# Patient Record
Sex: MALE | Race: WHITE | NOT HISPANIC OR LATINO
[De-identification: names, ages, dates, MRNs, and addresses within clinical notes are randomized per-mention and may not be internally consistent; named-entity substitution may affect disease eponyms.]

---

## 2017-01-03 ENCOUNTER — APPOINTMENT (OUTPATIENT)
Dept: ELECTROPHYSIOLOGY | Facility: CLINIC | Age: 72
End: 2017-01-03

## 2017-01-23 ENCOUNTER — OTHER (OUTPATIENT)
Age: 72
End: 2017-01-23

## 2017-02-21 ENCOUNTER — APPOINTMENT (OUTPATIENT)
Dept: ELECTROPHYSIOLOGY | Facility: CLINIC | Age: 72
End: 2017-02-21

## 2017-02-24 ENCOUNTER — APPOINTMENT (OUTPATIENT)
Dept: ELECTROPHYSIOLOGY | Facility: CLINIC | Age: 72
End: 2017-02-24

## 2017-02-24 ENCOUNTER — NON-APPOINTMENT (OUTPATIENT)
Age: 72
End: 2017-02-24

## 2017-02-24 VITALS
DIASTOLIC BLOOD PRESSURE: 62 MMHG | OXYGEN SATURATION: 98 % | SYSTOLIC BLOOD PRESSURE: 120 MMHG | HEIGHT: 68 IN | BODY MASS INDEX: 25.31 KG/M2 | WEIGHT: 167 LBS | HEART RATE: 78 BPM

## 2017-02-24 DIAGNOSIS — R61 GENERALIZED HYPERHIDROSIS: ICD-10-CM

## 2017-02-24 LAB
ALBUMIN SERPL ELPH-MCNC: 4.5 G/DL
ALP BLD-CCNC: 62 U/L
ALT SERPL-CCNC: 19 U/L
ANION GAP SERPL CALC-SCNC: 19 MMOL/L
AST SERPL-CCNC: 24 U/L
BASOPHILS # BLD AUTO: 0.02 K/UL
BASOPHILS NFR BLD AUTO: 0.3 %
BILIRUB SERPL-MCNC: 0.5 MG/DL
BUN SERPL-MCNC: 22 MG/DL
CALCIUM SERPL-MCNC: 10.3 MG/DL
CHLORIDE SERPL-SCNC: 96 MMOL/L
CO2 SERPL-SCNC: 26 MMOL/L
CREAT SERPL-MCNC: 1.06 MG/DL
EOSINOPHIL # BLD AUTO: 0.18 K/UL
EOSINOPHIL NFR BLD AUTO: 2.6 %
ERYTHROCYTE [SEDIMENTATION RATE] IN BLOOD BY WESTERGREN METHOD: 29 MM/HR
GLUCOSE SERPL-MCNC: 115 MG/DL
HCT VFR BLD CALC: 37.1 %
HGB BLD-MCNC: 12.9 G/DL
IMM GRANULOCYTES NFR BLD AUTO: 0.1 %
LDH SERPL-CCNC: 187 U/L
LYMPHOCYTES # BLD AUTO: 1.13 K/UL
LYMPHOCYTES NFR BLD AUTO: 16.4 %
MAN DIFF?: NORMAL
MCHC RBC-ENTMCNC: 34.8 GM/DL
MCHC RBC-ENTMCNC: 35.1 PG
MCV RBC AUTO: 100.8 FL
MONOCYTES # BLD AUTO: 0.39 K/UL
MONOCYTES NFR BLD AUTO: 5.6 %
NEUTROPHILS # BLD AUTO: 5.18 K/UL
NEUTROPHILS NFR BLD AUTO: 75 %
PLATELET # BLD AUTO: 248 K/UL
POTASSIUM SERPL-SCNC: 3.9 MMOL/L
PROT SERPL-MCNC: 8 G/DL
RBC # BLD: 3.68 M/UL
RBC # FLD: 14.8 %
SODIUM SERPL-SCNC: 141 MMOL/L
WBC # FLD AUTO: 6.91 K/UL

## 2017-03-06 ENCOUNTER — FORM ENCOUNTER (OUTPATIENT)
Age: 72
End: 2017-03-06

## 2017-03-07 ENCOUNTER — OUTPATIENT (OUTPATIENT)
Dept: OUTPATIENT SERVICES | Facility: HOSPITAL | Age: 72
LOS: 1 days | End: 2017-03-07
Payer: MEDICARE

## 2017-03-07 ENCOUNTER — APPOINTMENT (OUTPATIENT)
Dept: CT IMAGING | Facility: IMAGING CENTER | Age: 72
End: 2017-03-07

## 2017-03-07 DIAGNOSIS — Z98.89 OTHER SPECIFIED POSTPROCEDURAL STATES: Chronic | ICD-10-CM

## 2017-03-07 DIAGNOSIS — R61 GENERALIZED HYPERHIDROSIS: ICD-10-CM

## 2017-03-07 PROCEDURE — 70450 CT HEAD/BRAIN W/O DYE: CPT

## 2017-06-16 ENCOUNTER — OUTPATIENT (OUTPATIENT)
Dept: OUTPATIENT SERVICES | Facility: HOSPITAL | Age: 72
LOS: 1 days | End: 2017-06-16
Payer: MEDICARE

## 2017-06-16 ENCOUNTER — APPOINTMENT (OUTPATIENT)
Dept: CV DIAGNOSITCS | Facility: HOSPITAL | Age: 72
End: 2017-06-16

## 2017-06-16 ENCOUNTER — APPOINTMENT (OUTPATIENT)
Dept: CARDIOLOGY | Facility: CLINIC | Age: 72
End: 2017-06-16

## 2017-06-16 ENCOUNTER — NON-APPOINTMENT (OUTPATIENT)
Age: 72
End: 2017-06-16

## 2017-06-16 VITALS — HEART RATE: 81 BPM | DIASTOLIC BLOOD PRESSURE: 64 MMHG | SYSTOLIC BLOOD PRESSURE: 119 MMHG

## 2017-06-16 DIAGNOSIS — Z95.2 PRESENCE OF PROSTHETIC HEART VALVE: ICD-10-CM

## 2017-06-16 DIAGNOSIS — Z98.89 OTHER SPECIFIED POSTPROCEDURAL STATES: Chronic | ICD-10-CM

## 2017-06-16 PROCEDURE — 93306 TTE W/DOPPLER COMPLETE: CPT | Mod: 26

## 2017-06-16 PROCEDURE — 93306 TTE W/DOPPLER COMPLETE: CPT

## 2017-06-16 RX ORDER — PREDNISONE 20 MG/1
20 TABLET ORAL
Qty: 90 | Refills: 0 | Status: DISCONTINUED | COMMUNITY
Start: 2015-10-08 | End: 2017-06-16

## 2017-06-16 RX ORDER — HYDROCODONE BITARTRATE AND HOMATROPINE METHYLBROMIDE 5; 1.5 MG/5ML; MG/5ML
5-1.5 SOLUTION ORAL
Qty: 150 | Refills: 0 | Status: DISCONTINUED | COMMUNITY
Start: 2017-01-10 | End: 2017-06-16

## 2017-06-20 ENCOUNTER — APPOINTMENT (OUTPATIENT)
Dept: ELECTROPHYSIOLOGY | Facility: CLINIC | Age: 72
End: 2017-06-20

## 2017-06-20 ENCOUNTER — NON-APPOINTMENT (OUTPATIENT)
Age: 72
End: 2017-06-20

## 2017-06-20 VITALS — DIASTOLIC BLOOD PRESSURE: 52 MMHG | HEART RATE: 70 BPM | SYSTOLIC BLOOD PRESSURE: 109 MMHG

## 2017-07-28 ENCOUNTER — APPOINTMENT (OUTPATIENT)
Dept: CARDIOLOGY | Facility: CLINIC | Age: 72
End: 2017-07-28
Payer: MEDICARE

## 2017-07-28 ENCOUNTER — RX RENEWAL (OUTPATIENT)
Age: 72
End: 2017-07-28

## 2017-07-28 VITALS
OXYGEN SATURATION: 97 % | HEART RATE: 91 BPM | SYSTOLIC BLOOD PRESSURE: 124 MMHG | BODY MASS INDEX: 25.31 KG/M2 | HEIGHT: 68 IN | WEIGHT: 167 LBS | DIASTOLIC BLOOD PRESSURE: 68 MMHG | RESPIRATION RATE: 16 BRPM

## 2017-07-28 DIAGNOSIS — I25.2 OLD MYOCARDIAL INFARCTION: ICD-10-CM

## 2017-07-28 DIAGNOSIS — R51 HEADACHE: ICD-10-CM

## 2017-07-28 DIAGNOSIS — Z85.71 PERSONAL HISTORY OF HODGKIN LYMPHOMA: ICD-10-CM

## 2017-07-28 DIAGNOSIS — Z86.79 PERSONAL HISTORY OF OTHER DISEASES OF THE CIRCULATORY SYSTEM: ICD-10-CM

## 2017-07-28 PROCEDURE — 94620 PULMONARY STRESS TESTING SIMPLE: CPT | Mod: 59

## 2017-07-28 PROCEDURE — 93000 ELECTROCARDIOGRAM COMPLETE: CPT | Mod: 59

## 2017-07-28 PROCEDURE — 99205 OFFICE O/P NEW HI 60 MIN: CPT | Mod: 25

## 2017-08-11 LAB — ERYTHROCYTE [SEDIMENTATION RATE] IN BLOOD BY WESTERGREN METHOD: 22 MM/HR

## 2017-08-14 LAB
ANION GAP SERPL CALC-SCNC: 17 MMOL/L
BUN SERPL-MCNC: 31 MG/DL
CALCIUM SERPL-MCNC: 9.7 MG/DL
CHLORIDE SERPL-SCNC: 95 MMOL/L
CO2 SERPL-SCNC: 27 MMOL/L
CREAT SERPL-MCNC: 1.46 MG/DL
GLUCOSE SERPL-MCNC: 211 MG/DL
POTASSIUM SERPL-SCNC: 5 MMOL/L
SODIUM SERPL-SCNC: 139 MMOL/L

## 2017-08-22 ENCOUNTER — APPOINTMENT (OUTPATIENT)
Dept: CARDIOLOGY | Facility: CLINIC | Age: 72
End: 2017-08-22
Payer: MEDICARE

## 2017-08-22 VITALS
HEART RATE: 74 BPM | OXYGEN SATURATION: 95 % | RESPIRATION RATE: 17 BRPM | HEIGHT: 68 IN | SYSTOLIC BLOOD PRESSURE: 129 MMHG | BODY MASS INDEX: 25.46 KG/M2 | WEIGHT: 168 LBS | DIASTOLIC BLOOD PRESSURE: 69 MMHG

## 2017-08-22 PROCEDURE — 99215 OFFICE O/P EST HI 40 MIN: CPT

## 2017-09-26 ENCOUNTER — NON-APPOINTMENT (OUTPATIENT)
Age: 72
End: 2017-09-26

## 2017-09-26 ENCOUNTER — APPOINTMENT (OUTPATIENT)
Dept: ELECTROPHYSIOLOGY | Facility: CLINIC | Age: 72
End: 2017-09-26
Payer: MEDICARE

## 2017-09-26 VITALS — HEART RATE: 73 BPM | DIASTOLIC BLOOD PRESSURE: 52 MMHG | SYSTOLIC BLOOD PRESSURE: 117 MMHG

## 2017-09-26 PROCEDURE — 93284 PRGRMG EVAL IMPLANTABLE DFB: CPT

## 2017-09-26 PROCEDURE — 93000 ELECTROCARDIOGRAM COMPLETE: CPT | Mod: 59

## 2017-09-26 PROCEDURE — 99213 OFFICE O/P EST LOW 20 MIN: CPT | Mod: 25

## 2017-09-29 ENCOUNTER — RX RENEWAL (OUTPATIENT)
Age: 72
End: 2017-09-29

## 2017-09-29 ENCOUNTER — APPOINTMENT (OUTPATIENT)
Dept: CARDIOLOGY | Facility: CLINIC | Age: 72
End: 2017-09-29
Payer: MEDICARE

## 2017-09-29 VITALS
SYSTOLIC BLOOD PRESSURE: 122 MMHG | DIASTOLIC BLOOD PRESSURE: 70 MMHG | HEART RATE: 79 BPM | BODY MASS INDEX: 26.22 KG/M2 | HEIGHT: 68 IN | OXYGEN SATURATION: 95 % | RESPIRATION RATE: 17 BRPM | WEIGHT: 173 LBS

## 2017-09-29 PROCEDURE — 99215 OFFICE O/P EST HI 40 MIN: CPT

## 2017-10-18 ENCOUNTER — APPOINTMENT (OUTPATIENT)
Dept: CARDIOLOGY | Facility: CLINIC | Age: 72
End: 2017-10-18

## 2017-11-28 ENCOUNTER — APPOINTMENT (OUTPATIENT)
Dept: CARDIOLOGY | Facility: CLINIC | Age: 72
End: 2017-11-28
Payer: MEDICARE

## 2017-11-28 VITALS
BODY MASS INDEX: 26.52 KG/M2 | SYSTOLIC BLOOD PRESSURE: 120 MMHG | WEIGHT: 175 LBS | OXYGEN SATURATION: 97 % | DIASTOLIC BLOOD PRESSURE: 50 MMHG | HEIGHT: 68 IN | HEART RATE: 83 BPM

## 2017-11-28 DIAGNOSIS — I25.10 ATHEROSCLEROTIC HEART DISEASE OF NATIVE CORONARY ARTERY W/OUT ANGINA PECTORIS: ICD-10-CM

## 2017-11-28 PROCEDURE — 99214 OFFICE O/P EST MOD 30 MIN: CPT

## 2017-11-29 LAB
ALBUMIN SERPL ELPH-MCNC: 4.2 G/DL
ALP BLD-CCNC: 60 U/L
ALT SERPL-CCNC: 14 U/L
ANION GAP SERPL CALC-SCNC: 18 MMOL/L
AST SERPL-CCNC: 19 U/L
BASOPHILS # BLD AUTO: 0.02 K/UL
BASOPHILS NFR BLD AUTO: 0.2 %
BILIRUB SERPL-MCNC: 0.4 MG/DL
BUN SERPL-MCNC: 29 MG/DL
CALCIUM SERPL-MCNC: 10.1 MG/DL
CO2 SERPL-SCNC: 27 MMOL/L
CREAT SERPL-MCNC: 1.56 MG/DL
EOSINOPHIL # BLD AUTO: 0.16 K/UL
EOSINOPHIL NFR BLD AUTO: 1.8 %
GLUCOSE SERPL-MCNC: 249 MG/DL
HCT VFR BLD CALC: 35.7 %
HGB BLD-MCNC: 11.6 G/DL
IMM GRANULOCYTES NFR BLD AUTO: 0.2 %
LYMPHOCYTES # BLD AUTO: 1.11 K/UL
LYMPHOCYTES NFR BLD AUTO: 12.3 %
MAGNESIUM SERPL-MCNC: 2 MG/DL
MAN DIFF?: NORMAL
MCHC RBC-ENTMCNC: 32.6 PG
MONOCYTES # BLD AUTO: 0.56 K/UL
MONOCYTES NFR BLD AUTO: 6.2 %
NEUTROPHILS # BLD AUTO: 7.19 K/UL
NEUTROPHILS NFR BLD AUTO: 79.3 %
PLATELET # BLD AUTO: 315 K/UL
POTASSIUM SERPL-SCNC: 4.6 MMOL/L
PROT SERPL-MCNC: 7.7 G/DL
RBC # BLD: 3.56 M/UL
RBC # FLD: 14.5 %
WBC # FLD AUTO: 9.06 K/UL

## 2017-12-01 ENCOUNTER — APPOINTMENT (OUTPATIENT)
Dept: CARDIOLOGY | Facility: CLINIC | Age: 72
End: 2017-12-01
Payer: MEDICARE

## 2017-12-04 ENCOUNTER — NON-APPOINTMENT (OUTPATIENT)
Age: 72
End: 2017-12-04

## 2017-12-04 ENCOUNTER — APPOINTMENT (OUTPATIENT)
Dept: CARDIOLOGY | Facility: CLINIC | Age: 72
End: 2017-12-04
Payer: MEDICARE

## 2017-12-04 VITALS — DIASTOLIC BLOOD PRESSURE: 64 MMHG | OXYGEN SATURATION: 98 % | HEART RATE: 91 BPM | SYSTOLIC BLOOD PRESSURE: 115 MMHG

## 2017-12-04 PROCEDURE — 93000 ELECTROCARDIOGRAM COMPLETE: CPT

## 2017-12-04 PROCEDURE — 99214 OFFICE O/P EST MOD 30 MIN: CPT

## 2017-12-07 ENCOUNTER — APPOINTMENT (OUTPATIENT)
Dept: CV DIAGNOSITCS | Facility: HOSPITAL | Age: 72
End: 2017-12-07

## 2017-12-13 ENCOUNTER — OUTPATIENT (OUTPATIENT)
Dept: OUTPATIENT SERVICES | Facility: HOSPITAL | Age: 72
LOS: 1 days | End: 2017-12-13
Payer: MEDICARE

## 2017-12-13 ENCOUNTER — APPOINTMENT (OUTPATIENT)
Dept: CV DIAGNOSITCS | Facility: HOSPITAL | Age: 72
End: 2017-12-13

## 2017-12-13 DIAGNOSIS — Z98.89 OTHER SPECIFIED POSTPROCEDURAL STATES: Chronic | ICD-10-CM

## 2017-12-13 DIAGNOSIS — I50.22 CHRONIC SYSTOLIC (CONGESTIVE) HEART FAILURE: ICD-10-CM

## 2017-12-13 PROCEDURE — 93306 TTE W/DOPPLER COMPLETE: CPT

## 2017-12-13 PROCEDURE — 93306 TTE W/DOPPLER COMPLETE: CPT | Mod: 26

## 2017-12-14 ENCOUNTER — RESULT REVIEW (OUTPATIENT)
Age: 72
End: 2017-12-14

## 2017-12-18 ENCOUNTER — APPOINTMENT (OUTPATIENT)
Dept: CV DIAGNOSITCS | Facility: HOSPITAL | Age: 72
End: 2017-12-18

## 2018-01-22 ENCOUNTER — RX RENEWAL (OUTPATIENT)
Age: 73
End: 2018-01-22

## 2018-03-16 ENCOUNTER — APPOINTMENT (OUTPATIENT)
Dept: CARDIOLOGY | Facility: CLINIC | Age: 73
End: 2018-03-16
Payer: MEDICARE

## 2018-03-16 VITALS
HEART RATE: 78 BPM | BODY MASS INDEX: 26.22 KG/M2 | WEIGHT: 173 LBS | SYSTOLIC BLOOD PRESSURE: 103 MMHG | HEIGHT: 68 IN | OXYGEN SATURATION: 98 % | DIASTOLIC BLOOD PRESSURE: 60 MMHG | RESPIRATION RATE: 16 BRPM

## 2018-03-16 DIAGNOSIS — I42.7 CARDIOMYOPATHY DUE TO DRUG AND EXTERNAL AGENT: ICD-10-CM

## 2018-03-16 DIAGNOSIS — Z86.79 PERSONAL HISTORY OF OTHER DISEASES OF THE CIRCULATORY SYSTEM: ICD-10-CM

## 2018-03-16 DIAGNOSIS — F32.9 MAJOR DEPRESSIVE DISORDER, SINGLE EPISODE, UNSPECIFIED: ICD-10-CM

## 2018-03-16 PROCEDURE — 99215 OFFICE O/P EST HI 40 MIN: CPT

## 2018-03-16 RX ORDER — ATORVASTATIN CALCIUM 20 MG/1
20 TABLET, FILM COATED ORAL
Qty: 90 | Refills: 0 | Status: DISCONTINUED | COMMUNITY
Start: 2017-05-15 | End: 2018-03-16

## 2018-03-28 LAB
ANION GAP SERPL CALC-SCNC: 18 MMOL/L
BUN SERPL-MCNC: 31 MG/DL
CALCIUM SERPL-MCNC: 10.3 MG/DL
CHLORIDE SERPL-SCNC: 93 MMOL/L
CO2 SERPL-SCNC: 26 MMOL/L
CREAT SERPL-MCNC: 1.55 MG/DL
GLUCOSE SERPL-MCNC: 273 MG/DL
NT-PROBNP SERPL-MCNC: 804 PG/ML
POTASSIUM SERPL-SCNC: 4.7 MMOL/L
SODIUM SERPL-SCNC: 137 MMOL/L
TSH SERPL-ACNC: 5.05 UIU/ML

## 2018-04-17 LAB — DIGOXIN SERPL-MCNC: 1.2 NG/ML

## 2018-05-08 ENCOUNTER — APPOINTMENT (OUTPATIENT)
Dept: ELECTROPHYSIOLOGY | Facility: CLINIC | Age: 73
End: 2018-05-08

## 2018-07-03 ENCOUNTER — APPOINTMENT (OUTPATIENT)
Dept: ELECTROPHYSIOLOGY | Facility: CLINIC | Age: 73
End: 2018-07-03
Payer: MEDICARE

## 2018-07-03 ENCOUNTER — NON-APPOINTMENT (OUTPATIENT)
Age: 73
End: 2018-07-03

## 2018-07-03 VITALS — WEIGHT: 170 LBS | HEIGHT: 68 IN | BODY MASS INDEX: 25.76 KG/M2

## 2018-07-03 VITALS — OXYGEN SATURATION: 98 %

## 2018-07-03 VITALS — DIASTOLIC BLOOD PRESSURE: 50 MMHG | HEART RATE: 74 BPM | SYSTOLIC BLOOD PRESSURE: 109 MMHG

## 2018-07-03 PROCEDURE — 99214 OFFICE O/P EST MOD 30 MIN: CPT | Mod: 25

## 2018-07-03 PROCEDURE — 93000 ELECTROCARDIOGRAM COMPLETE: CPT | Mod: 59

## 2018-07-03 PROCEDURE — 93284 PRGRMG EVAL IMPLANTABLE DFB: CPT

## 2018-07-03 RX ORDER — AMOXICILLIN 500 MG/1
500 CAPSULE ORAL
Qty: 16 | Refills: 0 | Status: DISCONTINUED | COMMUNITY
Start: 2018-06-02

## 2018-07-13 ENCOUNTER — APPOINTMENT (OUTPATIENT)
Dept: ENDOCRINOLOGY | Facility: CLINIC | Age: 73
End: 2018-07-13

## 2018-07-19 ENCOUNTER — APPOINTMENT (OUTPATIENT)
Dept: ENDOCRINOLOGY | Facility: CLINIC | Age: 73
End: 2018-07-19

## 2018-09-13 ENCOUNTER — APPOINTMENT (OUTPATIENT)
Dept: ENDOCRINOLOGY | Facility: CLINIC | Age: 73
End: 2018-09-13

## 2018-10-12 ENCOUNTER — APPOINTMENT (OUTPATIENT)
Dept: ELECTROPHYSIOLOGY | Facility: CLINIC | Age: 73
End: 2018-10-12
Payer: MEDICARE

## 2018-10-12 ENCOUNTER — NON-APPOINTMENT (OUTPATIENT)
Age: 73
End: 2018-10-12

## 2018-10-12 VITALS
BODY MASS INDEX: 26.22 KG/M2 | OXYGEN SATURATION: 97 % | SYSTOLIC BLOOD PRESSURE: 115 MMHG | HEIGHT: 68 IN | WEIGHT: 173 LBS | HEART RATE: 71 BPM | DIASTOLIC BLOOD PRESSURE: 68 MMHG

## 2018-10-12 PROCEDURE — 93284 PRGRMG EVAL IMPLANTABLE DFB: CPT

## 2018-10-12 PROCEDURE — 99213 OFFICE O/P EST LOW 20 MIN: CPT

## 2018-10-12 PROCEDURE — 93000 ELECTROCARDIOGRAM COMPLETE: CPT | Mod: 59

## 2019-01-24 ENCOUNTER — APPOINTMENT (OUTPATIENT)
Dept: ELECTROPHYSIOLOGY | Facility: CLINIC | Age: 74
End: 2019-01-24

## 2019-05-14 ENCOUNTER — APPOINTMENT (OUTPATIENT)
Dept: ELECTROPHYSIOLOGY | Facility: CLINIC | Age: 74
End: 2019-05-14
Payer: MEDICARE

## 2019-05-14 VITALS — HEART RATE: 75 BPM | RESPIRATION RATE: 14 BRPM | SYSTOLIC BLOOD PRESSURE: 111 MMHG | DIASTOLIC BLOOD PRESSURE: 55 MMHG

## 2019-05-14 DIAGNOSIS — I31.3 PERICARDIAL EFFUSION (NONINFLAMMATORY): ICD-10-CM

## 2019-05-14 PROCEDURE — 93283 PRGRMG EVAL IMPLANTABLE DFB: CPT

## 2019-05-14 PROCEDURE — 99214 OFFICE O/P EST MOD 30 MIN: CPT

## 2019-05-14 RX ORDER — LOSARTAN POTASSIUM 25 MG/1
25 TABLET, FILM COATED ORAL
Qty: 90 | Refills: 0 | Status: DISCONTINUED | COMMUNITY
Start: 2017-01-06 | End: 2019-05-14

## 2019-05-14 RX ORDER — SPIRONOLACTONE 25 MG/1
25 TABLET ORAL
Qty: 1 | Refills: 3 | Status: DISCONTINUED | COMMUNITY
Start: 2017-07-28 | End: 2019-05-14

## 2019-05-14 RX ORDER — METOPROLOL SUCCINATE 50 MG/1
50 TABLET, EXTENDED RELEASE ORAL DAILY
Qty: 135 | Refills: 2 | Status: DISCONTINUED | COMMUNITY
Start: 2016-11-28 | End: 2019-05-14

## 2019-05-14 NOTE — DISCUSSION/SUMMARY
[FreeTextEntry1] : In summary, Al Hutton is a 72y/o man with Hx of Hodgkins lymphoma, HTN, DM, HLD, severe AS s/p TAVR, chronic systolic CHF with EF 34%, LBBB (EF improved to 40-45% with CRT-D and AVR), and MI s/p BiV ICD and paroxysmal afib (very brief, not on AC) who presents today for routine device check and follow up. Was recently hospitalized in [place] for suspected pericardial effusion and 900cc of blood was aspirated. Now off Plavix and only on ASA. Admits doing well currently with no issues or complaints. Denies chest pain, palpitations, SOB, syncope or near syncope. ICD check today revealed device in good working status. Has history fo brief episodes of afib but no sustained episodes. Given recent pericardial effusion, risk of AC may outweigh benefit at this time. Will remain off AC and set afib burden alert on device. Will continue remote monitoring and in office follow up as scheduled and RTO for f/u in 6 months. \par \par Sincerely,\par \par Sotero Godinez MD

## 2019-05-14 NOTE — REASON FOR VISIT
[Follow-Up - Clinic] : a clinic follow-up of [AICD Check] : implantable cardioverter-defibrillator [Atrial Fibrillation] : atrial fibrillation

## 2019-05-14 NOTE — HISTORY OF PRESENT ILLNESS
[FreeTextEntry1] : Del Kemp MD \par \par I saw Al Hutton on May 14, 2019. As you know, he is a 72y/o man with Hx of Hodgkins lymphoma, HTN, DM, HLD, severe AS s/p TAVR, chronic systolic CHF with EF 34%, LBBB (EF improved to 40-45% with CRT-D and AVR), and MI s/p BiV ICD and paroxysmal afib (very brief, not on AC) who presents today for routine device check and follow up. Was recently hospitalized in [place] for suspected pericardial effusion and 900cc of blood was aspirated. Now off Plavix and only on ASA. Admits doing well currently with no issues or complaints. Denies chest pain, palpitations, SOB, syncope or near syncope.

## 2019-05-14 NOTE — PHYSICAL EXAM
[Normal Appearance] : normal appearance [General Appearance - Well Developed] : well developed [Well Groomed] : well groomed [General Appearance - Well Nourished] : well nourished [No Deformities] : no deformities [General Appearance - In No Acute Distress] : no acute distress [Normal Conjunctiva] : the conjunctiva exhibited no abnormalities [Eyelids - No Xanthelasma] : the eyelids demonstrated no xanthelasmas [Normal Oral Mucosa] : normal oral mucosa [No Oral Pallor] : no oral pallor [No Oral Cyanosis] : no oral cyanosis [Normal Jugular Venous A Waves Present] : normal jugular venous A waves present [Normal Jugular Venous V Waves Present] : normal jugular venous V waves present [No Jugular Venous Katz A Waves] : no jugular venous katz A waves [Respiration, Rhythm And Depth] : normal respiratory rhythm and effort [Exaggerated Use Of Accessory Muscles For Inspiration] : no accessory muscle use [Auscultation Breath Sounds / Voice Sounds] : lungs were clear to auscultation bilaterally [Heart Rate And Rhythm] : heart rate and rhythm were normal [Heart Sounds] : normal S1 and S2 [Abdomen Soft] : soft [Abdomen Tenderness] : non-tender [Abdomen Mass (___ Cm)] : no abdominal mass palpated [Abnormal Walk] : normal gait [Gait - Sufficient For Exercise Testing] : the gait was sufficient for exercise testing [Nail Clubbing] : no clubbing of the fingernails [Cyanosis, Localized] : no localized cyanosis [Petechial Hemorrhages (___cm)] : no petechial hemorrhages [Skin Color & Pigmentation] : normal skin color and pigmentation [] : no rash [No Venous Stasis] : no venous stasis [Skin Lesions] : no skin lesions [No Xanthoma] : no  xanthoma was observed [No Skin Ulcers] : no skin ulcer [Oriented To Time, Place, And Person] : oriented to person, place, and time [Affect] : the affect was normal [No Anxiety] : not feeling anxious [Mood] : the mood was normal [FreeTextEntry1] : murmur

## 2019-05-28 ENCOUNTER — INPATIENT (INPATIENT)
Facility: HOSPITAL | Age: 74
LOS: 3 days | Discharge: HOME | End: 2019-06-01
Attending: INTERNAL MEDICINE | Admitting: INTERNAL MEDICINE
Payer: MEDICARE

## 2019-05-28 VITALS
SYSTOLIC BLOOD PRESSURE: 148 MMHG | TEMPERATURE: 99 F | RESPIRATION RATE: 16 BRPM | HEART RATE: 84 BPM | OXYGEN SATURATION: 99 % | DIASTOLIC BLOOD PRESSURE: 54 MMHG

## 2019-05-28 DIAGNOSIS — I50.22 CHRONIC SYSTOLIC (CONGESTIVE) HEART FAILURE: ICD-10-CM

## 2019-05-28 DIAGNOSIS — I21.9 ACUTE MYOCARDIAL INFARCTION, UNSPECIFIED: ICD-10-CM

## 2019-05-28 DIAGNOSIS — E11.9 TYPE 2 DIABETES MELLITUS WITHOUT COMPLICATIONS: ICD-10-CM

## 2019-05-28 DIAGNOSIS — R06.00 DYSPNEA, UNSPECIFIED: ICD-10-CM

## 2019-05-28 DIAGNOSIS — Z98.89 OTHER SPECIFIED POSTPROCEDURAL STATES: Chronic | ICD-10-CM

## 2019-05-28 DIAGNOSIS — I10 ESSENTIAL (PRIMARY) HYPERTENSION: ICD-10-CM

## 2019-05-28 DIAGNOSIS — Z98.890 OTHER SPECIFIED POSTPROCEDURAL STATES: Chronic | ICD-10-CM

## 2019-05-28 DIAGNOSIS — D64.9 ANEMIA, UNSPECIFIED: ICD-10-CM

## 2019-05-28 DIAGNOSIS — F41.9 ANXIETY DISORDER, UNSPECIFIED: ICD-10-CM

## 2019-05-28 DIAGNOSIS — Z95.810 PRESENCE OF AUTOMATIC (IMPLANTABLE) CARDIAC DEFIBRILLATOR: Chronic | ICD-10-CM

## 2019-05-28 DIAGNOSIS — Z95.2 PRESENCE OF PROSTHETIC HEART VALVE: Chronic | ICD-10-CM

## 2019-05-28 DIAGNOSIS — E03.9 HYPOTHYROIDISM, UNSPECIFIED: ICD-10-CM

## 2019-05-28 DIAGNOSIS — I35.0 NONRHEUMATIC AORTIC (VALVE) STENOSIS: ICD-10-CM

## 2019-05-28 LAB
ALBUMIN SERPL ELPH-MCNC: 4.3 G/DL — SIGNIFICANT CHANGE UP (ref 3.3–5)
ALP SERPL-CCNC: 67 U/L — SIGNIFICANT CHANGE UP (ref 40–120)
ALT FLD-CCNC: 10 U/L — SIGNIFICANT CHANGE UP (ref 4–41)
ANION GAP SERPL CALC-SCNC: 13 MMO/L — SIGNIFICANT CHANGE UP (ref 7–14)
APTT BLD: 36.7 SEC — HIGH (ref 27.5–36.3)
AST SERPL-CCNC: 15 U/L — SIGNIFICANT CHANGE UP (ref 4–40)
BASE EXCESS BLDV CALC-SCNC: 7 MMOL/L — SIGNIFICANT CHANGE UP
BASOPHILS # BLD AUTO: 0.03 K/UL — SIGNIFICANT CHANGE UP (ref 0–0.2)
BASOPHILS NFR BLD AUTO: 0.5 % — SIGNIFICANT CHANGE UP (ref 0–2)
BILIRUB SERPL-MCNC: 0.4 MG/DL — SIGNIFICANT CHANGE UP (ref 0.2–1.2)
BLOOD GAS VENOUS - CREATININE: 1.02 MG/DL — SIGNIFICANT CHANGE UP (ref 0.5–1.3)
BLOOD GAS VENOUS - FIO2: 21 — SIGNIFICANT CHANGE UP
BUN SERPL-MCNC: 10 MG/DL — SIGNIFICANT CHANGE UP (ref 7–23)
CALCIUM SERPL-MCNC: 9.6 MG/DL — SIGNIFICANT CHANGE UP (ref 8.4–10.5)
CHLORIDE BLDV-SCNC: 97 MMOL/L — SIGNIFICANT CHANGE UP (ref 96–108)
CHLORIDE SERPL-SCNC: 95 MMOL/L — LOW (ref 98–107)
CO2 SERPL-SCNC: 30 MMOL/L — SIGNIFICANT CHANGE UP (ref 22–31)
CREAT SERPL-MCNC: 1.11 MG/DL — SIGNIFICANT CHANGE UP (ref 0.5–1.3)
EOSINOPHIL # BLD AUTO: 0.21 K/UL — SIGNIFICANT CHANGE UP (ref 0–0.5)
EOSINOPHIL NFR BLD AUTO: 3.6 % — SIGNIFICANT CHANGE UP (ref 0–6)
GAS PNL BLDV: 137 MMOL/L — SIGNIFICANT CHANGE UP (ref 136–146)
GLUCOSE BLDV-MCNC: 137 MG/DL — HIGH (ref 70–99)
GLUCOSE SERPL-MCNC: 140 MG/DL — HIGH (ref 70–99)
HCO3 BLDV-SCNC: 29 MMOL/L — HIGH (ref 20–27)
HCT VFR BLD CALC: 33.3 % — LOW (ref 39–50)
HCT VFR BLDV CALC: 33.9 % — LOW (ref 39–51)
HGB BLD-MCNC: 10.8 G/DL — LOW (ref 13–17)
HGB BLDV-MCNC: 11 G/DL — LOW (ref 13–17)
IMM GRANULOCYTES NFR BLD AUTO: 0.5 % — SIGNIFICANT CHANGE UP (ref 0–1.5)
INR BLD: 1.17 — SIGNIFICANT CHANGE UP (ref 0.88–1.17)
LACTATE BLDV-MCNC: 1 MMOL/L — SIGNIFICANT CHANGE UP (ref 0.5–2)
LYMPHOCYTES # BLD AUTO: 0.65 K/UL — LOW (ref 1–3.3)
LYMPHOCYTES # BLD AUTO: 11.1 % — LOW (ref 13–44)
MCHC RBC-ENTMCNC: 30.4 PG — SIGNIFICANT CHANGE UP (ref 27–34)
MCHC RBC-ENTMCNC: 32.4 % — SIGNIFICANT CHANGE UP (ref 32–36)
MCV RBC AUTO: 93.8 FL — SIGNIFICANT CHANGE UP (ref 80–100)
MONOCYTES # BLD AUTO: 0.42 K/UL — SIGNIFICANT CHANGE UP (ref 0–0.9)
MONOCYTES NFR BLD AUTO: 7.2 % — SIGNIFICANT CHANGE UP (ref 2–14)
NEUTROPHILS # BLD AUTO: 4.5 K/UL — SIGNIFICANT CHANGE UP (ref 1.8–7.4)
NEUTROPHILS NFR BLD AUTO: 77.1 % — HIGH (ref 43–77)
NRBC # FLD: 0 K/UL — SIGNIFICANT CHANGE UP (ref 0–0)
NT-PROBNP SERPL-SCNC: 4718 PG/ML — SIGNIFICANT CHANGE UP
PCO2 BLDV: 53 MMHG — HIGH (ref 41–51)
PH BLDV: 7.4 PH — SIGNIFICANT CHANGE UP (ref 7.32–7.43)
PLATELET # BLD AUTO: 409 K/UL — HIGH (ref 150–400)
PMV BLD: 8.6 FL — SIGNIFICANT CHANGE UP (ref 7–13)
PO2 BLDV: 22 MMHG — LOW (ref 35–40)
POTASSIUM BLDV-SCNC: 4.1 MMOL/L — SIGNIFICANT CHANGE UP (ref 3.4–4.5)
POTASSIUM SERPL-MCNC: 4.3 MMOL/L — SIGNIFICANT CHANGE UP (ref 3.5–5.3)
POTASSIUM SERPL-SCNC: 4.3 MMOL/L — SIGNIFICANT CHANGE UP (ref 3.5–5.3)
PROT SERPL-MCNC: 7.9 G/DL — SIGNIFICANT CHANGE UP (ref 6–8.3)
PROTHROM AB SERPL-ACNC: 13.1 SEC — SIGNIFICANT CHANGE UP (ref 9.8–13.1)
RBC # BLD: 3.55 M/UL — LOW (ref 4.2–5.8)
RBC # FLD: 15 % — HIGH (ref 10.3–14.5)
SAO2 % BLDV: 28.1 % — LOW (ref 60–85)
SODIUM SERPL-SCNC: 138 MMOL/L — SIGNIFICANT CHANGE UP (ref 135–145)
TROPONIN T, HIGH SENSITIVITY: 25 NG/L — SIGNIFICANT CHANGE UP (ref ?–14)
TROPONIN T, HIGH SENSITIVITY: 29 NG/L — SIGNIFICANT CHANGE UP (ref ?–14)
WBC # BLD: 5.84 K/UL — SIGNIFICANT CHANGE UP (ref 3.8–10.5)
WBC # FLD AUTO: 5.84 K/UL — SIGNIFICANT CHANGE UP (ref 3.8–10.5)

## 2019-05-28 PROCEDURE — 93284 PRGRMG EVAL IMPLANTABLE DFB: CPT | Mod: 26

## 2019-05-28 PROCEDURE — 71046 X-RAY EXAM CHEST 2 VIEWS: CPT | Mod: 26

## 2019-05-28 PROCEDURE — 93306 TTE W/DOPPLER COMPLETE: CPT | Mod: 26

## 2019-05-28 PROCEDURE — 99222 1ST HOSP IP/OBS MODERATE 55: CPT

## 2019-05-28 PROCEDURE — 71250 CT THORAX DX C-: CPT | Mod: 26

## 2019-05-28 RX ORDER — ACETAMINOPHEN 500 MG
975 TABLET ORAL ONCE
Refills: 0 | Status: COMPLETED | OUTPATIENT
Start: 2019-05-28 | End: 2019-05-28

## 2019-05-28 RX ORDER — SODIUM CHLORIDE 9 MG/ML
1000 INJECTION, SOLUTION INTRAVENOUS
Refills: 0 | Status: DISCONTINUED | OUTPATIENT
Start: 2019-05-28 | End: 2019-06-01

## 2019-05-28 RX ORDER — INSULIN LISPRO 100/ML
VIAL (ML) SUBCUTANEOUS
Refills: 0 | Status: DISCONTINUED | OUTPATIENT
Start: 2019-05-28 | End: 2019-06-01

## 2019-05-28 RX ORDER — LEVOTHYROXINE SODIUM 125 MCG
125 TABLET ORAL DAILY
Refills: 0 | Status: DISCONTINUED | OUTPATIENT
Start: 2019-05-28 | End: 2019-06-01

## 2019-05-28 RX ORDER — FUROSEMIDE 40 MG
20 TABLET ORAL
Refills: 0 | Status: DISCONTINUED | OUTPATIENT
Start: 2019-05-28 | End: 2019-05-28

## 2019-05-28 RX ORDER — GLUCAGON INJECTION, SOLUTION 0.5 MG/.1ML
1 INJECTION, SOLUTION SUBCUTANEOUS ONCE
Refills: 0 | Status: DISCONTINUED | OUTPATIENT
Start: 2019-05-28 | End: 2019-06-01

## 2019-05-28 RX ORDER — LOSARTAN POTASSIUM 100 MG/1
25 TABLET, FILM COATED ORAL DAILY
Refills: 0 | Status: DISCONTINUED | OUTPATIENT
Start: 2019-05-28 | End: 2019-06-01

## 2019-05-28 RX ORDER — CLONAZEPAM 1 MG
1 TABLET ORAL DAILY
Refills: 0 | Status: DISCONTINUED | OUTPATIENT
Start: 2019-05-28 | End: 2019-06-01

## 2019-05-28 RX ORDER — ATORVASTATIN CALCIUM 80 MG/1
80 TABLET, FILM COATED ORAL AT BEDTIME
Refills: 0 | Status: DISCONTINUED | OUTPATIENT
Start: 2019-05-28 | End: 2019-06-01

## 2019-05-28 RX ORDER — METOPROLOL TARTRATE 50 MG
25 TABLET ORAL DAILY
Refills: 0 | Status: DISCONTINUED | OUTPATIENT
Start: 2019-05-28 | End: 2019-05-30

## 2019-05-28 RX ORDER — ASPIRIN/CALCIUM CARB/MAGNESIUM 324 MG
81 TABLET ORAL DAILY
Refills: 0 | Status: DISCONTINUED | OUTPATIENT
Start: 2019-05-28 | End: 2019-06-01

## 2019-05-28 RX ORDER — FUROSEMIDE 40 MG
20 TABLET ORAL DAILY
Refills: 0 | Status: DISCONTINUED | OUTPATIENT
Start: 2019-05-28 | End: 2019-05-29

## 2019-05-28 RX ORDER — DEXTROSE 50 % IN WATER 50 %
12.5 SYRINGE (ML) INTRAVENOUS ONCE
Refills: 0 | Status: DISCONTINUED | OUTPATIENT
Start: 2019-05-28 | End: 2019-06-01

## 2019-05-28 RX ORDER — DEXTROSE 50 % IN WATER 50 %
25 SYRINGE (ML) INTRAVENOUS ONCE
Refills: 0 | Status: DISCONTINUED | OUTPATIENT
Start: 2019-05-28 | End: 2019-06-01

## 2019-05-28 RX ORDER — HEPARIN SODIUM 5000 [USP'U]/ML
5000 INJECTION INTRAVENOUS; SUBCUTANEOUS EVERY 8 HOURS
Refills: 0 | Status: DISCONTINUED | OUTPATIENT
Start: 2019-05-28 | End: 2019-06-01

## 2019-05-28 RX ORDER — DEXTROSE 50 % IN WATER 50 %
15 SYRINGE (ML) INTRAVENOUS ONCE
Refills: 0 | Status: DISCONTINUED | OUTPATIENT
Start: 2019-05-28 | End: 2019-06-01

## 2019-05-28 RX ORDER — FLUOXETINE HCL 10 MG
20 CAPSULE ORAL DAILY
Refills: 0 | Status: DISCONTINUED | OUTPATIENT
Start: 2019-05-28 | End: 2019-06-01

## 2019-05-28 RX ORDER — ACETAMINOPHEN 500 MG
650 TABLET ORAL EVERY 6 HOURS
Refills: 0 | Status: DISCONTINUED | OUTPATIENT
Start: 2019-05-28 | End: 2019-06-01

## 2019-05-28 RX ADMIN — ATORVASTATIN CALCIUM 80 MILLIGRAM(S): 80 TABLET, FILM COATED ORAL at 22:27

## 2019-05-28 RX ADMIN — Medication 20 MILLIGRAM(S): at 18:48

## 2019-05-28 RX ADMIN — Medication 650 MILLIGRAM(S): at 19:47

## 2019-05-28 RX ADMIN — Medication 975 MILLIGRAM(S): at 12:30

## 2019-05-28 RX ADMIN — HEPARIN SODIUM 5000 UNIT(S): 5000 INJECTION INTRAVENOUS; SUBCUTANEOUS at 22:27

## 2019-05-28 RX ADMIN — Medication 650 MILLIGRAM(S): at 18:47

## 2019-05-28 NOTE — ED ADULT NURSE NOTE - PSH
H/O bilateral inguinal hernia repair    H/O carotid endarterectomy  bilateral  History of carotid endarterectomy

## 2019-05-28 NOTE — H&P ADULT - NSICDXPASTSURGICALHX_GEN_ALL_CORE_FT
PAST SURGICAL HISTORY:  H/O bilateral inguinal hernia repair     H/O carotid endarterectomy bilateral    History of umbilical hernia repair     S/P ICD (internal cardiac defibrillator) procedure [employer]    S/P TAVR (transcatheter aortic valve replacement) 10/16 in [place]

## 2019-05-28 NOTE — ED PROVIDER NOTE - OBJECTIVE STATEMENT
Corinne Lindsay MD: 74yo M with PMH of cardiac arrest, MI in 1994, ICD, HTN, HLD, hypothyroidism and lung fibrosis s/p radiation for non-hodgkin lymphoma, s/p TAVR, carotid endarterectomy x2, CHF, pericardial effusion and tamponade s/p pericardiocentesis 4/15/19 presents with SOB for 4 days. SOB worse with bending down, not with walking. No chest pain. Pt states that his BP has been high for past 4 days around 150/80. Decreased appetite. No diaphoresis, N/V, syncope, lightheadedness, irregular rhythm, palpitations, leg swelling or focal neuro deficit. No weight loss, night sweats.     Meds: asa 81, metoprolol 25, losartan 25, Lasix 20, actos 60, lipitor 80, prozac 20, klonopin 1, synthroid 0.125

## 2019-05-28 NOTE — ED ADULT NURSE NOTE - OBJECTIVE STATEMENT
patient alert ox3 came in c/o increasing in SOB with cough since 1week. denies having fever. h/o pericardial effusion 3 weeks ago while visiting  [place] and had a pericardiocentesis done. h/o MI and has a pacemaker. skin warm and d ry. CM shows NSR. labs done as ordered, awaiting results and re eval.

## 2019-05-28 NOTE — ED PROVIDER NOTE - CLINICAL SUMMARY MEDICAL DECISION MAKING FREE TEXT BOX
Corinne Lindsay MD: 72yo M with PMH of cardiac arrest, MI in 1994, ICD, HTN, HLD, hypothyroidism and lung fibrosis s/p radiation for non-hodgkin lymphoma, s/p TAVR, carotid endarterectomy x2, CHF, pericardial effusion and tamponade s/p pericardiocentesis 4/15/19 presents with SOB for 4 days Corinne Lindsay MD: 72yo M with PMH of cardiac arrest, MI in 1994, ICD, HTN, HLD, hypothyroidism and lung fibrosis s/p radiation for non-hodgkin lymphoma, s/p TAVR, carotid endarterectomy x2, CHF, pericardial effusion and tamponade s/p pericardiocentesis 4/15/19 presents with SOB for 4 days. Pt hemodynamically stable, hypertensive, afebrile. Decreased lungs sounds in L lung base. Heart sounds normal. No LE edema. Concern for pleural effusion vs pericardial effusion. Plan: labs, trop, proBNP, EKG, CXR, echo

## 2019-05-28 NOTE — ED PROVIDER NOTE - PMH
Acquired hypothyroidism    Diabetes mellitus    HLD (hyperlipidemia)    Hodgkin lymphoma  on remission, chemo and rad [id number]  HTN (hypertension)    Hx of Hodgkins lymphoma    Iatrogenic hypothyroidism    ICD (implantable cardioverter-defibrillator) in place    Myocardial infarction  1994  Myocardial infarction involving other coronary artery  no stents  Myocardial infarction, old    Pacemaker    Type 2 diabetes mellitus without complication

## 2019-05-28 NOTE — H&P ADULT - PROBLEM SELECTOR PLAN 1
Admit to Telemetry, Check CT Chest r/o CHF vs PNA, echo done- no pericardial effusion, IV Lasix, Pulmonary c/s to be called by MD, F/U Cardiology note

## 2019-05-28 NOTE — ED ADULT NURSE NOTE - PMH
Acquired hypothyroidism    Diabetes mellitus    HLD (hyperlipidemia)    Hodgkin lymphoma  on remission, chemo and rad [id number]  HTN (hypertension)    Hx of Hodgkins lymphoma    Iatrogenic hypothyroidism    ICD (implantable cardioverter-defibrillator) in place    Myocardial infarction  1994  Myocardial infarction involving other coronary artery  no stents  Myocardial infarction, old    Pacemaker    Type 2 diabetes mellitus without complication

## 2019-05-28 NOTE — H&P ADULT - NSICDXPASTMEDICALHX_GEN_ALL_CORE_FT
PAST MEDICAL HISTORY:  Acquired hypothyroidism     AS (aortic stenosis) s/p TAVR    HLD (hyperlipidemia)     Hodgkin lymphoma on remission, chemo and rad [id number]    HTN (hypertension)     Lung fibrosis after RT in 80's    Myocardial infarction Cardiac Arrest- 1994- no stents    Pericardial effusion drained in [place] in 4/19    Systolic CHF, chronic s/p ICD    Type 2 diabetes mellitus without complication

## 2019-05-28 NOTE — CONSULT NOTE ADULT - ASSESSMENT
Patient is a 73y old male with past medical history of remote Hodgkins lymphoma, hypertension, hyperlipidemia, severe AS s/p TAVR, chronic systolic CHF with EF 34%, LBBB, MI s/p BiV-ICD (EF improved to 40-45%) who, in  April, was hospitalized in [place] for pericardial effusion requiring draining. Now admitted with similar symptoms. CXR with bilateral pleural effusions, right > left. Patient is a 73y old male with past medical history of remote Hodgkins lymphoma, hypertension, hyperlipidemia, severe AS s/p TAVR, chronic systolic CHF with EF 34%, LBBB, MI s/p BiV-ICD (EF improved to 40-45%) who, in  April, was hospitalized in [place] for pericardial effusion requiring draining. Now admitted with similar symptoms. CXR with bilateral pleural effusions, right > left.  Plan:  Recommend diuresis Patient is a 73y old male with past medical history of remote Hodgkins lymphoma, hypertension, hyperlipidemia, severe AS s/p TAVR, chronic systolic CHF with EF 34%, LBBB, MI s/p BiV-ICD (EF improved to 40-45%) who, in  April, was hospitalized in [place] for pericardial effusion requiring draining. Now admitted with similar symptoms. CXR with bilateral pleural effusions, right > left.  Plan:  Recommend diuresis- agree with Lasix 20mg IV push daily

## 2019-05-28 NOTE — ED PROVIDER NOTE - ATTENDING CONTRIBUTION TO CARE
Attending note:   After face to face evaluation of this patient, I concur with above noted hx, pe, and care plan for this patient.  74 y/o M with previous pericardial effusion requiring drainage (april 2019), chf on lower dose of lasix for a month with increasing sob, decreased breath sounds on left.  Evaluation in progress.   Heart sounds easily audible.

## 2019-05-28 NOTE — H&P ADULT - [facility] HE PE MLT D E PC
Problem: Falls - Risk of  Goal: *Absence of Falls  Document Yu Fall Risk and appropriate interventions in the flowsheet. Outcome: Progressing Towards Goal  Fall Risk Interventions:  Mobility Interventions: Patient to call before getting OOB           Medication Interventions: Assess postural VS orthostatic hypotension     Elimination Interventions:  Toilet paper/wipes in reach no murmur/regular rate and rhythm

## 2019-05-28 NOTE — ED PROVIDER NOTE - NS ED ROS FT
Corinne Lindsay MD:   General: denies fever, chills  HENT: denies nasal congestion, sore throat, rhinorrhea  Eyes: denies vision changes  CV: denies chest pain  Resp: +difficulty breathing, cough  Abdominal: denies nausea, vomiting, diarrhea, abdominal pain, blood in stool, dark stool  : denies pain with urination  MSK: denies recent trauma  Neuro: denies headaches, numbness, tingling, dizziness, lightheadedness.  Skin: denies new rashes  Endocrine: denies recent weight loss

## 2019-05-28 NOTE — H&P ADULT - NEGATIVE ENMT SYMPTOMS
no throat pain/no dysphagia/no vertigo/no tinnitus/no sinus symptoms/no hearing difficulty/no ear pain/no nasal discharge

## 2019-05-28 NOTE — H&P ADULT - HISTORY OF PRESENT ILLNESS
72 yo M s/p recent pericardiocentesis in 4/19 in [place], reportedly had 900cc of non bacterial/ non malignant blood drained from pericardium as per pt. Pt now p/w SOB especially when bending over or when going to bathroom and wiping himself. Also c/o a mild dry cough for the last 1 week. Pt admits he experiences some SOB when walking longer distances for a while now. Also admits to experiencing orthopnea, sleeps with 2-3 pillows for a long time now. Pt reports ~6 weeks ago he had his furosemide decreased to TIW- M/W/F by his Cardiologist in [place] after the pericardial effusion was treated due to hypotension. Pt has since been using his BP machine over the last few weeks and noticed it has been gradually increasing up to 164/80 this AM, usually in past his BP was 100-110/60 reportedly. No dizziness, fevers/ chills. Pt does have a h/o experiencing HA's over his eyes, lasting a couple of hours, once in a while. No CP, wheezing, fevers/ chills, LE edema. Pt also c/o not eating much due to nausea, but over the last week has been drinking mostly chicken broth.

## 2019-05-28 NOTE — ED PROVIDER NOTE - PHYSICAL EXAMINATION
Corinne Lindsay MD:   CONSTITUTIONAL: Nontoxic, well nourished, well developed, elderly male, resting comfortably in no acute distress  HEAD: Normocephalic; atraumatic  EYES: Normal inspection, EOMI  ENMT: External appears normal; normal oropharynx  NECK: Supple; non-tender; no cervical lymphadenopathy  CARD: RRR; no audible murmurs, rubs, or gallops  RESP: No respiratory distress, lungs ctab/l with decreased lungs sounds in L base  ABD: Soft, non-distended; non-tender; no rebound or guarding  EXT: No LE pitting edema or calf tenderness; distal pulses intact with good capillary refill  SKIN: Warm, dry, intact  NEURO: aaox3, moving all extremities spontaneously

## 2019-05-28 NOTE — H&P ADULT - RS GEN PE MLT RESP DETAILS PC
good air movement/airway patent/respirations non-labored/clear to auscultation bilaterally/diminished breath sounds, L/breath sounds equal

## 2019-05-29 DIAGNOSIS — J84.10 PULMONARY FIBROSIS, UNSPECIFIED: ICD-10-CM

## 2019-05-29 LAB
ALBUMIN SERPL ELPH-MCNC: 3.9 G/DL — SIGNIFICANT CHANGE UP (ref 3.3–5)
ALP SERPL-CCNC: 71 U/L — SIGNIFICANT CHANGE UP (ref 40–120)
ALT FLD-CCNC: 14 U/L — SIGNIFICANT CHANGE UP (ref 4–41)
ANION GAP SERPL CALC-SCNC: 15 MMO/L — HIGH (ref 7–14)
AST SERPL-CCNC: 16 U/L — SIGNIFICANT CHANGE UP (ref 4–40)
BASOPHILS # BLD AUTO: 0.03 K/UL — SIGNIFICANT CHANGE UP (ref 0–0.2)
BASOPHILS NFR BLD AUTO: 0.5 % — SIGNIFICANT CHANGE UP (ref 0–2)
BILIRUB SERPL-MCNC: 0.6 MG/DL — SIGNIFICANT CHANGE UP (ref 0.2–1.2)
BUN SERPL-MCNC: 12 MG/DL — SIGNIFICANT CHANGE UP (ref 7–23)
CALCIUM SERPL-MCNC: 10.1 MG/DL — SIGNIFICANT CHANGE UP (ref 8.4–10.5)
CHLORIDE SERPL-SCNC: 95 MMOL/L — LOW (ref 98–107)
CHOLEST SERPL-MCNC: 130 MG/DL — SIGNIFICANT CHANGE UP (ref 120–199)
CO2 SERPL-SCNC: 27 MMOL/L — SIGNIFICANT CHANGE UP (ref 22–31)
CREAT SERPL-MCNC: 1.02 MG/DL — SIGNIFICANT CHANGE UP (ref 0.5–1.3)
EOSINOPHIL # BLD AUTO: 0.17 K/UL — SIGNIFICANT CHANGE UP (ref 0–0.5)
EOSINOPHIL NFR BLD AUTO: 3.1 % — SIGNIFICANT CHANGE UP (ref 0–6)
FERRITIN SERPL-MCNC: 424.2 NG/ML — HIGH (ref 30–400)
FOLATE SERPL-MCNC: > 20 NG/ML — HIGH (ref 4.7–20)
GLUCOSE SERPL-MCNC: 130 MG/DL — HIGH (ref 70–99)
HBA1C BLD-MCNC: 6.4 % — HIGH (ref 4–5.6)
HCT VFR BLD CALC: 35.2 % — LOW (ref 39–50)
HDLC SERPL-MCNC: 27 MG/DL — LOW (ref 35–55)
HGB BLD-MCNC: 11.2 G/DL — LOW (ref 13–17)
IMM GRANULOCYTES NFR BLD AUTO: 0.2 % — SIGNIFICANT CHANGE UP (ref 0–1.5)
IRON SATN MFR SERPL: 270 UG/DL — SIGNIFICANT CHANGE UP (ref 155–535)
IRON SATN MFR SERPL: 47 UG/DL — SIGNIFICANT CHANGE UP (ref 45–165)
LIPID PNL WITH DIRECT LDL SERPL: 91 MG/DL — SIGNIFICANT CHANGE UP
LYMPHOCYTES # BLD AUTO: 0.84 K/UL — LOW (ref 1–3.3)
LYMPHOCYTES # BLD AUTO: 15.1 % — SIGNIFICANT CHANGE UP (ref 13–44)
MAGNESIUM SERPL-MCNC: 2 MG/DL — SIGNIFICANT CHANGE UP (ref 1.6–2.6)
MCHC RBC-ENTMCNC: 29.9 PG — SIGNIFICANT CHANGE UP (ref 27–34)
MCHC RBC-ENTMCNC: 31.8 % — LOW (ref 32–36)
MCV RBC AUTO: 94.1 FL — SIGNIFICANT CHANGE UP (ref 80–100)
MONOCYTES # BLD AUTO: 0.49 K/UL — SIGNIFICANT CHANGE UP (ref 0–0.9)
MONOCYTES NFR BLD AUTO: 8.8 % — SIGNIFICANT CHANGE UP (ref 2–14)
NEUTROPHILS # BLD AUTO: 4.01 K/UL — SIGNIFICANT CHANGE UP (ref 1.8–7.4)
NEUTROPHILS NFR BLD AUTO: 72.3 % — SIGNIFICANT CHANGE UP (ref 43–77)
NRBC # FLD: 0 K/UL — SIGNIFICANT CHANGE UP (ref 0–0)
PHOSPHATE SERPL-MCNC: 3.9 MG/DL — SIGNIFICANT CHANGE UP (ref 2.5–4.5)
PLATELET # BLD AUTO: 429 K/UL — HIGH (ref 150–400)
PMV BLD: 8.9 FL — SIGNIFICANT CHANGE UP (ref 7–13)
POTASSIUM SERPL-MCNC: 4 MMOL/L — SIGNIFICANT CHANGE UP (ref 3.5–5.3)
POTASSIUM SERPL-SCNC: 4 MMOL/L — SIGNIFICANT CHANGE UP (ref 3.5–5.3)
PROT SERPL-MCNC: 7.5 G/DL — SIGNIFICANT CHANGE UP (ref 6–8.3)
RBC # BLD: 3.74 M/UL — LOW (ref 4.2–5.8)
RBC # FLD: 15.1 % — HIGH (ref 10.3–14.5)
RETICS #: 94 K/UL — SIGNIFICANT CHANGE UP (ref 25–125)
RETICS/RBC NFR: 2.5 % — SIGNIFICANT CHANGE UP (ref 0.5–2.5)
SODIUM SERPL-SCNC: 137 MMOL/L — SIGNIFICANT CHANGE UP (ref 135–145)
TRIGL SERPL-MCNC: 131 MG/DL — SIGNIFICANT CHANGE UP (ref 10–149)
UIBC SERPL-MCNC: 223 UG/DL — SIGNIFICANT CHANGE UP (ref 110–370)
VIT B12 SERPL-MCNC: 505 PG/ML — SIGNIFICANT CHANGE UP (ref 200–900)
WBC # BLD: 5.55 K/UL — SIGNIFICANT CHANGE UP (ref 3.8–10.5)
WBC # FLD AUTO: 5.55 K/UL — SIGNIFICANT CHANGE UP (ref 3.8–10.5)

## 2019-05-29 PROCEDURE — 70450 CT HEAD/BRAIN W/O DYE: CPT | Mod: 26

## 2019-05-29 PROCEDURE — 99232 SBSQ HOSP IP/OBS MODERATE 35: CPT

## 2019-05-29 RX ORDER — FUROSEMIDE 40 MG
40 TABLET ORAL DAILY
Refills: 0 | Status: DISCONTINUED | OUTPATIENT
Start: 2019-05-29 | End: 2019-06-01

## 2019-05-29 RX ORDER — FUROSEMIDE 40 MG
20 TABLET ORAL ONCE
Refills: 0 | Status: COMPLETED | OUTPATIENT
Start: 2019-05-29 | End: 2019-05-29

## 2019-05-29 RX ADMIN — Medication 650 MILLIGRAM(S): at 03:30

## 2019-05-29 RX ADMIN — ATORVASTATIN CALCIUM 80 MILLIGRAM(S): 80 TABLET, FILM COATED ORAL at 22:11

## 2019-05-29 RX ADMIN — Medication 20 MILLIGRAM(S): at 14:33

## 2019-05-29 RX ADMIN — Medication 81 MILLIGRAM(S): at 09:42

## 2019-05-29 RX ADMIN — HEPARIN SODIUM 5000 UNIT(S): 5000 INJECTION INTRAVENOUS; SUBCUTANEOUS at 22:11

## 2019-05-29 RX ADMIN — Medication 25 MILLIGRAM(S): at 06:15

## 2019-05-29 RX ADMIN — LOSARTAN POTASSIUM 25 MILLIGRAM(S): 100 TABLET, FILM COATED ORAL at 06:16

## 2019-05-29 RX ADMIN — HEPARIN SODIUM 5000 UNIT(S): 5000 INJECTION INTRAVENOUS; SUBCUTANEOUS at 14:33

## 2019-05-29 RX ADMIN — Medication 650 MILLIGRAM(S): at 09:41

## 2019-05-29 RX ADMIN — Medication 20 MILLIGRAM(S): at 09:42

## 2019-05-29 RX ADMIN — Medication 650 MILLIGRAM(S): at 10:15

## 2019-05-29 RX ADMIN — HEPARIN SODIUM 5000 UNIT(S): 5000 INJECTION INTRAVENOUS; SUBCUTANEOUS at 06:16

## 2019-05-29 RX ADMIN — Medication 20 MILLIGRAM(S): at 06:16

## 2019-05-29 RX ADMIN — Medication 650 MILLIGRAM(S): at 02:37

## 2019-05-29 RX ADMIN — Medication 125 MICROGRAM(S): at 06:16

## 2019-05-29 RX ADMIN — Medication 1 MILLIGRAM(S): at 09:41

## 2019-05-29 NOTE — DIETITIAN INITIAL EVALUATION ADULT. - NS AS NUTRI INTERV ED CONTENT
Nutrition relationship to health/disease/Provided with written education, as per pt., will provide to spouse for review./Other (specify)

## 2019-05-29 NOTE — DIETITIAN INITIAL EVALUATION ADULT. - NS AS NUTRI DX KNOWLEDGE BELIEFS
Not ready for diet/lifestyle change/Food - and nutrition - related knowledge deficit/Self - monitoring deficit

## 2019-05-29 NOTE — PROGRESS NOTE ADULT - SUBJECTIVE AND OBJECTIVE BOX
Subjective: no chest pain; sob improving   	  MEDICATIONS:  MEDICATIONS  (STANDING):  aspirin enteric coated 81 milliGRAM(s) Oral daily  atorvastatin 80 milliGRAM(s) Oral at bedtime  clonazePAM  Tablet 1 milliGRAM(s) Oral daily  dextrose 5%. 1000 milliLiter(s) (50 mL/Hr) IV Continuous <Continuous>  dextrose 50% Injectable 12.5 Gram(s) IV Push once  dextrose 50% Injectable 25 Gram(s) IV Push once  dextrose 50% Injectable 25 Gram(s) IV Push once  FLUoxetine 20 milliGRAM(s) Oral daily  furosemide   Injectable 20 milliGRAM(s) IV Push daily  heparin  Injectable 5000 Unit(s) SubCutaneous every 8 hours  insulin lispro (HumaLOG) corrective regimen sliding scale   SubCutaneous three times a day before meals  levothyroxine 125 MICROGram(s) Oral daily  losartan 25 milliGRAM(s) Oral daily  metoprolol succinate ER 25 milliGRAM(s) Oral daily      LABS:	 	    CARDIAC MARKERS:                                11.2   5.55  )-----------( 429      ( 29 May 2019 07:28 )             35.2     05-29    137  |  95<L>  |  12  ----------------------------<  130<H>  4.0   |  27  |  1.02    Ca    10.1      29 May 2019 07:28  Phos  3.9     05-29  Mg     2.0     05-29    TPro  7.5  /  Alb  3.9  /  TBili  0.6  /  DBili  x   /  AST  16  /  ALT  14  /  AlkPhos  71  05-29    proBNP:   Lipid Profile:   HgA1c: Hemoglobin A1C, Whole Blood: 6.4 % (05-29 @ 07:28)    TSH:       PHYSICAL EXAM:  T(C): 36.7 (05-29-19 @ 06:12), Max: 36.9 (05-28-19 @ 13:26)  HR: 86 (05-29-19 @ 06:12) (80 - 86)  BP: 152/75 (05-29-19 @ 06:12) (145/74 - 167/77)  RR: 18 (05-29-19 @ 06:12) (16 - 20)  SpO2: 98% (05-29-19 @ 06:12) (95% - 100%)  Wt(kg): --  I&O's Summary    28 May 2019 07:01  -  29 May 2019 07:00  --------------------------------------------------------  IN: 500 mL / OUT: 0 mL / NET: 500 mL      Height (cm): 172.72 (05-28 @ 17:46)  Weight (kg): 78.9 (05-28 @ 17:46)  BMI (kg/m2): 26.4 (05-28 @ 17:46)  BSA (m2): 1.93 (05-28 @ 17:46)    Appearance: Normal	  HEENT:   Normal oral mucosa, PERRL, EOMI	  Lymphatic: No lymphadenopathy , no edema  Cardiovascular: Normal S1 S2, RRR, No murmurs , Peripheral pulses palpable 2+ bilaterally  Respiratory: decreased BS at bases bilaterally 	  Gastrointestinal:  Soft, Non-tender, + BS	  Skin: No rashes, No ecchymoses, No cyanosis, warm to touch  Psychiatry:  Mood & affect appropriate    TELEMETRY: 	    ECG:  	  RADIOLOGY:   DIAGNOSTIC TESTING:  [ ] Echocardiogram: < from: Transthoracic Echocardiogram (05.28.19 @ 15:23) >  CONCLUSIONS:  1. Mitral annular calcification and calcified mitral  leaflets with decreased diastolic opening. Mild-moderate  mitral regurgitation. Mean transmitral valve gradient  equals 4 mm Hg, consistent with mild mitral stenosis.  2. Transcatheter aortic valve replacement. Suboptimal  transaortic gradients.  3. Normal left ventricular internal dimensions and wall  thicknesses.  4. Moderate segmental left ventricular systolic  dysfunction. Inferior and inferolateral wall hypokinesis.  Paradoxical septal wall motion.  5. The right ventricle is not well visualized; grossly  normal right ventricular systolic function.  6. Normal tricuspid valve.  Moderate tricuspid  regurgitation.  7. Estimated pulmonary artery systolic pressure equals 40  mm Hg, assuming right atrial pressure equals 10  mm Hg,  consistent with mild pulmonary hypertension.  8. Left pleural effusion.    < end of copied text >    [ ]  Catheterization: < from: Cardiac Cath Lab - Adult (10.18.16 @ 16:42) >  CORONARY VESSELS: The coronary circulation is right dominant.  LM:   --  LM: Angiography showed minor luminal irregularities with no flow  limiting lesions.  LAD:   --  LAD: Angiography showed minor luminal irregularities with no  flow limiting lesions.  CX:   --  Circumflex: Angiography showed minor luminal irregularities with  no flow limiting lesions.  RCA:   --  Proximal RCA: There was a 100 % stenosis.    < end of copied text >    [ ] Stress Test:    OTHER: 	      ASSESSMENT/PLAN: 	73y Male with history of severe AS s/p TAVR, CAD with last cath in 2016 showing  of prox RCA, HTN, history of pericardial effusion s/p pericardiocentesis, cardiomyopathy admitted with dyspnea.   -TTE performed showing no pericardial effusion and EG of 40% with segmental LV dysfunction in the inferior and inferolateral walls  -RWMA are old and consistent with known  of RCA - medical management of cardiomyopathy recommended  -CT scan demonstrates bilateral pleural effusions and patchy opacities consistent with pulmonary edema or possible PNA  -will continue with diuresis for now - increase lasix to 40mg IV daily  -pulmonary eval with Dr. Corcoran to evaluate possible pna and pleural effusions  -continue with asa for history of cad  -further workup pending above    Brian Mo MD

## 2019-05-29 NOTE — PROGRESS NOTE ADULT - SUBJECTIVE AND OBJECTIVE BOX
Patient feeling better, less shortness of breath.  No chest pain, palpitations or lightheadedness. Resting comfortably. Patient states that in [place] his medications were adjusted including spironolactone and Plavix which were discontinued and lasix, metoprolol and aspirin whose dosages were decreased.  His lasix is now back on daily dosing.       Vital Signs Last 24 Hrs  T(C): 36.8 (29 May 2019 12:22), Max: 36.9 (28 May 2019 13:26)  T(F): 98.3 (29 May 2019 12:22), Max: 98.5 (28 May 2019 13:26)  HR: 86 (29 May 2019 12:22) (80 - 86)  BP: 123/65 (29 May 2019 12:22) (123/65 - 167/77)  BP(mean): --  RR: 16 (29 May 2019 12:22) (16 - 18)  SpO2: 96% (29 May 2019 12:22) (95% - 99%)      EKG  Telemetry  MEDICATIONS  (STANDING):  aspirin enteric coated 81 milliGRAM(s) Oral daily  atorvastatin 80 milliGRAM(s) Oral at bedtime  clonazePAM  Tablet 1 milliGRAM(s) Oral daily  dextrose 5%. 1000 milliLiter(s) (50 mL/Hr) IV Continuous <Continuous>  dextrose 50% Injectable 12.5 Gram(s) IV Push once  dextrose 50% Injectable 25 Gram(s) IV Push once  dextrose 50% Injectable 25 Gram(s) IV Push once  FLUoxetine 20 milliGRAM(s) Oral daily  furosemide   Injectable 40 milliGRAM(s) IV Push daily  heparin  Injectable 5000 Unit(s) SubCutaneous every 8 hours  insulin lispro (HumaLOG) corrective regimen sliding scale   SubCutaneous three times a day before meals  levothyroxine 125 MICROGram(s) Oral daily  losartan 25 milliGRAM(s) Oral daily  metoprolol succinate ER 25 milliGRAM(s) Oral daily    MEDICATIONS  (PRN):  acetaminophen   Tablet .. 650 milliGRAM(s) Oral every 6 hours PRN Mild Pain (1 - 3)  dextrose 40% Gel 15 Gram(s) Oral once PRN Blood Glucose LESS THAN 70 milliGRAM(s)/deciliter  glucagon  Injectable 1 milliGRAM(s) IntraMuscular once PRN Glucose LESS THAN 70 milligrams/deciliter          Physical exam:   Gen- well developed.  Well nourished NAD  Resp- decreased breath sounds at bases.  No wheezing  CV- S1 and S2 RRR + systolic murmur  ABD- soft nontender + bowel sounds  EXT- no edema or calf tenderness.  Neuro- grossly nonfocal                            11.2   5.55  )-----------( 429      ( 29 May 2019 07:28 )             35.2     PT/INR - ( 28 May 2019 10:30 )   PT: 13.1 SEC;   INR: 1.17          PTT - ( 28 May 2019 10:30 )  PTT:36.7 SEC  05-29    137  |  95<L>  |  12  ----------------------------<  130<H>  4.0   |  27  |  1.02    Ca    10.1      29 May 2019 07:28  Phos  3.9     05-29  Mg     2.0     05-29    TPro  7.5  /  Alb  3.9  /  TBili  0.6  /  DBili  x   /  AST  16  /  ALT  14  /  AlkPhos  71  05-29      Radiology:    < from: CT Chest No Cont (05.28.19 @ 23:29) >  EXAM:  CT CHEST        PROCEDURE DATE:  May 28 2019         INTERPRETATION:  Clinical indications: Shortness of breath and cough,   pericardial effusion.    Axial CT images of the chest are obtained without intravenous   administration of contrast..    Correlation is made with prior studies including the cardiac CT of   October 21, 2016.    There is a left cardiac device with the leads terminating within the   right atrium, right ventricle and 1 coursing through the coronary sinus.    There areno enlarged bilateral axillary lymph nodes. There are calcified   anterior mediastinal lymph nodes unchanged.    The heart size is enlarged. There is a small pericardial effusion   unchanged in size the prior study. There may be associated pericardial   thickening. The main pulmonary artery is prominent measuring about 3.3 cm   may be due to pulmonary arterial hypertension.    There is atherosclerotic disease of the aorta and the coronary arteries.   There is mitral annular calcification. The patient is status post TAVR.    Evaluation of the upper abdominal organs demonstrate cholelithiasis.   There is a tiny hiatal hernia.    There are bilateral pleural effusions, moderate-sized on the left and   small on the right. There is suggestion of right pleural thickening.    Evaluation of the lungs demonstrate bibasilar areas of compressive   atelectasis, left greater than right. There are multiple right lung   patchy groundglass or mixed solid and groundglass opacities within all 3   lobes.    There is a 5 mm right lower lobe pulmonary nodule unchanged. There are   bilateral subtle reticular opacities in the paramediastinal location   representing scarring given the history and likely sequela of prior   radiation therapy as on the prior study. There are no central   endobronchial lesions.  Evaluation of the bones demonstrate some degenerative changes of the   spine.    IMPRESSION: Bilateral pleural effusions, moderate-sized on the left and   small on the right with associated bibasilar atelectasis, left greater   than right.    Multiple right lung patchy opacities as described above. Differential   diagnosis include pneumonia and/or pulmonary edema.    Small pericardial effusion is unchanged since October 21, 2016.  SAI ALEXANDER M.D. ATTENDING RADIOLOGIST  This document has been electronically signed. May 29 2019  9:21AM

## 2019-05-29 NOTE — CONSULT NOTE ADULT - SUBJECTIVE AND OBJECTIVE BOX
Patient is a 73y old  Male who presents with a chief complaint of shortness of breath and hypertension (29 May 2019 13:04)      HPI:  74 yo M s/p recent pericardiocentesis in 4/19 in [place], reportedly had 900cc of non bacterial/ non malignant blood drained from pericardium as per pt. Pt now p/w SOB especially when bending over or when going to bathroom and wiping himself. Also c/o a mild dry cough for the last 1 week. Pt admits he experiences some SOB when walking longer distances for a while now. Also admits to experiencing orthopnea, sleeps with 2-3 pillows for a long time now. Pt reports ~6 weeks ago he had his furosemide decreased to TIW- M/W/F by his Cardiologist in [place] after the pericardial effusion was treated due to hypotension. Pt has since been using his BP machine over the last few weeks and noticed it has been gradually increasing up to 164/80 this AM, usually in past his BP was 100-110/60 reportedly. No dizziness, fevers/ chills. Pt does have a h/o experiencing HA's over his eyes, lasting a couple of hours, once in a while. No CP, wheezing, fevers/ chills, LE edema. Pt also c/o not eating much due to nausea, but over the last week has been drinking mostly chicken broth. 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;s  Systolic CHF, chronic: s/p ICD  AS (aortic stenosis): s/p TAVR  Myocardial infarction: Cardiac Arrest- 1994- no stents  Type 2 diabetes mellitus without complication  Hodgkin lymphoma: on remission, chemo and rad 9557-0419  Acquired hypothyroidism  HLD (hyperlipidemia)  HTN (hypertension)  S/P ICD (internal cardiac defibrillator) procedure: [employer]  S/P TAVR (transcatheter aortic valve replacement): 10/16 in [place]  History of umbilical hernia repair  H/O bilateral inguinal hernia repair  H/O carotid endarterectomy: bilateral      FAMILY HISTORY:  No pertinent family history in first degree relatives      Social History: [ x ] TOBACCO                  [ x ] ETOH                                 [  x] IVDA/DRUGS    REVIEW OF SYSTEMS      General:	x    Skin/Breast:x  	  Ophthalmologic:x  	  ENMT:	x    Respiratory and Thorax: [place]: increasing blood pressure  	  Cardiovascular:	x    Gastrointestinal:	x    Genitourinary:	x    Musculoskeletal:	x    Neurological:	x    Psychiatric:	x    Hematology/Lymphatics:	x    Endocrine:	x    Allergic/Immunologic:	x    MEDICATIONS  (STANDING):  aspirin enteric coated 81 milliGRAM(s) Oral daily  atorvastatin 80 milliGRAM(s) Oral at bedtime  clonazePAM  Tablet 1 milliGRAM(s) Oral daily  dextrose 5%. 1000 milliLiter(s) (50 mL/Hr) IV Continuous <Continuous>  dextrose 50% Injectable 12.5 Gram(s) IV Push once  dextrose 50% Injectable 25 Gram(s) IV Push once  dextrose 50% Injectable 25 Gram(s) IV Push once  FLUoxetine 20 milliGRAM(s) Oral daily  furosemide   Injectable 40 milliGRAM(s) IV Push daily  heparin  Injectable 5000 Unit(s) SubCutaneous every 8 hours  insulin lispro (HumaLOG) corrective regimen sliding scale   SubCutaneous three times a day before meals  levothyroxine 125 MICROGram(s) Oral daily  losartan 25 milliGRAM(s) Oral daily  metoprolol succinate ER 25 milliGRAM(s) Oral daily    MEDICATIONS  (PRN):  acetaminophen   Tablet .. 650 milliGRAM(s) Oral every 6 hours PRN Mild Pain (1 - 3)  dextrose 40% Gel 15 Gram(s) Oral once PRN Blood Glucose LESS THAN 70 milliGRAM(s)/deciliter  glucagon  Injectable 1 milliGRAM(s) IntraMuscular once PRN Glucose LESS THAN 70 milligrams/deciliter       Vital Signs Last 24 Hrs  T(C): 36.8 (29 May 2019 12:22), Max: 36.9 (28 May 2019 18:13)  T(F): 98.3 (29 May 2019 12:22), Max: 98.4 (28 May 2019 18:13)  HR: 86 (29 May 2019 12:22) (80 - 86)  BP: 123/65 (29 May 2019 12:22) (123/65 - 167/77)  BP(mean): --  RR: 16 (29 May 2019 12:22) (16 - 18)  SpO2: 96% (29 May 2019 12:22) (95% - 98%)        I&O's Summary    28 May 2019 07:01  -  29 May 2019 07:00  --------------------------------------------------------  IN: 500 mL / OUT: 0 mL / NET: 500 mL        Physical Exam:   GENERAL: NAD, well-groomed, well-developed  HEENT: CATRINA/   Atraumatic, Normocephalic  ENMT: No tonsillar erythema, exudates, or enlargement; Moist mucous membranes, Good dentition, No lesions  NECK: Supple, No JVD, Normal thyroid  CHEST/LUNG: Decreased air entry left side: whole lung fields  CVS: Regular rate and rhythm; No murmurs, rubs, or gallops  GI: : Soft, Nontender, Nondistended; Bowel sounds present  NERVOUS SYSTEM:  Alert & Oriented X3  EXTREMITIES:  -edema  LYMPH: No lymphadenopathy noted  SKIN: No rashes or lesions  ENDOCRINOLOGY: No Thyromegaly  PSYCH: Appropriate    Labs:  7.0<53<4>>22<<7.405>>7.0<<3><<4><<5<<229>>                            11.2   5.55  )-----------( 429      ( 29 May 2019 07:28 )             35.2                         10.8   5.84  )-----------( 409      ( 28 May 2019 10:30 )             33.3     05-29    137  |  95<L>  |  12  ----------------------------<  130<H>  4.0   |  27  |  1.02  05-28    138  |  95<L>  |  10  ----------------------------<  140<H>  4.3   |  30  |  1.11    Ca    10.1      29 May 2019 07:28  Ca    9.6      28 May 2019 10:30  Phos  3.9     05-29  Mg     2.0     05-29    TPro  7.5  /  Alb  3.9  /  TBili  0.6  /  DBili  x   /  AST  16  /  ALT  14  /  AlkPhos  71  05-29  TPro  7.9  /  Alb  4.3  /  TBili  0.4  /  DBili  x   /  AST  15  /  ALT  10  /  AlkPhos  67  05-28    CAPILLARY BLOOD GLUCOSE      POCT Blood Glucose.: 149 mg/dL (29 May 2019 12:38)  POCT Blood Glucose.: 130 mg/dL (29 May 2019 08:41)  POCT Blood Glucose.: 133 mg/dL (28 May 2019 21:39)  POCT Blood Glucose.: 147 mg/dL (28 May 2019 18:04)    LIVER FUNCTIONS - ( 29 May 2019 07:28 )  Alb: 3.9 g/dL / Pro: 7.5 g/dL / ALK PHOS: 71 u/L / ALT: 14 u/L / AST: 16 u/L / GGT: x           PT/INR - ( 28 May 2019 10:30 )   PT: 13.1 SEC;   INR: 1.17          PTT - ( 28 May 2019 10:30 )  PTT:36.7 SEC    D DImer  Serum Pro-Brain Natriuretic Peptide: 4718 pg/mL (05-28 @ 10:30)      Studies  Chest X-RAY  CT SCAN Chest   CT Abdomen  Venous Dopplers: LE:   Others      < from: CT Chest No Cont (05.28.19 @ 23:29) >  radiation therapy as on the prior study. There are no central   endobronchial lesions.    Evaluation of the bones demonstrate some degenerative changes of the   spine.    IMPRESSION: Bilateral pleural effusions, moderate-sized on the left and   small on the right with associated bibasilar atelectasis, left greater   than right.    Multiple right lung patchy opacities as described above. Differential   diagnosis include pneumonia and/or pulmonary edema.    Small pericardial effusion is unchanged since October 21, 2016.                  SAI ALEXANDER M.D. ATTENDING RADIOLOGIST  This document has been electronically signed. May 29 2019  9:21AM    < end of copied text >        < from: Transthoracic Echocardiogram (05.28.19 @ 15:23) >  CONCLUSIONS:  1. Mitral annular calcification and calcified mitral  leaflets with decreased diastolic opening. Mild-moderate  mitral regurgitation. Mean transmitral valve gradient  equals 4 mm Hg, consistent with mild mitral stenosis.  2. Transcatheter aortic valve replacement. Suboptimal  transaortic gradients.  3. Normal left ventricular internal dimensions and wall  thicknesses.  4. Moderate segmental left ventricular systolic  dysfunction. Inferior and inferolateral wall hypokinesis.  Paradoxical septal wall motion.  5. The right ventricle is not well visualized; grossly  normal right ventricular systolic function.  6. Normal tricuspid valve.  Moderate tricuspid  regurgitation.  7. Estimated pulmonary artery systolic pressure equals 40  mm Hg, assuming right atrial pressure equals 10  mm Hg,  consistent with mild pulmonary hypertension.  8. Left pleural effusion.  ------------------------------------------------------------------------  Confirmed on  5/28/2019 - 16:51:40 by Mike Nicole M.D. RPVI    < end of copied text >      < from: CT Abdomen and Pelvis No Cont (10.11.16 @ 10:39) >    Bilateral pleural effusions.    < end of copied text >
Patient is a 73y old male with past medical history of remote Hodgkins lymphoma, hypertension, hyperlipidemia, severe AS s/p TAVR, chronic systolic CHF with EF 34%, LBBB, MI s/p BiV-ICD (EF improved to 40-45%) who, in  April, wasa hospitalized in [place] for dyspnea on exertion and was found to have a pericardial effusion which was drained - 900cc blood.  The Plavix was discontinued but he remained on aspirin.  He was discharged feeling significantly better and did well up until a week ago when he developed similar symptoms-> dyspnea with minimal exertion and nonproductive cough. No fever, shortness of breath at rest, hemoptysis, chest discomfort, palpitations, near syncope or syncope.    Interrogation of his [employer] CRT-D showed normal sinus rhythm with appropriate pacing and sensing and good capture thresholds. There were brief but no sustained episodes of atrial fibrillation all occurring during his [place] hospitalization.  He saw his cardiologist, Dr. Durham who did a office echocardiogram last week and said there was no evidence of recurrent pericardial effusion. He is being admitted for evaluation.    PAST MEDICAL & SURGICAL HISTORY:  ICD (implantable cardioverter-defibrillator) in place  Myocardial infarction: 1994  Type 2 diabetes mellitus without complication  Hodgkin lymphoma: on remission, chemo and rad [id number]  Acquired hypothyroidism  Pacemaker  Myocardial infarction involving other coronary artery: no stents  Myocardial infarction, old  Iatrogenic hypothyroidism  Hx of Hodgkins lymphoma  Diabetes mellitus  HLD (hyperlipidemia)  HTN (hypertension)  H/O bilateral inguinal hernia repair  H/O carotid endarterectomy: bilateral  History of carotid endarterectomy    ALLERGIES:    lisinopril    MEDICATIONS  (STANDING):    MEDICATIONS  (PRN):      FAMILY HISTORY:  Mother :  prior MI  Father:     prior MI      SOCIAL HISTORY:  Lives with his wife    CIGARETTES:  never a smoker  DRUGS   never  ALCOHOL:  no    REVIEW OF SYSTEMS:    CONSTITUTIONAL: No fever, weight loss, chills, shakes, or fatigue  EYES: No eye pain, visual disturbances, or discharge  ENMT:  No difficulty hearing, tinnitus, vertigo;   throiat irritation which makes him cough  NECK: No pain or stiffness  BREASTS: No pain, masses, or nipple discharge  RESPIRATORY: No cough, wheezing, hemoptysis, or shortness of breath  CARDIOVASCULAR: No chest pain, palpitations, dizziness, syncope, paroxysmal nocturnal dyspnea, orthopnea, or arm or leg swelling  + dyspnea with minimal exertion  GASTROINTESTINAL: No abdominal  or epigastric pain, nausea, vomiting, hematemesis, diarrhea, constipation, melena or bright red blood.  GENITOURINARY: No dysuria, nocturia, hematuria, or urinary incontinence  NEUROLOGICAL: + headaches,  No memory loss, slurred speech, limb weakness, loss of strength, numbness, or tremors  SKIN: No itching, burning, rashes, or lesions   LYMPH NODES: No enlarged glands  ENDOCRINE: No heat or cold intolerance, or hair loss  MUSCULOSKELETAL: No joint pain or swelling, muscle, back, or extremity pain  PSYCHIATRIC: No depression, anxiety, or difficulty sleeping  HEME/LYMPH: No easy bruising or bleeding gums  ALLERGY AND IMMUNOLOGIC: No hives or rash.      Vital Signs Last 24 Hrs  T(C): 36.9 (28 May 2019 13:26), Max: 37 (28 May 2019 09:42)  T(F): 98.5 (28 May 2019 13:26), Max: 98.6 (28 May 2019 09:42)  HR: 80 (28 May 2019 13:26) (78 - 84)  BP: 160/78 (28 May 2019 13:26) (148/54 - 163/70)  BP(mean): --  RR: 16 (28 May 2019 13:26) (16 - 22)  SpO2: 99% (28 May 2019 13:26) (96% - 100%)    PHYSICAL EXAM:    GENERAL: In no apparent distress, well nourished, and hydrated.  C/O headache otherwise comfortable  HEAD:  Atraumatic, Normocephalic  EYES: EOMI, PERRLA, conjunctiva and sclera clear  ENMT: No tonsillar erythema, exudates, or enlargement; Moist mucous membranes, Good dentition  NECK: Supple and normal thyroid.  No JVD or carotid bruit.  Carotid pulse is 2+ bilaterally.  HEART: Regular rate and rhythm; No rubs, or gallops.  + systolic murmur  PULMONARY: Decreased breath sounds at bases.  Right > left.  ABDOMEN: Soft, Nontender, Nondistended; Bowel sounds present  EXTREMITIES:  2+ Peripheral Pulses, No clubbing, cyanosis, or edema  LYMPH: No lymphadenopathy noted  NEUROLOGICAL: Grossly nonfocal          INTERPRETATION OF TELEMETRY:  Normal sinus rhythm with ventricular pacing at 70 bpm    ECG:  Normal sinus rhythm with biventricular pacing    I&O's Detail      LABS:                        10.8   5.84  )-----------( 409      ( 28 May 2019 10:30 )             33.3     05-28    138  |  95<L>  |  10  ----------------------------<  140<H>  4.3   |  30  |  1.11    Ca    9.6      28 May 2019 10:30    TPro  7.9  /  Alb  4.3  /  TBili  0.4  /  DBili  x   /  AST  15  /  ALT  10  /  AlkPhos  67  05-28        PT/INR - ( 28 May 2019 10:30 )   PT: 13.1 SEC;   INR: 1.17          PTT - ( 28 May 2019 10:30 )  PTT:36.7 SEC    BNPSerum Pro-Brain Natriuretic Peptide: 4718 pg/mL (05-28 @ 10:30)    I&O's Detail    Daily     Daily     RADIOLOGY & ADDITIONAL STUDIES:  PREVIOUS DIAGNOSTIC TESTING:    EXAM:  XR CHEST PA LAT 2V        PROCEDURE DATE:  May 28 2019         INTERPRETATION:  CLINICAL INDICATION: dyspnea; pulmonary fibrosis; status   post radiation; ICD    EXAM:  Frontal and lateral chest from 5/28/2019 at 1127. Compared to prior study   from 12/14/2016.    IMPRESSION:  Bilateral pleural effusions left greater than right. Adjacent left   basilar consolidative changes. Indistinct bandlike hazy opacity in right   mid to upper lung indeterminate for subsegmental atelectatic change or  focal airspace disease including possible infiltrate/pneumonia in the   proper clinical context.    Stable left chest wall biventricular AICD and metallic mesh aortic valve   stent. Stable sightly prominent appearing cardiac and mediastinal   silhouettes.    Trachea midline.    Generalized osteopenia again seen.     Right neck region surgical clips again noted.                    ECHO  FINDINGS:   pending

## 2019-05-29 NOTE — CONSULT NOTE ADULT - PROBLEM SELECTOR RECOMMENDATION 9
he has mild radiation fibrosis on the left side: He has bilateral pleural effusion moderate on  the left side and has poor LV function : Has had TAVR too: He says his breathing has much improved: On ct abdomen: in 2016 he had bilateral pleural effusions too: on ct chest in 2016 : he had right sided effusion: Cont Diuresis

## 2019-05-29 NOTE — DIETITIAN INITIAL EVALUATION ADULT. - OTHER INFO
Pt. presents with a good appetite; provided with Consistent CHO/Low Sodium therapeutic diet to aid in the management of chronic medical conditions, which remains appropriate at this time.  Current weight of 174.1# is consistent w. reported UBW of ~175#.  Pt. states spouse prepares all meals at home; provided with written and verbal nutrition education regarding consistent CHO/heart healthy diet; accepted verbal and written education materials, however, stated that he will reinforce with spouse as she prepares all meals and foods.  Denies constipation, regular BMs reported. Denies chewing/swallowing difficulties.  Skin intact and no edema is noted or reported at this time.  Pt. with CHF, receiving Lasix Rx and I&O monitoring.  HgbA1c 6.4%, acceptable.

## 2019-05-30 LAB
ANION GAP SERPL CALC-SCNC: 18 MMO/L — HIGH (ref 7–14)
BUN SERPL-MCNC: 16 MG/DL — SIGNIFICANT CHANGE UP (ref 7–23)
CALCIUM SERPL-MCNC: 9.4 MG/DL — SIGNIFICANT CHANGE UP (ref 8.4–10.5)
CHLORIDE SERPL-SCNC: 95 MMOL/L — LOW (ref 98–107)
CO2 SERPL-SCNC: 22 MMOL/L — SIGNIFICANT CHANGE UP (ref 22–31)
CREAT SERPL-MCNC: 0.96 MG/DL — SIGNIFICANT CHANGE UP (ref 0.5–1.3)
GLUCOSE SERPL-MCNC: 128 MG/DL — HIGH (ref 70–99)
HCT VFR BLD CALC: 38.2 % — LOW (ref 39–50)
HGB BLD-MCNC: 11.8 G/DL — LOW (ref 13–17)
MAGNESIUM SERPL-MCNC: 2 MG/DL — SIGNIFICANT CHANGE UP (ref 1.6–2.6)
MCHC RBC-ENTMCNC: 30.6 PG — SIGNIFICANT CHANGE UP (ref 27–34)
MCHC RBC-ENTMCNC: 30.9 % — LOW (ref 32–36)
MCV RBC AUTO: 99 FL — SIGNIFICANT CHANGE UP (ref 80–100)
NRBC # FLD: 0 K/UL — SIGNIFICANT CHANGE UP (ref 0–0)
PLATELET # BLD AUTO: 310 K/UL — SIGNIFICANT CHANGE UP (ref 150–400)
PMV BLD: 10.6 FL — SIGNIFICANT CHANGE UP (ref 7–13)
POTASSIUM SERPL-MCNC: 4.4 MMOL/L — SIGNIFICANT CHANGE UP (ref 3.5–5.3)
POTASSIUM SERPL-SCNC: 4.4 MMOL/L — SIGNIFICANT CHANGE UP (ref 3.5–5.3)
RBC # BLD: 3.86 M/UL — LOW (ref 4.2–5.8)
RBC # FLD: 15.2 % — HIGH (ref 10.3–14.5)
SODIUM SERPL-SCNC: 135 MMOL/L — SIGNIFICANT CHANGE UP (ref 135–145)
WBC # BLD: 5.82 K/UL — SIGNIFICANT CHANGE UP (ref 3.8–10.5)
WBC # FLD AUTO: 5.82 K/UL — SIGNIFICANT CHANGE UP (ref 3.8–10.5)

## 2019-05-30 PROCEDURE — 99232 SBSQ HOSP IP/OBS MODERATE 35: CPT

## 2019-05-30 RX ORDER — METOPROLOL TARTRATE 50 MG
75 TABLET ORAL DAILY
Refills: 0 | Status: DISCONTINUED | OUTPATIENT
Start: 2019-05-30 | End: 2019-06-01

## 2019-05-30 RX ADMIN — Medication 650 MILLIGRAM(S): at 06:43

## 2019-05-30 RX ADMIN — Medication 40 MILLIGRAM(S): at 05:57

## 2019-05-30 RX ADMIN — HEPARIN SODIUM 5000 UNIT(S): 5000 INJECTION INTRAVENOUS; SUBCUTANEOUS at 13:47

## 2019-05-30 RX ADMIN — Medication 650 MILLIGRAM(S): at 06:09

## 2019-05-30 RX ADMIN — Medication 81 MILLIGRAM(S): at 13:48

## 2019-05-30 RX ADMIN — Medication 1 MILLIGRAM(S): at 13:48

## 2019-05-30 RX ADMIN — HEPARIN SODIUM 5000 UNIT(S): 5000 INJECTION INTRAVENOUS; SUBCUTANEOUS at 21:39

## 2019-05-30 RX ADMIN — Medication 25 MILLIGRAM(S): at 05:57

## 2019-05-30 RX ADMIN — LOSARTAN POTASSIUM 25 MILLIGRAM(S): 100 TABLET, FILM COATED ORAL at 05:57

## 2019-05-30 RX ADMIN — Medication 125 MICROGRAM(S): at 05:57

## 2019-05-30 RX ADMIN — ATORVASTATIN CALCIUM 80 MILLIGRAM(S): 80 TABLET, FILM COATED ORAL at 21:39

## 2019-05-30 RX ADMIN — Medication 20 MILLIGRAM(S): at 13:48

## 2019-05-30 RX ADMIN — HEPARIN SODIUM 5000 UNIT(S): 5000 INJECTION INTRAVENOUS; SUBCUTANEOUS at 05:57

## 2019-05-30 NOTE — PROGRESS NOTE ADULT - SUBJECTIVE AND OBJECTIVE BOX
Patient is a 73y old  Male who presents with a chief complaint of dyspnea (30 May 2019 14:35)      Any change in ROS: pt is doing ok : his breathing is back to baseline:     MEDICATIONS  (STANDING):  aspirin enteric coated 81 milliGRAM(s) Oral daily  atorvastatin 80 milliGRAM(s) Oral at bedtime  clonazePAM  Tablet 1 milliGRAM(s) Oral daily  dextrose 5%. 1000 milliLiter(s) (50 mL/Hr) IV Continuous <Continuous>  dextrose 50% Injectable 12.5 Gram(s) IV Push once  dextrose 50% Injectable 25 Gram(s) IV Push once  dextrose 50% Injectable 25 Gram(s) IV Push once  FLUoxetine 20 milliGRAM(s) Oral daily  furosemide   Injectable 40 milliGRAM(s) IV Push daily  heparin  Injectable 5000 Unit(s) SubCutaneous every 8 hours  insulin lispro (HumaLOG) corrective regimen sliding scale   SubCutaneous three times a day before meals  levothyroxine 125 MICROGram(s) Oral daily  losartan 25 milliGRAM(s) Oral daily  metoprolol succinate ER 25 milliGRAM(s) Oral daily    MEDICATIONS  (PRN):  acetaminophen   Tablet .. 650 milliGRAM(s) Oral every 6 hours PRN Mild Pain (1 - 3)  dextrose 40% Gel 15 Gram(s) Oral once PRN Blood Glucose LESS THAN 70 milliGRAM(s)/deciliter  glucagon  Injectable 1 milliGRAM(s) IntraMuscular once PRN Glucose LESS THAN 70 milligrams/deciliter    Vital Signs Last 24 Hrs  T(C): 36.9 (30 May 2019 12:28), Max: 36.9 (30 May 2019 12:28)  T(F): 98.5 (30 May 2019 12:28), Max: 98.5 (30 May 2019 12:28)  HR: 90 (30 May 2019 12:28) (80 - 90)  BP: 104/57 (30 May 2019 12:28) (104/57 - 142/71)  BP(mean): --  RR: 16 (30 May 2019 12:28) (16 - 18)  SpO2: 97% (30 May 2019 12:28) (97% - 100%)    I&O's Summary    29 May 2019 07:01  -  30 May 2019 07:00  --------------------------------------------------------  IN: 500 mL / OUT: 200 mL / NET: 300 mL          Physical Exam:   GENERAL: NAD, well-groomed, well-developed  HEENT: CATRINA/   Atraumatic, Normocephalic  ENMT: No tonsillar erythema, exudates, or enlargement; Moist mucous membranes, Good dentition, No lesions  NECK: Supple, No JVD, Normal thyroid  CHEST/LUNG: decreased air entry left side:   CVS: Regular rate and rhythm; No murmurs, rubs, or gallops  GI: : Soft, Nontender, Nondistended; Bowel sounds present  NERVOUS SYSTEM:  Alert & Oriented X3  EXTREMITIES: mild edema  LYMPH: No lymphadenopathy noted  SKIN: No rashes or lesions  ENDOCRINOLOGY: No Thyromegaly  PSYCH: Appropriate    Labs:  29                            11.8   5.82  )-----------( 310      ( 30 May 2019 06:48 )             38.2                         11.2   5.55  )-----------( 429      ( 29 May 2019 07:28 )             35.2                         10.8   5.84  )-----------( 409      ( 28 May 2019 10:30 )             33.3     05-30    135  |  95<L>  |  16  ----------------------------<  128<H>  4.4   |  22  |  0.96  05-29    137  |  95<L>  |  12  ----------------------------<  130<H>  4.0   |  27  |  1.02  05-28    138  |  95<L>  |  10  ----------------------------<  140<H>  4.3   |  30  |  1.11    Ca    9.4      30 May 2019 06:48  Ca    10.1      29 May 2019 07:28  Phos  3.9     05-29  Mg     2.0     05-30  Mg     2.0     05-29    TPro  7.5  /  Alb  3.9  /  TBili  0.6  /  DBili  x   /  AST  16  /  ALT  14  /  AlkPhos  71  05-29  TPro  7.9  /  Alb  4.3  /  TBili  0.4  /  DBili  x   /  AST  15  /  ALT  10  /  AlkPhos  67  05-28    CAPILLARY BLOOD GLUCOSE      POCT Blood Glucose.: 146 mg/dL (30 May 2019 12:52)  POCT Blood Glucose.: 145 mg/dL (30 May 2019 08:48)  POCT Blood Glucose.: 144 mg/dL (29 May 2019 21:44)  POCT Blood Glucose.: 134 mg/dL (29 May 2019 17:38)      LIVER FUNCTIONS - ( 29 May 2019 07:28 )  Alb: 3.9 g/dL / Pro: 7.5 g/dL / ALK PHOS: 71 u/L / ALT: 14 u/L / AST: 16 u/L / GGT: x               Serum Pro-Brain Natriuretic Peptide: 4718 pg/mL (05-28 @ 10:30)        RECENT CULTURES:    < from: CT Chest No Cont (05.28.19 @ 23:29) >    IMPRESSION: Bilateral pleural effusions, moderate-sized on the left and   small on the right with associated bibasilar atelectasis, left greater   than right.    Multiple right lung patchy opacities as described above. Differential   diagnosis include pneumonia and/or pulmonary edema.    Small pericardial effusion is unchanged since October 21, 2016.          < from: Transthoracic Echocardiogram (05.28.19 @ 15:23) >  mm Hg, consistent with mild pulmonary hypertension.  ------------------------------------------------------------------------  CONCLUSIONS:  1. Mitral annular calcification and calcified mitral  leaflets with decreased diastolic opening. Mild-moderate  mitral regurgitation. Mean transmitral valve gradient  equals 4 mm Hg, consistent with mild mitral stenosis.  2. Transcatheter aortic valve replacement. Suboptimal  transaortic gradients.  3. Normal left ventricular internal dimensions and wall  thicknesses.  4. Moderate segmental left ventricular systolic  dysfunction. Inferior and inferolateral wall hypokinesis.  Paradoxical septal wall motion.  5. The right ventricle is not well visualized; grossly  normal right ventricular systolic function.  6. Normal tricuspid valve.  Moderate tricuspid  regurgitation.  7. Estimated pulmonary artery systolic pressure equals 40  mm Hg, assuming right atrial pressure equals 10  mm Hg,  consistent with mild pulmonary hypertension.  8. Left pleural effusion.  ------------------------------------------------------------------------  Confirmed on  5/28/2019 - 16:51:40 by Mike Nicole M.D. RPVI  ------------------------------------------------------------------------    < end of copied text >          SAI ALEXANDER M.D. ATTENDING RADIOLOGIST  This document has been electronically signed. May 29 2019  9:21AM    < end of copied text >      RESPIRATORY CULTURES:          Studies  Chest X-RAY  CT SCAN Chest   Venous Dopplers: LE:   CT Abdomen  Others

## 2019-05-30 NOTE — PROGRESS NOTE ADULT - SUBJECTIVE AND OBJECTIVE BOX
Patient is a 73y old  Male who presents with a chief complaint of radiation fibrosis (29 May 2019 13:37)  Breathing improved.  Patient denies CP, dizziness or palpitations.       PAST MEDICAL & SURGICAL HISTORY:  Pericardial effusion: drained in [place] in 4/19  Lung fibrosis: after RT in 80&#x27;s  Systolic CHF, chronic: s/p ICD  AS (aortic stenosis): s/p TAVR  ICD (implantable cardioverter-defibrillator) in place  Myocardial infarction: Cardiac Arrest- 1994- no stents  Type 2 diabetes mellitus without complication  Hodgkin lymphoma: on remission, chemo and rad 4435-7030  Acquired hypothyroidism  Pacemaker  Myocardial infarction involving other coronary artery: no stents  Myocardial infarction, old  Iatrogenic hypothyroidism  Hx of Hodgkins lymphoma  Diabetes mellitus  HLD (hyperlipidemia)  HTN (hypertension)  S/P ICD (internal cardiac defibrillator) procedure: Reamaze  S/P TAVR (transcatheter aortic valve replacement): 10/16 in [place]  History of umbilical hernia repair  H/O bilateral inguinal hernia repair  H/O carotid endarterectomy: bilateral  No significant past surgical history  History of carotid endarterectomy      MEDICATIONS  (STANDING):  aspirin enteric coated 81 milliGRAM(s) Oral daily  atorvastatin 80 milliGRAM(s) Oral at bedtime  clonazePAM  Tablet 1 milliGRAM(s) Oral daily  dextrose 5%. 1000 milliLiter(s) (50 mL/Hr) IV Continuous <Continuous>  dextrose 50% Injectable 12.5 Gram(s) IV Push once  dextrose 50% Injectable 25 Gram(s) IV Push once  dextrose 50% Injectable 25 Gram(s) IV Push once  FLUoxetine 20 milliGRAM(s) Oral daily  furosemide   Injectable 40 milliGRAM(s) IV Push daily  heparin  Injectable 5000 Unit(s) SubCutaneous every 8 hours  insulin lispro (HumaLOG) corrective regimen sliding scale   SubCutaneous three times a day before meals  levothyroxine 125 MICROGram(s) Oral daily  losartan 25 milliGRAM(s) Oral daily  metoprolol succinate ER 25 milliGRAM(s) Oral daily    MEDICATIONS  (PRN):  acetaminophen   Tablet .. 650 milliGRAM(s) Oral every 6 hours PRN Mild Pain (1 - 3)  dextrose 40% Gel 15 Gram(s) Oral once PRN Blood Glucose LESS THAN 70 milliGRAM(s)/deciliter  glucagon  Injectable 1 milliGRAM(s) IntraMuscular once PRN Glucose LESS THAN 70 milligrams/deciliter            Vital Signs Last 24 Hrs  T(C): 36.9 (30 May 2019 12:28), Max: 36.9 (30 May 2019 12:28)  T(F): 98.5 (30 May 2019 12:28), Max: 98.5 (30 May 2019 12:28)  HR: 90 (30 May 2019 12:28) (80 - 90)  BP: 104/57 (30 May 2019 12:28) (104/57 - 142/71)  BP(mean): --  RR: 16 (30 May 2019 12:28) (16 - 18)  SpO2: 97% (30 May 2019 12:28) (97% - 100%)            INTERPRETATION OF TELEMETRY:  SR with V paced at 60; 14 beats NSVT recorded on 5/29    ECG:        LABS:                        11.8   5.82  )-----------( 310      ( 30 May 2019 06:48 )             38.2     05-30    135  |  95<L>  |  16  ----------------------------<  128<H>  4.4   |  22  |  0.96    Ca    9.4      30 May 2019 06:48  Phos  3.9     05-29  Mg     2.0     05-30    TPro  7.5  /  Alb  3.9  /  TBili  0.6  /  DBili  x   /  AST  16  /  ALT  14  /  AlkPhos  71  05-29              BNP  RADIOLOGY & ADDITIONAL STUDIES:      PHYSICAL EXAM:    GENERAL: In no apparent distress, well nourished, and hydrated.  NECK: Supple and normal thyroid.  No JVD or carotid bruit.  Carotid pulse is 2+ bilaterally.  HEART: Regular rate and rhythm; 2/6 systolic  murmurs, no rubs, or gallops.  L ICD  PULMONARY: minimal breath sound LL base; No rales, wheezing, or rhonchi bilaterally.  ABDOMEN: Soft, Nontender, Nondistended; Bowel sounds present  EXTREMITIES:  2+ Peripheral Pulses, No clubbing, cyanosis, or edema  NEUROLOGICAL: Grossly nonfocal

## 2019-05-30 NOTE — PROGRESS NOTE ADULT - PROBLEM SELECTOR PLAN 2
He has bilaterla pleural effusions: left is more then right: DW Cards attending: think this is heart failure: He does have hx of Lymphoma to keep in mind: Cont diuresis

## 2019-05-30 NOTE — PROGRESS NOTE ADULT - SUBJECTIVE AND OBJECTIVE BOX
Subjective: no chest pain; sob improving   	  MEDICATIONS  (STANDING):  aspirin enteric coated 81 milliGRAM(s) Oral daily  atorvastatin 80 milliGRAM(s) Oral at bedtime  clonazePAM  Tablet 1 milliGRAM(s) Oral daily  dextrose 5%. 1000 milliLiter(s) (50 mL/Hr) IV Continuous <Continuous>  dextrose 50% Injectable 12.5 Gram(s) IV Push once  dextrose 50% Injectable 25 Gram(s) IV Push once  dextrose 50% Injectable 25 Gram(s) IV Push once  FLUoxetine 20 milliGRAM(s) Oral daily  furosemide   Injectable 40 milliGRAM(s) IV Push daily  heparin  Injectable 5000 Unit(s) SubCutaneous every 8 hours  insulin lispro (HumaLOG) corrective regimen sliding scale   SubCutaneous three times a day before meals  levothyroxine 125 MICROGram(s) Oral daily  losartan 25 milliGRAM(s) Oral daily  metoprolol succinate ER 25 milliGRAM(s) Oral daily    MEDICATIONS  (PRN):  acetaminophen   Tablet .. 650 milliGRAM(s) Oral every 6 hours PRN Mild Pain (1 - 3)  dextrose 40% Gel 15 Gram(s) Oral once PRN Blood Glucose LESS THAN 70 milliGRAM(s)/deciliter  glucagon  Injectable 1 milliGRAM(s) IntraMuscular once PRN Glucose LESS THAN 70 milligrams/deciliter    LABS:                        11.8   5.82  )-----------( 310      ( 30 May 2019 06:48 )             38.2     135  |  95<L>  |  16  ----------------------------<  128<H>  4.4   |  22  |  0.96    Ca    9.4      30 May 2019 06:48  Phos  3.9     05-29  Mg     2.0     05-30    TPro  7.5  /  Alb  3.9  /  TBili  0.6  /  DBili  x   /  AST  16  /  ALT  14  /  AlkPhos  71  05-29      PHYSICAL EXAM  Vital Signs Last 24 Hrs  T(C): 36.9 (30 May 2019 12:28), Max: 36.9 (30 May 2019 12:28)  T(F): 98.5 (30 May 2019 12:28), Max: 98.5 (30 May 2019 12:28)  HR: 90 (30 May 2019 12:28) (80 - 90)  BP: 104/57 (30 May 2019 12:28) (104/57 - 142/71)  RR: 16 (30 May 2019 12:28) (16 - 18)  SpO2: 97% (30 May 2019 12:28) (97% - 100%)    Appearance: Normal	  HEENT:   Normal oral mucosa, PERRL, EOMI	  Lymphatic: No lymphadenopathy , no edema  Cardiovascular: Normal S1 S2, RRR, No murmurs , Peripheral pulses palpable 2+ bilaterally  Respiratory: decreased BS at bases bilaterally 	  Gastrointestinal:  Soft, Non-tender, + BS	  Skin: No rashes, No ecchymoses, No cyanosis, warm to touch  Psychiatry:  Mood & affect appropriate    TELEMETRY: 	       < from: Transthoracic Echocardiogram (05.28.19 @ 15:23) >  CONCLUSIONS:  1. Mitral annular calcification and calcified mitral  leaflets with decreased diastolic opening. Mild-moderate  mitral regurgitation. Mean transmitral valve gradient  equals 4 mm Hg, consistent with mild mitral stenosis.  2. Transcatheter aortic valve replacement. Suboptimal  transaortic gradients.  3. Normal left ventricular internal dimensions and wall  thicknesses.  4. Moderate segmental left ventricular systolic  dysfunction. Inferior and inferolateral wall hypokinesis.  Paradoxical septal wall motion.  5. The right ventricle is not well visualized; grossly  normal right ventricular systolic function.  6. Normal tricuspid valve.  Moderate tricuspid  regurgitation.  7. Estimated pulmonary artery systolic pressure equals 40  mm Hg, assuming right atrial pressure equals 10  mm Hg,  consistent with mild pulmonary hypertension.  8. Left pleural effusion.    < end of copied text >    < from: Cardiac Cath Lab - Adult (10.18.16 @ 16:42) >  CORONARY VESSELS: The coronary circulation is right dominant.  LM:   --  LM: Angiography showed minor luminal irregularities with no flow  limiting lesions.  LAD:   --  LAD: Angiography showed minor luminal irregularities with no  flow limiting lesions.  CX:   --  Circumflex: Angiography showed minor luminal irregularities with  no flow limiting lesions.  RCA:   --  Proximal RCA: There was a 100 % stenosis.    < end of copied text >      ASSESSMENT/PLAN: 	73y Male with history of severe AS s/p TAVR, CAD with last cath in 2016 showing  of prox RCA, HTN, history of pericardial effusion s/p pericardiocentesis, cardiomyopathy admitted with dyspnea.     -TTE performed showing no pericardial effusion and EG of 40% with segmental LV dysfunction in the inferior and inferolateral walls  -RWMA are old and consistent with known  of RCA - medical management of cardiomyopathy recommended  -CT scan demonstrates bilateral pleural effusions and patchy opacities consistent with pulmonary edema or possible PNA  -will continue with diuresis for now - increase lasix to 40mg IV daily, O>I  -pulmonary eval with Dr. Corcoran to evaluate possible pna and pleural effusions  -continue with asa for history of cad  -close OP Fu with Dr Durham after DC

## 2019-05-31 ENCOUNTER — TRANSCRIPTION ENCOUNTER (OUTPATIENT)
Age: 74
End: 2019-05-31

## 2019-05-31 LAB
ANION GAP SERPL CALC-SCNC: 14 MMO/L — SIGNIFICANT CHANGE UP (ref 7–14)
BUN SERPL-MCNC: 22 MG/DL — SIGNIFICANT CHANGE UP (ref 7–23)
CALCIUM SERPL-MCNC: 9.2 MG/DL — SIGNIFICANT CHANGE UP (ref 8.4–10.5)
CHLORIDE SERPL-SCNC: 95 MMOL/L — LOW (ref 98–107)
CO2 SERPL-SCNC: 27 MMOL/L — SIGNIFICANT CHANGE UP (ref 22–31)
CREAT SERPL-MCNC: 1.07 MG/DL — SIGNIFICANT CHANGE UP (ref 0.5–1.3)
GLUCOSE SERPL-MCNC: 151 MG/DL — HIGH (ref 70–99)
HCT VFR BLD CALC: 35.8 % — LOW (ref 39–50)
HGB BLD-MCNC: 11.8 G/DL — LOW (ref 13–17)
MAGNESIUM SERPL-MCNC: 2 MG/DL — SIGNIFICANT CHANGE UP (ref 1.6–2.6)
MCHC RBC-ENTMCNC: 30 PG — SIGNIFICANT CHANGE UP (ref 27–34)
MCHC RBC-ENTMCNC: 33 % — SIGNIFICANT CHANGE UP (ref 32–36)
MCV RBC AUTO: 91.1 FL — SIGNIFICANT CHANGE UP (ref 80–100)
NRBC # FLD: 0 K/UL — SIGNIFICANT CHANGE UP (ref 0–0)
PLATELET # BLD AUTO: 430 K/UL — HIGH (ref 150–400)
PMV BLD: 8.5 FL — SIGNIFICANT CHANGE UP (ref 7–13)
POTASSIUM SERPL-MCNC: 4 MMOL/L — SIGNIFICANT CHANGE UP (ref 3.5–5.3)
POTASSIUM SERPL-SCNC: 4 MMOL/L — SIGNIFICANT CHANGE UP (ref 3.5–5.3)
RBC # BLD: 3.93 M/UL — LOW (ref 4.2–5.8)
RBC # FLD: 15.3 % — HIGH (ref 10.3–14.5)
SODIUM SERPL-SCNC: 136 MMOL/L — SIGNIFICANT CHANGE UP (ref 135–145)
WBC # BLD: 6.31 K/UL — SIGNIFICANT CHANGE UP (ref 3.8–10.5)
WBC # FLD AUTO: 6.31 K/UL — SIGNIFICANT CHANGE UP (ref 3.8–10.5)

## 2019-05-31 PROCEDURE — 99232 SBSQ HOSP IP/OBS MODERATE 35: CPT

## 2019-05-31 PROCEDURE — 71046 X-RAY EXAM CHEST 2 VIEWS: CPT | Mod: 26

## 2019-05-31 RX ORDER — FUROSEMIDE 40 MG
1 TABLET ORAL
Qty: 30 | Refills: 0
Start: 2019-05-31 | End: 2019-06-29

## 2019-05-31 RX ORDER — METOPROLOL TARTRATE 50 MG
3 TABLET ORAL
Qty: 90 | Refills: 0
Start: 2019-05-31 | End: 2019-06-29

## 2019-05-31 RX ADMIN — Medication 20 MILLIGRAM(S): at 12:42

## 2019-05-31 RX ADMIN — Medication 1 MILLIGRAM(S): at 12:42

## 2019-05-31 RX ADMIN — LOSARTAN POTASSIUM 25 MILLIGRAM(S): 100 TABLET, FILM COATED ORAL at 05:48

## 2019-05-31 RX ADMIN — Medication 40 MILLIGRAM(S): at 05:48

## 2019-05-31 RX ADMIN — Medication 650 MILLIGRAM(S): at 19:14

## 2019-05-31 RX ADMIN — Medication 650 MILLIGRAM(S): at 20:00

## 2019-05-31 RX ADMIN — Medication 81 MILLIGRAM(S): at 12:43

## 2019-05-31 RX ADMIN — ATORVASTATIN CALCIUM 80 MILLIGRAM(S): 80 TABLET, FILM COATED ORAL at 20:56

## 2019-05-31 RX ADMIN — HEPARIN SODIUM 5000 UNIT(S): 5000 INJECTION INTRAVENOUS; SUBCUTANEOUS at 13:52

## 2019-05-31 RX ADMIN — Medication 125 MICROGRAM(S): at 05:48

## 2019-05-31 RX ADMIN — HEPARIN SODIUM 5000 UNIT(S): 5000 INJECTION INTRAVENOUS; SUBCUTANEOUS at 20:56

## 2019-05-31 RX ADMIN — Medication 75 MILLIGRAM(S): at 05:48

## 2019-05-31 NOTE — DISCHARGE NOTE PROVIDER - HOSPITAL COURSE
73y Male with history of severe AS s/p TAVR, CAD with last cath in 2016 showing  of prox RCA, HTN, history of pericardial effusion s/p pericardiocentesis, cardiomyopathy admitted with dyspnea.         - pt sob improving with diuresis , cont present IV doses. -    - ECHO noted     - ASA, statin, ACE     - pt is tolerating BB well, BP stable -pulmonary follow up  chst x-ray today noted: seesm to eb a little better : still has pl effusions: but symptomatically he is better and almost at baseline: DW Family in detail: He has no clinical features of pneumonia lke fever, cough, phlegm etc: And his WBC COUNT IS NORMAL:    Patient is hemodynamically stable and without complaints and patient is ready for discharge.  Case discussed and medications reviewed with PMD.  Agreed with above. 73y Male with history of severe AS s/p TAVR, CAD with last cath in 2016 showing  of prox RCA, HTN, history of pericardial effusion s/p pericardiocentesis, cardiomyopathy admitted with dyspnea.         - pt sob improved with diuresis    - ECHO noted     - ASA, statin, ACE     - pt is tolerating BB well, BP stable -pulmonary follow up  chst x-ray noted: noted improvement; symptomatically he is better and almost at baseline: DW Family in detail: He has no clinical features of pneumonia lke fever, cough, phlegm etc: And his WBC COUNT IS NORMAL    Patient is hemodynamically stable and without complaints and patient is ready for discharge.  Case discussed and medications reviewed with PMD.  Agreed with above.

## 2019-05-31 NOTE — DISCHARGE NOTE PROVIDER - INSTRUCTIONS
no salt added, low cholesterol, consistent carbohydrate diet- Refer to [employer] for recipes and information.

## 2019-05-31 NOTE — PROGRESS NOTE ADULT - SUBJECTIVE AND OBJECTIVE BOX
pt seen and examined, no complaints, ROS - .     acetaminophen   Tablet .. 650 milliGRAM(s) Oral every 6 hours PRN  aspirin enteric coated 81 milliGRAM(s) Oral daily  atorvastatin 80 milliGRAM(s) Oral at bedtime  clonazePAM  Tablet 1 milliGRAM(s) Oral daily  dextrose 40% Gel 15 Gram(s) Oral once PRN  dextrose 5%. 1000 milliLiter(s) IV Continuous <Continuous>  dextrose 50% Injectable 12.5 Gram(s) IV Push once  dextrose 50% Injectable 25 Gram(s) IV Push once  dextrose 50% Injectable 25 Gram(s) IV Push once  FLUoxetine 20 milliGRAM(s) Oral daily  furosemide   Injectable 40 milliGRAM(s) IV Push daily  glucagon  Injectable 1 milliGRAM(s) IntraMuscular once PRN  heparin  Injectable 5000 Unit(s) SubCutaneous every 8 hours  insulin lispro (HumaLOG) corrective regimen sliding scale   SubCutaneous three times a day before meals  levothyroxine 125 MICROGram(s) Oral daily  losartan 25 milliGRAM(s) Oral daily  metoprolol succinate ER 75 milliGRAM(s) Oral daily                            11.8   5.82  )-----------( 310      ( 30 May 2019 06:48 )             38.2       Hemoglobin: 11.8 g/dL (05-30 @ 06:48)  Hemoglobin: 11.2 g/dL (05-29 @ 07:28)  Hemoglobin: 10.8 g/dL (05-28 @ 10:30)      05-30    135  |  95<L>  |  16  ----------------------------<  128<H>  4.4   |  22  |  0.96    Ca    9.4      30 May 2019 06:48  Phos  3.9     05-29  Mg     2.0     05-30    TPro  7.5  /  Alb  3.9  /  TBili  0.6  /  DBili  x   /  AST  16  /  ALT  14  /  AlkPhos  71  05-29    Creatinine Trend: 0.96<--, 1.02<--, 1.11<--    COAGS:           T(C): 36.8 (05-30-19 @ 20:36), Max: 36.9 (05-30-19 @ 12:28)  HR: 90 (05-30-19 @ 20:36) (89 - 91)  BP: 117/62 (05-30-19 @ 20:36) (104/57 - 142/71)  RR: 18 (05-30-19 @ 20:36) (16 - 18)  SpO2: 96% (05-30-19 @ 20:36) (96% - 99%)  Wt(kg): --    I&O's Summary    29 May 2019 07:01  -  30 May 2019 07:00  --------------------------------------------------------  IN: 500 mL / OUT: 200 mL / NET: 300 mL      Appearance: Normal	  HEENT:   Normal oral mucosa, PERRL, EOMI	  Lymphatic: No lymphadenopathy , no edema  Cardiovascular: Normal S1 S2, RRR, No murmurs , Peripheral pulses palpable 2+ bilaterally  Respiratory: decreased BS at bases bilaterally 	  Gastrointestinal:  Soft, Non-tender, + BS	  Skin: No rashes, No ecchymoses, No cyanosis, warm to touch  Psychiatry:  Mood & affect appropriate    TELEMETRY: 	       < from: Transthoracic Echocardiogram (05.28.19 @ 15:23) >  CONCLUSIONS:  1. Mitral annular calcification and calcified mitral  leaflets with decreased diastolic opening. Mild-moderate  mitral regurgitation. Mean transmitral valve gradient  equals 4 mm Hg, consistent with mild mitral stenosis.  2. Transcatheter aortic valve replacement. Suboptimal  transaortic gradients.  3. Normal left ventricular internal dimensions and wall  thicknesses.  4. Moderate segmental left ventricular systolic  dysfunction. Inferior and inferolateral wall hypokinesis.  Paradoxical septal wall motion.  5. The right ventricle is not well visualized; grossly  normal right ventricular systolic function.  6. Normal tricuspid valve.  Moderate tricuspid  regurgitation.  7. Estimated pulmonary artery systolic pressure equals 40  mm Hg, assuming right atrial pressure equals 10  mm Hg,  consistent with mild pulmonary hypertension.  8. Left pleural effusion.    < end of copied text >    < from: Cardiac Cath Lab - Adult (10.18.16 @ 16:42) >  CORONARY VESSELS: The coronary circulation is right dominant.  LM:   --  LM: Angiography showed minor luminal irregularities with no flow  limiting lesions.  LAD:   --  LAD: Angiography showed minor luminal irregularities with no  flow limiting lesions.  CX:   --  Circumflex: Angiography showed minor luminal irregularities with  no flow limiting lesions.  RCA:   --  Proximal RCA: There was a 100 % stenosis.    < end of copied text >      ASSESSMENT/PLAN: 	73y Male with history of severe AS s/p TAVR, CAD with last cath in 2016 showing  of prox RCA, HTN, history of pericardial effusion s/p pericardiocentesis, cardiomyopathy admitted with dyspnea.     - pt sob improving with diuresis , cont present IV doses. -  - ECHO noted   - ASA, statin, ACE   - pt is tolerating BB well, BP stable    -pulmonary follow up    -continue with asa for history of cad  - D/W  D/W Dr Hinton

## 2019-05-31 NOTE — DISCHARGE NOTE PROVIDER - CARE PROVIDERS DIRECT ADDRESSES
[email or web address]

## 2019-05-31 NOTE — DISCHARGE NOTE PROVIDER - NSDCFUADDAPPT_GEN_ALL_CORE_FT
follow up with Dr Corcoran in 2 weeks follow up with Dr Corcoran in 2 weeks  follow up with your doctor Dr Durahm 6/10 2:15 PM

## 2019-05-31 NOTE — PROGRESS NOTE ADULT - PROBLEM SELECTOR PLAN 2
He has bilaterla pleural effusions: left is more then right: MARIAMA Cards attending: think this is heart failure: He does have hx of Lymphoma to keep in mind: Cont diuresis  [facility] sees[facility] to eb a little better : still has pl effusions: but symptomatically heis better and almost at baseline: MARIAMA Family in detail He has bilaterla pleural effusions: left is more then right: MARIAMA Cards attending: think this is heart failure: He does have hx of Lymphoma to keep in mind: Cont diuresis  [facility] sees[facility] to eb a little better : still has pl effusions: but symptomatically he is better and almost at baseline: MARIAMA Family in detail: He has no clinical features of pneumonia lke fever, cough, phlegm etc: And his WBC COUNT IS NORMAL:

## 2019-05-31 NOTE — CHART NOTE - NSCHARTNOTEFT_GEN_A_CORE
ELECTROPHYSIOLOGY        Patient feels well but is very agitated and wants to go home.  No shortness of breath.  Telemetry:  Normal sinus rhythm with ventricular pacing +PVC's.  No NSVT.   Continue Toprol XL 75mg po daily.   Has contact information and device clinic appointment in September.

## 2019-05-31 NOTE — DISCHARGE NOTE PROVIDER - PROVIDER TOKENS
PROVIDER:[TOKEN:[[id number]],FOLLOWUP:[1 month]],PROVIDER:[TOKEN:[[id number]],FOLLOWUP:[1 week]],PROVIDER:[TOKEN:[[id number]],FOLLOWUP:[2 weeks]]

## 2019-05-31 NOTE — CHART NOTE - NSCHARTNOTEFT_GEN_A_CORE
73y Male with history of severe AS s/p TAVR, CAD with last cath in 2016 showing  of prox RCA, HTN, history of pericardial effusion s/p pericardiocentesis, cardiomyopathy admitted with dyspnea. CT scan demonstrates bilateral pleural effusions and patchy opacities consistent with pulmonary edema or possible PNA. Repeat CXR today shows slight improvement in pleural effusions. Will continue with diuresis for now with IV lasix 40 mg daily. Patient still not understanding need for diuresis, very upset with plan. Per my conversation with Dr. Mo, we recommend patient stays but if he want to leave it will be against medical advice.      Alina Lane, [facility]-BC   73y Male with history of severe AS s/p TAVR, CAD with last cath in 2016 showing  of prox RCA, HTN, history of pericardial effusion s/p pericardiocentesis, cardiomyopathy admitted with dyspnea. CT scan demonstrates bilateral pleural effusions and patchy opacities consistent with pulmonary edema or possible PNA. Repeat CXR today shows slight improvement in pleural effusions. Will continue with diuresis for now with IV lasix 40 mg daily. Patient still not understanding need for diuresis, very upset with plan. Per my conversation with Dr. Mo, we recommend patient stays but if he wants to leave it will be against medical advice.      Alina Lane, [facility]-BC  [phone]

## 2019-05-31 NOTE — DISCHARGE NOTE PROVIDER - NSDCCPCAREPLAN_GEN_ALL_CORE_FT
PRINCIPAL DISCHARGE DIAGNOSIS  Diagnosis: Acute on chronic systolic heart failure  Assessment and Plan of Treatment: To relieve and prevent worsening symptoms associated with congestive heart failure, to improve quality of life, and to treat underlying conditions such as coronary heart disease, high blood pressure, or diabetes, and to maintain euvolemia. EF 34-40%  Low salt low salt &  low cholesterol, monitor your fluid intake and weight daily, exercise as tolerated 30 minutes daily, and follow up with your physician within one week.      SECONDARY DISCHARGE DIAGNOSES  Diagnosis: Pulmonary fibrosis  Assessment and Plan of Treatment: continue present care PRINCIPAL DISCHARGE DIAGNOSIS  Diagnosis: Acute on chronic systolic heart failure  Assessment and Plan of Treatment: To relieve and prevent worsening symptoms associated with congestive heart failure, to improve quality of life, and to treat underlying conditions such as coronary heart disease, high blood pressure, or diabetes, and to maintain euvolemia. EF 34-40%  Low salt low salt &  low cholesterol, monitor your fluid intake and weight daily, exercise as tolerated 30 minutes daily, and follow up with your physician within one week.      SECONDARY DISCHARGE DIAGNOSES  Diagnosis: Pulmonary fibrosis  Assessment and Plan of Treatment: continue present care. Continue home medications.

## 2019-05-31 NOTE — PROGRESS NOTE ADULT - SUBJECTIVE AND OBJECTIVE BOX
Patient is a 73y old  Male who presents with a chief complaint of pl effusion (30 May 2019 16:48)      Any change in ROS: Doingok : no SOB     MEDICATIONS  (STANDING):  aspirin enteric coated 81 milliGRAM(s) Oral daily  atorvastatin 80 milliGRAM(s) Oral at bedtime  clonazePAM  Tablet 1 milliGRAM(s) Oral daily  dextrose 5%. 1000 milliLiter(s) (50 mL/Hr) IV Continuous <Continuous>  dextrose 50% Injectable 12.5 Gram(s) IV Push once  dextrose 50% Injectable 25 Gram(s) IV Push once  dextrose 50% Injectable 25 Gram(s) IV Push once  FLUoxetine 20 milliGRAM(s) Oral daily  furosemide   Injectable 40 milliGRAM(s) IV Push daily  heparin  Injectable 5000 Unit(s) SubCutaneous every 8 hours  insulin lispro (HumaLOG) corrective regimen sliding scale   SubCutaneous three times a day before meals  levothyroxine 125 MICROGram(s) Oral daily  losartan 25 milliGRAM(s) Oral daily  metoprolol succinate ER 75 milliGRAM(s) Oral daily    MEDICATIONS  (PRN):  acetaminophen   Tablet .. 650 milliGRAM(s) Oral every 6 hours PRN Mild Pain (1 - 3)  dextrose 40% Gel 15 Gram(s) Oral once PRN Blood Glucose LESS THAN 70 milliGRAM(s)/deciliter  glucagon  Injectable 1 milliGRAM(s) IntraMuscular once PRN Glucose LESS THAN 70 milligrams/deciliter    Vital Signs Last 24 Hrs  T(C): 36.8 (31 May 2019 12:15), Max: 36.8 (30 May 2019 20:36)  T(F): 98.3 (31 May 2019 12:15), Max: 98.3 (31 May 2019 12:15)  HR: 86 (31 May 2019 12:15) (86 - 97)  BP: 136/67 (31 May 2019 12:15) (114/63 - 144/83)  BP(mean): --  RR: 18 (31 May 2019 12:15) (16 - 18)  SpO2: 95% (31 May 2019 12:15) (95% - 97%)    I&O's Summary    30 May 2019 07:01  -  31 May 2019 07:00  --------------------------------------------------------  IN: 400 mL / OUT: 0 mL / NET: 400 mL          Physical Exam:   GENERAL: NAD, well-groomed, well-developed  HEENT: CATRINA/   Atraumatic, Normocephalic  ENMT: No tonsillar erythema, exudates, or enlargement; Moist mucous membranes, Good dentition, No lesions  NECK: Supple, No JVD, Normal thyroid  CHEST/LUNG: Slight decrease in air entry bilaterally:   CVS: Regular rate and rhythm; No murmurs, rubs, or gallops  GI: : Soft, Nontender, Nondistended; Bowel sounds present  NERVOUS SYSTEM:  Alert & Oriented X3, Good concentration; Motor Strength 5/5 B/L upper and lower extremities; DTRs 2+ intact and symmetric  EXTREMITIES:  2+ Peripheral Pulses, No clubbing, cyanosis, or edema  LYMPH: No lymphadenopathy noted  SKIN: No rashes or lesions  ENDOCRINOLOGY: No Thyromegaly  PSYCH: Appropriate    Labs:  29                            11.8   6.31  )-----------( 430      ( 31 May 2019 06:00 )             35.8                         11.8   5.82  )-----------( 310      ( 30 May 2019 06:48 )             38.2                         11.2   5.55  )-----------( 429      ( 29 May 2019 07:28 )             35.2                         10.8   5.84  )-----------( 409      ( 28 May 2019 10:30 )             33.3     05-31    136  |  95<L>  |  22  ----------------------------<  151<H>  4.0   |  27  |  1.07  05-30    135  |  95<L>  |  16  ----------------------------<  128<H>  4.4   |  22  |  0.96  05-29    137  |  95<L>  |  12  ----------------------------<  130<H>  4.0   |  27  |  1.02  05-28    138  |  95<L>  |  10  ----------------------------<  140<H>  4.3   |  30  |  1.11    Ca    9.2      31 May 2019 06:00  Ca    9.4      30 May 2019 06:48  Mg     2.0     05-31  Mg     2.0     05-30    TPro  7.5  /  Alb  3.9  /  TBili  0.6  /  DBili  x   /  AST  16  /  ALT  14  /  AlkPhos  71  05-29  TPro  7.9  /  Alb  4.3  /  TBili  0.4  /  DBili  x   /  AST  15  /  ALT  10  /  AlkPhos  67  05-28    CAPILLARY BLOOD GLUCOSE      POCT Blood Glucose.: 141 mg/dL (31 May 2019 13:03)  POCT Blood Glucose.: 150 mg/dL (31 May 2019 08:36)  POCT Blood Glucose.: 133 mg/dL (30 May 2019 21:44)  POCT Blood Glucose.: 146 mg/dL (30 May 2019 17:34)              < from: CT Chest No Cont (05.28.19 @ 23:29) >  Evaluation of the bones demonstrate some degenerative changes of the   spine.    IMPRESSION: Bilateral pleural effusions, moderate-sized on the left and   small on the right with associated bibasilar atelectasis, left greater   than right.    Multiple right lung patchy opacities as described above. Differential   diagnosis include pneumonia and/or pulmonary edema.    Small pericardial effusion is unchanged since October 21, 2016.                  SAI ALEXANDER M.D. ATTENDING RADIOLOGIST  This document has been electronically signed. May 29 2019  9:21AM    < end of copied text >      RECENT CULTURES:        RESPIRATORY CULTURES:          Studies  Chest X-RAY  CT SCAN Chest   Venous Dopplers: LE:   CT Abdomen  Others

## 2019-05-31 NOTE — DISCHARGE NOTE PROVIDER - CARE PROVIDER_API CALL
Sotero Godinez (MD)  Cardiac Electrophysiology; Cardiovascular Disease; Internal Medicine  [address] [place] [address], [address] [address]  [place] [address]  Phone: [phone]  Fax: [phone]  Follow Up Time: 1 month    Jorge Mercer)  Internal Medicine; Medical Oncology  [address], [address]  [place] [address]  Phone: [phone]  Fax: [phone]  Follow Up Time: 1 week    Kerry Murillo)  Cardiovascular Disease; Internal Medicine  [address], [address]  [place] [address]  Phone: [phone]  Fax: [phone]  Follow Up Time: 2 weeks

## 2019-06-01 ENCOUNTER — TRANSCRIPTION ENCOUNTER (OUTPATIENT)
Age: 74
End: 2019-06-01

## 2019-06-01 VITALS
HEART RATE: 81 BPM | RESPIRATION RATE: 17 BRPM | TEMPERATURE: 98 F | OXYGEN SATURATION: 98 % | SYSTOLIC BLOOD PRESSURE: 109 MMHG | DIASTOLIC BLOOD PRESSURE: 81 MMHG

## 2019-06-01 LAB
ANION GAP SERPL CALC-SCNC: 16 MMO/L — HIGH (ref 7–14)
BUN SERPL-MCNC: 27 MG/DL — HIGH (ref 7–23)
CALCIUM SERPL-MCNC: 10.1 MG/DL — SIGNIFICANT CHANGE UP (ref 8.4–10.5)
CHLORIDE SERPL-SCNC: 95 MMOL/L — LOW (ref 98–107)
CO2 SERPL-SCNC: 26 MMOL/L — SIGNIFICANT CHANGE UP (ref 22–31)
CREAT SERPL-MCNC: 1.1 MG/DL — SIGNIFICANT CHANGE UP (ref 0.5–1.3)
GLUCOSE SERPL-MCNC: 127 MG/DL — HIGH (ref 70–99)
HCT VFR BLD CALC: 36.5 % — LOW (ref 39–50)
HGB BLD-MCNC: 11.7 G/DL — LOW (ref 13–17)
MAGNESIUM SERPL-MCNC: 1.9 MG/DL — SIGNIFICANT CHANGE UP (ref 1.6–2.6)
MCHC RBC-ENTMCNC: 29.8 PG — SIGNIFICANT CHANGE UP (ref 27–34)
MCHC RBC-ENTMCNC: 32.1 % — SIGNIFICANT CHANGE UP (ref 32–36)
MCV RBC AUTO: 93.1 FL — SIGNIFICANT CHANGE UP (ref 80–100)
NRBC # FLD: 0 K/UL — SIGNIFICANT CHANGE UP (ref 0–0)
PLATELET # BLD AUTO: 455 K/UL — HIGH (ref 150–400)
PMV BLD: 9.1 FL — SIGNIFICANT CHANGE UP (ref 7–13)
POTASSIUM SERPL-MCNC: 4.2 MMOL/L — SIGNIFICANT CHANGE UP (ref 3.5–5.3)
POTASSIUM SERPL-SCNC: 4.2 MMOL/L — SIGNIFICANT CHANGE UP (ref 3.5–5.3)
RBC # BLD: 3.92 M/UL — LOW (ref 4.2–5.8)
RBC # FLD: 15.5 % — HIGH (ref 10.3–14.5)
SODIUM SERPL-SCNC: 137 MMOL/L — SIGNIFICANT CHANGE UP (ref 135–145)
WBC # BLD: 6.96 K/UL — SIGNIFICANT CHANGE UP (ref 3.8–10.5)
WBC # FLD AUTO: 6.96 K/UL — SIGNIFICANT CHANGE UP (ref 3.8–10.5)

## 2019-06-01 RX ORDER — FUROSEMIDE 40 MG
1 TABLET ORAL
Qty: 0 | Refills: 0 | DISCHARGE

## 2019-06-01 RX ADMIN — Medication 125 MICROGRAM(S): at 04:39

## 2019-06-01 RX ADMIN — Medication 20 MILLIGRAM(S): at 11:06

## 2019-06-01 RX ADMIN — Medication 81 MILLIGRAM(S): at 11:06

## 2019-06-01 RX ADMIN — Medication 1 MILLIGRAM(S): at 11:06

## 2019-06-01 RX ADMIN — Medication 40 MILLIGRAM(S): at 04:39

## 2019-06-01 RX ADMIN — LOSARTAN POTASSIUM 25 MILLIGRAM(S): 100 TABLET, FILM COATED ORAL at 04:39

## 2019-06-01 RX ADMIN — Medication 75 MILLIGRAM(S): at 04:39

## 2019-06-01 NOTE — DISCHARGE NOTE NURSING/CASE MANAGEMENT/SOCIAL WORK - NSDCDPATPORTLINK_GEN_ALL_CORE
You can access the [facility] Patient Portal, offered by [facility], by registering with the following website: [email or web address]

## 2019-06-01 NOTE — PROGRESS NOTE ADULT - ATTENDING COMMENTS
Patient seen and examined.  Agree with above.   -Continue with IV diuresis to keep O > I  -Appreciate pulm eval - finding suggestive of heart failure - will monitor off abx    Brian Mo MD
Patient seen and examined.  Agree with above.   -Volume status improving  -continue with diuresis  -check cxr  -follow up pulmonary  -further workup pending above    Brian oM MD
CARDIOLOGY ATTENDING    Patient seen and examined. Agree with above. No further inpatient cardiac workup needed. D/C home today. F/U with Marva June 10th at 2:15pm
Clinically he feels better: he wants to go home today : I have explained to his wife and him that this effusion needs to be followed up: on ct chest there were alveolar opacities which I think is secondary to chf as he has no clinical features of pneumonia including fever as well as leucocytosis and clinically he has improved with Lasix: He is also told that he must follow up with a pulmonologist for these effusions, as if they don't improve with current rx: he might need a tap: He has  a hx of lymphoma and radiation fibrosis:

## 2019-06-01 NOTE — PROGRESS NOTE ADULT - PROBLEM SELECTOR PLAN 5
Controlled
Controlled  5/31: controlled!  6/1 FS with sliding scale, controlled
Controlled  5/31: controlled!

## 2019-06-01 NOTE — PROGRESS NOTE ADULT - SUBJECTIVE AND OBJECTIVE BOX
S:  denies sob, chest pain. All other review of systems negative.      acetaminophen   Tablet .. 650 milliGRAM(s) Oral every 6 hours PRN  aspirin enteric coated 81 milliGRAM(s) Oral daily  atorvastatin 80 milliGRAM(s) Oral at bedtime  clonazePAM  Tablet 1 milliGRAM(s) Oral daily  dextrose 40% Gel 15 Gram(s) Oral once PRN  dextrose 5%. 1000 milliLiter(s) IV Continuous <Continuous>  dextrose 50% Injectable 12.5 Gram(s) IV Push once  dextrose 50% Injectable 25 Gram(s) IV Push once  dextrose 50% Injectable 25 Gram(s) IV Push once  FLUoxetine 20 milliGRAM(s) Oral daily  furosemide   Injectable 40 milliGRAM(s) IV Push daily  glucagon  Injectable 1 milliGRAM(s) IntraMuscular once PRN  heparin  Injectable 5000 Unit(s) SubCutaneous every 8 hours  insulin lispro (HumaLOG) corrective regimen sliding scale   SubCutaneous three times a day before meals  levothyroxine 125 MICROGram(s) Oral daily  losartan 25 milliGRAM(s) Oral daily  metoprolol succinate ER 75 milliGRAM(s) Oral daily                            11.7   6.96  )-----------( 455      ( 01 Jun 2019 04:30 )             36.5       Hemoglobin: 11.7 g/dL (06-01 @ 04:30)  Hemoglobin: 11.8 g/dL (05-31 @ 06:00)  Hemoglobin: 11.8 g/dL (05-30 @ 06:48)  Hemoglobin: 11.2 g/dL (05-29 @ 07:28)  Hemoglobin: 10.8 g/dL (05-28 @ 10:30)    06-01    137  |  95<L>  |  27<H>  ----------------------------<  127<H>  4.2   |  26  |  1.10    Ca    10.1      01 Jun 2019 04:30  Mg     1.9     06-01    Creatinine Trend: 1.10<--, 1.07<--, 0.96<--, 1.02<--, 1.11<--    T(C): 36.7 (06-01-19 @ 04:38), Max: 36.8 (05-31-19 @ 12:15)  HR: 83 (06-01-19 @ 04:38) (83 - 86)  BP: 119/68 (06-01-19 @ 04:38) (110/63 - 136/67)  RR: 18 (06-01-19 @ 04:38) (18 - 18)  SpO2: 97% (06-01-19 @ 04:38) (95% - 97%)  Wt(kg): --  78.9    I&O's Summary    31 May 2019 07:01  -  01 Jun 2019 07:00  --------------------------------------------------------  IN: 400 mL / OUT: 0 mL / NET: 400 mL        Appearance: Normal	  HEENT:   Normal oral mucosa, PERRL, EOMI	  Lymphatic: No lymphadenopathy , no edema  Cardiovascular: Normal S1 S2, RRR, No murmurs , Peripheral pulses palpable 2+ bilaterally  Respiratory: decreased BS at bases bilaterally 	  Gastrointestinal:  Soft, Non-tender, + BS	  Skin: No rashes, No ecchymoses, No cyanosis, warm to touch  Psychiatry:  Mood & affect appropriate    TELEMETRY: 	       < from: Transthoracic Echocardiogram (05.28.19 @ 15:23) >  CONCLUSIONS:  1. Mitral annular calcification and calcified mitral  leaflets with decreased diastolic opening. Mild-moderate  mitral regurgitation. Mean transmitral valve gradient  equals 4 mm Hg, consistent with mild mitral stenosis.  2. Transcatheter aortic valve replacement. Suboptimal  transaortic gradients.  3. Normal left ventricular internal dimensions and wall  thicknesses.  4. Moderate segmental left ventricular systolic  dysfunction. Inferior and inferolateral wall hypokinesis.  Paradoxical septal wall motion.  5. The right ventricle is not well visualized; grossly  normal right ventricular systolic function.  6. Normal tricuspid valve.  Moderate tricuspid  regurgitation.  7. Estimated pulmonary artery systolic pressure equals 40  mm Hg, assuming right atrial pressure equals 10  mm Hg,  consistent with mild pulmonary hypertension.  8. Left pleural effusion.    < end of copied text >    < from: Cardiac Cath Lab - Adult (10.18.16 @ 16:42) >  CORONARY VESSELS: The coronary circulation is right dominant.  LM:   --  LM: Angiography showed minor luminal irregularities with no flow  limiting lesions.  LAD:   --  LAD: Angiography showed minor luminal irregularities with no  flow limiting lesions.  CX:   --  Circumflex: Angiography showed minor luminal irregularities with  no flow limiting lesions.  RCA:   --  Proximal RCA: There was a 100 % stenosis.    < end of copied text >      ASSESSMENT/PLAN: 	73y Male with history of severe AS s/p TAVR, CAD with last cath in 2016 showing  of prox RCA, HTN, history of pericardial effusion s/p pericardiocentesis, cardiomyopathy admitted with dyspnea.     - pt sob improving with diuresis, continue lasix will change to po on d/c   - ECHO noted   - Continue ASA, statin, ACE for CAD   - pulmonary follow up appreciated   - pt ready for d/c today with follow up with Dr. Durham on More[place] 10, 2019 at 2:15 pm

## 2019-06-01 NOTE — PROGRESS NOTE ADULT - PROBLEM SELECTOR PLAN 1
I don't see much evidence of radiation fibrosis in his lungs on the left sdie:
I don't see much evidence of radiation fibrosis in his lungs on the left sdie:  6/1 monitor.
I don't see much evidence of radiation fibrosis in his lungs on the left sdie:

## 2019-06-01 NOTE — PROGRESS NOTE ADULT - PROBLEM SELECTOR PROBLEM 5
Type 2 diabetes mellitus without complication

## 2019-06-01 NOTE — PROGRESS NOTE ADULT - PROVIDER SPECIALTY LIST ADULT
Cardiology
Electrophysiology
Electrophysiology
Pulmonology
Pulmonology
Cardiology
Pulmonology

## 2019-06-01 NOTE — PROGRESS NOTE ADULT - ASSESSMENT
72 yo M presents with SOB, elevated BP over the last couple of weeks.    EKG- Paced @ 81
72 yo M presents with SOB, elevated BP over the last couple of weeks.    EKG- Paced @ 81
Patient is a 73y old male with past medical history of remote Hodgkins lymphoma, hypertension, hyperlipidemia, severe AS s/p TAVR, chronic systolic CHF with EF 34%, LBBB, MI s/p BiV-ICD (EF improved to 40-45%) who, in  April, was hospitalized in [place] for pericardial effusion requiring draining. At that time his medications were changed and now he is admitted with shortness of breath and hypertension.   Chest CT with bilateral pleural effusions, left greater than right. Also with multiple right lung opacities c/w either pneumonia and/or pulmonary edema.  Repeat echo with LV EF 40%.  -Continue diuresis with daily IV Lasix for CHF exacerbation   -May increase Metoprolol to home dosage (75mg daily at home)  - Keep K+ > 4.0 and Mg > 2.0  -Will follow up
Patient is a 73y old male with past medical history of remote Hodgkins lymphoma, hypertension, hyperlipidemia, severe AS s/p TAVR, chronic systolic CHF with EF 34%, LBBB, MI s/p BiV-ICD (EF improved to 40-45%) who, in  April, was hospitalized in [place] for pericardial effusion requiring draining. At that time his medications were changed and now he is admitted with shortness of breath and hypertension.   Chest CT with bilateral pleural effusions, left greater than right. Also with multiple right lung opacities c/w either pneumonia and/or pulmonary edema.  Repeat echo with LV EF 40%.  Plan: Patient being diuresed with daily IV Lasix.          Awaiting pulmonary consult          Keep K+ > 4.0 and Mg > 2.0
72 yo M presents with SOB, elevated BP over the last couple of weeks.    EKG- Paced @ 81

## 2019-06-01 NOTE — PROGRESS NOTE ADULT - PROBLEM SELECTOR PLAN 3
As above: has moderate LV systolic dysfunction:
As above: has moderate LV systolic dysfunction:  5/31: Cont diuresis  6/1 continue lasix. monitor I&O keep negative
As above: has moderate LV systolic dysfunction:  5/31: Cont diuresis

## 2019-06-01 NOTE — PROGRESS NOTE ADULT - PROBLEM SELECTOR PLAN 2
He has bilaterla pleural effusions: left is more then right: MARIAMA Cards attending: think this is heart failure: He does have hx of Lymphoma to keep in mind: Cont diuresis  [facility] a little better : still has pl effusions: but symptomatically he is better and almost at baseline: MARIAMA Family in detail: He has no clinical features of pneumonia lke fever, cough, phlegm etc: And his WBC COUNT IS NORMAL:  6/1 symptom alleviated. remains afebrile. no leukocytosis

## 2019-06-01 NOTE — PROGRESS NOTE ADULT - SUBJECTIVE AND OBJECTIVE BOX
Patient is a 73y old  Male who presents with a chief complaint of SOB (01 Jun 2019 11:08)    Any change in ROS: doing ok. SOB alleviated. on room air.     MEDICATIONS  (STANDING):  aspirin enteric coated 81 milliGRAM(s) Oral daily  atorvastatin 80 milliGRAM(s) Oral at bedtime  clonazePAM  Tablet 1 milliGRAM(s) Oral daily  dextrose 5%. 1000 milliLiter(s) (50 mL/Hr) IV Continuous <Continuous>  dextrose 50% Injectable 12.5 Gram(s) IV Push once  dextrose 50% Injectable 25 Gram(s) IV Push once  dextrose 50% Injectable 25 Gram(s) IV Push once  FLUoxetine 20 milliGRAM(s) Oral daily  furosemide   Injectable 40 milliGRAM(s) IV Push daily  heparin  Injectable 5000 Unit(s) SubCutaneous every 8 hours  insulin lispro (HumaLOG) corrective regimen sliding scale   SubCutaneous three times a day before meals  levothyroxine 125 MICROGram(s) Oral daily  losartan 25 milliGRAM(s) Oral daily  metoprolol succinate ER 75 milliGRAM(s) Oral daily    MEDICATIONS  (PRN):  acetaminophen   Tablet .. 650 milliGRAM(s) Oral every 6 hours PRN Mild Pain (1 - 3)  dextrose 40% Gel 15 Gram(s) Oral once PRN Blood Glucose LESS THAN 70 milliGRAM(s)/deciliter  glucagon  Injectable 1 milliGRAM(s) IntraMuscular once PRN Glucose LESS THAN 70 milligrams/deciliter    Vital Signs Last 24 Hrs  T(C): 36.7 (01 Jun 2019 04:38), Max: 36.7 (31 May 2019 20:51)  T(F): 98 (01 Jun 2019 04:38), Max: 98 (31 May 2019 20:51)  HR: 83 (01 Jun 2019 04:38) (83 - 86)  BP: 119/68 (01 Jun 2019 04:38) (110/63 - 119/68)  BP(mean): --  RR: 18 (01 Jun 2019 04:38) (18 - 18)  SpO2: 97% (01 Jun 2019 04:38) (97% - 97%)    I&O's Summary    31 May 2019 07:01  -  01 Jun 2019 07:00  --------------------------------------------------------  IN: 400 mL / OUT: 0 mL / NET: 400 mL          Physical Exam:   GENERAL: The patient comfortable with no apparent distress.   HEENT: Head is normocephalic and atraumatic.    NECK: Supple with no elevated JVP.  LUNGS: Clear to auscultation without wheeze and rhonchi.  HEART: S1 and S2 present without murmur.  ABDOMEN: Soft, nontender, and nondistended.   EXTREMITIES: No edema or calf tenderness.  NEUROLOGIC: Grossly intact.    Labs:  29                            11.7   6.96  )-----------( 455      ( 01 Jun 2019 04:30 )             36.5                         11.8   6.31  )-----------( 430      ( 31 May 2019 06:00 )             35.8                         11.8   5.82  )-----------( 310      ( 30 May 2019 06:48 )             38.2                         11.2   5.55  )-----------( 429      ( 29 May 2019 07:28 )             35.2     06-01    137  |  95<L>  |  27<H>  ----------------------------<  127<H>  4.2   |  26  |  1.10  05-31    136  |  95<L>  |  22  ----------------------------<  151<H>  4.0   |  27  |  1.07  05-30    135  |  95<L>  |  16  ----------------------------<  128<H>  4.4   |  22  |  0.96  05-29    137  |  95<L>  |  12  ----------------------------<  130<H>  4.0   |  27  |  1.02    Ca    10.1      01 Jun 2019 04:30  Ca    9.2      31 May 2019 06:00  Mg     1.9     06-01  Mg     2.0     05-31    TPro  7.5  /  Alb  3.9  /  TBili  0.6  /  DBili  x   /  AST  16  /  ALT  14  /  AlkPhos  71  05-29    CAPILLARY BLOOD GLUCOSE      POCT Blood Glucose.: 149 mg/dL (01 Jun 2019 12:45)  POCT Blood Glucose.: 150 mg/dL (01 Jun 2019 08:30)  POCT Blood Glucose.: 144 mg/dL (31 May 2019 21:31)  POCT Blood Glucose.: 130 mg/dL (31 May 2019 17:07)    Studies  Chest X-RAY  < from: Xray Chest 2 Views PA/Lat (05.31.19 @ 11:24) >  EXAM:  XR CHEST PA LAT 2V        PROCEDURE DATE:  May 31 2019         INTERPRETATION:  CLINICAL INFORMATION: Pleural effusion, follow-up.    TECHNIQUE: Frontal and lateral radiographs of the chest dated 5/31/2019   11:24 AM.    COMPARISON: Chest radiograph 5/28/2019.    IMPRESSION:  Left-sided chest wall AICD. Status post TAVR.    Redemonstrated bilateral pleural effusions, left greater than right,   without significant interval change from prior. No pneumothorax.    < end of copied text >    CT SCAN Chest   < from: CT Chest No Cont (05.28.19 @ 23:29) >    EXAM:  CT CHEST        PROCEDURE DATE:  May 28 2019         INTERPRETATION:  Clinical indications: Shortness of breath and cough,   pericardial effusion.    Axial CT images of the chest are obtained without intravenous   administration of contrast..    Correlation is made with prior studies including the cardiac CT of   October 21, 2016.    There is a left cardiac device with the leads terminating within the   right atrium, right ventricle and 1 coursing through the coronary sinus.    There areno enlarged bilateral axillary lymph nodes. There are calcified   anterior mediastinal lymph nodes unchanged.    The heart size is enlarged. There is a small pericardial effusion   unchanged in size the prior study. There may be associated pericardial   thickening. The main pulmonary artery is prominent measuring about 3.3 cm   may be due to pulmonary arterial hypertension.    There is atherosclerotic disease of the aorta and the coronary arteries.   There is mitral annular calcification. The patient is status post TAVR.    Evaluation of the upper abdominal organs demonstrate cholelithiasis.   There is a tiny hiatal hernia.    There are bilateral pleural effusions, moderate-sized on the left and   small on the right. There is suggestion of right pleural thickening.    Evaluation of the lungs demonstrate bibasilar areas of compressive   atelectasis, left greater than right. There are multiple right lung   patchy groundglass or mixed solid and groundglass opacities within all 3   lobes.    There is a 5 mm right lower lobe pulmonary nodule unchanged. There are   bilateral subtle reticular opacities in the paramediastinal location   representing scarring given the history and likely sequela of prior   radiation therapy as on the prior study. There are no central   endobronchial lesions.    Evaluation of the bones demonstrate some degenerative changes of the   spine.    IMPRESSION: Bilateral pleural effusions, moderate-sized on the left and   small on the right with associated bibasilar atelectasis, left greater   than right.    Multiple right lung patchy opacities as described above. Differential   diagnosis include pneumonia and/or pulmonary edema.    Small pericardial effusion is unchanged since October 21, 2016.    < end of copied text >    Venous Dopplers: LE:   < from: [facility] Duplex Upper Ext Vein Scan, Left (10.26.16 @ 10:21) >  EXAM:  DUPLEX EXT VEINS UPPER LT                            PROCEDURE DATE:  10/26/2016        INTERPRETATION:  EXAMINATION DATE: 10/26/2016 at 9:34 AM    CLINICAL INFORMATION: Status post left AICD upgrade. Evaluate left   subclavian vein.    TECHNIQUE: Duplex evaluation of the deep venous system of the left upper   extremity with color and spectral doppler was performed to include the   brachiocephalic, internal jugular, subclavian and axillary veins.     COMPARISON: None available    FINDINGS:     No echogenic thrombus is seen within the lumen of the brachiocephalic,   internal jugular, subclavian, axillary, brachial, basilic and cephalic   veins. Complete coaptation of those segments of vein accessible to   compression was achieved.    Incidental note is made of mild narrowing of the lateral portion of the   left subclavian vein and mild dilatation of the medial portion of the   left subclavian vein.    No echogenic thrombus is visualized within the right subclavian vein.    IMPRESSION:     No duplex evidence of DVT in the left upper extremity. In particular,   patent left subclavian vein with narrowing of the lateral portion.    < end of copied text >    CT Abdomen  < from: CT Abdomen and Pelvis No Cont (10.11.16 @ 10:39) >   EXAM:  CT ABDOMEN AND PELVIS        PROCEDURE DATE:  10/11/2016    *** ADDENDUM 10/11/2016 12:10PM ***  Mild retroperitoneal free fluids adjacent to the psoas muscles   bilaterally with Hounsfield units measuring approximately 11 may be due   to acute pancreatitis, or fluid overload.  *** END OF ADDENDUM ***    INTERPRETATION:  CT of the abdomen and pelvis without IV contrast    Indication: Acute cholecystitis. Heart failure.    Technique: Axial multidetector CT images of the abdomen and pelvis are   acquired without IV or oral contrast.    Comparison: 10/09/2016.    Findings:    Abdomen: Limited sections through the lung bases demonstrate mild   bilateral pleural effusions, grossly stable on the right and new on the   left. Mild bilateralatelectasis. Evaluation of the abdomen and pelvis is   limited by lack of IV and oral contrast. Multiple gallstones in the   gallbladder. Slight gallbladder wall thickening is noted. Slight   pericholecystic stranding. Findings may represent acute cholecystitis.   There is vicarious excretion of IV contrast material in the gallbladder   from prior IV contrast administration. New slight peripancreatic   stranding may represent acute pancreatitis. Allowing for the noncontrast   technique, the liver, spleen, and adrenals appear grossly unremarkable.    There is a stable small cyst in the right kidney. There is persistent IV   contrast enhancement of both kidneys from prior IV contrast   administration, suggestive of renal failure or acute tubular necrosis. No   evidence for a calculus in the ureters. No hydronephrosis.    Mild bilateral ascites, increased since the previous examination.    Colonic diverticulosis without evidence for diverticulitis. The appendix   is not visualized. No evidence for bowel obstruction, or grossly   thickened bowel wall. No evidence for free air, or grossly enlarged lymph   node.    Pelvis: New Be catheter in the urinary bladder which is collapsed.   Mild pelvic ascites. Sigmoid diverticulosis without evidence for   diverticulitis. No enlarged pelvic lymph node.    IMPRESSION: Gallstones in the gallbladder. Slight gallbladder wall   thickening and pericholecystic stranding may represent acute   cholecystitis. If clinically indicated, HIDA scan may be pursued for   further evaluation.    New slight peripancreatic stranding may represent acute pancreatitis.   Clinical correlation with pancreatic enzymes is recommended.    Mild ascites, increased since the previous examination.    Persistent renal enhancement bilaterally from prior IV contrast   administration may represent renal failure or acute tubular necrosis. No   hydronephrosis.    Bilateral pleural effusions.      ****Please see the addendum at the top of this report. It may contain   additional important information or changes.****    < end of copied text >    Others  < from: Transthoracic Echocardiogram (05.28.19 @ 15:23) >  Patient name: FER HERNANDEZ  YOB: 1945   Age: 73 (M)   MR#: [id number]  Study Date: 5/28/2019  Location: O/PSonographer: [facility]  Study quality: Technically Difficult  Referring Physician: Not Available Doctor, MD  Blood Pressure: 151/71 mmHg  Height: 173 cm  Weight: 79 kg  BSA: 1.9 m2  ------------------------------------------------------------------------  PROCEDURE: Transthoracic echocardiogram with 2-D, M-Mode  and complete spectral and color flow Doppler.  INDICATION: Dyspnea, unspecified (R06.00)  ------------------------------------------------------------------------  DIMENSIONS:  Dimensions:     Normal Values:  LA:     4.5 cm    2.0 - 4.0 cm  Ao:     2.8 cm    2.0 - 3.8 cm  SEPTUM: 0.7 cm    0.6 - 1.2 cm  PWT:    0.8 cm    0.6 - 1.1 cm  LVIDd:  4.8 cm    3.0 - 5.6 cm  LVIDs:    ---     1.8 - 4.0 cm  Derived Variables:  LVMI: 60 g/m2  RWT: 0.33  Ejection Fraction (Visual Estimate): 40 %  ------------------------------------------------------------------------  OBSERVATIONS:  Mitral Valve: Mitral annular calcification and calcified  mitral leaflets with decreased diastolic opening.  Mild-moderate mitral regurgitation. Mean transmitral valve  gradient equals 4 mm Hg, consistent with mild mitral  stenosis.  Aortic Root: Normal aortic root.  Aortic Valve: Transcatheter aortic valve replacement.  Suboptimal transaortic gradients.  Left Atrium: Normal left atrium.  Left Ventricle: Moderate segmental left ventricular  systolic dysfunction. Inferior and inferolateral wall  hypokinesis. Paradoxical septal wall motion. Normal left  ventricular internal dimensions and wall thicknesses.  Right Heart: Normal right atrium. The right ventricle is  not well visualized; grossly normal right ventricular  systolic function. Normal tricuspid valve.  Moderate  tricuspid regurgitation. Normal pulmonic valve. Minimal  pulmonic regurgitation.  Pericardium/PleuraNormal pericardium with no pericardial  effusion. Left pleural effusion.  Hemodynamic: Estimated rightventricular systolic pressure  equals 40 mm Hg, assuming right atrial pressure equals 10  mm Hg, consistent with mild pulmonary hypertension.  ------------------------------------------------------------------------  CONCLUSIONS:  1. Mitral annular calcification and calcified mitral  leaflets with decreased diastolic opening. Mild-moderate  mitral regurgitation. Mean transmitral valve gradient  equals 4 mm Hg, consistent with mild mitral stenosis.  2. Transcatheter aortic valve replacement. Suboptimal  transaortic gradients.  3. Normal left ventricular internal dimensions and wall  thicknesses.  4. Moderate segmental left ventricular systolic  dysfunction. Inferior and inferolateral wall hypokinesis.  Paradoxical septal wall motion.  5. The right ventricle is not well visualized; grossly  normal right ventricular systolic function.  6. Normal tricuspid valve.  Moderate tricuspid  regurgitation.  7. Estimated pulmonary artery systolic pressure equals 40  mm Hg, assuming right atrial pressure equals 10  mm Hg,  consistent with mild pulmonary hypertension.  8. Left pleural effusion.  ----------------------------------------------------    < end of copied text > Patient is a 73y old  Male who presents with a chief complaint of SOB (01 Jun 2019 11:08)    Any change in ROS: doing ok. SOB alleviated. on room air.     MEDICATIONS  (STANDING):  aspirin enteric coated 81 milliGRAM(s) Oral daily  atorvastatin 80 milliGRAM(s) Oral at bedtime  clonazePAM  Tablet 1 milliGRAM(s) Oral daily  dextrose 5%. 1000 milliLiter(s) (50 mL/Hr) IV Continuous <Continuous>  dextrose 50% Injectable 12.5 Gram(s) IV Push once  dextrose 50% Injectable 25 Gram(s) IV Push once  dextrose 50% Injectable 25 Gram(s) IV Push once  FLUoxetine 20 milliGRAM(s) Oral daily  furosemide   Injectable 40 milliGRAM(s) IV Push daily  heparin  Injectable 5000 Unit(s) SubCutaneous every 8 hours  insulin lispro (HumaLOG) corrective regimen sliding scale   SubCutaneous three times a day before meals  levothyroxine 125 MICROGram(s) Oral daily  losartan 25 milliGRAM(s) Oral daily  metoprolol succinate ER 75 milliGRAM(s) Oral daily    MEDICATIONS  (PRN):  acetaminophen   Tablet .. 650 milliGRAM(s) Oral every 6 hours PRN Mild Pain (1 - 3)  dextrose 40% Gel 15 Gram(s) Oral once PRN Blood Glucose LESS THAN 70 milliGRAM(s)/deciliter  glucagon  Injectable 1 milliGRAM(s) IntraMuscular once PRN Glucose LESS THAN 70 milligrams/deciliter    Vital Signs Last 24 Hrs  T(C): 36.7 (01 Jun 2019 04:38), Max: 36.7 (31 May 2019 20:51)  T(F): 98 (01 Jun 2019 04:38), Max: 98 (31 May 2019 20:51)  HR: 83 (01 Jun 2019 04:38) (83 - 86)  BP: 119/68 (01 Jun 2019 04:38) (110/63 - 119/68)  BP(mean): --  RR: 18 (01 Jun 2019 04:38) (18 - 18)  SpO2: 97% (01 Jun 2019 04:38) (97% - 97%)    I&O's Summary    31 May 2019 07:01  -  01 Jun 2019 07:00  --------------------------------------------------------  IN: 400 mL / OUT: 0 mL / NET: 400 mL          Physical Exam:   GENERAL: The patient comfortable with no apparent distress.   HEENT: Head is normocephalic and atraumatic.    NECK: Supple with no elevated JVP.  LUNGS: Clear to auscultation without wheeze and rhonchi.  HEART: S1 and S2 present without murmur.  ABDOMEN: Soft, nontender, and nondistended.   EXTREMITIES: No edema or calf tenderness.  NEUROLOGIC: Grossly intact.    Labs:  29                            11.7   6.96  )-----------( 455      ( 01 Jun 2019 04:30 )             36.5                         11.8   6.31  )-----------( 430      ( 31 May 2019 06:00 )             35.8                         11.8   5.82  )-----------( 310      ( 30 May 2019 06:48 )             38.2                         11.2   5.55  )-----------( 429      ( 29 May 2019 07:28 )             35.2     06-01    137  |  95<L>  |  27<H>  ----------------------------<  127<H>  4.2   |  26  |  1.10  05-31    136  |  95<L>  |  22  ----------------------------<  151<H>  4.0   |  27  |  1.07  05-30    135  |  95<L>  |  16  ----------------------------<  128<H>  4.4   |  22  |  0.96  05-29    137  |  95<L>  |  12  ----------------------------<  130<H>  4.0   |  27  |  1.02    Ca    10.1      01 Jun 2019 04:30  Ca    9.2      31 May 2019 06:00  Mg     1.9     06-01  Mg     2.0     05-31    TPro  7.5  /  Alb  3.9  /  TBili  0.6  /  DBili  x   /  AST  16  /  ALT  14  /  AlkPhos  71  05-29    CAPILLARY BLOOD GLUCOSE      POCT Blood Glucose.: 149 mg/dL (01 Jun 2019 12:45)  POCT Blood Glucose.: 150 mg/dL (01 Jun 2019 08:30)  POCT Blood Glucose.: 144 mg/dL (31 May 2019 21:31)  POCT Blood Glucose.: 130 mg/dL (31 May 2019 17:07)    Studies  Chest X-RAY  < from: Xray Chest 2 Views PA/Lat (05.31.19 @ 11:24) >  EXAM:  XR CHEST PA LAT 2V        PROCEDURE DATE:  May 31 2019         INTERPRETATION:  CLINICAL INFORMATION: Pleural effusion, follow-up.    TECHNIQUE: Frontal and lateral radiographs of the chest dated 5/31/2019   11:24 AM.    COMPARISON: Chest radiograph 5/28/2019.    IMPRESSION:  Left-sided chest wall AICD. Status post TAVR.    Redemonstrated bilateral pleural effusions, left greater than right,   without significant interval change from prior. No pneumothorax.    < end of copied text >    CT SCAN Chest   < from: CT Chest No Cont (05.28.19 @ 23:29) >    EXAM:  CT CHEST        PROCEDURE DATE:  May 28 2019         INTERPRETATION:  Clinical indications: Shortness of breath and cough,   pericardial effusion.    Axial CT images of the chest are obtained without intravenous   administration of contrast..    Correlation is made with prior studies including the cardiac CT of   October 21, 2016.    There is a left cardiac device with the leads terminating within the   right atrium, right ventricle and 1 coursing through the coronary sinus.    There areno enlarged bilateral axillary lymph nodes. There are calcified   anterior mediastinal lymph nodes unchanged.    The heart size is enlarged. There is a small pericardial effusion   unchanged in size the prior study. There may be associated pericardial   thickening. The main pulmonary artery is prominent measuring about 3.3 cm   may be due to pulmonary arterial hypertension.    There is atherosclerotic disease of the aorta and the coronary arteries.   There is mitral annular calcification. The patient is status post TAVR.    Evaluation of the upper abdominal organs demonstrate cholelithiasis.   There is a tiny hiatal hernia.    There are bilateral pleural effusions, moderate-sized on the left and   small on the right. There is suggestion of right pleural thickening.    Evaluation of the lungs demonstrate bibasilar areas of compressive   atelectasis, left greater than right. There are multiple right lung   patchy groundglass or mixed solid and groundglass opacities within all 3   lobes.    There is a 5 mm right lower lobe pulmonary nodule unchanged. There are   bilateral subtle reticular opacities in the paramediastinal location   representing scarring given the history and likely sequela of prior   radiation therapy as on the prior study. There are no central   endobronchial lesions.    Evaluation of the bones demonstrate some degenerative changes of the   spine.    IMPRESSION: Bilateral pleural effusions, moderate-sized on the left and   small on the right with associated bibasilar atelectasis, left greater   than right.    Multiple right lung patchy opacities as described above. Differential   diagnosis include pneumonia and/or pulmonary edema.    Small pericardial effusion is unchanged since October 21, 2016.    < end of copied text >    Venous Dopplers: LE:   < from: [facility] Duplex Upper Ext Vein Scan, Left (10.26.16 @ 10:21) >  EXAM:  DUPLEX EXT VEINS UPPER LT                            PROCEDURE DATE:  10/26/2016        INTERPRETATION:  EXAMINATION DATE: 10/26/2016 at 9:34 AM    CLINICAL INFORMATION: Status post left AICD upgrade. Evaluate left   subclavian vein.    TECHNIQUE: Duplex evaluation of the deep venous system of the left upper   extremity with color and spectral doppler was performed to include the   brachiocephalic, internal jugular, subclavian and axillary veins.     COMPARISON: None available    FINDINGS:     No echogenic thrombus is seen within the lumen of the brachiocephalic,   internal jugular, subclavian, axillary, brachial, basilic and cephalic   veins. Complete coaptation of those segments of vein accessible to   compression was achieved.    Incidental note is made of mild narrowing of the lateral portion of the   left subclavian vein and mild dilatation of the medial portion of the   left subclavian vein.    No echogenic thrombus is visualized within the right subclavian vein.    IMPRESSION:     No duplex evidence of DVT in the left upper extremity. In particular,   patent left subclavian vein with narrowing of the lateral portion.    < end of copied text >    CT Abdomen  < from: CT Abdomen and Pelvis No Cont (10.11.16 @ 10:39) >   EXAM:  CT ABDOMEN AND PELVIS        PROCEDURE DATE:  10/11/2016    *** ADDENDUM 10/11/2016 12:10PM ***  Mild retroperitoneal free fluids adjacent to the psoas muscles   bilaterally with Hounsfield units measuring approximately 11 may be due   to acute pancreatitis, or fluid overload.  *** END OF ADDENDUM ***    INTERPRETATION:  CT of the abdomen and pelvis without IV contrast    Indication: Acute cholecystitis. Heart failure.    Technique: Axial multidetector CT images of the abdomen and pelvis are   acquired without IV or oral contrast.    Comparison: 10/09/2016.    Findings:    Abdomen: Limited sections through the lung bases demonstrate mild   bilateral pleural effusions, grossly stable on the right and new on the   left. Mild bilateralatelectasis. Evaluation of the abdomen and pelvis is   limited by lack of IV and oral contrast. Multiple gallstones in the   gallbladder. Slight gallbladder wall thickening is noted. Slight   pericholecystic stranding. Findings may represent acute cholecystitis.   There is vicarious excretion of IV contrast material in the gallbladder   from prior IV contrast administration. New slight peripancreatic   stranding may represent acute pancreatitis. Allowing for the noncontrast   technique, the liver, spleen, and adrenals appear grossly unremarkable.    There is a stable small cyst in the right kidney. There is persistent IV   contrast enhancement of both kidneys from prior IV contrast   administration, suggestive of renal failure or acute tubular necrosis. No   evidence for a calculus in the ureters. No hydronephrosis.    Mild bilateral ascites, increased since the previous examination.    Colonic diverticulosis without evidence for diverticulitis. The appendix   is not visualized. No evidence for bowel obstruction, or grossly   thickened bowel wall. No evidence for free air, or grossly enlarged lymph   node.    Pelvis: New Be catheter in the urinary bladder which is collapsed.   Mild pelvic ascites. Sigmoid diverticulosis without evidence for   diverticulitis. No enlarged pelvic lymph node.    IMPRESSION: Gallstones in the gallbladder. Slight gallbladder wall   thickening and pericholecystic stranding may represent acute   cholecystitis. If clinically indicated, HIDA scan may be pursued for   further evaluation.    New slight peripancreatic stranding may represent acute pancreatitis.   Clinical correlation with pancreatic enzymes is recommended.    Mild ascites, increased since the previous examination.    Persistent renal enhancement bilaterally from prior IV contrast   administration may represent renal failure or acute tubular necrosis. No   hydronephrosis.    Bilateral pleural effusions.      ****Please see the addendum at the top of this report. It may contain   additional important information or changes.****    < end of copied text >    Others  < from: Transthoracic Echocardiogram (05.28.19 @ 15:23) >  Patient name: FER HERNANDEZ  YOB: 1945   Age: 73 (M)   MR#: [id number]  Study Date: 5/28/2019  Location: O/PSonographer: [facility]  Study quality: Technically Difficult  Referring Physician: Not Available Doctor, MD  Blood Pressure: 151/71 mmHg  Height: 173 cm  Weight: 79 kg  BSA: 1.9 m2  ------------------------------------------------------------------------  PROCEDURE: Transthoracic echocardiogram with 2-D, M-Mode  and complete spectral and color flow Doppler.  INDICATION: Dyspnea, unspecified (R06.00)  ------------------------------------------------------------------------  DIMENSIONS:  Dimensions:     Normal Values:  LA:     4.5 cm    2.0 - 4.0 cm  Ao:     2.8 cm    2.0 - 3.8 cm  SEPTUM: 0.7 cm    0.6 - 1.2 cm  PWT:    0.8 cm    0.6 - 1.1 cm  LVIDd:  4.8 cm    3.0 - 5.6 cm  LVIDs:    ---     1.8 - 4.0 cm  Derived Variables:  LVMI: 60 g/m2  RWT: 0.33  Ejection Fraction (Visual Estimate): 40 %  ------------------------------------------------------------------------  OBSERVATIONS:  Mitral Valve: Mitral annular calcification and calcified  mitral leaflets with decreased diastolic opening.  Mild-moderate mitral regurgitation. Mean transmitral valve  gradient equals 4 mm Hg, consistent with mild mitral  stenosis.  Aortic Root: Normal aortic root.  Aortic Valve: Transcatheter aortic valve replacement.  Suboptimal transaortic gradients.  Left Atrium: Normal left atrium.  Left Ventricle: Moderate segmental left ventricular  systolic dysfunction. Inferior and inferolateral wall  hypokinesis. Paradoxical septal wall motion. Normal left  ventricular internal dimensions and wall thicknesses.  Right Heart: Normal right atrium. The right ventricle is  not well visualized; grossly normal right ventricular  systolic function. Normal tricuspid valve.  Moderate  tricuspid regurgitation. Normal pulmonic valve. Minimal  pulmonic regurgitation.  Pericardium/PleuraNormal pericardium with no pericardial  effusion. Left pleural effusion.  Hemodynamic: Estimated rightventricular systolic pressure  equals 40 mm Hg, assuming right atrial pressure equals 10  mm Hg, consistent with mild pulmonary hypertension.  ------------------------------------------------------------------------  CONCLUSIONS:  1. Mitral annular calcification and calcified mitral  leaflets with decreased diastolic opening. Mild-moderate  mitral regurgitation. Mean transmitral valve gradient  equals 4 mm Hg, consistent with mild mitral stenosis.  2. Transcatheter aortic valve replacement. Suboptimal  transaortic gradients.  3. Normal left ventricular internal dimensions and wall  thicknesses.  4. Moderate segmental left ventricular systolic  dysfunction. Inferior and inferolateral wall hypokinesis.  Paradoxical septal wall motion.  5. The right ventricle is not well visualized; grossly  normal right ventricular systolic function.  6. Normal tricuspid valve.  Moderate tricuspid  regurgitation.  7. Estimated pulmonary artery systolic pressure equals 40  mm Hg, assuming right atrial pressure equals 10  mm Hg,  consistent with mild pulmonary hypertension.  8. Left pleural effusion.  ----------------------------------------------------    < end of copied text >          < from: Xray Chest 2 Views PA/Lat (05.31.19 @ 11:24) >  11:24 AM.    COMPARISON: Chest radiograph 5/28/2019.    IMPRESSION:  Left-sided chest wall AICD. Status post TAVR.    Redemonstrated bilateral pleural effusions, left greater than right,   without significant interval change from prior. No pneumothorax.              DEEPALI MCKEON M.D., RADIOLOGY RESIDENT  This document has been electronically signed.  SAI ALEXANDER M.D. ATTENDING RADIOLOGIST    < end of copied text >

## 2019-06-03 RX ORDER — LOSARTAN POTASSIUM 25 MG/1
25 TABLET, FILM COATED ORAL DAILY
Refills: 0 | Status: ACTIVE | COMMUNITY
Start: 2019-06-03

## 2019-07-07 ENCOUNTER — RX RENEWAL (OUTPATIENT)
Age: 74
End: 2019-07-07

## 2019-07-22 PROBLEM — I31.3 PERICARDIAL EFFUSION (NONINFLAMMATORY): Chronic | Status: ACTIVE | Noted: 2019-05-28

## 2019-07-22 PROBLEM — I35.0 NONRHEUMATIC AORTIC (VALVE) STENOSIS: Chronic | Status: ACTIVE | Noted: 2019-05-28

## 2019-07-22 PROBLEM — J84.10 PULMONARY FIBROSIS, UNSPECIFIED: Chronic | Status: ACTIVE | Noted: 2019-05-28

## 2019-08-09 ENCOUNTER — APPOINTMENT (OUTPATIENT)
Dept: CARDIOTHORACIC SURGERY | Facility: CLINIC | Age: 74
End: 2019-08-09
Payer: MEDICARE

## 2019-08-15 ENCOUNTER — APPOINTMENT (OUTPATIENT)
Dept: ELECTROPHYSIOLOGY | Facility: CLINIC | Age: 74
End: 2019-08-15
Payer: MEDICARE

## 2019-08-15 PROCEDURE — 93295 DEV INTERROG REMOTE 1/2/MLT: CPT

## 2019-08-15 PROCEDURE — 93296 REM INTERROG EVL PM/IDS: CPT

## 2019-10-03 ENCOUNTER — APPOINTMENT (OUTPATIENT)
Dept: CARDIOTHORACIC SURGERY | Facility: CLINIC | Age: 74
End: 2019-10-03
Payer: MEDICARE

## 2019-10-07 ENCOUNTER — NON-APPOINTMENT (OUTPATIENT)
Age: 74
End: 2019-10-07

## 2019-10-07 ENCOUNTER — APPOINTMENT (OUTPATIENT)
Dept: CARDIOTHORACIC SURGERY | Facility: CLINIC | Age: 74
End: 2019-10-07
Payer: MEDICARE

## 2019-10-07 VITALS
WEIGHT: 177 LBS | DIASTOLIC BLOOD PRESSURE: 56 MMHG | HEIGHT: 67.5 IN | SYSTOLIC BLOOD PRESSURE: 120 MMHG | TEMPERATURE: 97.3 F | HEART RATE: 71 BPM | RESPIRATION RATE: 14 BRPM | BODY MASS INDEX: 27.46 KG/M2 | OXYGEN SATURATION: 100 %

## 2019-10-07 PROCEDURE — 99214 OFFICE O/P EST MOD 30 MIN: CPT

## 2019-10-07 PROCEDURE — 93000 ELECTROCARDIOGRAM COMPLETE: CPT

## 2019-10-07 NOTE — HISTORY OF PRESENT ILLNESS
[FreeTextEntry1] : FER HERNANDEZ is a 74 year old male here on 10/03/2019, almost 2 years after he was last seen and 3 years after undergoing MAGO procedure with 29 mm Evolut Pro bioprosthesis on 10/27/2016.  He comes to the office today reporting a hospitalization in April while in [place]. He had fluid around the heart and had 900 cc's fluid removed.  He had gone to his cardiologist and told him he was short of breath. He had an ECHO done and was sent right to the hospital.  He feels better since the fluid was removed though does get short of breath "every once in a while".  At the end of May, he went to [facility] for shortness of breath and was admitted for a few day.  He currently denies fever, chills, angina, dizziness, lightheadedness, syncope, or peripheral edema.  His energy level is "shitty" and appetite is good. Denies bowel or bladder problems.  \par \par He had a repeat TTE in late April in [place] that demonstrated only a trace residual pericardial effusion; favorably, it reported an LVEF of 45%.  He has overall done well since his TAVR and CRT upgrade.  \par \par PCP/oncologist: Dr. Jorge Mercer\par Dr. Aman Fermin-Cardiologist in [place]\par EP: Dr. Godinez\par

## 2019-10-07 NOTE — DISCUSSION/SUMMARY
[FreeTextEntry1] : Al presents for his 3 year post TAVR follow up and is doing well from a cardiac perspective.  He has no signs or symptoms of heart failure and his EF has steadily improved following TAVR, CRT, and medical optimization.  He will have full labs today.  I recommended he repeat a TTE (as we do annually post TAVR) when he sees Dr Durham for his regular cardiac follow up later this month.  He has no murmur or symptoms suggesting valvular dysfunction.

## 2019-10-07 NOTE — PHYSICAL EXAM
[General Appearance - Well Developed] : well developed [General Appearance - Well Nourished] : well nourished [Well Groomed] : well groomed [Normal Appearance] : normal appearance [No Deformities] : no deformities [General Appearance - In No Acute Distress] : no acute distress [Normal Conjunctiva] : the conjunctiva exhibited no abnormalities [Eyelids - No Xanthelasma] : the eyelids demonstrated no xanthelasmas [No Oral Pallor] : no oral pallor [Normal Oral Mucosa] : normal oral mucosa [No Oral Cyanosis] : no oral cyanosis [Normal Jugular Venous A Waves Present] : normal jugular venous A waves present [Normal Jugular Venous V Waves Present] : normal jugular venous V waves present [No Jugular Venous Katz A Waves] : no jugular venous katz A waves [Exaggerated Use Of Accessory Muscles For Inspiration] : no accessory muscle use [Respiration, Rhythm And Depth] : normal respiratory rhythm and effort [Normal Rate] : normal [Auscultation Breath Sounds / Voice Sounds] : lungs were clear to auscultation bilaterally [Rhythm Regular] : regular [Heart Rate ___] : [unfilled] bpm [No Gallop] : no gallop heard [Normal S1] : normal S1 [Normal S2] : normal S2 [Prosthetic Aortic Valve] : prosthetic aortic valve heard [No Murmur] : no murmurs heard [2+] : left 2+ [No Abnormalities] : the abdominal aorta was not enlarged and no bruit was heard [No Pitting Edema] : no pitting edema present [Abdomen Soft] : soft [Bowel Sounds] : normal bowel sounds [Abdomen Tenderness] : non-tender [Abdomen Mass (___ Cm)] : no abdominal mass palpated [Abnormal Walk] : normal gait [Cyanosis, Localized] : no localized cyanosis [Petechial Hemorrhages (___cm)] : no petechial hemorrhages [Nail Clubbing] : no clubbing of the fingernails [Skin Color & Pigmentation] : normal skin color and pigmentation [Skin Turgor] : normal skin turgor [Skin Lesions] : no skin lesions [No Venous Stasis] : no venous stasis [] : no rash [No Skin Ulcers] : no skin ulcer [Oriented To Time, Place, And Person] : oriented to person, place, and time [Affect] : the affect was normal [Impaired Insight] : insight and judgment were intact [Mood] : the mood was normal [Memory Remote] : remote memory was not impaired [Memory Recent] : recent memory was not impaired [No Anxiety] : not feeling anxious [Click] : no click [Distant] : the heart sounds were ~L not distant [Pericardial Rub] : no pericardial rub [Left Carotid Bruit] : no bruit heard over the left carotid [Bruit] : no bruit heard [Right Carotid Bruit] : no bruit heard over the right carotid

## 2019-10-30 ENCOUNTER — RX CHANGE (OUTPATIENT)
Age: 74
End: 2019-10-30

## 2019-10-30 LAB
CHOLEST SERPL-MCNC: 160 MG/DL
CHOLEST/HDLC SERPL: 4.2 RATIO
HDLC SERPL-MCNC: 38 MG/DL
LDLC SERPL CALC-MCNC: 98 MG/DL
TRIGL SERPL-MCNC: 119 MG/DL

## 2019-11-19 ENCOUNTER — APPOINTMENT (OUTPATIENT)
Dept: ELECTROPHYSIOLOGY | Facility: CLINIC | Age: 74
End: 2019-11-19
Payer: MEDICARE

## 2019-11-19 ENCOUNTER — NON-APPOINTMENT (OUTPATIENT)
Age: 74
End: 2019-11-19

## 2019-11-19 VITALS — SYSTOLIC BLOOD PRESSURE: 111 MMHG | DIASTOLIC BLOOD PRESSURE: 48 MMHG | HEART RATE: 80 BPM

## 2019-11-19 VITALS
OXYGEN SATURATION: 100 % | WEIGHT: 175 LBS | DIASTOLIC BLOOD PRESSURE: 73 MMHG | HEIGHT: 67.5 IN | HEART RATE: 75 BPM | SYSTOLIC BLOOD PRESSURE: 130 MMHG | RESPIRATION RATE: 14 BRPM | BODY MASS INDEX: 27.15 KG/M2

## 2019-11-19 PROCEDURE — 99213 OFFICE O/P EST LOW 20 MIN: CPT

## 2019-11-19 PROCEDURE — 93000 ELECTROCARDIOGRAM COMPLETE: CPT | Mod: 59

## 2019-11-19 PROCEDURE — 93284 PRGRMG EVAL IMPLANTABLE DFB: CPT

## 2019-11-20 NOTE — HISTORY OF PRESENT ILLNESS
[FreeTextEntry1] : Del Kemp MD \par \par I saw Al Hutton on November 19, 2019. As you know, he is a 73y/o man with Hx of Hodgkins lymphoma, HTN, DM, HLD, severe AS s/p TAVR, chronic systolic CHF with EF 34%, LBBB (EF improved to 40-45% with CRT-D and AVR), and MI s/p BiV ICD and paroxysmal afib (very brief, not on AC) who presents today for routine device check and follow up. Was hospitalized in [place] in the spring of this year for pericardial effusion and 900cc of blood was aspirated. Now off Plavix and only on ASA. Was also admitted to [facility] in late May for acute on chronic systolic CHF.  Admits doing well currently with no issues or complaints. Denies chest pain, palpitations, SOB, syncope or near syncope.

## 2019-11-20 NOTE — DISCUSSION/SUMMARY
[FreeTextEntry1] : In summary, Al Hutton on November 19, 2019. As you know, he is a 75y/o man with Hx of Hodgkins lymphoma, HTN, DM, HLD, severe AS s/p TAVR, chronic systolic CHF with EF 34%, LBBB (EF improved to 40-45% with CRT-D and AVR), and MI s/p BiV ICD and paroxysmal afib (very brief, not on AC) who presents today for routine device check and follow up. Was hospitalized in [place] in the spring of this year for pericardial effusion and 900cc of blood was aspirated. Now off Plavix and only on ASA. Was also admitted to [facility] in late May for acute on chronic systolic CHF.  Admits doing well currently with no issues or complaints. Denies chest pain, palpitations, SOB, syncope or near syncope.  Has history of brief episodes of afib but no sustained episodes. No report of AF this time. Given recent pericardial effusion, risk of AC may outweigh benefit at this time. Will remain off AC and set afib burden alert on device. Will continue remote monitoring and in office follow up as scheduled and RTO for f/u in 6 months. \par \par Sincerely,\par \par Sotero Godinez MD

## 2019-11-20 NOTE — PHYSICAL EXAM
[General Appearance - Well Developed] : well developed [Normal Appearance] : normal appearance [General Appearance - Well Nourished] : well nourished [Well Groomed] : well groomed [No Deformities] : no deformities [General Appearance - In No Acute Distress] : no acute distress [Normal Conjunctiva] : the conjunctiva exhibited no abnormalities [Eyelids - No Xanthelasma] : the eyelids demonstrated no xanthelasmas [Normal Oral Mucosa] : normal oral mucosa [No Oral Pallor] : no oral pallor [No Oral Cyanosis] : no oral cyanosis [Normal Jugular Venous A Waves Present] : normal jugular venous A waves present [Normal Jugular Venous V Waves Present] : normal jugular venous V waves present [No Jugular Venous Katz A Waves] : no jugular venous katz A waves [Respiration, Rhythm And Depth] : normal respiratory rhythm and effort [Exaggerated Use Of Accessory Muscles For Inspiration] : no accessory muscle use [Auscultation Breath Sounds / Voice Sounds] : lungs were clear to auscultation bilaterally [Heart Rate And Rhythm] : heart rate and rhythm were normal [Heart Sounds] : normal S1 and S2 [Abdomen Soft] : soft [Abdomen Tenderness] : non-tender [Abdomen Mass (___ Cm)] : no abdominal mass palpated [Abnormal Walk] : normal gait [Gait - Sufficient For Exercise Testing] : the gait was sufficient for exercise testing [Nail Clubbing] : no clubbing of the fingernails [Petechial Hemorrhages (___cm)] : no petechial hemorrhages [Cyanosis, Localized] : no localized cyanosis [Skin Color & Pigmentation] : normal skin color and pigmentation [] : no rash [No Venous Stasis] : no venous stasis [Skin Lesions] : no skin lesions [No Skin Ulcers] : no skin ulcer [No Xanthoma] : no  xanthoma was observed [Oriented To Time, Place, And Person] : oriented to person, place, and time [No Anxiety] : not feeling anxious [Affect] : the affect was normal [Mood] : the mood was normal [FreeTextEntry1] : murmur

## 2019-12-30 ENCOUNTER — INPATIENT (INPATIENT)
Facility: HOSPITAL | Age: 74
LOS: 5 days | Discharge: HOME | DRG: 445 | End: 2020-01-05
Attending: STUDENT IN AN ORGANIZED HEALTH CARE EDUCATION/TRAINING PROGRAM | Admitting: STUDENT IN AN ORGANIZED HEALTH CARE EDUCATION/TRAINING PROGRAM
Payer: MEDICARE

## 2019-12-30 VITALS
WEIGHT: 179.9 LBS | HEIGHT: 68 IN | SYSTOLIC BLOOD PRESSURE: 173 MMHG | RESPIRATION RATE: 17 BRPM | HEART RATE: 83 BPM | OXYGEN SATURATION: 100 % | TEMPERATURE: 98 F | DIASTOLIC BLOOD PRESSURE: 81 MMHG

## 2019-12-30 DIAGNOSIS — Z98.890 OTHER SPECIFIED POSTPROCEDURAL STATES: Chronic | ICD-10-CM

## 2019-12-30 DIAGNOSIS — Z95.810 PRESENCE OF AUTOMATIC (IMPLANTABLE) CARDIAC DEFIBRILLATOR: Chronic | ICD-10-CM

## 2019-12-30 DIAGNOSIS — Z98.89 OTHER SPECIFIED POSTPROCEDURAL STATES: Chronic | ICD-10-CM

## 2019-12-30 DIAGNOSIS — Z95.2 PRESENCE OF PROSTHETIC HEART VALVE: Chronic | ICD-10-CM

## 2019-12-30 LAB
ALBUMIN SERPL ELPH-MCNC: 3.9 G/DL — SIGNIFICANT CHANGE UP (ref 3.5–5)
ALP SERPL-CCNC: 62 U/L — SIGNIFICANT CHANGE UP (ref 40–120)
ALT FLD-CCNC: 26 U/L DA — SIGNIFICANT CHANGE UP (ref 10–60)
ANION GAP SERPL CALC-SCNC: 13 MMOL/L — SIGNIFICANT CHANGE UP (ref 5–17)
AST SERPL-CCNC: 37 U/L — SIGNIFICANT CHANGE UP (ref 10–40)
BASOPHILS # BLD AUTO: 0.01 K/UL — SIGNIFICANT CHANGE UP (ref 0–0.2)
BASOPHILS NFR BLD AUTO: 0.1 % — SIGNIFICANT CHANGE UP (ref 0–2)
BILIRUB SERPL-MCNC: 0.8 MG/DL — SIGNIFICANT CHANGE UP (ref 0.2–1.2)
BUN SERPL-MCNC: 32 MG/DL — HIGH (ref 7–18)
CALCIUM SERPL-MCNC: 9.2 MG/DL — SIGNIFICANT CHANGE UP (ref 8.4–10.5)
CHLORIDE SERPL-SCNC: 98 MMOL/L — SIGNIFICANT CHANGE UP (ref 96–108)
CO2 SERPL-SCNC: 24 MMOL/L — SIGNIFICANT CHANGE UP (ref 22–31)
CREAT SERPL-MCNC: 1.56 MG/DL — HIGH (ref 0.5–1.3)
EOSINOPHIL # BLD AUTO: 0.01 K/UL — SIGNIFICANT CHANGE UP (ref 0–0.5)
EOSINOPHIL NFR BLD AUTO: 0.1 % — SIGNIFICANT CHANGE UP (ref 0–6)
GLUCOSE SERPL-MCNC: 192 MG/DL — HIGH (ref 70–99)
HCT VFR BLD CALC: 38.5 % — LOW (ref 39–50)
HGB BLD-MCNC: 12.7 G/DL — LOW (ref 13–17)
IMM GRANULOCYTES NFR BLD AUTO: 0.2 % — SIGNIFICANT CHANGE UP (ref 0–1.5)
LYMPHOCYTES # BLD AUTO: 0.69 K/UL — LOW (ref 1–3.3)
LYMPHOCYTES # BLD AUTO: 7.7 % — LOW (ref 13–44)
MCHC RBC-ENTMCNC: 31.5 PG — SIGNIFICANT CHANGE UP (ref 27–34)
MCHC RBC-ENTMCNC: 33 GM/DL — SIGNIFICANT CHANGE UP (ref 32–36)
MCV RBC AUTO: 95.5 FL — SIGNIFICANT CHANGE UP (ref 80–100)
MONOCYTES # BLD AUTO: 0.5 K/UL — SIGNIFICANT CHANGE UP (ref 0–0.9)
MONOCYTES NFR BLD AUTO: 5.6 % — SIGNIFICANT CHANGE UP (ref 2–14)
NEUTROPHILS # BLD AUTO: 7.71 K/UL — HIGH (ref 1.8–7.4)
NEUTROPHILS NFR BLD AUTO: 86.3 % — HIGH (ref 43–77)
NRBC # BLD: 0 /100 WBCS — SIGNIFICANT CHANGE UP (ref 0–0)
NT-PROBNP SERPL-SCNC: 1732 PG/ML — HIGH (ref 0–450)
PLATELET # BLD AUTO: 230 K/UL — SIGNIFICANT CHANGE UP (ref 150–400)
POTASSIUM SERPL-MCNC: 4.4 MMOL/L — SIGNIFICANT CHANGE UP (ref 3.5–5.3)
POTASSIUM SERPL-SCNC: 4.4 MMOL/L — SIGNIFICANT CHANGE UP (ref 3.5–5.3)
PROT SERPL-MCNC: 7.6 G/DL — SIGNIFICANT CHANGE UP (ref 6–8.3)
RBC # BLD: 4.03 M/UL — LOW (ref 4.2–5.8)
RBC # FLD: 15 % — HIGH (ref 10.3–14.5)
SODIUM SERPL-SCNC: 135 MMOL/L — SIGNIFICANT CHANGE UP (ref 135–145)
TROPONIN I SERPL-MCNC: 0.09 NG/ML — HIGH (ref 0–0.04)
WBC # BLD: 8.94 K/UL — SIGNIFICANT CHANGE UP (ref 3.8–10.5)
WBC # FLD AUTO: 8.94 K/UL — SIGNIFICANT CHANGE UP (ref 3.8–10.5)

## 2019-12-30 PROCEDURE — 71045 X-RAY EXAM CHEST 1 VIEW: CPT | Mod: 26

## 2019-12-30 PROCEDURE — 71275 CT ANGIOGRAPHY CHEST: CPT | Mod: 26

## 2019-12-30 PROCEDURE — 74174 CTA ABD&PLVS W/CONTRAST: CPT | Mod: 26

## 2019-12-30 RX ORDER — ACETAMINOPHEN 500 MG
1000 TABLET ORAL ONCE
Refills: 0 | Status: COMPLETED | OUTPATIENT
Start: 2019-12-30 | End: 2019-12-30

## 2019-12-30 RX ORDER — CIPROFLOXACIN LACTATE 400MG/40ML
400 VIAL (ML) INTRAVENOUS ONCE
Refills: 0 | Status: COMPLETED | OUTPATIENT
Start: 2019-12-30 | End: 2019-12-30

## 2019-12-30 RX ORDER — METRONIDAZOLE 500 MG
500 TABLET ORAL ONCE
Refills: 0 | Status: COMPLETED | OUTPATIENT
Start: 2019-12-30 | End: 2019-12-30

## 2019-12-30 RX ADMIN — Medication 400 MILLIGRAM(S): at 22:12

## 2019-12-30 RX ADMIN — Medication 200 MILLIGRAM(S): at 22:32

## 2019-12-30 NOTE — ED ADULT NURSE NOTE - OBJECTIVE STATEMENT
Pt. c/o left upper abdominal pain below the chest with 3 episodes of lose stool today. Pt. has cardiac history.

## 2019-12-30 NOTE — ED PROVIDER NOTE - OBJECTIVE STATEMENT
75 y/o M patient, w/ PMHx of Pacemaker, Heart failure, and MI, presents to the ED w/ L sided chest pain associated w/ L flank pain that began x7 hours ago today (12/30/2019). Patient reports pain is radiating down the abdomen. Patient denies nausea, vomiting, fever, cough, or any other acute complaints. NKDA. Intolerances: Lisinopril: cough.

## 2019-12-30 NOTE — ED PROVIDER NOTE - PSH
H/O bilateral inguinal hernia repair    H/O carotid endarterectomy  bilateral  History of umbilical hernia repair    S/P ICD (internal cardiac defibrillator) procedure  [employer]  S/P TAVR (transcatheter aortic valve replacement)  10/16 in [place]

## 2019-12-30 NOTE — ED ADULT NURSE NOTE - ED STAT RN HANDOFF DETAILS
endorsed to OLIVIER Winkler to attempt to put in IV after unsuccessful attempt and to administer medication.

## 2019-12-30 NOTE — ED PROVIDER NOTE - PMH
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

## 2019-12-30 NOTE — ED ADULT NURSE NOTE - NSIMPLEMENTINTERV_GEN_ALL_ED
Implemented All Universal Safety Interventions:  [place] to call system. Call bell, personal items and telephone within reach. Instruct patient to call for assistance. Room bathroom lighting operational. Non-slip footwear when patient is off stretcher. Physically safe environment: no spills, clutter or unnecessary equipment. Stretcher in lowest position, wheels locked, appropriate side rails in place.

## 2019-12-30 NOTE — ED PROVIDER NOTE - PROGRESS NOTE DETAILS
Signout from Dr Butler to followup surgery consult recs. patient evaluated by surgery consult. Plan for medicine admission with surgery following.

## 2019-12-30 NOTE — ED PROVIDER NOTE - CLINICAL SUMMARY MEDICAL DECISION MAKING FREE TEXT BOX
75 y/o M patient presents to the ED w/ chest pain and abd pain. Vitals stable. Will r/o ACS versus Dissection versus Kidney stones. Will obtain blood work, EKG, x-ray, CT Abd and chest. Will observe in the ED and reassess.

## 2019-12-30 NOTE — ED PROVIDER NOTE - CARE PLAN
Principal Discharge DX:	NSTEMI (non-ST elevated myocardial infarction)  Secondary Diagnosis:	Cholecystitis

## 2019-12-31 DIAGNOSIS — N17.9 ACUTE KIDNEY FAILURE, UNSPECIFIED: ICD-10-CM

## 2019-12-31 DIAGNOSIS — K81.0 ACUTE CHOLECYSTITIS: ICD-10-CM

## 2019-12-31 DIAGNOSIS — I21.4 NON-ST ELEVATION (NSTEMI) MYOCARDIAL INFARCTION: ICD-10-CM

## 2019-12-31 DIAGNOSIS — I50.22 CHRONIC SYSTOLIC (CONGESTIVE) HEART FAILURE: ICD-10-CM

## 2019-12-31 DIAGNOSIS — R91.1 SOLITARY PULMONARY NODULE: ICD-10-CM

## 2019-12-31 DIAGNOSIS — I10 ESSENTIAL (PRIMARY) HYPERTENSION: ICD-10-CM

## 2019-12-31 DIAGNOSIS — E03.9 HYPOTHYROIDISM, UNSPECIFIED: ICD-10-CM

## 2019-12-31 DIAGNOSIS — Z29.9 ENCOUNTER FOR PROPHYLACTIC MEASURES, UNSPECIFIED: ICD-10-CM

## 2019-12-31 DIAGNOSIS — Z71.89 OTHER SPECIFIED COUNSELING: ICD-10-CM

## 2019-12-31 DIAGNOSIS — E11.9 TYPE 2 DIABETES MELLITUS WITHOUT COMPLICATIONS: ICD-10-CM

## 2019-12-31 LAB
24R-OH-CALCIDIOL SERPL-MCNC: 19.2 NG/ML — LOW (ref 30–80)
ALBUMIN SERPL ELPH-MCNC: 4.2 G/DL — SIGNIFICANT CHANGE UP (ref 3.5–5)
ALP SERPL-CCNC: 67 U/L — SIGNIFICANT CHANGE UP (ref 40–120)
ALT FLD-CCNC: 28 U/L DA — SIGNIFICANT CHANGE UP (ref 10–60)
ANION GAP SERPL CALC-SCNC: 9 MMOL/L — SIGNIFICANT CHANGE UP (ref 5–17)
APPEARANCE UR: CLEAR — SIGNIFICANT CHANGE UP
APTT BLD: 31.8 SEC — SIGNIFICANT CHANGE UP (ref 27.5–36.3)
AST SERPL-CCNC: 20 U/L — SIGNIFICANT CHANGE UP (ref 10–40)
BILIRUB SERPL-MCNC: 0.9 MG/DL — SIGNIFICANT CHANGE UP (ref 0.2–1.2)
BILIRUB UR-MCNC: NEGATIVE — SIGNIFICANT CHANGE UP
BUN SERPL-MCNC: 30 MG/DL — HIGH (ref 7–18)
CALCIUM SERPL-MCNC: 9.2 MG/DL — SIGNIFICANT CHANGE UP (ref 8.4–10.5)
CHLORIDE SERPL-SCNC: 98 MMOL/L — SIGNIFICANT CHANGE UP (ref 96–108)
CHOLEST SERPL-MCNC: 183 MG/DL — SIGNIFICANT CHANGE UP (ref 10–199)
CK MB BLD-MCNC: 1.3 % — SIGNIFICANT CHANGE UP (ref 0–3.5)
CK MB CFR SERPL CALC: 2 NG/ML — SIGNIFICANT CHANGE UP (ref 0–3.6)
CK SERPL-CCNC: 151 U/L — SIGNIFICANT CHANGE UP (ref 35–232)
CO2 SERPL-SCNC: 28 MMOL/L — SIGNIFICANT CHANGE UP (ref 22–31)
COLOR SPEC: YELLOW — SIGNIFICANT CHANGE UP
CREAT SERPL-MCNC: 1.37 MG/DL — HIGH (ref 0.5–1.3)
DIFF PNL FLD: ABNORMAL
GLUCOSE SERPL-MCNC: 168 MG/DL — HIGH (ref 70–99)
GLUCOSE UR QL: NEGATIVE — SIGNIFICANT CHANGE UP
HBA1C BLD-MCNC: 6.4 % — HIGH (ref 4–5.6)
HCT VFR BLD CALC: 38 % — LOW (ref 39–50)
HDLC SERPL-MCNC: 55 MG/DL — SIGNIFICANT CHANGE UP
HGB BLD-MCNC: 12.8 G/DL — LOW (ref 13–17)
INR BLD: 1.08 RATIO — SIGNIFICANT CHANGE UP (ref 0.88–1.16)
KETONES UR-MCNC: NEGATIVE — SIGNIFICANT CHANGE UP
LEUKOCYTE ESTERASE UR-ACNC: NEGATIVE — SIGNIFICANT CHANGE UP
LIPID PNL WITH DIRECT LDL SERPL: 117 MG/DL — SIGNIFICANT CHANGE UP
MAGNESIUM SERPL-MCNC: 2 MG/DL — SIGNIFICANT CHANGE UP (ref 1.6–2.6)
MCHC RBC-ENTMCNC: 32.1 PG — SIGNIFICANT CHANGE UP (ref 27–34)
MCHC RBC-ENTMCNC: 33.7 GM/DL — SIGNIFICANT CHANGE UP (ref 32–36)
MCV RBC AUTO: 95.2 FL — SIGNIFICANT CHANGE UP (ref 80–100)
NITRITE UR-MCNC: NEGATIVE — SIGNIFICANT CHANGE UP
NRBC # BLD: 0 /100 WBCS — SIGNIFICANT CHANGE UP (ref 0–0)
PH UR: 5 — SIGNIFICANT CHANGE UP (ref 5–8)
PHOSPHATE SERPL-MCNC: 3.7 MG/DL — SIGNIFICANT CHANGE UP (ref 2.5–4.5)
PLATELET # BLD AUTO: 244 K/UL — SIGNIFICANT CHANGE UP (ref 150–400)
POTASSIUM SERPL-MCNC: 3.9 MMOL/L — SIGNIFICANT CHANGE UP (ref 3.5–5.3)
POTASSIUM SERPL-SCNC: 3.9 MMOL/L — SIGNIFICANT CHANGE UP (ref 3.5–5.3)
PROT SERPL-MCNC: 7.9 G/DL — SIGNIFICANT CHANGE UP (ref 6–8.3)
PROT UR-MCNC: 30 MG/DL
PROTHROM AB SERPL-ACNC: 12 SEC — SIGNIFICANT CHANGE UP (ref 10–12.9)
RBC # BLD: 3.99 M/UL — LOW (ref 4.2–5.8)
RBC # FLD: 15.5 % — HIGH (ref 10.3–14.5)
SODIUM SERPL-SCNC: 135 MMOL/L — SIGNIFICANT CHANGE UP (ref 135–145)
SP GR SPEC: 1.01 — SIGNIFICANT CHANGE UP (ref 1.01–1.02)
TOTAL CHOLESTEROL/HDL RATIO MEASUREMENT: 3.3 RATIO — LOW (ref 3.4–9.6)
TRIGL SERPL-MCNC: 53 MG/DL — SIGNIFICANT CHANGE UP (ref 10–149)
TROPONIN I SERPL-MCNC: 0.1 NG/ML — HIGH (ref 0–0.04)
TROPONIN I SERPL-MCNC: 0.14 NG/ML — HIGH (ref 0–0.04)
TSH SERPL-MCNC: 0.42 UU/ML — SIGNIFICANT CHANGE UP (ref 0.34–4.82)
UROBILINOGEN FLD QL: NEGATIVE — SIGNIFICANT CHANGE UP
VIT B12 SERPL-MCNC: 466 PG/ML — SIGNIFICANT CHANGE UP (ref 232–1245)
WBC # BLD: 17.03 K/UL — HIGH (ref 3.8–10.5)
WBC # FLD AUTO: 17.03 K/UL — HIGH (ref 3.8–10.5)

## 2019-12-31 PROCEDURE — 76705 ECHO EXAM OF ABDOMEN: CPT | Mod: 26

## 2019-12-31 PROCEDURE — 99223 1ST HOSP IP/OBS HIGH 75: CPT

## 2019-12-31 PROCEDURE — 99285 EMERGENCY DEPT VISIT HI MDM: CPT

## 2019-12-31 PROCEDURE — 99222 1ST HOSP IP/OBS MODERATE 55: CPT

## 2019-12-31 RX ORDER — METRONIDAZOLE 500 MG
500 TABLET ORAL EVERY 8 HOURS
Refills: 0 | Status: DISCONTINUED | OUTPATIENT
Start: 2019-12-31 | End: 2020-01-05

## 2019-12-31 RX ORDER — METOPROLOL TARTRATE 50 MG
25 TABLET ORAL
Refills: 0 | Status: DISCONTINUED | OUTPATIENT
Start: 2019-12-31 | End: 2020-01-05

## 2019-12-31 RX ORDER — HEPARIN SODIUM 5000 [USP'U]/ML
5000 INJECTION INTRAVENOUS; SUBCUTANEOUS EVERY 8 HOURS
Refills: 0 | Status: DISCONTINUED | OUTPATIENT
Start: 2019-12-31 | End: 2020-01-05

## 2019-12-31 RX ORDER — MORPHINE SULFATE 50 MG/1
2 CAPSULE, EXTENDED RELEASE ORAL EVERY 6 HOURS
Refills: 0 | Status: DISCONTINUED | OUTPATIENT
Start: 2019-12-31 | End: 2020-01-01

## 2019-12-31 RX ORDER — CIPROFLOXACIN LACTATE 400MG/40ML
400 VIAL (ML) INTRAVENOUS EVERY 12 HOURS
Refills: 0 | Status: DISCONTINUED | OUTPATIENT
Start: 2019-12-31 | End: 2020-01-02

## 2019-12-31 RX ORDER — FLUOXETINE HCL 10 MG
20 CAPSULE ORAL DAILY
Refills: 0 | Status: DISCONTINUED | OUTPATIENT
Start: 2019-12-31 | End: 2020-01-05

## 2019-12-31 RX ORDER — ASPIRIN/CALCIUM CARB/MAGNESIUM 324 MG
81 TABLET ORAL DAILY
Refills: 0 | Status: DISCONTINUED | OUTPATIENT
Start: 2019-12-31 | End: 2020-01-05

## 2019-12-31 RX ORDER — LEVOTHYROXINE SODIUM 125 MCG
125 TABLET ORAL DAILY
Refills: 0 | Status: DISCONTINUED | OUTPATIENT
Start: 2019-12-31 | End: 2020-01-05

## 2019-12-31 RX ORDER — ATORVASTATIN CALCIUM 80 MG/1
80 TABLET, FILM COATED ORAL AT BEDTIME
Refills: 0 | Status: DISCONTINUED | OUTPATIENT
Start: 2019-12-31 | End: 2020-01-05

## 2019-12-31 RX ORDER — SODIUM CHLORIDE 9 MG/ML
1000 INJECTION, SOLUTION INTRAVENOUS
Refills: 0 | Status: DISCONTINUED | OUTPATIENT
Start: 2019-12-31 | End: 2020-01-01

## 2019-12-31 RX ORDER — ACETAMINOPHEN 500 MG
650 TABLET ORAL EVERY 6 HOURS
Refills: 0 | Status: DISCONTINUED | OUTPATIENT
Start: 2019-12-31 | End: 2020-01-05

## 2019-12-31 RX ORDER — CLONAZEPAM 1 MG
1 TABLET ORAL DAILY
Refills: 0 | Status: DISCONTINUED | OUTPATIENT
Start: 2019-12-31 | End: 2020-01-05

## 2019-12-31 RX ADMIN — Medication 81 MILLIGRAM(S): at 12:34

## 2019-12-31 RX ADMIN — Medication 100 MILLIGRAM(S): at 10:19

## 2019-12-31 RX ADMIN — Medication 1 MILLIGRAM(S): at 12:33

## 2019-12-31 RX ADMIN — Medication 200 MILLIGRAM(S): at 21:09

## 2019-12-31 RX ADMIN — Medication 1000 MILLIGRAM(S): at 01:45

## 2019-12-31 RX ADMIN — Medication 400 MILLIGRAM(S): at 01:45

## 2019-12-31 RX ADMIN — Medication 650 MILLIGRAM(S): at 18:02

## 2019-12-31 RX ADMIN — MORPHINE SULFATE 2 MILLIGRAM(S): 50 CAPSULE, EXTENDED RELEASE ORAL at 01:20

## 2019-12-31 RX ADMIN — HEPARIN SODIUM 5000 UNIT(S): 5000 INJECTION INTRAVENOUS; SUBCUTANEOUS at 13:38

## 2019-12-31 RX ADMIN — Medication 650 MILLIGRAM(S): at 15:57

## 2019-12-31 RX ADMIN — ATORVASTATIN CALCIUM 80 MILLIGRAM(S): 80 TABLET, FILM COATED ORAL at 21:08

## 2019-12-31 RX ADMIN — MORPHINE SULFATE 2 MILLIGRAM(S): 50 CAPSULE, EXTENDED RELEASE ORAL at 13:25

## 2019-12-31 RX ADMIN — HEPARIN SODIUM 5000 UNIT(S): 5000 INJECTION INTRAVENOUS; SUBCUTANEOUS at 21:08

## 2019-12-31 RX ADMIN — Medication 125 MICROGRAM(S): at 06:51

## 2019-12-31 RX ADMIN — HEPARIN SODIUM 5000 UNIT(S): 5000 INJECTION INTRAVENOUS; SUBCUTANEOUS at 06:51

## 2019-12-31 RX ADMIN — Medication 25 MILLIGRAM(S): at 06:51

## 2019-12-31 RX ADMIN — Medication 650 MILLIGRAM(S): at 04:58

## 2019-12-31 RX ADMIN — Medication 25 MILLIGRAM(S): at 17:10

## 2019-12-31 RX ADMIN — Medication 200 MILLIGRAM(S): at 12:00

## 2019-12-31 RX ADMIN — Medication 100 MILLIGRAM(S): at 17:53

## 2019-12-31 RX ADMIN — Medication 100 MILLIGRAM(S): at 00:19

## 2019-12-31 RX ADMIN — MORPHINE SULFATE 2 MILLIGRAM(S): 50 CAPSULE, EXTENDED RELEASE ORAL at 12:32

## 2019-12-31 RX ADMIN — Medication 20 MILLIGRAM(S): at 12:32

## 2019-12-31 RX ADMIN — Medication 650 MILLIGRAM(S): at 06:25

## 2019-12-31 RX ADMIN — SODIUM CHLORIDE 50 MILLILITER(S): 9 INJECTION, SOLUTION INTRAVENOUS at 12:33

## 2019-12-31 NOTE — CONSULT NOTE ADULT - ASSESSMENT
GI asked to evaluate this 73 yo male with past medical hx as previously mentioned admitted for acute cholecystitis, NSTEMI and RIANNA. GI asked to evaluate this 75 yo male with past medical hx as previously mentioned admitted for acute cholecystitis, NSTEMI and RIANNA. Surgical and cardiology consults appreciated.  Patient optimized from a cardiac prospective.

## 2019-12-31 NOTE — H&P ADULT - PROBLEM SELECTOR PLAN 6
patient takes lasix at home patient takes Actos at home   will start HSS  monitor blood glucose q6 while NPO   f/u Hgb A1c

## 2019-12-31 NOTE — H&P ADULT - PROBLEM SELECTOR PLAN 1
patient noted to have acute cholecystitis on imaging   given dose of cipro and flagyl in ED  surgery team consulted by ED team  tenderness in RUQ upon deep palpation  NPO  IV abx  f/u urine and blood cultures   f/u surgery  pain management with morphine and tylenol  small amount of fluids due to history of CHF patient noted to have acute cholecystitis on imaging   given dose of cipro and flagyl in ED  surgery team consulted by ED team  tenderness in RUQ upon deep palpation  NPO except meds  IV cipro and flagyl  f/u urine and blood cultures   f/u surgery  GI consult  pain management with morphine and tylenol  hold off on IVF due to history of CHF  f/u RUQ US to r/o choledocholithiasis patient noted to have acute cholecystitis on imaging   given dose of cipro and flagyl in ED  surgery team consulted by ED team  tenderness in RUQ upon deep palpation  NPO except meds  IV cipro and flagyl  f/u urine and blood cultures   f/u surgery  Dr. Alfaro GI consulted   pain management with morphine and tylenol  hold off on IVF due to history of CHF  f/u RUQ US to r/o choledocholithiasis

## 2019-12-31 NOTE — H&P ADULT - PROBLEM SELECTOR PLAN 9
GOC discussed with patient and wife   Patient is FULL CODE IMPROVE VTE Individual Risk Assessment  RISK                                                                Points  [  ] Previous VTE                                                  3  [  ] Thrombophilia                                               2  [  ] Lower limb paralysis                                      2       (unable to hold up >15 seconds)    [  ] Current Cancer                                              2        (within 6 months)  [  ] Immobilization > 24 hrs                                1  [  ] ICU/CCU stay > 24 hours                              1  [X ] Age > 60                                                      1    IMPROVE VTE Score  1    heparin for DVT prophylaxis

## 2019-12-31 NOTE — PROGRESS NOTE ADULT - PROBLEM SELECTOR PLAN 4
RLL 7mm lung nodule noted on CT scan which has slightly increased in size from imaging in May along with groundglass opacity in RLL  patient denies any shortness of breath  no history of smoking   pulm Dr. Shaw consulted

## 2019-12-31 NOTE — H&P ADULT - HISTORY OF PRESENT ILLNESS
Patient is a 74 year old male, with PMH of MI s/p PPM, AS s/p TAVR 2016, HF, and DM, who came in to the ED due to left sided chest pain radiating to abdomen. Patient states pain started yesterday morning (12/30) after he had ate breakfast. Pain was sudden in onset and was about a 7/10 in severity. Also states the pain radiated to his shoulder blades. Currently states the pain has improved in his chest and only feel pain in abdomen. Denies any nausea, vomiting, leg pain or swelling, dizziness, or issues in bowel movement and urination. Patient is a 74 year old male from home, ambulates independently, with PMH of MI s/p PPM, AS s/p TAVR 2016, HF with reduced EF initially of 27% and now 40-45% s/p BiV ICD/pacemaker, cholelithiasis, anxiety, hypothyroidism, DM, Hodgkin lymphoma s/p radiation and chemo now in remission with left lung base radiation fibrosis, b/l carotid stenosis s/p carotid endarterectomy, pericardial effusion s/p pericardiocentesis, and b/l inguinal hernia, who came in to the ED due to left sided chest pain radiating to abdomen. Patient states pain started yesterday morning (12/30) after he had ate breakfast. Pain was sudden in onset and was about a 7/10 in severity. Also states the pain radiated to his shoulder blades. Denies any association with food. No diarrhea or constipation. Currently states the pain has improved in his chest and only feel pain in abdomen in the right upper quadrant. Denies any nausea, vomiting, leg pain or swelling or dizziness.

## 2019-12-31 NOTE — H&P ADULT - PROBLEM SELECTOR PLAN 7
patient takes synthroid at home  will continue home meds  f/u TSH patient takes lasix at home  will hold on lasix   hold on IVF patient takes lasix at home  will hold on lasix   hold on IVF  f/u ECHO

## 2019-12-31 NOTE — H&P ADULT - PROBLEM SELECTOR PLAN 4
history of HTN  patient takes metoprolol, losartan, klonopin at home  will hold on losartan due to RIANNA  monitor BP and adjust meds as needed RLL 7mm lung nodule noted on CT scan which has slightly increased in size from imaging in May along with groundglass opacity in RLL  patient denies any shortness of breath  no history of smoking   pulm consult RLL 7mm lung nodule noted on CT scan which has slightly increased in size from imaging in May along with groundglass opacity in RLL  patient denies any shortness of breath  no history of smoking   pulm Dr. Shaw consulted

## 2019-12-31 NOTE — PROGRESS NOTE ADULT - SUBJECTIVE AND OBJECTIVE BOX
INTERVAL HPI/OVERNIGHT EVENTS:    No acute events overnight.   Pt resting comfortably. No acute complaints.   Some pain on RUQ, no n/v/f/c, no CP or SOB    MEDICATIONS  (STANDING):  aspirin  chewable 81 milliGRAM(s) Oral daily  atorvastatin 80 milliGRAM(s) Oral at bedtime  ciprofloxacin   IVPB 400 milliGRAM(s) IV Intermittent every 12 hours  clonazePAM  Tablet 1 milliGRAM(s) Oral daily  FLUoxetine 20 milliGRAM(s) Oral daily  heparin  Injectable 5000 Unit(s) SubCutaneous every 8 hours  levothyroxine 125 MICROGram(s) Oral daily  metoprolol tartrate 25 milliGRAM(s) Oral two times a day  metroNIDAZOLE  IVPB 500 milliGRAM(s) IV Intermittent every 8 hours  sodium chloride 0.45%. 1000 milliLiter(s) (50 mL/Hr) IV Continuous <Continuous>    MEDICATIONS  (PRN):  acetaminophen   Tablet .. 650 milliGRAM(s) Oral every 6 hours PRN Moderate Pain (4 - 6)  morphine  - Injectable 2 milliGRAM(s) IV Push every 6 hours PRN Severe Pain (7 - 10)      Vital Signs Last 24 Hrs  T(C): 36.8 (31 Dec 2019 08:19), Max: 37.2 (31 Dec 2019 04:53)  T(F): 98.2 (31 Dec 2019 08:19), Max: 99 (31 Dec 2019 04:53)  HR: 90 (31 Dec 2019 08:19) (83 - 95)  BP: 121/58 (31 Dec 2019 08:19) (121/58 - 173/81)  RR: 18 (31 Dec 2019 08:19) (17 - 18)  SpO2: 96% (31 Dec 2019 08:19) (95% - 100%)      PHYSICAL EXAM  General: Alert and oriented, not in acute distress  Resp: Breathing unlabored  Abdomen: Soft, moderately distended at baseline, RUQ tenderness, previous lap port incision scars from hernia repair  : No schreiber catheter, no dysuria or hematuria  Extremities: No pedal edema    I&O's Detail    31 Dec 2019 07:01  -  31 Dec 2019 10:36  --------------------------------------------------------  IN:    sodium chloride 0.45%.: 100 mL  Total IN: 100 mL    OUT:  Total OUT: 0 mL    Total NET: 100 mL          LABS:                        12.8   17.03 )-----------( 244      ( 31 Dec 2019 05:38 )             38.0             12-31    135  |  98  |  30<H>  ----------------------------<  168<H>  3.9   |  28  |  1.37<H>    Ca    9.2      31 Dec 2019 05:38  Phos  3.7     12-31  Mg     2.0     12-31    TPro  7.9  /  Alb  4.2  /  TBili  0.9  /  DBili  x   /  AST  20  /  ALT  28  /  AlkPhos  67  12-31

## 2019-12-31 NOTE — PROGRESS NOTE ADULT - ASSESSMENT
Patient is a 74 year old male, with PMH of MI s/p PPM, AS s/p TAVR 2016, HF, and DM, who came in to the ED due to left sided chest pain radiating to abdomen.    CT angio done to evaluate for aortic dissection. Imaging negative for dissection but showed distended gallbladder with cholelithiasis and pericholecystic inflammatory changes concerning for acute cholecystitis. Dilated CBD of 10mm.   Surgery team consulted by ED. Given dose of cipro and flagyl.  Patient noted to have elevated troponin level of 0.093 and elevated creatinine level of 1.56.   EKG showed atrial paced rhythm     Admitted for acute cholecystitis, NSTEMI and RIANNA

## 2019-12-31 NOTE — CONSULT NOTE ADULT - SUBJECTIVE AND OBJECTIVE BOX
[facility]    Patient is a 74y old  Male who presents with a chief complaint of left sided chest and flank pain (31 Dec 2019 08:55)    HPI:  Patient is a 74 year old male from home, ambulates independently, with PMH of MI s/p PPM, AS s/p TAVR 2016, HF with reduced EF initially of 27% and now 40-45% s/p BiV ICD/pacemaker, cholelithiasis, anxiety, hypothyroidism, DM, Hodgkin lymphoma s/p radiation and chemo now in remission with left lung base radiation fibrosis, b/l carotid stenosis s/p carotid endarterectomy, pericardial effusion s/p pericardiocentesis, and b/l inguinal hernia, who came in to the ED due to left sided chest pain radiating to abdomen. Patient states pain started yesterday morning (12/30) after he ate breakfast. Pain was sudden in onset and was about a 7/10 in severity. Also states the pain radiated to his shoulder blades. Denies any association with food. No diarrhea or constipation. Currently states the pain has improved in his chest and only feel pain in abdomen in the right upper quadrant. + nausea, no vomiting. Denies leg pain, swelling or dizziness. 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 in 80&#x27;s  Systolic CHF, chronic: s/p ICD  AS (aortic stenosis): s/p TAVR  Myocardial infarction: Cardiac Arrest- 1994- no stents  Type 2 diabetes mellitus without complication  Hodgkin lymphoma: on remission, chemo and rad [id number]  Acquired hypothyroidism  HLD (hyperlipidemia)  HTN (hypertension)  S/P ICD (internal cardiac defibrillator) procedure: [employer]  S/P TAVR (transcatheter aortic valve replacement): 10/16 in [place]  History of umbilical hernia repair  H/O bilateral inguinal hernia repair  H/O carotid endarterectomy: bilateral    FH:  FAMILY HISTORY:  No pertinent family history in first degree relatives      MEDS:  MEDICATIONS  (STANDING):  aspirin  chewable 81 milliGRAM(s) Oral daily  atorvastatin 80 milliGRAM(s) Oral at bedtime  ciprofloxacin   IVPB 400 milliGRAM(s) IV Intermittent every 12 hours  clonazePAM  Tablet 1 milliGRAM(s) Oral daily  FLUoxetine 20 milliGRAM(s) Oral daily  heparin  Injectable 5000 Unit(s) SubCutaneous every 8 hours  levothyroxine 125 MICROGram(s) Oral daily  metoprolol tartrate 25 milliGRAM(s) Oral two times a day  metroNIDAZOLE  IVPB 500 milliGRAM(s) IV Intermittent every 8 hours  sodium chloride 0.45%. 1000 milliLiter(s) (50 mL/Hr) IV Continuous <Continuous>    MEDICATIONS  (PRN):  acetaminophen   Tablet .. 650 milliGRAM(s) Oral every 6 hours PRN Moderate Pain (4 - 6)  morphine  - Injectable 2 milliGRAM(s) IV Push every 6 hours PRN Severe Pain (7 - 10)    Allergies    No Known Allergies    Intolerances    lisinopril (Other)          CONSTITUTIONAL:  No weight loss, fever, chills, weakness or fatigue.  HEENT:  Eyes:  No visual loss, blurred vision, double vision or yellow sclerae. Ears, Nose, Throat:  No hearing loss, sneezing, congestion, runny nose or sore throat.  SKIN:  No rash or itching.  CARDIOVASCULAR:  No chest pain, chest pressure or chest discomfort. No palpitations or edema.  RESPIRATORY:  No shortness of breath, cough or sputum.  GASTROINTESTINAL:  SEE HPI  GENITOURINARY:  No dysuria, hematuria, urinary frequency  NEUROLOGICAL:  No headache, dizziness, syncope, paralysis, ataxia, numbness or tingling in the extremities. No change in bowel or bladder control.  MUSCULOSKELETAL:  No muscle, back pain, joint pain or stiffness.  HEMATOLOGIC:  No anemia, bleeding or bruising.  LYMPHATICS:  No enlarged nodes. No history of splenectomy.  PSYCHIATRIC:  No history of depression or anxiety.  ENDOCRINOLOGIC:  No reports of sweating, cold or heat intolerance. No polyuria or polydipsia.      ______________________________________________________________________  PHYSICAL EXAM:  T(C): 36.8 (12-31-19 @ 08:19), Max: 37.2 (12-31-19 @ 04:53)  HR: 90 (12-31-19 @ 08:19)  BP: 121/58 (12-31-19 @ 08:19)  RR: 18 (12-31-19 @ 08:19)  SpO2: 96% (12-31-19 @ 08:19)  Wt(kg): --    12-31 - 12-31  --------------------------------------------------------  IN:    sodium chloride 0.45%.: 100 mL  Total IN: 100 mL    OUT:  Total OUT: 0 mL    Total NET: 100 mL          GEN: NAD, normocephalic  CVS: S1S2+  CHEST: clear to auscultation  ABD: soft , +tenderness, +distended, bowel sounds present; no rebound, no guarding  EXTR: no cyanosis, no clubbing, no edema  NEURO: Awake and alert; oriented to person, place, time, and situation.   SKIN:  warm;  non icteric    ______________________________________________________________________  LABS:                        12.8   17.03 )-----------( 244      ( 31 Dec 2019 05:38 )             38.0     12-31    135  |  98  |  30<H>  ----------------------------<  168<H>  3.9   |  28  |  1.37<H>    Ca    9.2      31 Dec 2019 05:38  Phos  3.7     12-31  Mg     2.0     12-31    TPro  7.9  /  Alb  4.2  /  TBili  0.9  /  DBili  x   /  AST  20  /  ALT  28  /  AlkPhos  67  12-31    LIVER FUNCTIONS - ( 31 Dec 2019 05:38 )  Alb: 4.2 g/dL / Pro: 7.9 g/dL / ALK PHOS: 67 U/L / ALT: 28 U/L DA / AST: 20 U/L / GGT: x           PT/INR - ( 31 Dec 2019 05:38 )   PT: 12.0 sec;   INR: 1.08 ratio         PTT - ( 31 Dec 2019 05:38 )  PTT:31.8 sec  ____________________________________________    IMAGING:  CTA A/P - No abdominal aortic aneurysm or dissection.    Distended gallbladder with cholelithiasis and pericholecystic inflammatory changes concerning for acute cholecystitis. Dilated common bile duct measuring up to 10 mm.    Mild colonic diverticulosis without evidence of diverticulitis.    Trace right pleural effusion. Right lower lobe lung nodule measuring up to 7 mm slightly increased in size compared to prior exam from 5/28/2019. Ill marginated groundglass opacity in the right lower lobe measuring up to 2.3 cm which is also slightly increased in size compared to prior exam for which considerations include infection, inflammation, or malignancy.

## 2019-12-31 NOTE — H&P ADULT - PROBLEM SELECTOR PLAN 8
IMPROVE VTE Individual Risk Assessment  RISK                                                                Points  [  ] Previous VTE                                                  3  [  ] Thrombophilia                                               2  [  ] Lower limb paralysis                                      2       (unable to hold up >15 seconds)    [  ] Current Cancer                                              2        (within 6 months)  [  ] Immobilization > 24 hrs                                1  [  ] ICU/CCU stay > 24 hours                              1  [X ] Age > 60                                                      1    IMPROVE VTE Score  1    heparin for DVT prophylaxis patient takes synthroid 125mcg at home  will continue home meds  f/u TSH

## 2019-12-31 NOTE — PROGRESS NOTE ADULT - ASSESSMENT
74 yoM with significant cardiac history p/w chest pain, r/o ACS; CT finding c/w acute cholecystitis with RUQ pain    h/o lap hernia repair 4 yrs ago (4 hernias; bl inguinal, 2 ventral -repaired with mesh)  T bili wnl, no avilez's sign, nonperitonitic  WBC up to 17 from 8, afebrile vss    - cardiology cleared pt for OR, however pt does not wish to proceed with surgery at this time; will discuss options and surgical planning   - pain control as needed, trend cbc/chem  - dvt ppx  - cont mng per medicine  - will continue to follow  - d/w attending

## 2019-12-31 NOTE — H&P ADULT - PROBLEM SELECTOR PLAN 3
noted to have elevated creatinine of 1.56 on admission  f/u BMP in AM and monitor creatinine noted to have elevated creatinine of 1.56 on admission  holding off on IVF for now due to CHF history  holding lasix and losartan due to RIANNA  f/u BMP in AM and monitor creatinine noted to have elevated creatinine from baseline of 1.56 on admission  likely prerenal or ATN due to diuretic use  holding off on IVF for now due to CHF history  holding lasix and losartan due to RIANNA  f/u BMP in AM and monitor creatinine  f/u urine lytes

## 2019-12-31 NOTE — H&P ADULT - ASSESSMENT
Patient is a 74 year old male, with PMH of MI s/p PPM, AS s/p TAVR 2016, HF, and DM, who came in to the ED due to left sided chest pain radiating to abdomen.    CT angio done to evaluate for aortic dissection. Imaging negative for dissection but showed distended gallbladder with cholelithiasis and pericholecystic inflammatory changes concerning for acute cholecystitis. Dilated CBD of 10mm.   Surgery team consulted by ED. Given dose of cipro and flagyl.  Patient noted to have elevated troponin level of 0.093 and elevated creatinine level of 1.56.     Admitted for acute cholecystitis and NSTEMI and RIANNA Patient is a 74 year old male, with PMH of MI s/p PPM, AS s/p TAVR 2016, HF, and DM, who came in to the ED due to left sided chest pain radiating to abdomen.    CT angio done to evaluate for aortic dissection. Imaging negative for dissection but showed distended gallbladder with cholelithiasis and pericholecystic inflammatory changes concerning for acute cholecystitis. Dilated CBD of 10mm.   Surgery team consulted by ED. Given dose of cipro and flagyl.  Patient noted to have elevated troponin level of 0.093 and elevated creatinine level of 1.56.   EKG showed atrial paced rhythm     Admitted for acute cholecystitis, NSTEMI and RIANNA

## 2019-12-31 NOTE — CONSULT NOTE ADULT - SUBJECTIVE AND OBJECTIVE BOX
HISTORY OF PRESENT ILLNESS: HPI:  Patient is a 74 year old male from home,  with PMH of MI s/p PPM, AS s/p TAVR 2016, HF Mild segmental LV dysfx 40-45% chronically occluded RCA (cath 2016) s/p BiV ICD/pacemaker, cholelithiasis, anxiety, hypothyroidism, DM, Hodgkin lymphoma s/p radiation and chemo now in remission with left lung base radiation fibrosis, b/l carotid stenosis s/p carotid endarterectomy, pericardial effusion s/p pericardiocentesis, and b/l inguinal hernia, who came in to the ED due to RUQ pain radiating to the back.  A CTA of the aorta r/o dissection but was noted with cholecystitis. Patient states pain started yesterday morning (12/30) after he had ate breakfast. Pain was sudden in onset and was about a 7/10 in severity. Also states the pain radiated to his shoulder blades. No diarrhea or constipation. Currently states the pain has improved  only feel pain in abdomen in the right upper quadrant. Denies any nausea, vomiting, leg pain or swelling or dizziness. He reports he ambulates up and down 2 flights of stairs, and a 2-3 blocks with no anginal symptoms. No changes in his exercise tolerance, No PND, No orthopnea, NO ICD shocks or LOC.      PAST MEDICAL & SURGICAL HISTORY:  Pericardial effusion: drained in [place] in 4/19  Lung fibrosis: after RT in 80&#x27;s  Systolic CHF, chronic: s/p ICD  AS (aortic stenosis): s/p TAVR  Myocardial infarction: Cardiac Arrest- 1994- no stents  Type 2 diabetes mellitus without complication  Hodgkin lymphoma: on remission, chemo and rad [id number]  Acquired hypothyroidism  HLD (hyperlipidemia)  HTN (hypertension)  S/P ICD (internal cardiac defibrillator) procedure: [employer]  S/P TAVR (transcatheter aortic valve replacement): 10/16 in [place]  History of umbilical hernia repair  H/O bilateral inguinal hernia repair  H/O carotid endarterectomy: bilateral          MEDICATIONS:  MEDICATIONS  (STANDING):  aspirin  chewable 81 milliGRAM(s) Oral daily  atorvastatin 80 milliGRAM(s) Oral at bedtime  ciprofloxacin   IVPB 400 milliGRAM(s) IV Intermittent every 12 hours  clonazePAM  Tablet 1 milliGRAM(s) Oral daily  FLUoxetine 20 milliGRAM(s) Oral daily  heparin  Injectable 5000 Unit(s) SubCutaneous every 8 hours  levothyroxine 125 MICROGram(s) Oral daily  metoprolol tartrate 25 milliGRAM(s) Oral two times a day  metroNIDAZOLE  IVPB 500 milliGRAM(s) IV Intermittent every 8 hours  sodium chloride 0.45%. 1000 milliLiter(s) (50 mL/Hr) IV Continuous <Continuous>      Allergies    No Known Allergies    Intolerances    lisinopril (Other)      FAMILY HISTORY:  No pertinent family history in first degree relatives    Non-contributary for premature coronary disease or sudden cardiac death    SOCIAL HISTORY:    [X ] Non-smoker  [ ] Smoker  [ ] Alcohol    FLU VACCINE THIS YEAR STARTS IN AUGUST:  [X ] Yes    [ ] No    IF OVER 65 HAVE YOU EVER HAD A PNA VACCINE:  [ X] Yes    [ ] No       [ ] N/A      REVIEW OF SYSTEMS:  [ ]chest pain  [  ]shortness of breath  [  ]palpitations  [  ]syncope  [ ]near syncope [ ]upper extremity weakness   [ ] lower extremity weakness  [  ]diplopia  [  ]altered mental status   [  ]fevers  [ ]chills [ ]nausea  [ ]vomitting  [  ]dysphagia    [ X]abdominal pain  [ ]melena  [ ]BRBPR    [  ]epistaxis  [  ]rash    [ ]lower extremity edema        [X ] All others negative	  [ ] Unable to obtain      LABS:	 	    CARDIAC MARKERS:  CARDIAC MARKERS ( 31 Dec 2019 05:39 )  0.135 ng/mL / x     / 151 U/L / x     / 2.0 ng/mL  CARDIAC MARKERS ( 30 Dec 2019 18:54 )  0.093 ng/mL / x     / x     / x     / x                                  12.8   17.03 )-----------( 244      ( 31 Dec 2019 05:38 )             38.0     Hb Trend: 12.8<--, 12.7<--    12-31    135  |  98  |  30<H>  ----------------------------<  168<H>  3.9   |  28  |  1.37<H>    Ca    9.2      31 Dec 2019 05:38  Phos  3.7     12-31  Mg     2.0     12-31    TPro  7.9  /  Alb  4.2  /  TBili  0.9  /  DBili  x   /  AST  20  /  ALT  28  /  AlkPhos  67  12-31    Creatinine Trend: 1.37<--, 1.56<--    Coags:  PT/INR - ( 31 Dec 2019 05:38 )   PT: 12.0 sec;   INR: 1.08 ratio         PTT - ( 31 Dec 2019 05:38 )  PTT:31.8 sec    proBNP: Serum Pro-Brain Natriuretic Peptide: 1732 pg/mL (12-30 @ 18:54)     TSH: Thyroid Stimulating Hormone, Serum: 0.42 uU/mL (12-31 @ 05:38)      PHYSICAL EXAM:  T(C): 36.8 (12-31-19 @ 08:19), Max: 37.2 (12-31-19 @ 04:53)  HR: 90 (12-31-19 @ 08:19) (83 - 95)  BP: 121/58 (12-31-19 @ 08:19) (121/58 - 173/81)  RR: 18 (12-31-19 @ 08:19) (17 - 18)  SpO2: 96% (12-31-19 @ 08:19) (95% - 100%)  Wt(kg): --   BMI (kg/m2): 27.4 (12-30-19 @ 17:42)  I&O's Summary      Gen: Appears well in NAD  HEENT:  (-)icterus (-)pallor  CV: N S1 S2 1/6 ANGELLA (+)2 Pulses B/l  Resp:  Clear to ausculatation B/L, normal effort  GI: (+) BS Soft, NT, ND  Lymph:  (-)Edema, (-)obvious lymphadenopathy  Skin: Warm to touch, Normal turgor  Psych: Appropriate mood and affect    TELEMETRY: 	  V paced    ECG:  	Sinus 87 Vpaced    RADIOLOGY:         CXR:  < from: Xray Chest 2 Views PA/Lat (05.31.19 @ 11:24) >  Left-sided chest wall AICD. Status post TAVR.    Redemonstrated bilateral pleural effusions, left greater than right,   without significant interval change from prior. No pneumothorax.    < end of copied text >      ASSESSMENT/PLAN: 	74y Male  PMH of MI s/p PPM, AS s/p TAVR 2016, HF Mild segmental LV dysfx 40-45% chronically occluded RCA (cath 2016) Complete heart block  s/p BiV ICD, cholelithiasis, anxiety, hypothyroidism, DM, Hodgkin lymphoma s/p radiation and chemo now in remission with left lung base radiation fibrosis, b/l carotid stenosis s/p carotid endarterectomy, pericardial effusion s/p pericardiocentesis, and b/l inguinal hernia, who came in to the ED due to RUQ pain radiating to the back found with cholecystitis.    - Patient with no anginal symptoms trace trop likely due to  of RCA and cardiomyopathy.  - Recent with no interval changes no reoccurrence of pericardial effusion  - BiV icd interrogation from Dr. Godinez's office placed in chart along with all office records.  - He has no evidence of decompensated CHF or unstable cardiac syndromes    - By virtue of having a history of a hx of CAD and CHF he should be treated as moderate cardiac risk.  Cont beta blocker periprocedure  - optimized from a cardiac prospective.    I once again thank you for allowing me to participate in the care of your patient.  If you have any questions or concerns please do not hesitate to contact me.    Lobo Hinton MD, [facility]  BEEPER [phone] HISTORY OF PRESENT ILLNESS: HPI:  Patient is a 74 year old male from home,  with PMH of MI s/p PPM, AS s/p TAVR 2016, HF Mild segmental LV dysfx 40-45% chronically occluded RCA (cath 2016) s/p BiV ICD/pacemaker, cholelithiasis, anxiety, hypothyroidism, DM, Hodgkin lymphoma s/p radiation and chemo now in remission with left lung base radiation fibrosis, b/l carotid stenosis s/p carotid endarterectomy, pericardial effusion s/p pericardiocentesis, and b/l inguinal hernia, who came in to the ED due to RUQ pain radiating to the back.  A CTA of the aorta r/o dissection but was noted with cholecystitis. Patient states pain started yesterday morning (12/30) after he had ate breakfast. Pain was sudden in onset and was about a 7/10 in severity. Also states the pain radiated to his shoulder blades. No diarrhea or constipation. Currently states the pain has improved  only feel pain in abdomen in the right upper quadrant. Denies any nausea, vomiting, leg pain or swelling or dizziness. He reports he ambulates up and down 2 flights of stairs, and a 2-3 blocks with no anginal symptoms. No changes in his exercise tolerance, No PND, No orthopnea, NO ICD shocks or LOC.      PAST MEDICAL & SURGICAL HISTORY:  Pericardial effusion: drained in [place] in 4/19  Lung fibrosis: after RT in 80&#x27;s  Systolic CHF, chronic: s/p ICD  AS (aortic stenosis): s/p TAVR  Myocardial infarction: Cardiac Arrest- 1994- no stents  Type 2 diabetes mellitus without complication  Hodgkin lymphoma: on remission, chemo and rad [id number]  Acquired hypothyroidism  HLD (hyperlipidemia)  HTN (hypertension)  S/P ICD (internal cardiac defibrillator) procedure: [employer]  S/P TAVR (transcatheter aortic valve replacement): 10/16 in [place]  History of umbilical hernia repair  H/O bilateral inguinal hernia repair  H/O carotid endarterectomy: bilateral          MEDICATIONS:  MEDICATIONS  (STANDING):  aspirin  chewable 81 milliGRAM(s) Oral daily  atorvastatin 80 milliGRAM(s) Oral at bedtime  ciprofloxacin   IVPB 400 milliGRAM(s) IV Intermittent every 12 hours  clonazePAM  Tablet 1 milliGRAM(s) Oral daily  FLUoxetine 20 milliGRAM(s) Oral daily  heparin  Injectable 5000 Unit(s) SubCutaneous every 8 hours  levothyroxine 125 MICROGram(s) Oral daily  metoprolol tartrate 25 milliGRAM(s) Oral two times a day  metroNIDAZOLE  IVPB 500 milliGRAM(s) IV Intermittent every 8 hours  sodium chloride 0.45%. 1000 milliLiter(s) (50 mL/Hr) IV Continuous <Continuous>      Allergies    No Known Allergies    Intolerances    lisinopril (Other)      FAMILY HISTORY:  No pertinent family history in first degree relatives    Non-contributary for premature coronary disease or sudden cardiac death    SOCIAL HISTORY:    [X ] Non-smoker  [ ] Smoker  [ ] Alcohol    FLU VACCINE THIS YEAR STARTS IN AUGUST:  [X ] Yes    [ ] No    IF OVER 65 HAVE YOU EVER HAD A PNA VACCINE:  [ X] Yes    [ ] No       [ ] N/A      REVIEW OF SYSTEMS:  [ ]chest pain  [  ]shortness of breath  [  ]palpitations  [  ]syncope  [ ]near syncope [ ]upper extremity weakness   [ ] lower extremity weakness  [  ]diplopia  [  ]altered mental status   [  ]fevers  [ ]chills [ ]nausea  [ ]vomitting  [  ]dysphagia    [ X]abdominal pain  [ ]melena  [ ]BRBPR    [  ]epistaxis  [  ]rash    [ ]lower extremity edema        [X ] All others negative	  [ ] Unable to obtain      LABS:	 	    CARDIAC MARKERS:  CARDIAC MARKERS ( 31 Dec 2019 05:39 )  0.135 ng/mL / x     / 151 U/L / x     / 2.0 ng/mL  CARDIAC MARKERS ( 30 Dec 2019 18:54 )  0.093 ng/mL / x     / x     / x     / x                                  12.8   17.03 )-----------( 244      ( 31 Dec 2019 05:38 )             38.0     Hb Trend: 12.8<--, 12.7<--    12-31    135  |  98  |  30<H>  ----------------------------<  168<H>  3.9   |  28  |  1.37<H>    Ca    9.2      31 Dec 2019 05:38  Phos  3.7     12-31  Mg     2.0     12-31    TPro  7.9  /  Alb  4.2  /  TBili  0.9  /  DBili  x   /  AST  20  /  ALT  28  /  AlkPhos  67  12-31    Creatinine Trend: 1.37<--, 1.56<--    Coags:  PT/INR - ( 31 Dec 2019 05:38 )   PT: 12.0 sec;   INR: 1.08 ratio         PTT - ( 31 Dec 2019 05:38 )  PTT:31.8 sec    proBNP: Serum Pro-Brain Natriuretic Peptide: 1732 pg/mL (12-30 @ 18:54)     TSH: Thyroid Stimulating Hormone, Serum: 0.42 uU/mL (12-31 @ 05:38)      PHYSICAL EXAM:  T(C): 36.8 (12-31-19 @ 08:19), Max: 37.2 (12-31-19 @ 04:53)  HR: 90 (12-31-19 @ 08:19) (83 - 95)  BP: 121/58 (12-31-19 @ 08:19) (121/58 - 173/81)  RR: 18 (12-31-19 @ 08:19) (17 - 18)  SpO2: 96% (12-31-19 @ 08:19) (95% - 100%)  Wt(kg): --   BMI (kg/m2): 27.4 (12-30-19 @ 17:42)  I&O's Summary      Gen: Appears well in NAD  HEENT:  (-)icterus (-)pallor  CV: N S1 S2 1/6 ANGELLA (+)2 Pulses B/l  Resp:  Clear to ausculatation B/L, normal effort  GI: (+) BS Soft, NT, ND  Lymph:  (-)Edema, (-)obvious lymphadenopathy  Skin: Warm to touch, Normal turgor  Psych: Appropriate mood and affect    TELEMETRY: 	  V paced    ECG:  	Sinus 87 Vpaced    RADIOLOGY:         CXR:  < from: Xray Chest 2 Views PA/Lat (05.31.19 @ 11:24) >  Left-sided chest wall AICD. Status post TAVR.    Redemonstrated bilateral pleural effusions, left greater than right,   without significant interval change from prior. No pneumothorax.    < end of copied text >      ASSESSMENT/PLAN: 	74y Male  PMH of MI s/p PPM, AS s/p TAVR 2016, HF Mild segmental LV dysfx 40-45% chronically occluded RCA (cath 2016) Complete heart block  s/p BiV ICD, cholelithiasis, anxiety, hypothyroidism, DM, Hodgkin lymphoma s/p radiation and chemo now in remission with left lung base radiation fibrosis, b/l carotid stenosis s/p carotid endarterectomy, pericardial effusion s/p pericardiocentesis, and b/l inguinal hernia, who came in to the ED due to RUQ pain radiating to the back found with cholecystitis.    - Patient with no anginal symptoms trace trop likely due to  of RCA and cardiomyopathy.  - Recent with no interval changes no reoccurrence of pericardial effusion  - BiV icd interrogation from Dr. Godinez's office placed in chart along with all office records.  - He has no evidence of decompensated CHF or unstable cardiac syndromes    - By virtue of having a history of a hx of CAD and CHF he should be treated as high cardiac risk.  Cont beta blocker periprocedure  - optimized from a cardiac prospective.    I once again thank you for allowing me to participate in the care of your patient.  If you have any questions or concerns please do not hesitate to contact me.    Lobo Hinton MD, [facility]  BEEPER [phone] HISTORY OF PRESENT ILLNESS: HPI:  Patient is a 74 year old male from home,  with PMH of MI s/p PPM, AS s/p TAVR 2016, HF Mild segmental LV dysfx 40-45% chronically occluded RCA (cath 2016) s/p BiV ICD/pacemaker, cholelithiasis, anxiety, hypothyroidism, DM, Hodgkin lymphoma s/p radiation and chemo now in remission with left lung base radiation fibrosis, b/l carotid stenosis s/p carotid endarterectomy, pericardial effusion s/p pericardiocentesis, and b/l inguinal hernia, who came in to the ED due to RUQ pain radiating to the back.  A CTA of the aorta r/o dissection but was noted with cholecystitis. Patient states pain started yesterday morning (12/30) after he had ate breakfast. Pain was sudden in onset and was about a 7/10 in severity. Also states the pain radiated to his shoulder blades. No diarrhea or constipation. Currently states the pain has improved  only feel pain in abdomen in the right upper quadrant. Denies any nausea, vomiting, leg pain or swelling or dizziness. He reports he ambulates up and down 2 flights of stairs, and a 2-3 blocks with no anginal symptoms. No changes in his exercise tolerance, No PND, No orthopnea, NO ICD shocks or LOC.      PAST MEDICAL & SURGICAL HISTORY:  Pericardial effusion: drained in [place] in 4/19  Lung fibrosis: after RT in 80&#x27;s  Systolic CHF, chronic: s/p ICD  AS (aortic stenosis): s/p TAVR  Myocardial infarction: Cardiac Arrest- 1994- no stents  Type 2 diabetes mellitus without complication  Hodgkin lymphoma: on remission, chemo and rad [id number]  Acquired hypothyroidism  HLD (hyperlipidemia)  HTN (hypertension)  S/P ICD (internal cardiac defibrillator) procedure: [employer]  S/P TAVR (transcatheter aortic valve replacement): 10/16 in [place]  History of umbilical hernia repair  H/O bilateral inguinal hernia repair  H/O carotid endarterectomy: bilateral          MEDICATIONS:  MEDICATIONS  (STANDING):  aspirin  chewable 81 milliGRAM(s) Oral daily  atorvastatin 80 milliGRAM(s) Oral at bedtime  ciprofloxacin   IVPB 400 milliGRAM(s) IV Intermittent every 12 hours  clonazePAM  Tablet 1 milliGRAM(s) Oral daily  FLUoxetine 20 milliGRAM(s) Oral daily  heparin  Injectable 5000 Unit(s) SubCutaneous every 8 hours  levothyroxine 125 MICROGram(s) Oral daily  metoprolol tartrate 25 milliGRAM(s) Oral two times a day  metroNIDAZOLE  IVPB 500 milliGRAM(s) IV Intermittent every 8 hours  sodium chloride 0.45%. 1000 milliLiter(s) (50 mL/Hr) IV Continuous <Continuous>      Allergies    No Known Allergies    Intolerances    lisinopril (Other)      FAMILY HISTORY:  No pertinent family history in first degree relatives    Non-contributary for premature coronary disease or sudden cardiac death    SOCIAL HISTORY:    [X ] Non-smoker  [ ] Smoker  [ ] Alcohol    FLU VACCINE THIS YEAR STARTS IN AUGUST:  [X ] Yes    [ ] No    IF OVER 65 HAVE YOU EVER HAD A PNA VACCINE:  [ X] Yes    [ ] No       [ ] N/A      REVIEW OF SYSTEMS:  [ ]chest pain  [  ]shortness of breath  [  ]palpitations  [  ]syncope  [ ]near syncope [ ]upper extremity weakness   [ ] lower extremity weakness  [  ]diplopia  [  ]altered mental status   [  ]fevers  [ ]chills [ ]nausea  [ ]vomitting  [  ]dysphagia    [ X]abdominal pain  [ ]melena  [ ]BRBPR    [  ]epistaxis  [  ]rash    [ ]lower extremity edema        [X ] All others negative	  [ ] Unable to obtain      LABS:	 	    CARDIAC MARKERS:  CARDIAC MARKERS ( 31 Dec 2019 05:39 )  0.135 ng/mL / x     / 151 U/L / x     / 2.0 ng/mL  CARDIAC MARKERS ( 30 Dec 2019 18:54 )  0.093 ng/mL / x     / x     / x     / x                                  12.8   17.03 )-----------( 244      ( 31 Dec 2019 05:38 )             38.0     Hb Trend: 12.8<--, 12.7<--    12-31    135  |  98  |  30<H>  ----------------------------<  168<H>  3.9   |  28  |  1.37<H>    Ca    9.2      31 Dec 2019 05:38  Phos  3.7     12-31  Mg     2.0     12-31    TPro  7.9  /  Alb  4.2  /  TBili  0.9  /  DBili  x   /  AST  20  /  ALT  28  /  AlkPhos  67  12-31    Creatinine Trend: 1.37<--, 1.56<--    Coags:  PT/INR - ( 31 Dec 2019 05:38 )   PT: 12.0 sec;   INR: 1.08 ratio         PTT - ( 31 Dec 2019 05:38 )  PTT:31.8 sec    proBNP: Serum Pro-Brain Natriuretic Peptide: 1732 pg/mL (12-30 @ 18:54)     TSH: Thyroid Stimulating Hormone, Serum: 0.42 uU/mL (12-31 @ 05:38)      PHYSICAL EXAM:  T(C): 36.8 (12-31-19 @ 08:19), Max: 37.2 (12-31-19 @ 04:53)  HR: 90 (12-31-19 @ 08:19) (83 - 95)  BP: 121/58 (12-31-19 @ 08:19) (121/58 - 173/81)  RR: 18 (12-31-19 @ 08:19) (17 - 18)  SpO2: 96% (12-31-19 @ 08:19) (95% - 100%)  Wt(kg): --   BMI (kg/m2): 27.4 (12-30-19 @ 17:42)  I&O's Summary      Gen: Appears well in NAD  HEENT:  (-)icterus (-)pallor  CV: N S1 S2 1/6 ANGELLA (+)2 Pulses B/l  Resp:  Clear to ausculatation B/L, normal effort  GI: (+) BS Soft, NT, ND  Lymph:  (-)Edema, (-)obvious lymphadenopathy  Skin: Warm to touch, Normal turgor  Psych: Appropriate mood and affect    TELEMETRY: 	  V paced    ECG:  	Sinus 87 Vpaced    RADIOLOGY:         CXR:  < from: Xray Chest 2 Views PA/Lat (05.31.19 @ 11:24) >  Left-sided chest wall AICD. Status post TAVR.    Redemonstrated bilateral pleural effusions, left greater than right,   without significant interval change from prior. No pneumothorax.    < end of copied text >      ASSESSMENT/PLAN: 	74y Male  PMH of MI s/p PPM, AS s/p TAVR 2016, HF Mild segmental LV dysfx 40-45% chronically occluded RCA (cath 2016) Complete heart block  s/p BiV ICD, cholelithiasis, anxiety, hypothyroidism, DM, Hodgkin lymphoma s/p radiation and chemo now in remission with left lung base radiation fibrosis, b/l carotid stenosis s/p carotid endarterectomy, pericardial effusion s/p pericardiocentesis, and b/l inguinal hernia, who came in to the ED due to RUQ pain radiating to the back found with cholecystitis.    - Patient with no anginal symptoms trace trop likely due to  of RCA and cardiomyopathy.  - Recent with no interval changes no reoccurrence of pericardial effusion  - BiV icd interrogation from Dr. Godinez's office placed in chart along with all office records.  - He has no evidence of decompensated CHF or unstable cardiac syndromes    - By virtue of having a history of a hx of CAD and CHF he should be treated as high cardiac risk.  Cont beta blocker periprocedure, strict I+O's preprocedure  - optimized from a cardiac prospective.    I once again thank you for allowing me to participate in the care of your patient.  If you have any questions or concerns please do not hesitate to contact me.    Lobo Hinton MD, [facility]  BEEPER [phone] HISTORY OF PRESENT ILLNESS: HPI:  Patient is a 74 year old male from home,  with PMH of MI s/p PPM, AS s/p TAVR 2016, HF Mild segmental LV dysfx 40-45% chronically occluded RCA (cath 2016) s/p BiV ICD/pacemaker, cholelithiasis, anxiety, hypothyroidism, DM, Hodgkin lymphoma s/p radiation and chemo now in remission with left lung base radiation fibrosis, b/l carotid stenosis s/p carotid endarterectomy, pericardial effusion s/p pericardiocentesis, and b/l inguinal hernia, who came in to the ED due to RUQ pain radiating to the back.  A CTA of the aorta r/o dissection but was noted with cholecystitis. Patient states pain started yesterday morning (12/30) after he had ate breakfast. Pain was sudden in onset and was about a 7/10 in severity. Also states the pain radiated to his shoulder blades. No diarrhea or constipation. Currently states the pain has improved  only feel pain in abdomen in the right upper quadrant. Denies any nausea, vomiting, leg pain or swelling or dizziness. He reports he ambulates up and down 2 flights of stairs, and a 2-3 blocks with no anginal symptoms. No changes in his exercise tolerance, No PND, No orthopnea, NO ICD shocks or LOC.      PAST MEDICAL & SURGICAL HISTORY:  Pericardial effusion: drained in [place] in 4/19  Lung fibrosis: after RT in 80&#x27;s  Systolic CHF, chronic: s/p ICD  AS (aortic stenosis): s/p TAVR  Myocardial infarction: Cardiac Arrest- 1994- no stents  Type 2 diabetes mellitus without complication  Hodgkin lymphoma: on remission, chemo and rad [id number]  Acquired hypothyroidism  HLD (hyperlipidemia)  HTN (hypertension)  S/P ICD (internal cardiac defibrillator) procedure: [employer]  S/P TAVR (transcatheter aortic valve replacement): 10/16 in [place]  History of umbilical hernia repair  H/O bilateral inguinal hernia repair  H/O carotid endarterectomy: bilateral          MEDICATIONS:  MEDICATIONS  (STANDING):  aspirin  chewable 81 milliGRAM(s) Oral daily  atorvastatin 80 milliGRAM(s) Oral at bedtime  ciprofloxacin   IVPB 400 milliGRAM(s) IV Intermittent every 12 hours  clonazePAM  Tablet 1 milliGRAM(s) Oral daily  FLUoxetine 20 milliGRAM(s) Oral daily  heparin  Injectable 5000 Unit(s) SubCutaneous every 8 hours  levothyroxine 125 MICROGram(s) Oral daily  metoprolol tartrate 25 milliGRAM(s) Oral two times a day  metroNIDAZOLE  IVPB 500 milliGRAM(s) IV Intermittent every 8 hours  sodium chloride 0.45%. 1000 milliLiter(s) (50 mL/Hr) IV Continuous <Continuous>      Allergies    No Known Allergies    Intolerances    lisinopril (Other)      FAMILY HISTORY:  No pertinent family history in first degree relatives    Non-contributary for premature coronary disease or sudden cardiac death    SOCIAL HISTORY:    [X ] Non-smoker  [ ] Smoker  [ ] Alcohol    FLU VACCINE THIS YEAR STARTS IN AUGUST:  [X ] Yes    [ ] No    IF OVER 65 HAVE YOU EVER HAD A PNA VACCINE:  [ X] Yes    [ ] No       [ ] N/A      REVIEW OF SYSTEMS:  [ ]chest pain  [  ]shortness of breath  [  ]palpitations  [  ]syncope  [ ]near syncope [ ]upper extremity weakness   [ ] lower extremity weakness  [  ]diplopia  [  ]altered mental status   [  ]fevers  [ ]chills [ ]nausea  [ ]vomitting  [  ]dysphagia    [ X]abdominal pain  [ ]melena  [ ]BRBPR    [  ]epistaxis  [  ]rash    [ ]lower extremity edema        [X ] All others negative	  [ ] Unable to obtain      LABS:	 	    CARDIAC MARKERS:  CARDIAC MARKERS ( 31 Dec 2019 05:39 )  0.135 ng/mL / x     / 151 U/L / x     / 2.0 ng/mL  CARDIAC MARKERS ( 30 Dec 2019 18:54 )  0.093 ng/mL / x     / x     / x     / x                                  12.8   17.03 )-----------( 244      ( 31 Dec 2019 05:38 )             38.0     Hb Trend: 12.8<--, 12.7<--    12-31    135  |  98  |  30<H>  ----------------------------<  168<H>  3.9   |  28  |  1.37<H>    Ca    9.2      31 Dec 2019 05:38  Phos  3.7     12-31  Mg     2.0     12-31    TPro  7.9  /  Alb  4.2  /  TBili  0.9  /  DBili  x   /  AST  20  /  ALT  28  /  AlkPhos  67  12-31    Creatinine Trend: 1.37<--, 1.56<--    Coags:  PT/INR - ( 31 Dec 2019 05:38 )   PT: 12.0 sec;   INR: 1.08 ratio      HgA1c: 6.4     PTT - ( 31 Dec 2019 05:38 )  PTT:31.8 sec    proBNP: Serum Pro-Brain Natriuretic Peptide: 1732 pg/mL (12-30 @ 18:54)     TSH: Thyroid Stimulating Hormone, Serum: 0.42 uU/mL (12-31 @ 05:38)      PHYSICAL EXAM:  T(C): 36.8 (12-31-19 @ 08:19), Max: 37.2 (12-31-19 @ 04:53)  HR: 90 (12-31-19 @ 08:19) (83 - 95)  BP: 121/58 (12-31-19 @ 08:19) (121/58 - 173/81)  RR: 18 (12-31-19 @ 08:19) (17 - 18)  SpO2: 96% (12-31-19 @ 08:19) (95% - 100%)  Wt(kg): --   BMI (kg/m2): 27.4 (12-30-19 @ 17:42)  I&O's Summary      Gen: Appears well in NAD  HEENT:  (-)icterus (-)pallor  CV: N S1 S2 1/6 ANGELLA (+)2 Pulses B/l  Resp:  Clear to ausculatation B/L, normal effort  GI: (+) BS Soft, NT, ND  Lymph:  (-)Edema, (-)obvious lymphadenopathy  Skin: Warm to touch, Normal turgor  Psych: Appropriate mood and affect    TELEMETRY: 	  V paced    ECG:  	Sinus 87 Vpaced    RADIOLOGY:         CXR:  < from: Xray Chest 2 Views PA/Lat (05.31.19 @ 11:24) >  Left-sided chest wall AICD. Status post TAVR.    Redemonstrated bilateral pleural effusions, left greater than right,   without significant interval change from prior. No pneumothorax.    < end of copied text >      ASSESSMENT/PLAN: 	74y Male  PMH of MI s/p PPM, AS s/p TAVR 2016, HF Mild segmental LV dysfx 40-45% chronically occluded RCA (cath 2016) Complete heart block  s/p BiV ICD, cholelithiasis, anxiety, hypothyroidism, DM, Hodgkin lymphoma s/p radiation and chemo now in remission with left lung base radiation fibrosis, b/l carotid stenosis s/p carotid endarterectomy, pericardial effusion s/p pericardiocentesis, and b/l inguinal hernia, who came in to the ED due to RUQ pain radiating to the back found with cholecystitis.    - Patient with no anginal symptoms trace trop likely due to  of RCA and cardiomyopathy.  - Recent with no interval changes no reoccurrence of pericardial effusion  - BiV icd interrogation from Dr. Godinez's office placed in chart along with all office records.  - He has no evidence of decompensated CHF or unstable cardiac syndromes    - By virtue of having a history of a hx of CAD and CHF he should be treated as high cardiac risk.  Cont beta blocker periprocedure, strict I+O's preprocedure  - optimized from a cardiac prospective.    I once again thank you for allowing me to participate in the care of your patient.  If you have any questions or concerns please do not hesitate to contact me.    Lobo Hinton MD, [facility]  BEEPER [phone]

## 2019-12-31 NOTE — CONSULT NOTE ADULT - SUBJECTIVE AND OBJECTIVE BOX
Source: Patient, Chart    Reason for Consultation: Abnormal CT chest    Chief Complaint: Left sided flank pain    HPI:  Patient is a 74 year old male from home, ambulates independently, with PMH of MI s/p PPM, AS s/p TAVR , HF with reduced EF initially of 27% and now 40-45% s/p BiV ICD/pacemaker, cholelithiasis, anxiety, hypothyroidism, DM, Hodgkin lymphoma s/p radiation and chemo now in remission with left lung base radiation fibrosis, b/l carotid stenosis s/p carotid endarterectomy, pericardial effusion s/p pericardiocentesis, and b/l inguinal hernia, who came in to the ED due to left sided chest pain radiating to abdomen. Patient states pain started yesterday morning () after he had ate breakfast. Pain was sudden in onset and was about a 7/10 in severity. Also states the pain radiated to his shoulder blades. Denies any association with food. No diarrhea or constipation. Currently states the pain has improved in his chest and only feel pain in abdomen in the right upper quadrant. Denies any nausea, vomiting, leg pain or swelling or dizziness. (31 Dec 2019 01:55)    He has no cough or shortness of breath. He never had any follow up from his previous CT chest.    MEDICATIONS  (STANDING):  aspirin  chewable 81 milliGRAM(s) Oral daily  atorvastatin 80 milliGRAM(s) Oral at bedtime  ciprofloxacin   IVPB 400 milliGRAM(s) IV Intermittent every 12 hours  clonazePAM  Tablet 1 milliGRAM(s) Oral daily  FLUoxetine 20 milliGRAM(s) Oral daily  heparin  Injectable 5000 Unit(s) SubCutaneous every 8 hours  levothyroxine 125 MICROGram(s) Oral daily  metoprolol tartrate 25 milliGRAM(s) Oral two times a day  metroNIDAZOLE  IVPB 500 milliGRAM(s) IV Intermittent every 8 hours  sodium chloride 0.45%. 1000 milliLiter(s) (50 mL/Hr) IV Continuous <Continuous>    MEDICATIONS  (PRN):  acetaminophen   Tablet .. 650 milliGRAM(s) Oral every 6 hours PRN Moderate Pain (4 - 6)  morphine  - Injectable 2 milliGRAM(s) IV Push every 6 hours PRN Severe Pain (7 - 10)      Allergies    No Known Allergies    Intolerances    lisinopril (Other)      PAST MEDICAL & SURGICAL HISTORY:  Pericardial effusion: drained in [place] in   Lung fibrosis: after RT in 80&#x27;s  Systolic CHF, chronic: s/p ICD  AS (aortic stenosis): s/p TAVR  Myocardial infarction: Cardiac Arrest- - no stents  Type 2 diabetes mellitus without complication  Hodgkin lymphoma: on remission, chemo and rad [id number]  Acquired hypothyroidism  HLD (hyperlipidemia)  HTN (hypertension)  S/P ICD (internal cardiac defibrillator) procedure: [employer]  S/P TAVR (transcatheter aortic valve replacement): 10/16 in [place]  History of umbilical hernia repair  H/O bilateral inguinal hernia repair  H/O carotid endarterectomy: bilateral      FAMILY HISTORY:  No pertinent family history in first degree relatives    SOCIAL HISTORY  Smoking History: nonsmoker  Alcohol: Denies  Drugs: Denies  Occupation: Retired    T(C): 37.3 (19 @ 11:38), Max: 37.3 (19 @ 11:38)  HR: 89 (19 @ 11:38) (83 - 95)  BP: 115/48 (19 @ 11:38) (115/48 - 173/81)  RR: 18 (19 @ 11:38) (17 - 18)  SpO2: 97% (19 @ 11:38) (95% - 100%)    LABS:                        12.8   17.03 )-----------( 244      ( 31 Dec 2019 05:38 )             38.0         135  |  98  |  30<H>  ----------------------------<  168<H>  3.9   |  28  |  1.37<H>    Ca    9.2      31 Dec 2019 05:38  Phos  3.7       Mg     2.0         TPro  7.9  /  Alb  4.2  /  TBili  0.9  /  DBili  x   /  AST  20  /  ALT  28  /  AlkPhos  67      PT/INR - ( 31 Dec 2019 05:38 )   PT: 12.0 sec;   INR: 1.08 ratio         PTT - ( 31 Dec 2019 05:38 )  PTT:31.8 sec  Urinalysis Basic - ( 31 Dec 2019 05:39 )    Color: Yellow / Appearance: Clear / S.015 / pH: x  Gluc: x / Ketone: Negative  / Bili: Negative / Urobili: Negative   Blood: x / Protein: 30 mg/dL / Nitrite: Negative   Leuk Esterase: Negative / RBC: 2-5 /HPF / WBC 0-2 /HPF   Sq Epi: x / Non Sq Epi: Occasional /HPF / Bacteria: Moderate /HPF    CARDIAC MARKERS ( 31 Dec 2019 12:51 )  0.103 ng/mL / x     / x     / x     / x      CARDIAC MARKERS ( 31 Dec 2019 05:39 )  0.135 ng/mL / x     / 151 U/L / x     / 2.0 ng/mL  CARDIAC MARKERS ( 30 Dec 2019 18:54 )  0.093 ng/mL / x     / x     / x     / x        Serum Pro-Brain Natriuretic Peptide: 1732 pg/mL (19 @ 18:54)    Microbiology    RADIOLOGY & ADDITIONAL STUDIES: (My Reading)  CT angio- No PE. RLL ground glass opacity 2.3cm slightly increased compared to 5/19. 7mm RLL ground glass nodule

## 2019-12-31 NOTE — H&P ADULT - ATTENDING COMMENTS
Patient seen and examined ; case was discussed with the admitting resident    ROS: as in the HPI; all other ROS negative    SH and family history as above    Vital Signs Last 24 Hrs  T(C): 36.7 (30 Dec 2019 17:42), Max: 36.7 (30 Dec 2019 17:42)  T(F): 98 (30 Dec 2019 17:42), Max: 98 (30 Dec 2019 17:42)  HR: 83 (30 Dec 2019 17:42) (83 - 83)  BP: 173/81 (30 Dec 2019 17:42) (173/81 - 173/81)  BP(mean): --  RR: 17 (30 Dec 2019 17:42) (17 - 17)  SpO2: 100% (30 Dec 2019 17:42) (100% - 100%)    GEN: NAD  HEENT- normocephalic; mouth moist  CVS- S1S2+  LUNGS- clear to auscultation; no wheezing  ABD: Soft , mild tenderness to palpation over RUQ, [place] absent, +distended, Bowel sounds are present  EXTREMITY: no calf tenderness, no cyanosis, no edema  NEURO: AAOx3; non focal neurologic exam; cranial nerves grossly intact  PSYCH: normal affect and behavior  BACK: no swelling or mass;   VASCULAR: ++ distal peripheral pulses  SKIN: warm and dry.       Labs Reviewed:                         12.7   8.94  )-----------( 230      ( 30 Dec 2019 18:54 )             38.5     12-30    135  |  98  |  32<H>  ----------------------------<  192<H>  4.4   |  24  |  1.56<H>    Ca    9.2      30 Dec 2019 18:54    TPro  7.6  /  Alb  3.9  /  TBili  0.8  /  DBili  x   /  AST  37  /  ALT  26  /  AlkPhos  62  12-30    CARDIAC MARKERS ( 30 Dec 2019 18:54 )  0.093 ng/mL / x     / x     / x     / x              BNP: Serum Pro-Brain Natriuretic Peptide: 1732 pg/mL (12-30 @ 18:54)    MEDICATIONS  (STANDING):  heparin  Injectable 5000 Unit(s) SubCutaneous every 8 hours    MEDICATIONS  (PRN):  acetaminophen   Tablet .. 650 milliGRAM(s) Oral every 6 hours PRN Moderate Pain (4 - 6)  morphine  - Injectable 2 milliGRAM(s) IV Push every 6 hours PRN Severe Pain (7 - 10)      CXR reviewed  EKG Reviewed  Ambulatory documents reviewed   Prev hospitalizations reviewed     75 y/o M with ICM s/p AICD/CRT, s/p TAVR, s/p b/l CEA, HL,  of RCA , 4/2019 hospital admission in [place] with large pericardial effusion s/p pericardiocentesis of bloody fluid off plavix, ac, pAF, admitted with chest and abdominal pain, found to have troponinemia and acute cholecystitis.     1. Acute cholecystitis- empiric abx, ruq us pending, npo, will appreciate surgical consultation   2. NSTEMI- suspect demand ischemia but with extensive cardiac hx needs echo especially given recent large pericardial effusion. Trend troponin, interrogate device, asa, statin, bb with hold parameters. Appreciate cardiology evaluation for nstemi and cardiac optimization prior to surgery, patient requests Dr. Hinton.   3. Dilated CBD- obtain ruq US to eval further, patient cannot get MRCP due to aicd, appreciate GI recommendations.   4. RIANNA- hold arb, suspect prerenal, can give very gentle hydration and hold Lasix while npo   5. Pulmonary nodules- increased in size on recent ct, will request pulmonology evaluation   6. paradoxical a fib- very brief, not on AC given recent bloody effusion, a fib parameters set on device on recent outpatient visit, monitor on tele for now   7. Chronic combined sys/christy CHF stage D s/p AICD/CRT- EF recovered to 40% on most recent echo   8. CAD   9. s/p B/L CEA   10. s/p TAVR   11. H/o HL, h/o chest XRT with some fibrosis- pulm eval as above.   Plan of care discussed with patient ;  all questions and concerns were addressed.  Discussed with Patient's family, wife at bedside

## 2019-12-31 NOTE — PROGRESS NOTE ADULT - SUBJECTIVE AND OBJECTIVE BOX
PGY 1 Note discussed with supervising resident and primary attending    Patient is a 74y old  Male who presents with a chief complaint of left sided chest and flank pain (31 Dec 2019 10:36)      INTERVAL HPI/OVERNIGHT EVENTS: Patient was seen and examined at bedside, chest pain is resolved , still complains of abdominal discomfort.      MEDICATIONS  (STANDING):  aspirin  chewable 81 milliGRAM(s) Oral daily  atorvastatin 80 milliGRAM(s) Oral at bedtime  ciprofloxacin   IVPB 400 milliGRAM(s) IV Intermittent every 12 hours  clonazePAM  Tablet 1 milliGRAM(s) Oral daily  FLUoxetine 20 milliGRAM(s) Oral daily  heparin  Injectable 5000 Unit(s) SubCutaneous every 8 hours  levothyroxine 125 MICROGram(s) Oral daily  metoprolol tartrate 25 milliGRAM(s) Oral two times a day  metroNIDAZOLE  IVPB 500 milliGRAM(s) IV Intermittent every 8 hours  sodium chloride 0.45%. 1000 milliLiter(s) (50 mL/Hr) IV Continuous <Continuous>    MEDICATIONS  (PRN):  acetaminophen   Tablet .. 650 milliGRAM(s) Oral every 6 hours PRN Moderate Pain (4 - 6)  morphine  - Injectable 2 milliGRAM(s) IV Push every 6 hours PRN Severe Pain (7 - 10)      __________________________________________________  REVIEW OF SYSTEMS:    CONSTITUTIONAL: No fever,   EYES: no acute visual disturbances  NECK: No pain or stiffness  RESPIRATORY: No cough; No shortness of breath  CARDIOVASCULAR: No chest pain, no palpitations  GASTROINTESTINAL: abdominal discomfort. No nausea or vomiting; No diarrhea   NEUROLOGICAL: No headache or numbness, no tremors  MUSCULOSKELETAL: No joint pain, no muscle pain  GENITOURINARY: no dysuria, no frequency, no hesitancy  PSYCHIATRY: no depression , no anxiety  ALL OTHER  ROS negative        Vital Signs Last 24 Hrs  T(C): 36.8 (31 Dec 2019 08:19), Max: 37.2 (31 Dec 2019 04:53)  T(F): 98.2 (31 Dec 2019 08:19), Max: 99 (31 Dec 2019 04:53)  HR: 90 (31 Dec 2019 08:19) (83 - 95)  BP: 121/58 (31 Dec 2019 08:19) (121/58 - 173/81)  BP(mean): --  RR: 18 (31 Dec 2019 08:19) (17 - 18)  SpO2: 96% (31 Dec 2019 08:19) (95% - 100%)    ________________________________________________  PHYSICAL EXAM:  GENERAL: NAD  HEENT: Normocephalic;  conjunctivae and sclerae clear; moist mucous membranes;   NECK : supple  CHEST/LUNG: Clear to auscultation bilaterally with good air entry   HEART: S1 S2  regular; no murmurs, gallops or rubs  ABDOMEN: Soft, Nontender, distended; Bowel sounds present  EXTREMITIES: no cyanosis; no edema; no calf tenderness  SKIN: warm and dry; no rash  NERVOUS SYSTEM:  Awake and alert; Oriented  to place, person and time ; no new deficits    _________________________________________________  LABS:                        12.8   17.03 )-----------( 244      ( 31 Dec 2019 05:38 )             38.0         135  |  98  |  30<H>  ----------------------------<  168<H>  3.9   |  28  |  1.37<H>    Ca    9.2      31 Dec 2019 05:38  Phos  3.7       Mg     2.0         TPro  7.9  /  Alb  4.2  /  TBili  0.9  /  DBili  x   /  AST  20  /  ALT  28  /  AlkPhos  67      PT/INR - ( 31 Dec 2019 05:38 )   PT: 12.0 sec;   INR: 1.08 ratio         PTT - ( 31 Dec 2019 05:38 )  PTT:31.8 sec  Urinalysis Basic - ( 31 Dec 2019 05:39 )    Color: Yellow / Appearance: Clear / S.015 / pH: x  Gluc: x / Ketone: Negative  / Bili: Negative / Urobili: Negative   Blood: x / Protein: 30 mg/dL / Nitrite: Negative   Leuk Esterase: Negative / RBC: 2-5 /HPF / WBC 0-2 /HPF   Sq Epi: x / Non Sq Epi: Occasional /HPF / Bacteria: Moderate /HPF      CAPILLARY BLOOD GLUCOSE            RADIOLOGY & ADDITIONAL TESTS:    Imaging Personally Reviewed:  YES/NO    Consultant(s) Notes Reviewed:   YES/ No    Care Discussed with Consultants :     Plan of care was discussed with patient and /or primary care giver; all questions and concerns were addressed and care was aligned with patient's wishes.

## 2019-12-31 NOTE — H&P ADULT - NSHPSOCIALHISTORY_GEN_ALL_CORE
Patient denies any history of smoking, alcohol use or use of illicit drugs. Patient lives at home with his wife.

## 2019-12-31 NOTE — H&P ADULT - NSICDXPASTMEDICALHX_GEN_ALL_CORE_FT
PAST MEDICAL HISTORY:  Acquired hypothyroidism     AS (aortic stenosis) s/p TAVR    HLD (hyperlipidemia)     Hodgkin lymphoma on remission, chemo and rad [id number]    HTN (hypertension)     Lung fibrosis after RT in 80's    Myocardial infarction Cardiac Arrest- 1994- no stents    Pericardial effusion drained in [place] in 4/19    Systolic CHF, chronic s/p ICD    Type 2 diabetes mellitus without complication

## 2019-12-31 NOTE — H&P ADULT - PROBLEM SELECTOR PLAN 5
patient takes Actos at home   will start HSS  monitor blood glucose q6 while NPO   f/u Hgb A1c history of HTN  patient takes metoprolol 25 TID, losartan 25mg daily, klonopin 1mg daily at home  will hold on losartan due to RIANNA  continue klonopin and metoprolol 25 mg BID  monitor BP and adjust meds as needed

## 2019-12-31 NOTE — PATIENT PROFILE ADULT - NSPROGENSOURCEINFO_GEN_A_NUR
October 29, 2019      Collin Paredes MD  [address] [facility] [address]           [facility] - Dermatology  [address] [place] [id number]  Phone: [phone]  Fax: [phone]          Patient: Jose F Callahan   MR Number: [id number]   YOB: 1985   Date of Visit: 10/29/2019       Dear Dr. Collin Paredes:    Thank you for referring Jose F Callahan to me for evaluation. Attached you will find relevant portions of my assessment and plan of care.    If you have questions, please do not hesitate to call me. I look forward to following Jose F Callahan along with you.    Sincerely,    Ruthie Lara MD    Enclosure  CC:  No Recipients    If you would like to receive this communication electronically, please contact [email or web address] or [phone] to request more information on [facility] [employer] [facility] access.    For providers and/or their staff who would like to refer a patient to [facility], please contact us through our one-stop-shop provider referral line, [facility], at [phone].    If you feel you have received this communication in error or would no longer like to receive these types of communications, please e-mail [email or web address]         
patient

## 2019-12-31 NOTE — PROGRESS NOTE ADULT - PROBLEM SELECTOR PLAN 3
noted to have elevated creatinine from baseline of 1.56 on admission  likely prerenal or ATN due to diuretic use  f/u urine lytes.  Cr trended down.

## 2019-12-31 NOTE — CONSULT NOTE ADULT - PROBLEM SELECTOR RECOMMENDATION 9
Nl LFT's and Lipase  Pain management  f/u US   c/w cipro/flagyl  blood cx's pending Nl LFT's and Lipase  Pain management  f/u US    c/w cipro/flagyl  blood cx's pending NL LFT's and Lipase  Pain management  US showing cholelithiasis without gallbladder wall thickening and hepatomegaly  c/w cipro/flagyl  blood cx's pending  no need for ERCP at this time

## 2019-12-31 NOTE — CONSULT NOTE ADULT - SUBJECTIVE AND OBJECTIVE BOX
HPI:      PAST MEDICAL & SURGICAL HISTORY:  Pericardial effusion: drained in [place] in 4/19  Lung fibrosis: after RT in 80&#x27;s  Systolic CHF, chronic: s/p ICD  AS (aortic stenosis): s/p TAVR  Myocardial infarction: Cardiac Arrest- 1994- no stents  Type 2 diabetes mellitus without complication  Hodgkin lymphoma: on remission, chemo and rad [id number]  Acquired hypothyroidism  HLD (hyperlipidemia)  HTN (hypertension)  S/P ICD (internal cardiac defibrillator) procedure: [employer]  S/P TAVR (transcatheter aortic valve replacement): 10/16 in [place]  History of umbilical hernia repair  H/O bilateral inguinal hernia repair  H/O carotid endarterectomy: bilateral      Vital Signs Last 24 Hrs  T(C): 36.7 (30 Dec 2019 17:42), Max: 36.7 (30 Dec 2019 17:42)  T(F): 98 (30 Dec 2019 17:42), Max: 98 (30 Dec 2019 17:42)  HR: 83 (30 Dec 2019 17:42) (83 - 83)  BP: 173/81 (30 Dec 2019 17:42) (173/81 - 173/81)  BP(mean): --  RR: 17 (30 Dec 2019 17:42) (17 - 17)  SpO2: 100% (30 Dec 2019 17:42) (100% - 100%)                          12.7   8.94  )-----------( 230      ( 30 Dec 2019 18:54 )             38.5     12-30    135  |  98  |  32<H>  ----------------------------<  192<H>  4.4   |  24  |  1.56<H>    Ca    9.2      30 Dec 2019 18:54    TPro  7.6  /  Alb  3.9  /  TBili  0.8  /  DBili  x   /  AST  37  /  ALT  26  /  AlkPhos  62  12-30    < from: CT Angio Abdomen and Pelvis w/ IV Cont (12.30.19 @ 21:27) >    IMPRESSION:     No abdominal aortic aneurysm or dissection.    Distended gallbladder with cholelithiasis and pericholecystic inflammatory changes concerning for acute cholecystitis. Dilated common bile duct measuring up to 10 mm.    Mild colonic diverticulosis without evidence of diverticulitis.    Trace right pleural effusion. Right lower lobe lung nodule measuring up to 7 mm slightly increased in size compared to prior exam from 5/28/2019. Ill marginated groundglass opacity in the right lower lobe measuring up to 2.3 cm which is also slightly increased in size compared to prior exam for which considerations include infection, inflammation, or malignancy.      < end of copied text >      PHYSICAL EXAM  Abdomen: soft, tender RUQ, no guarding or peritoneal signs    ASSESSMENT/ PLAN:  admitted medical service  sig cardiac history;^trop, r/o ACS  RUQ pain, CT susp acute jere, cbd 10mm  rec med/cardio clearance, npo, iv abx, serial labs, GI consult  poss pre-op mode for lap jere when clear HPI:Patient is a 74 year old male, with PMH of MI s/p PPM, AS s/p TAVR 2016, HF, and DM, who came in to the ED due to left sided chest pain radiating to abdomen. Patient states pain started yesterday morning (12/30) after he had ate breakfast. Pain was sudden in onset and was about a 7/10 in severity. Also states the pain radiated to his shoulder blades. Currently states the pain has improved in his chest and only feel pain in abdomen. Denies any nausea, vomiting, leg pain or swelling, dizziness, or issues in bowel movement and urination.           PAST MEDICAL & SURGICAL HISTORY:  Pericardial effusion: drained in [place] in 4/19  Lung fibrosis: after RT in 80&#x27;s  Systolic CHF, chronic: s/p ICD  AS (aortic stenosis): s/p TAVR  Myocardial infarction: Cardiac Arrest- 1994- no stents  Type 2 diabetes mellitus without complication  Hodgkin lymphoma: on remission, chemo and rad [id number]  Acquired hypothyroidism  HLD (hyperlipidemia)  HTN (hypertension)  S/P ICD (internal cardiac defibrillator) procedure: [employer]  S/P TAVR (transcatheter aortic valve replacement): 10/16 in [place]  History of umbilical hernia repair  H/O bilateral inguinal hernia repair  H/O carotid endarterectomy: bilateral      Vital Signs Last 24 Hrs  T(C): 36.7 (30 Dec 2019 17:42), Max: 36.7 (30 Dec 2019 17:42)  T(F): 98 (30 Dec 2019 17:42), Max: 98 (30 Dec 2019 17:42)  HR: 83 (30 Dec 2019 17:42) (83 - 83)  BP: 173/81 (30 Dec 2019 17:42) (173/81 - 173/81)  BP(mean): --  RR: 17 (30 Dec 2019 17:42) (17 - 17)  SpO2: 100% (30 Dec 2019 17:42) (100% - 100%)                          12.7   8.94  )-----------( 230      ( 30 Dec 2019 18:54 )             38.5     12-30    135  |  98  |  32<H>  ----------------------------<  192<H>  4.4   |  24  |  1.56<H>    Ca    9.2      30 Dec 2019 18:54    TPro  7.6  /  Alb  3.9  /  TBili  0.8  /  DBili  x   /  AST  37  /  ALT  26  /  AlkPhos  62  12-30    < from: CT Angio Abdomen and Pelvis w/ IV Cont (12.30.19 @ 21:27) >    IMPRESSION:     No abdominal aortic aneurysm or dissection.    Distended gallbladder with cholelithiasis and pericholecystic inflammatory changes concerning for acute cholecystitis. Dilated common bile duct measuring up to 10 mm.    Mild colonic diverticulosis without evidence of diverticulitis.    Trace right pleural effusion. Right lower lobe lung nodule measuring up to 7 mm slightly increased in size compared to prior exam from 5/28/2019. Ill marginated groundglass opacity in the right lower lobe measuring up to 2.3 cm which is also slightly increased in size compared to prior exam for which considerations include infection, inflammation, or malignancy.      < end of copied text >      PHYSICAL EXAM  Abdomen: soft, tender RUQ, no guarding or peritoneal signs    ASSESSMENT/ PLAN:  admitted medical service  sig cardiac history;^trop, r/o ACS  RUQ pain, CT susp acute jere, cbd 10mm  rec med/cardio clearance, npo, iv abx, serial labs, GI consult  poss pre-op mode for lap jere when clear

## 2019-12-31 NOTE — H&P ADULT - PROBLEM SELECTOR PLAN 2
patient noted to have elevated troponin of 0.093 on admission  f/u T2, T3  cardio Dr. Hinton patient noted to have elevated troponin of 0.093 on admission  f/u T2, T3  cardio Dr. Hinton  f/u ECHO patient noted to have chest pain with elevated troponin of 0.093 on admission  r/o ACS  f/u T2, T3  cardio Dr. Hinton  f/u ECHO patient noted to have chest pain with elevated troponin of 0.093 on admission  r/o ACS  f/u T2, T3  cardio Dr. Hinton consulted  f/u ECHO

## 2019-12-31 NOTE — PROGRESS NOTE ADULT - PROBLEM SELECTOR PLAN 2
patient noted to have chest pain with elevated troponin of 0.093 on admission  r/o ACS  T2 is elevated - Patient with no anginal symptoms trace trop likely due to  of RCA and cardiomyopathy. Dr hinton  cardio Dr. Hinton consulted.

## 2019-12-31 NOTE — CONSULT NOTE ADULT - ASSESSMENT
1) Abnormal CT chest- 2.3 cm Ground glass opacity RLL, 7mm ground glass opacity  2) S/P TAVR, Pacemaker  3) Hx of lymphoma    Clinically stable  Symptoms appear to be more GI in origin  RLL ground glass nodule appears slightly larger, may represent a slow growing adeno ca, nodule present in 5/19 CT but not in 2016.  I discussed the options with the patient and his wife at the bedside including CT guided biopsy or conservative measures. The patient has extensive comorbidities and an active GI issue. Would repeat CT chest in 3 months and if stable can continue to follow or if enlarged, a CT guided biopsy may be warrented if he is willing to undering tx, options including XRT, radiofrequency ablation, stereotactic radiation, segmentectomy. The 7mm nodule is too small for biopsy. He can follow up with his PCP who is an oncologist and decide for referral.  No signs of a pulmonary infection. 1) Abnormal CT chest- 2.3 cm Ground glass opacity RLL, 7mm ground glass opacity  2) S/P TAVR, Pacemaker  3) Hx of lymphoma    Clinically stable  Symptoms appear to be more GI in origin  RLL ground glass nodule appears slightly larger, may represent a slow growing adeno ca, nodule present in 5/19 CT but not in 2016.  I discussed the options with the patient and his wife at the bedside including CT guided biopsy or conservative measures. The patient has extensive comorbidities and an active GI issue. Would obtain outpatient CT/PET scan to determine metabolic activity and urgency for biopsy or other abnormal sites, a CT guided biopsy may be warrented if he is willing to undering tx, options including XRT, radiofrequency ablation, stereotactic radiation, segmentectomy. The 7mm nodule is too small for biopsy and under the resolution of a PET scan. He can follow up with his PCP who is an oncologist and decide for referral.  No signs of a pulmonary infection.

## 2019-12-31 NOTE — PROGRESS NOTE ADULT - PROBLEM SELECTOR PLAN 1
patient noted to have acute cholecystitis on imaging   cw cipro and flagyl  surgery team consulted by ED team  tenderness in RUQ upon deep palpation  NPO except meds  IV cipro and flagyl  f/u urine and blood cultures   surgery , GI and cardio consults noted.  Dr. Alfaro GI consulted   pain management with morphine and tylenol  hold off on IVF due to history of CHF  f/u RUQ US to r/o choledocholithiasis

## 2020-01-01 LAB
ANION GAP SERPL CALC-SCNC: 7 MMOL/L — SIGNIFICANT CHANGE UP (ref 5–17)
BUN SERPL-MCNC: 31 MG/DL — HIGH (ref 7–18)
CALCIUM SERPL-MCNC: 8.9 MG/DL — SIGNIFICANT CHANGE UP (ref 8.4–10.5)
CHLORIDE SERPL-SCNC: 100 MMOL/L — SIGNIFICANT CHANGE UP (ref 96–108)
CO2 SERPL-SCNC: 28 MMOL/L — SIGNIFICANT CHANGE UP (ref 22–31)
CREAT SERPL-MCNC: 1.44 MG/DL — HIGH (ref 0.5–1.3)
CULTURE RESULTS: NO GROWTH — SIGNIFICANT CHANGE UP
GLUCOSE BLDC GLUCOMTR-MCNC: 119 MG/DL — HIGH (ref 70–99)
GLUCOSE BLDC GLUCOMTR-MCNC: 124 MG/DL — HIGH (ref 70–99)
GLUCOSE BLDC GLUCOMTR-MCNC: 168 MG/DL — HIGH (ref 70–99)
GLUCOSE BLDC GLUCOMTR-MCNC: 174 MG/DL — HIGH (ref 70–99)
GLUCOSE SERPL-MCNC: 171 MG/DL — HIGH (ref 70–99)
HCT VFR BLD CALC: 33.1 % — LOW (ref 39–50)
HGB BLD-MCNC: 10.9 G/DL — LOW (ref 13–17)
MAGNESIUM SERPL-MCNC: 2 MG/DL — SIGNIFICANT CHANGE UP (ref 1.6–2.6)
MCHC RBC-ENTMCNC: 31.8 PG — SIGNIFICANT CHANGE UP (ref 27–34)
MCHC RBC-ENTMCNC: 32.9 GM/DL — SIGNIFICANT CHANGE UP (ref 32–36)
MCV RBC AUTO: 96.5 FL — SIGNIFICANT CHANGE UP (ref 80–100)
NRBC # BLD: 0 /100 WBCS — SIGNIFICANT CHANGE UP (ref 0–0)
PHOSPHATE SERPL-MCNC: 3.1 MG/DL — SIGNIFICANT CHANGE UP (ref 2.5–4.5)
PLATELET # BLD AUTO: 183 K/UL — SIGNIFICANT CHANGE UP (ref 150–400)
POTASSIUM SERPL-MCNC: 3.6 MMOL/L — SIGNIFICANT CHANGE UP (ref 3.5–5.3)
POTASSIUM SERPL-SCNC: 3.6 MMOL/L — SIGNIFICANT CHANGE UP (ref 3.5–5.3)
RBC # BLD: 3.43 M/UL — LOW (ref 4.2–5.8)
RBC # FLD: 16 % — HIGH (ref 10.3–14.5)
SODIUM SERPL-SCNC: 135 MMOL/L — SIGNIFICANT CHANGE UP (ref 135–145)
SPECIMEN SOURCE: SIGNIFICANT CHANGE UP
WBC # BLD: 17.68 K/UL — HIGH (ref 3.8–10.5)
WBC # FLD AUTO: 17.68 K/UL — HIGH (ref 3.8–10.5)

## 2020-01-01 PROCEDURE — 99233 SBSQ HOSP IP/OBS HIGH 50: CPT | Mod: GC

## 2020-01-01 RX ORDER — DEXTROSE 50 % IN WATER 50 %
12.5 SYRINGE (ML) INTRAVENOUS ONCE
Refills: 0 | Status: DISCONTINUED | OUTPATIENT
Start: 2020-01-01 | End: 2020-01-05

## 2020-01-01 RX ORDER — INSULIN LISPRO 100/ML
VIAL (ML) SUBCUTANEOUS AT BEDTIME
Refills: 0 | Status: DISCONTINUED | OUTPATIENT
Start: 2020-01-01 | End: 2020-01-05

## 2020-01-01 RX ORDER — DEXTROSE 50 % IN WATER 50 %
25 SYRINGE (ML) INTRAVENOUS ONCE
Refills: 0 | Status: DISCONTINUED | OUTPATIENT
Start: 2020-01-01 | End: 2020-01-05

## 2020-01-01 RX ORDER — SIMETHICONE 80 MG/1
80 TABLET, CHEWABLE ORAL ONCE
Refills: 0 | Status: COMPLETED | OUTPATIENT
Start: 2020-01-01 | End: 2020-01-01

## 2020-01-01 RX ORDER — GLUCAGON INJECTION, SOLUTION 0.5 MG/.1ML
1 INJECTION, SOLUTION SUBCUTANEOUS ONCE
Refills: 0 | Status: DISCONTINUED | OUTPATIENT
Start: 2020-01-01 | End: 2020-01-05

## 2020-01-01 RX ORDER — INSULIN LISPRO 100/ML
VIAL (ML) SUBCUTANEOUS
Refills: 0 | Status: DISCONTINUED | OUTPATIENT
Start: 2020-01-01 | End: 2020-01-05

## 2020-01-01 RX ORDER — DEXTROSE 50 % IN WATER 50 %
15 SYRINGE (ML) INTRAVENOUS ONCE
Refills: 0 | Status: DISCONTINUED | OUTPATIENT
Start: 2020-01-01 | End: 2020-01-05

## 2020-01-01 RX ORDER — MORPHINE SULFATE 50 MG/1
1 CAPSULE, EXTENDED RELEASE ORAL EVERY 6 HOURS
Refills: 0 | Status: DISCONTINUED | OUTPATIENT
Start: 2020-01-01 | End: 2020-01-04

## 2020-01-01 RX ADMIN — MORPHINE SULFATE 2 MILLIGRAM(S): 50 CAPSULE, EXTENDED RELEASE ORAL at 00:36

## 2020-01-01 RX ADMIN — Medication 100 MILLIGRAM(S): at 23:03

## 2020-01-01 RX ADMIN — Medication 1: at 08:13

## 2020-01-01 RX ADMIN — Medication 100 MILLIGRAM(S): at 16:26

## 2020-01-01 RX ADMIN — Medication 650 MILLIGRAM(S): at 21:08

## 2020-01-01 RX ADMIN — Medication 25 MILLIGRAM(S): at 17:25

## 2020-01-01 RX ADMIN — Medication 1: at 12:55

## 2020-01-01 RX ADMIN — Medication 20 MILLIGRAM(S): at 12:56

## 2020-01-01 RX ADMIN — Medication 200 MILLIGRAM(S): at 10:19

## 2020-01-01 RX ADMIN — HEPARIN SODIUM 5000 UNIT(S): 5000 INJECTION INTRAVENOUS; SUBCUTANEOUS at 14:46

## 2020-01-01 RX ADMIN — ATORVASTATIN CALCIUM 80 MILLIGRAM(S): 80 TABLET, FILM COATED ORAL at 21:10

## 2020-01-01 RX ADMIN — Medication 100 MILLIGRAM(S): at 00:37

## 2020-01-01 RX ADMIN — MORPHINE SULFATE 2 MILLIGRAM(S): 50 CAPSULE, EXTENDED RELEASE ORAL at 11:35

## 2020-01-01 RX ADMIN — Medication 81 MILLIGRAM(S): at 12:56

## 2020-01-01 RX ADMIN — Medication 100 MILLIGRAM(S): at 08:13

## 2020-01-01 RX ADMIN — Medication 125 MICROGRAM(S): at 06:15

## 2020-01-01 RX ADMIN — HEPARIN SODIUM 5000 UNIT(S): 5000 INJECTION INTRAVENOUS; SUBCUTANEOUS at 06:15

## 2020-01-01 RX ADMIN — Medication 200 MILLIGRAM(S): at 21:10

## 2020-01-01 RX ADMIN — Medication 25 MILLIGRAM(S): at 06:15

## 2020-01-01 RX ADMIN — Medication 650 MILLIGRAM(S): at 20:13

## 2020-01-01 RX ADMIN — Medication 1 MILLIGRAM(S): at 12:57

## 2020-01-01 RX ADMIN — HEPARIN SODIUM 5000 UNIT(S): 5000 INJECTION INTRAVENOUS; SUBCUTANEOUS at 21:09

## 2020-01-01 RX ADMIN — MORPHINE SULFATE 2 MILLIGRAM(S): 50 CAPSULE, EXTENDED RELEASE ORAL at 12:00

## 2020-01-01 RX ADMIN — MORPHINE SULFATE 2 MILLIGRAM(S): 50 CAPSULE, EXTENDED RELEASE ORAL at 04:27

## 2020-01-01 NOTE — PROGRESS NOTE ADULT - PROBLEM SELECTOR PLAN 3
noted to have elevated creatinine from baseline of 1.56 on admission  likely prerenal or ATN due to diuretic use    Cr trended down.

## 2020-01-01 NOTE — PROGRESS NOTE ADULT - PROBLEM SELECTOR PLAN 4
RLL 7mm lung nodule noted on CT scan which has slightly increased in size from imaging in May along with groundglass opacity in RLL  patient denies any shortness of breath  no history of smoking   pulm Dr. Shaw consuled.

## 2020-01-01 NOTE — PROGRESS NOTE ADULT - SUBJECTIVE AND OBJECTIVE BOX
Subjective:  pt seen and examined, no complaints, ROS - .          acetaminophen   Tablet .. 650 milliGRAM(s) Oral every 6 hours PRN  aspirin  chewable 81 milliGRAM(s) Oral daily  atorvastatin 80 milliGRAM(s) Oral at bedtime  ciprofloxacin   IVPB 400 milliGRAM(s) IV Intermittent every 12 hours  clonazePAM  Tablet 1 milliGRAM(s) Oral daily  dextrose 40% Gel 15 Gram(s) Oral once PRN  dextrose 50% Injectable 12.5 Gram(s) IV Push once  dextrose 50% Injectable 25 Gram(s) IV Push once  dextrose 50% Injectable 25 Gram(s) IV Push once  FLUoxetine 20 milliGRAM(s) Oral daily  glucagon  Injectable 1 milliGRAM(s) IntraMuscular once PRN  heparin  Injectable 5000 Unit(s) SubCutaneous every 8 hours  insulin lispro (HumaLOG) corrective regimen sliding scale   SubCutaneous three times a day before meals  insulin lispro (HumaLOG) corrective regimen sliding scale   SubCutaneous at bedtime  levothyroxine 125 MICROGram(s) Oral daily  metoprolol tartrate 25 milliGRAM(s) Oral two times a day  metroNIDAZOLE  IVPB 500 milliGRAM(s) IV Intermittent every 8 hours  morphine  - Injectable 2 milliGRAM(s) IV Push every 6 hours PRN  sodium chloride 0.45%. 1000 milliLiter(s) IV Continuous <Continuous>                            12.8   17.03 )-----------( 244      ( 31 Dec 2019 05:38 )             38.0       Hemoglobin: 12.8 g/dL (12-31 @ 05:38)  Hemoglobin: 12.7 g/dL (12-30 @ 18:54)      12-31    135  |  98  |  30<H>  ----------------------------<  168<H>  3.9   |  28  |  1.37<H>    Ca    9.2      31 Dec 2019 05:38  Phos  3.7     12-31  Mg     2.0     12-31    TPro  7.9  /  Alb  4.2  /  TBili  0.9  /  DBili  x   /  AST  20  /  ALT  28  /  AlkPhos  67  12-31    Creatinine Trend: 1.37<--, 1.56<--    COAGS:     CARDIAC MARKERS ( 31 Dec 2019 12:51 )  0.103 ng/mL / x     / x     / x     / x      CARDIAC MARKERS ( 31 Dec 2019 05:39 )  0.135 ng/mL / x     / 151 U/L / x     / 2.0 ng/mL  CARDIAC MARKERS ( 30 Dec 2019 18:54 )  0.093 ng/mL / x     / x     / x     / x            T(C): 37 (01-01-20 @ 05:27), Max: 37.6 (12-31-19 @ 20:26)  HR: 92 (01-01-20 @ 05:27) (89 - 93)  BP: 123/53 (01-01-20 @ 05:27) (109/60 - 123/53)  RR: 18 (01-01-20 @ 05:27) (17 - 18)  SpO2: 97% (01-01-20 @ 05:27) (95% - 98%)  Wt(kg): --    I&O's Summary    31 Dec 2019 07:01  -  01 Jan 2020 06:46  --------------------------------------------------------  IN: 350 mL / OUT: 410 mL / NET: -60 mL    Appearance: Normal	  HEENT:   Normal oral mucosa, PERRL, EOMI	  Lymphatic: No lymphadenopathy , no edema  Cardiovascular: Normal S1 S2, No JVD, No murmurs , Peripheral pulses palpable 2+ bilaterally  Respiratory: Lungs clear to auscultation, normal effort 	  Gastrointestinal:  Soft, Non-tender, + BS	  Skin: No rashes, No ecchymoses, No cyanosis, warm to touch  Musculoskeletal: Normal range of motion, normal strength  Psychiatry:  Mood & affect appropriate       TELEMETRY: 	  V paced        ASSESSMENT/PLAN: 	74y Male  PMH of MI s/p PPM, AS s/p TAVR 2016, HF Mild segmental LV dysfx 40-45% chronically occluded RCA (cath 2016) Complete heart block  s/p BiV ICD, cholelithiasis, anxiety, hypothyroidism, DM, Hodgkin lymphoma s/p radiation and chemo now in remission with left lung base radiation fibrosis, b/l carotid stenosis s/p carotid endarterectomy, pericardial effusion s/p pericardiocentesis, and b/l inguinal hernia, who came in to the ED due to RUQ pain radiating to the back found with cholecystitis.    - Surgery follow up , cont ABX at present - pt for surgical intervention once he agree to sx .   - Cardiac markers neg so far.   - Patient with no anginal symptoms trace trop likely due to  of RCA and cardiomyopathy.  - Recent with no interval changes no reoccurrence of pericardial effusion  - BiV icd interrogation from Dr. Godinez's office placed in chart along with all office records.  - He has no evidence of decompensated CHF or unstable cardiac syndromes    - By virtue of having a history of a hx of CAD and CHF he should be treated as high cardiac risk.  Cont beta blocker periprocedure, strict I+O's preprocedure  - D/W Dr Mo

## 2020-01-01 NOTE — PROGRESS NOTE ADULT - PROBLEM SELECTOR PLAN 1
patient noted to have acute cholecystitis on imaging   cw cipro and flagyl  surgery team consulted by ED team  tenderness in RUQ upon deep palpation  -on clear liquid diet  IV cipro and flagyl  f/u blood cultures . Ucx -ve.  surgery , GI and cardio consults noted.  Dr. Alfaro GI consulted   pain management with morphine and tylenol  hold off on IVF due to history of CHF  RUQ US showed cholelithiasis.

## 2020-01-01 NOTE — PROGRESS NOTE ADULT - ASSESSMENT
cholelithiasis without inflamation per sono  r/o cholecystitis  high risk for operative intervention    Discussed case with Dr Kaiser who states that considering high cardiac risk for Operative intervention would consider IR for placement of Percutaneous cholecystostomy tube.  Please obtain HIDA scan to confirm cholecystitis, and if confirmed then plan for IR cholecystostomy tube.  Continue Abx

## 2020-01-01 NOTE — PROGRESS NOTE ADULT - SUBJECTIVE AND OBJECTIVE BOX
PGY 1 Note discussed with supervising resident and primary attending    Patient is a 74y old  Male who presents with a chief complaint of left sided chest and flank pain (2020 06:46)      INTERVAL HPI/OVERNIGHT EVENTS: PAtient was seen and examined at bedside.       MEDICATIONS  (STANDING):  aspirin  chewable 81 milliGRAM(s) Oral daily  atorvastatin 80 milliGRAM(s) Oral at bedtime  ciprofloxacin   IVPB 400 milliGRAM(s) IV Intermittent every 12 hours  clonazePAM  Tablet 1 milliGRAM(s) Oral daily  dextrose 50% Injectable 12.5 Gram(s) IV Push once  dextrose 50% Injectable 25 Gram(s) IV Push once  dextrose 50% Injectable 25 Gram(s) IV Push once  FLUoxetine 20 milliGRAM(s) Oral daily  heparin  Injectable 5000 Unit(s) SubCutaneous every 8 hours  insulin lispro (HumaLOG) corrective regimen sliding scale   SubCutaneous three times a day before meals  insulin lispro (HumaLOG) corrective regimen sliding scale   SubCutaneous at bedtime  levothyroxine 125 MICROGram(s) Oral daily  metoprolol tartrate 25 milliGRAM(s) Oral two times a day  metroNIDAZOLE  IVPB 500 milliGRAM(s) IV Intermittent every 8 hours  sodium chloride 0.45%. 1000 milliLiter(s) (50 mL/Hr) IV Continuous <Continuous>    MEDICATIONS  (PRN):  acetaminophen   Tablet .. 650 milliGRAM(s) Oral every 6 hours PRN Moderate Pain (4 - 6)  dextrose 40% Gel 15 Gram(s) Oral once PRN Blood Glucose LESS THAN 70 milliGRAM(s)/deciliter  glucagon  Injectable 1 milliGRAM(s) IntraMuscular once PRN Glucose LESS THAN 70 milligrams/deciliter  morphine  - Injectable 2 milliGRAM(s) IV Push every 6 hours PRN Severe Pain (7 - 10)      __________________________________________________  REVIEW OF SYSTEMS:    CONSTITUTIONAL: No fever,   EYES: no acute visual disturbances  NECK: No pain or stiffness  RESPIRATORY: No cough; No shortness of breath  CARDIOVASCULAR: No chest pain, no palpitations  GASTROINTESTINAL: No pain. No nausea or vomiting; No diarrhea   NEUROLOGICAL: No headache or numbness, no tremors  MUSCULOSKELETAL: No joint pain, no muscle pain  GENITOURINARY: no dysuria, no frequency, no hesitancy  PSYCHIATRY: no depression , no anxiety  ALL OTHER  ROS negative        Vital Signs Last 24 Hrs  T(C): 37.1 (2020 07:42), Max: 37.6 (31 Dec 2019 20:26)  T(F): 98.8 (2020 07:42), Max: 99.6 (31 Dec 2019 20:26)  HR: 87 (2020 07:42) (87 - 93)  BP: 115/52 (2020 07:42) (109/60 - 123/53)  BP(mean): --  RR: 18 (2020 07:42) (17 - 18)  SpO2: 96% (2020 07:42) (95% - 98%)    ________________________________________________  PHYSICAL EXAM:  GENERAL: NAD  HEENT: Normocephalic;  conjunctivae and sclerae clear; moist mucous membranes;   NECK : supple  CHEST/LUNG: Clear to auscultation bilaterally with good air entry   HEART: S1 S2  regular; no murmurs, gallops or rubs  ABDOMEN: Soft, Nontender, distended; Bowel sounds present  EXTREMITIES: no cyanosis; no edema; no calf tenderness  SKIN: warm and dry; no rash  NERVOUS SYSTEM:  Awake and alert; Oriented  to place, person and time ; no new deficits    _________________________________________________  LABS:                        10.9   17.68 )-----------( 183      ( 2020 07:32 )             33.1     -    135  |  100  |  31<H>  ----------------------------<  171<H>  3.6   |  28  |  1.44<H>    Ca    8.9      2020 07:32  Phos  3.1     -  Mg     2.0     -    TPro  7.9  /  Alb  4.2  /  TBili  0.9  /  DBili  x   /  AST  20  /  ALT  28  /  AlkPhos  67  12-31    PT/INR - ( 31 Dec 2019 05:38 )   PT: 12.0 sec;   INR: 1.08 ratio         PTT - ( 31 Dec 2019 05:38 )  PTT:31.8 sec  Urinalysis Basic - ( 31 Dec 2019 05:39 )    Color: Yellow / Appearance: Clear / S.015 / pH: x  Gluc: x / Ketone: Negative  / Bili: Negative / Urobili: Negative   Blood: x / Protein: 30 mg/dL / Nitrite: Negative   Leuk Esterase: Negative / RBC: 2-5 /HPF / WBC 0-2 /HPF   Sq Epi: x / Non Sq Epi: Occasional /HPF / Bacteria: Moderate /HPF      CAPILLARY BLOOD GLUCOSE      POCT Blood Glucose.: 174 mg/dL (2020 07:56)        RADIOLOGY & ADDITIONAL TESTS:    Imaging Personally Reviewed:  YES/NO    Consultant(s) Notes Reviewed:   YES/ No    Care Discussed with Consultants :     Plan of care was discussed with patient and /or primary care giver; all questions and concerns were addressed and care was aligned with patient's wishes.

## 2020-01-01 NOTE — PROGRESS NOTE ADULT - SUBJECTIVE AND OBJECTIVE BOX
Pt states feels a bit better, but still ruq pain no n/v  ICU Vital Signs Last 24 Hrs  T(C): 37.4 (01 Jan 2020 11:42), Max: 37.6 (31 Dec 2019 20:26)  T(F): 99.3 (01 Jan 2020 11:42), Max: 99.6 (31 Dec 2019 20:26)  HR: 92 (01 Jan 2020 11:42) (87 - 93)  BP: 111/58 (01 Jan 2020 11:42) (109/60 - 123/53)  BP(mean): --  ABP: --  ABP(mean): --  RR: 18 (01 Jan 2020 11:42) (17 - 18)  SpO2: 97% (01 Jan 2020 11:42) (95% - 98%)    Abd: soft, mild/mod RUQ tenderness with palpation  lungs: cta  EXT: no calf tenderness or erythema                          10.9   17.68 )-----------( 183      ( 01 Jan 2020 07:32 )             33.1     01-01    135  |  100  |  31<H>  ----------------------------<  171<H>  3.6   |  28  |  1.44<H>    Ca    8.9      01 Jan 2020 07:32  Phos  3.1     01-01  Mg     2.0     01-01    TPro  7.9  /  Alb  4.2  /  TBili  0.9  /  DBili  x   /  AST  20  /  ALT  28  /  AlkPhos  67  12-31

## 2020-01-02 LAB
ANION GAP SERPL CALC-SCNC: 7 MMOL/L — SIGNIFICANT CHANGE UP (ref 5–17)
BUN SERPL-MCNC: 34 MG/DL — HIGH (ref 7–18)
CALCIUM SERPL-MCNC: 8.7 MG/DL — SIGNIFICANT CHANGE UP (ref 8.4–10.5)
CHLORIDE SERPL-SCNC: 99 MMOL/L — SIGNIFICANT CHANGE UP (ref 96–108)
CO2 SERPL-SCNC: 28 MMOL/L — SIGNIFICANT CHANGE UP (ref 22–31)
CREAT SERPL-MCNC: 1.53 MG/DL — HIGH (ref 0.5–1.3)
GLUCOSE BLDC GLUCOMTR-MCNC: 138 MG/DL — HIGH (ref 70–99)
GLUCOSE SERPL-MCNC: 135 MG/DL — HIGH (ref 70–99)
HCT VFR BLD CALC: 33.5 % — LOW (ref 39–50)
HGB BLD-MCNC: 11.1 G/DL — LOW (ref 13–17)
MAGNESIUM SERPL-MCNC: 2 MG/DL — SIGNIFICANT CHANGE UP (ref 1.6–2.6)
MCHC RBC-ENTMCNC: 32.2 PG — SIGNIFICANT CHANGE UP (ref 27–34)
MCHC RBC-ENTMCNC: 33.1 GM/DL — SIGNIFICANT CHANGE UP (ref 32–36)
MCV RBC AUTO: 97.1 FL — SIGNIFICANT CHANGE UP (ref 80–100)
NRBC # BLD: 0 /100 WBCS — SIGNIFICANT CHANGE UP (ref 0–0)
PHOSPHATE SERPL-MCNC: 2.2 MG/DL — LOW (ref 2.5–4.5)
PLATELET # BLD AUTO: 180 K/UL — SIGNIFICANT CHANGE UP (ref 150–400)
POTASSIUM SERPL-MCNC: 3.5 MMOL/L — SIGNIFICANT CHANGE UP (ref 3.5–5.3)
POTASSIUM SERPL-SCNC: 3.5 MMOL/L — SIGNIFICANT CHANGE UP (ref 3.5–5.3)
RBC # BLD: 3.45 M/UL — LOW (ref 4.2–5.8)
RBC # FLD: 15.7 % — HIGH (ref 10.3–14.5)
SODIUM SERPL-SCNC: 134 MMOL/L — LOW (ref 135–145)
WBC # BLD: 14.65 K/UL — HIGH (ref 3.8–10.5)
WBC # FLD AUTO: 14.65 K/UL — HIGH (ref 3.8–10.5)

## 2020-01-02 PROCEDURE — 99232 SBSQ HOSP IP/OBS MODERATE 35: CPT

## 2020-01-02 PROCEDURE — 99233 SBSQ HOSP IP/OBS HIGH 50: CPT | Mod: GC

## 2020-01-02 PROCEDURE — 78226 HEPATOBILIARY SYSTEM IMAGING: CPT | Mod: 26

## 2020-01-02 RX ORDER — POTASSIUM PHOSPHATE, MONOBASIC POTASSIUM PHOSPHATE, DIBASIC 236; 224 MG/ML; MG/ML
15 INJECTION, SOLUTION INTRAVENOUS ONCE
Refills: 0 | Status: COMPLETED | OUTPATIENT
Start: 2020-01-02 | End: 2020-01-02

## 2020-01-02 RX ORDER — SENNA PLUS 8.6 MG/1
2 TABLET ORAL DAILY
Refills: 0 | Status: DISCONTINUED | OUTPATIENT
Start: 2020-01-02 | End: 2020-01-05

## 2020-01-02 RX ORDER — CEFTRIAXONE 500 MG/1
1000 INJECTION, POWDER, FOR SOLUTION INTRAMUSCULAR; INTRAVENOUS EVERY 24 HOURS
Refills: 0 | Status: DISCONTINUED | OUTPATIENT
Start: 2020-01-02 | End: 2020-01-05

## 2020-01-02 RX ADMIN — Medication 100 MILLIGRAM(S): at 23:01

## 2020-01-02 RX ADMIN — POTASSIUM PHOSPHATE, MONOBASIC POTASSIUM PHOSPHATE, DIBASIC 62.5 MILLIMOLE(S): 236; 224 INJECTION, SOLUTION INTRAVENOUS at 16:54

## 2020-01-02 RX ADMIN — Medication 20 MILLIGRAM(S): at 14:06

## 2020-01-02 RX ADMIN — Medication 1 MILLIGRAM(S): at 14:05

## 2020-01-02 RX ADMIN — HEPARIN SODIUM 5000 UNIT(S): 5000 INJECTION INTRAVENOUS; SUBCUTANEOUS at 05:09

## 2020-01-02 RX ADMIN — Medication 25 MILLIGRAM(S): at 05:09

## 2020-01-02 RX ADMIN — Medication 81 MILLIGRAM(S): at 14:05

## 2020-01-02 RX ADMIN — MORPHINE SULFATE 1 MILLIGRAM(S): 50 CAPSULE, EXTENDED RELEASE ORAL at 12:18

## 2020-01-02 RX ADMIN — ATORVASTATIN CALCIUM 80 MILLIGRAM(S): 80 TABLET, FILM COATED ORAL at 21:12

## 2020-01-02 RX ADMIN — Medication 100 MILLIGRAM(S): at 09:56

## 2020-01-02 RX ADMIN — Medication 200 MILLIGRAM(S): at 09:56

## 2020-01-02 RX ADMIN — Medication 25 MILLIGRAM(S): at 17:02

## 2020-01-02 RX ADMIN — MORPHINE SULFATE 1 MILLIGRAM(S): 50 CAPSULE, EXTENDED RELEASE ORAL at 12:35

## 2020-01-02 RX ADMIN — CEFTRIAXONE 100 MILLIGRAM(S): 500 INJECTION, POWDER, FOR SOLUTION INTRAMUSCULAR; INTRAVENOUS at 21:12

## 2020-01-02 RX ADMIN — HEPARIN SODIUM 5000 UNIT(S): 5000 INJECTION INTRAVENOUS; SUBCUTANEOUS at 14:05

## 2020-01-02 RX ADMIN — Medication 125 MICROGRAM(S): at 05:09

## 2020-01-02 NOTE — CONSULT NOTE ADULT - REASON FOR ADMISSION
left sided chest and flank pain

## 2020-01-02 NOTE — PROGRESS NOTE ADULT - PROBLEM SELECTOR PLAN 3
noted to have elevated creatinine from baseline of 1.56 on admission  likely prerenal or ATN due to diuretic use    Cr trended  up

## 2020-01-02 NOTE — PROGRESS NOTE ADULT - ASSESSMENT
73 yo male with past medical hx as previously stated admitted with acute cholecystitis deemed high risk for operative intervention secondary to his cardiac history.

## 2020-01-02 NOTE — CONSULT NOTE ADULT - SUBJECTIVE AND OBJECTIVE BOX
HPI:  Patient is a 74 year old male from home, ambulates independently, with PMH of MI s/p PPM, AS s/p TAVR 2016, HF with reduced EF initially of 27% and now 40-45% s/p BiV ICD/pacemaker, cholelithiasis, anxiety, hypothyroidism, DM, Hodgkin lymphoma s/p radiation and chemo now in remission with left lung base radiation fibrosis, b/l carotid stenosis s/p carotid endarterectomy, pericardial effusion s/p pericardiocentesis, and b/l inguinal hernia, who came in to the ED due to left sided chest pain radiating to abdomen. Patient states pain started yesterday morning (12/30) after he had ate breakfast. Pain was sudden in onset and was about a 7/10 in severity. Also states the pain radiated to his shoulder blades. Denies any association with food. No diarrhea or constipation. Currently states the pain has improved in his chest and only feel pain in abdomen in the right upper quadrant. Denies any nausea, vomiting, leg pain or swelling or dizziness. (31 Dec 2019 01:55)      PAST MEDICAL & SURGICAL HISTORY:  Pericardial effusion: drained in [place] in 4/19  Lung fibrosis: after RT in 80&#x27;s  Systolic CHF, chronic: s/p ICD  AS (aortic stenosis): s/p TAVR  Myocardial infarction: Cardiac Arrest- 1994- no stents  Type 2 diabetes mellitus without complication  Hodgkin lymphoma: on remission, chemo and rad [id number]  Acquired hypothyroidism  HLD (hyperlipidemia)  HTN (hypertension)  S/P ICD (internal cardiac defibrillator) procedure: [employer]  S/P TAVR (transcatheter aortic valve replacement): 10/16 in [place]  History of umbilical hernia repair  H/O bilateral inguinal hernia repair  H/O carotid endarterectomy: bilateral      lisinopril (Other)  No Known Allergies      Meds:  acetaminophen   Tablet .. 650 milliGRAM(s) Oral every 6 hours PRN  aspirin  chewable 81 milliGRAM(s) Oral daily  atorvastatin 80 milliGRAM(s) Oral at bedtime  ciprofloxacin   IVPB 400 milliGRAM(s) IV Intermittent every 12 hours  clonazePAM  Tablet 1 milliGRAM(s) Oral daily  dextrose 40% Gel 15 Gram(s) Oral once PRN  dextrose 50% Injectable 12.5 Gram(s) IV Push once  dextrose 50% Injectable 25 Gram(s) IV Push once  dextrose 50% Injectable 25 Gram(s) IV Push once  FLUoxetine 20 milliGRAM(s) Oral daily  glucagon  Injectable 1 milliGRAM(s) IntraMuscular once PRN  heparin  Injectable 5000 Unit(s) SubCutaneous every 8 hours  insulin lispro (HumaLOG) corrective regimen sliding scale   SubCutaneous three times a day before meals  insulin lispro (HumaLOG) corrective regimen sliding scale   SubCutaneous at bedtime  levothyroxine 125 MICROGram(s) Oral daily  metoprolol tartrate 25 milliGRAM(s) Oral two times a day  metroNIDAZOLE  IVPB 500 milliGRAM(s) IV Intermittent every 8 hours  morphine  - Injectable 1 milliGRAM(s) IV Push every 6 hours PRN  senna 2 Tablet(s) Oral daily      SOCIAL HISTORY:  Smoker:  YES / NO        PACK YEARS:                         WHEN QUIT?  ETOH use:  YES / NO               FREQUENCY / QUANTITY:  Ilicit Drug use:  YES / NO  Occupation:  Assisted device use (Cane / Walker):  Live with:    FAMILY HISTORY:  No pertinent family history in first degree relatives      VITALS:  Vital Signs Last 24 Hrs  T(C): 36.6 (02 Jan 2020 15:54), Max: 37.4 (01 Jan 2020 20:02)  T(F): 97.9 (02 Jan 2020 15:54), Max: 99.3 (01 Jan 2020 20:02)  HR: 87 (02 Jan 2020 15:54) (83 - 90)  BP: 104/56 (02 Jan 2020 15:54) (97/58 - 104/56)  BP(mean): --  RR: 16 (02 Jan 2020 15:54) (16 - 18)  SpO2: 95% (02 Jan 2020 15:54) (95% - 97%)    LABS/DIAGNOSTIC TESTS:                          11.1   14.65 )-----------( 180      ( 02 Jan 2020 07:02 )             33.5     WBC Count: 14.65 K/uL (01-02 @ 07:02)  WBC Count: 17.68 K/uL (01-01 @ 07:32)  WBC Count: 17.03 K/uL (12-31 @ 05:38)  WBC Count: 8.94 K/uL (12-30 @ 18:54)      01-02    134<L>  |  99  |  34<H>  ----------------------------<  135<H>  3.5   |  28  |  1.53<H>    Ca    8.7      02 Jan 2020 07:02  Phos  2.2     01-02  Mg     2.0     01-02                    LACTATE:    ABG -     CULTURES:   .Urine  12-31 @ 11:05   No growth  --  --      .Blood  12-31 @ 10:36   No growth to date.  --  --          RADIOLOGY:< from: [facility] Hepatobiliary Imaging (01.02.20 @ 13:50) >  EXAM:  [facility] HEPATOBILIARY IMG                            PROCEDURE DATE:  01/02/2020          INTERPRETATION:  CLINICAL INFORMATION: 74 year-old male with right upper quadrant abdominal pain and cholelithiasis, referred to evaluate for acute cholecystitis.    RADIOPHARMACEUTICAL: 3 mCi Tc-99m-Mebrofenin, I.V.; 2 doses    TECHNIQUE: Dynamic imaging of the anterior abdomen was performed for 1 hour following injection of radiotracer. Morphine 1 mg I.V. and a second dose of radiotracer were administered at 1 hour.  Dynamic imaging was continued for 1 hour followed by static images of the abdomen in the anterior, right lateral and right anterior oblique projections.    COMPARISON: No previous hepatobiliary scan for comparison    FINDINGS: There isprompt, homogeneous uptake of radiotracer by the hepatocytes. Activity is first seen in the bowel at 15 minutes. The gallbladder is not visualized at anytime during the study despite administration of Morphine. There is good clearance of activity from the liver at the end of the study.    IMPRESSION:  Abnormal morphine-augmented hepatobiliary scan compatible with acute cholecystitis.      Dr. Bianchi was notified of these results by Dr. Smith via telephone on 1/2/2020 at 2:35 PM, with read back.          VANESSA SMITH M.D., NUCLEAR MEDICINE ATTENDING  This document has been electronically signed. Jan 2 2020  2:39PM      --------------------------------------------------------------------------------------------------------------------------------------------------------  < from: CT Angio Abdomen and Pelvis w/ IV Cont (12.30.19 @ 21:27) >  EXAM:  CT ANGIO CHEST (W)AW IC                          EXAM:  CT ANGIO ABD PELV (W)AW IC                            PROCEDURE DATE:  12/30/2019          INTERPRETATION:  CLINICAL INFORMATION: Left-sided chest and flank pain, evaluate for aortic dissection.    COMPARISON: CT of the chest from 5/28/2019 and CT the abdomen and pelvis from 10/11/2016.    PROCEDURE:   CT Angiography of the Chest, Abdomen and Pelvis.   Precontrast imaging was performed through the chest followed by arterial phase imaging of the chest, abdomen and pelvis.  Intravenous contrast: 90 ml Omnipaque 350. 10 ml discarded.  Oral contrast: None.  Sagittal and coronal reformats were performed as well as 3D (MIP) reconstructions.    FINDINGS:    CHEST:     LUNGS AND LARGE AIRWAYS: Patent central airways. Right lower lobe lung nodule measuring up to 7 mm (series 11:86), slightly increased in size compared to prior exam from 5/28/2019 where it measured 6 mm. Calcified granuloma in the right lower lobe. Ill marginated groundglass opacity in the right lower lobe measuring up to 2.3 cm which is slightly increased in size compared to prior exam where it measured 1.9 cm. Scarring/fibrosis in the perihilar regions in the upper lobes with associated mild bronchiectasis, unchanged. Bands of scarring/platelike atelectasis in the lower lobes and right middle lobe.  PLEURA: Trace right pleural effusion.  VESSELS: Normal caliber thoracic aorta. No evidence of an intramural hematoma on precontrast images. No thoracic aortic dissection flap. Heavy atherosclerotic calcifications of the thoracic aorta, proximal great vessels, and coronary arteries. No central pulmonary embolism within the confines of this exam.  HEART: Heart size is normal. No pericardial effusion. Aortic valve replacement. Pacemaker/AICD lead tips in the right atrium, right ventricle, and coronary sinus.  MEDIASTINUM AND TERESA: Large calcification in the anterior mediastinum measuring up to 2.1 cm, unchanged compared with prior exam. No mediastinal hematoma. Otherwise, no evidence of mediastinal or hilar lymphadenopathy.  CHEST WALL AND LOWER NECK: Left chest wall pacemaker/AICD.    ABDOMEN AND PELVIS:    LIVER: Within normal limits.  BILE DUCTS: Dilated common bile duct measuring up to 11 mm. No discrete choledocholithiasis on CT.  GALLBLADDER: Distended gallbladder with cholelithiasis and mild pericholecystic inflammatory changes.  SPLEEN: Within normal limits.  PANCREAS: Within normal limits.  ADRENALS: Slight nodularity of the left adrenal gland, unchanged.  KIDNEYS/URETERS: Kidneys enhance symmetrically without hydronephrosis. Lower pole right renal cyst.    BLADDER: Underdistended.  REPRODUCTIVE ORGANS: Prostate gland is not enlarged.    BOWEL: Small hiatus hernia. No bowel obstruction. Mild colonic diverticulosis without evidence of diverticulitis. Appendix is normal. Lipoma in the sigmoid colon.  PERITONEUM: No ascites or pneumoperitoneum. No mesenteric lymphadenopathy.  VESSELS: At chronic calcifications of the aortoiliac tree andabdominal aortic branches. No abdominal aortic dissection flap. Normal caliber abdominal aorta. Patent celiac artery, SMA, renal arteries, and ROSI. Subcentimeter peripherally calcified splenic artery aneurysm measuring up to 8 mm.  RETROPERITONEUM/LYMPH NODES: No lymphadenopathy. No retroperitoneal hematoma.    ABDOMINAL WALL: Small fat-containing bilateral inguinal hernias.  BONES: Multilevel degenerative changes of the spine and degenerative changes of the hips (right greater than left).    IMPRESSION:     No abdominal aortic aneurysm or dissection.    Distended gallbladder with cholelithiasis and pericholecystic inflammatory changes concerning for acute cholecystitis. Dilated common bile duct measuring up to 10 mm.    Mild colonic diverticulosis without evidence of diverticulitis.    Trace right pleural effusion. Right lower lobe lung nodule measuring up to 7 mm slightly increased in size compared to prior exam from 5/28/2019. Ill marginated groundglass opacity in the right lower lobe measuring up to 2.3 cm which is also slightly increased in size compared to prior exam for which considerations include infection, inflammation, or malignancy.          JANET YAÑEZ D.O. ATTENDING RADIOLOGIST  This document has been electronically signed. Dec 30 2019  9:46PM          < end of copied text >                ROS  [  ] UNABLE TO ELICIT

## 2020-01-02 NOTE — PROGRESS NOTE ADULT - PROBLEM SELECTOR PLAN 1
patient noted to have acute cholecystitis on imaging   cw cipro and flagyl  surgery team consulted by ED team  tenderness in RUQ upon deep palpation  -on clear liquid diet  IV cipro and flagyl   blood cultures and Ucx -ve.  surgery , GI and cardio consults noted.  -Patients needs HIDA scan.  US abdomen showed cholelithiasis  Dr. Alfaro GI consulted   pain management with morphine and tylenol  hold off on IVF due to history of CHF  RUQ US showed cholelithiasis.

## 2020-01-02 NOTE — PROGRESS NOTE ADULT - SUBJECTIVE AND OBJECTIVE BOX
Patient denies chest pain or shortness of breath.   Review of systems otherwise (-)  	  MEDICATIONS:  MEDICATIONS  (STANDING):  aspirin  chewable 81 milliGRAM(s) Oral daily  atorvastatin 80 milliGRAM(s) Oral at bedtime  ciprofloxacin   IVPB 400 milliGRAM(s) IV Intermittent every 12 hours  clonazePAM  Tablet 1 milliGRAM(s) Oral daily  dextrose 50% Injectable 12.5 Gram(s) IV Push once  dextrose 50% Injectable 25 Gram(s) IV Push once  dextrose 50% Injectable 25 Gram(s) IV Push once  FLUoxetine 20 milliGRAM(s) Oral daily  heparin  Injectable 5000 Unit(s) SubCutaneous every 8 hours  insulin lispro (HumaLOG) corrective regimen sliding scale   SubCutaneous three times a day before meals  insulin lispro (HumaLOG) corrective regimen sliding scale   SubCutaneous at bedtime  levothyroxine 125 MICROGram(s) Oral daily  metoprolol tartrate 25 milliGRAM(s) Oral two times a day  metroNIDAZOLE  IVPB 500 milliGRAM(s) IV Intermittent every 8 hours  potassium phosphate IVPB 15 milliMole(s) IV Intermittent once  senna 2 Tablet(s) Oral daily      LABS:	 	    CARDIAC MARKERS:  CARDIAC MARKERS ( 31 Dec 2019 12:51 )  0.103 ng/mL / x     / x     / x     / x      CARDIAC MARKERS ( 31 Dec 2019 05:39 )  0.135 ng/mL / x     / 151 U/L / x     / 2.0 ng/mL  CARDIAC MARKERS ( 30 Dec 2019 18:54 )  0.093 ng/mL / x     / x     / x     / x                                    11.1   14.65 )-----------( 180      ( 02 Jan 2020 07:02 )             33.5     Hemoglobin: 11.1 g/dL (01-02 @ 07:02)  Hemoglobin: 10.9 g/dL (01-01 @ 07:32)  Hemoglobin: 12.8 g/dL (12-31 @ 05:38)  Hemoglobin: 12.7 g/dL (12-30 @ 18:54)      01-02    134<L>  |  99  |  34<H>  ----------------------------<  135<H>  3.5   |  28  |  1.53<H>    Ca    8.7      02 Jan 2020 07:02  Phos  2.2     01-02  Mg     2.0     01-02      Creatinine Trend: 1.53<--, 1.44<--, 1.37<--, 1.56<--      PHYSICAL EXAM:  T(C): 36.6 (01-02-20 @ 15:54), Max: 37.4 (01-01-20 @ 20:02)  HR: 87 (01-02-20 @ 15:54) (83 - 90)  BP: 104/56 (01-02-20 @ 15:54) (97/58 - 104/56)  RR: 16 (01-02-20 @ 15:54) (16 - 18)  SpO2: 95% (01-02-20 @ 15:54) (95% - 97%)  Wt(kg): --  I&O's Summary    01 Jan 2020 07:01  -  02 Jan 2020 07:00  --------------------------------------------------------  IN: 400 mL / OUT: 0 mL / NET: 400 mL          Gen: Appears well in NAD  HEENT:  (-)icterus (-)pallor  CV: N S1 S2 1/6 ANGELLA (+)2 Pulses B/l  Resp:  Clear to ausculatation B/L, normal effort  GI: (+) BS Soft, NT, ND  Lymph:  (-)Edema, (-)obvious lymphadenopathy  Skin: Warm to touch, Normal turgor  Psych: Appropriate mood and affect      TELEMETRY: 	  OFF        ASSESSMENT/PLAN: 	74y  Male PMH of MI s/p PPM, AS s/p TAVR 2016, HF Mild segmental LV dysfx 40-45% chronically occluded RCA (cath 2016) Complete heart block  s/p BiV ICD, cholelithiasis, anxiety, hypothyroidism, DM, Hodgkin lymphoma s/p radiation and chemo now in remission with left lung base radiation fibrosis, b/l carotid stenosis s/p carotid endarterectomy, pericardial effusion s/p pericardiocentesis, and b/l inguinal hernia, who came in to the ED due to RUQ pain radiating to the back found with cholecystitis.    - (+) HIDA scan.  - Discuss with primary team, by virtue of having a hx of CAD, CHF and DM, undergoing an intraperitoneal surgery he is considered high risk but not prohibitive for a necessary surgery since he is optimized from a cardiac standpoint.  - D/W patient and family at bedside thet understand the risk and are willing to accept it   - Surgery f/u    Lobo Hinton MD, [facility]  BEEPER [phone]

## 2020-01-02 NOTE — PROGRESS NOTE ADULT - PROBLEM SELECTOR PLAN 1
As per surgery - awaiting HIDA scan to confirm cholecystitis, and if confirmed then plan for IR cholecystostomy tube.  US - showing Cholelithiasis without gallbladder wall thickening. The common bile duct is not dilated measuring 6 mm. No need for ERCP  GI will sign off   reconsult prn

## 2020-01-02 NOTE — PROGRESS NOTE ADULT - ASSESSMENT
74y.o. Male with cholelithiasis and dilated CBD    -Given cardiac comorbidity, pt is high risk surgical candidate. Recommend HIDA scan to r/o obstruction. If HIDA+ recommend PTC by IR team.  -Pain control prn  -Diet as tolerated.

## 2020-01-02 NOTE — PROGRESS NOTE ADULT - SUBJECTIVE AND OBJECTIVE BOX
INTERVAL HPI/OVERNIGHT EVENTS:  Pt resting comfortably. c/o RUQ pain but not exacerbated by liquid diet.  Tolerating liquid diet.   Denies N/V    MEDICATIONS  (STANDING):  aspirin  chewable 81 milliGRAM(s) Oral daily  atorvastatin 80 milliGRAM(s) Oral at bedtime  bisacodyl 10 milliGRAM(s) Oral daily  ciprofloxacin   IVPB 400 milliGRAM(s) IV Intermittent every 12 hours  clonazePAM  Tablet 1 milliGRAM(s) Oral daily  dextrose 50% Injectable 12.5 Gram(s) IV Push once  dextrose 50% Injectable 25 Gram(s) IV Push once  dextrose 50% Injectable 25 Gram(s) IV Push once  FLUoxetine 20 milliGRAM(s) Oral daily  heparin  Injectable 5000 Unit(s) SubCutaneous every 8 hours  insulin lispro (HumaLOG) corrective regimen sliding scale   SubCutaneous three times a day before meals  insulin lispro (HumaLOG) corrective regimen sliding scale   SubCutaneous at bedtime  levothyroxine 125 MICROGram(s) Oral daily  metoprolol tartrate 25 milliGRAM(s) Oral two times a day  metroNIDAZOLE  IVPB 500 milliGRAM(s) IV Intermittent every 8 hours  senna 2 Tablet(s) Oral daily    MEDICATIONS  (PRN):  acetaminophen   Tablet .. 650 milliGRAM(s) Oral every 6 hours PRN Moderate Pain (4 - 6)  dextrose 40% Gel 15 Gram(s) Oral once PRN Blood Glucose LESS THAN 70 milliGRAM(s)/deciliter  glucagon  Injectable 1 milliGRAM(s) IntraMuscular once PRN Glucose LESS THAN 70 milligrams/deciliter  morphine  - Injectable 1 milliGRAM(s) IV Push every 6 hours PRN Severe Pain (7 - 10)      Vital Signs Last 24 Hrs  T(C): 36.4 (02 Jan 2020 08:00), Max: 37.6 (01 Jan 2020 15:42)  T(F): 97.6 (02 Jan 2020 08:00), Max: 99.6 (01 Jan 2020 15:42)  HR: 85 (02 Jan 2020 08:00) (83 - 92)  BP: 98/55 (02 Jan 2020 08:00) (97/57 - 111/58)  BP(mean): --  RR: 16 (02 Jan 2020 08:00) (16 - 18)  SpO2: 97% (02 Jan 2020 08:00) (95% - 97%)    Physical:  General: A&Ox3. NAD.  Abdomen: Soft protuberant, mildly distended, nontender.    I&O's Detail    01 Jan 2020 07:01  -  02 Jan 2020 07:00  --------------------------------------------------------  IN:    Solution: 200 mL    Solution: 200 mL  Total IN: 400 mL    OUT:  Total OUT: 0 mL    Total NET: 400 mL    LABS:                        11.1   14.65 )-----------( 180      ( 02 Jan 2020 07:02 )             33.5             01-02    134<L>  |  99  |  34<H>  ----------------------------<  135<H>  3.5   |  28  |  1.53<H>    Ca    8.7      02 Jan 2020 07:02  Phos  2.2     01-02  Mg     2.0     01-02

## 2020-01-02 NOTE — PROGRESS NOTE ADULT - SUBJECTIVE AND OBJECTIVE BOX
PGY 1 Note discussed with supervising resident and primary attending    Patient is a 74y old  Male who presents with a chief complaint of left sided chest and flank pain (02 Jan 2020 08:23)      INTERVAL HPI/OVERNIGHT EVENTS: Patient was seen and examined at bedside, no new complains offfered.  MEDICATIONS  (STANDING):  aspirin  chewable 81 milliGRAM(s) Oral daily  atorvastatin 80 milliGRAM(s) Oral at bedtime  bisacodyl 10 milliGRAM(s) Oral daily  ciprofloxacin   IVPB 400 milliGRAM(s) IV Intermittent every 12 hours  clonazePAM  Tablet 1 milliGRAM(s) Oral daily  dextrose 50% Injectable 12.5 Gram(s) IV Push once  dextrose 50% Injectable 25 Gram(s) IV Push once  dextrose 50% Injectable 25 Gram(s) IV Push once  FLUoxetine 20 milliGRAM(s) Oral daily  heparin  Injectable 5000 Unit(s) SubCutaneous every 8 hours  insulin lispro (HumaLOG) corrective regimen sliding scale   SubCutaneous three times a day before meals  insulin lispro (HumaLOG) corrective regimen sliding scale   SubCutaneous at bedtime  levothyroxine 125 MICROGram(s) Oral daily  metoprolol tartrate 25 milliGRAM(s) Oral two times a day  metroNIDAZOLE  IVPB 500 milliGRAM(s) IV Intermittent every 8 hours  senna 2 Tablet(s) Oral daily    MEDICATIONS  (PRN):  acetaminophen   Tablet .. 650 milliGRAM(s) Oral every 6 hours PRN Moderate Pain (4 - 6)  dextrose 40% Gel 15 Gram(s) Oral once PRN Blood Glucose LESS THAN 70 milliGRAM(s)/deciliter  glucagon  Injectable 1 milliGRAM(s) IntraMuscular once PRN Glucose LESS THAN 70 milligrams/deciliter  morphine  - Injectable 1 milliGRAM(s) IV Push every 6 hours PRN Severe Pain (7 - 10)      __________________________________________________  REVIEW OF SYSTEMS:    CONSTITUTIONAL: No fever,   EYES: no acute visual disturbances  NECK: No pain or stiffness  RESPIRATORY: No cough; No shortness of breath  CARDIOVASCULAR: No chest pain, no palpitations  GASTROINTESTINAL: No pain. No nausea or vomiting; No diarrhea   NEUROLOGICAL: No headache or numbness, no tremors  MUSCULOSKELETAL: No joint pain, no muscle pain  GENITOURINARY: no dysuria, no frequency, no hesitancy  PSYCHIATRY: no depression , no anxiety  ALL OTHER  ROS negative        Vital Signs Last 24 Hrs  T(C): 36.4 (02 Jan 2020 08:00), Max: 37.6 (01 Jan 2020 15:42)  T(F): 97.6 (02 Jan 2020 08:00), Max: 99.6 (01 Jan 2020 15:42)  HR: 85 (02 Jan 2020 08:00) (83 - 92)  BP: 98/55 (02 Jan 2020 08:00) (97/57 - 111/58)  BP(mean): --  RR: 16 (02 Jan 2020 08:00) (16 - 18)  SpO2: 97% (02 Jan 2020 08:00) (95% - 97%)    ________________________________________________  PHYSICAL EXAM:  GENERAL: NAD  HEENT: Normocephalic;  conjunctivae and sclerae clear; moist mucous membranes;   NECK : supple  CHEST/LUNG: Clear to auscultation bilaterally with good air entry   HEART: S1 S2  regular; no murmurs, gallops or rubs  ABDOMEN: Soft, Nontender, distended; Bowel sounds present  EXTREMITIES: no cyanosis; no edema; no calf tenderness  SKIN: warm and dry; no rash  NERVOUS SYSTEM:  Awake and alert; Oriented  to place, person and time ; no new deficits    _________________________________________________  LABS:                        11.1   14.65 )-----------( 180      ( 02 Jan 2020 07:02 )             33.5     01-02    134<L>  |  99  |  34<H>  ----------------------------<  135<H>  3.5   |  28  |  1.53<H>    Ca    8.7      02 Jan 2020 07:02  Phos  2.2     01-02  Mg     2.0     01-02          CAPILLARY BLOOD GLUCOSE      POCT Blood Glucose.: 138 mg/dL (02 Jan 2020 07:54)  POCT Blood Glucose.: 124 mg/dL (01 Jan 2020 20:56)  POCT Blood Glucose.: 119 mg/dL (01 Jan 2020 16:41)  POCT Blood Glucose.: 168 mg/dL (01 Jan 2020 12:04)        RADIOLOGY & ADDITIONAL TESTS:    Imaging Personally Reviewed:  YES/NO    Consultant(s) Notes Reviewed:   YES/ No    Care Discussed with Consultants :     Plan of care was discussed with patient and /or primary care giver; all questions and concerns were addressed and care was aligned with patient's wishes.

## 2020-01-02 NOTE — PROGRESS NOTE ADULT - SUBJECTIVE AND OBJECTIVE BOX
GI PROGRESS NOTE    Patient is a 74y old  Male who presents with a chief complaint of left sided chest and flank pain (02 Jan 2020 09:54)      HPI:  Patient is a 74 year old male from home, ambulates independently, with PMH of MI s/p PPM, AS s/p TAVR 2016, HF with reduced EF initially of 27% and now 40-45% s/p BiV ICD/pacemaker, cholelithiasis, anxiety, hypothyroidism, DM, Hodgkin lymphoma s/p radiation and chemo now in remission with left lung base radiation fibrosis, b/l carotid stenosis s/p carotid endarterectomy, pericardial effusion s/p pericardiocentesis, and b/l inguinal hernia, who came in to the ED due to left sided chest pain radiating to abdomen. Patient states pain started yesterday morning (12/30) after he had ate breakfast. Pain was sudden in onset and was about a 7/10 in severity. Also states the pain radiated to his shoulder blades. Denies any association with food. No diarrhea or constipation. Currently states the pain has improved in his chest and only feel pain in abdomen in the right upper quadrant. Denies any nausea, vomiting, leg pain or swelling or dizziness. (31 Dec 2019 01:55)          ______________________________________________________________________  PMH/PSH:  PAST MEDICAL & SURGICAL HISTORY:  Pericardial effusion: drained in [place] in 4/19  Lung fibrosis: after RT in 80&#x27;s  Systolic CHF, chronic: s/p ICD  AS (aortic stenosis): s/p TAVR  Myocardial infarction: Cardiac Arrest- 1994- no stents  Type 2 diabetes mellitus without complication  Hodgkin lymphoma: on remission, chemo and rad [id number]  Acquired hypothyroidism  HLD (hyperlipidemia)  HTN (hypertension)  S/P ICD (internal cardiac defibrillator) procedure: [employer]  S/P TAVR (transcatheter aortic valve replacement): 10/16 in [place]  History of umbilical hernia repair  H/O bilateral inguinal hernia repair  H/O carotid endarterectomy: bilateral    ______________________________________________________________________  MEDS:  MEDICATIONS  (STANDING):  aspirin  chewable 81 milliGRAM(s) Oral daily  atorvastatin 80 milliGRAM(s) Oral at bedtime  bisacodyl 10 milliGRAM(s) Oral daily  ciprofloxacin   IVPB 400 milliGRAM(s) IV Intermittent every 12 hours  clonazePAM  Tablet 1 milliGRAM(s) Oral daily  dextrose 50% Injectable 12.5 Gram(s) IV Push once  dextrose 50% Injectable 25 Gram(s) IV Push once  dextrose 50% Injectable 25 Gram(s) IV Push once  FLUoxetine 20 milliGRAM(s) Oral daily  heparin  Injectable 5000 Unit(s) SubCutaneous every 8 hours  insulin lispro (HumaLOG) corrective regimen sliding scale   SubCutaneous three times a day before meals  insulin lispro (HumaLOG) corrective regimen sliding scale   SubCutaneous at bedtime  levothyroxine 125 MICROGram(s) Oral daily  metoprolol tartrate 25 milliGRAM(s) Oral two times a day  metroNIDAZOLE  IVPB 500 milliGRAM(s) IV Intermittent every 8 hours  potassium phosphate IVPB 15 milliMole(s) IV Intermittent once  senna 2 Tablet(s) Oral daily    MEDICATIONS  (PRN):  acetaminophen   Tablet .. 650 milliGRAM(s) Oral every 6 hours PRN Moderate Pain (4 - 6)  dextrose 40% Gel 15 Gram(s) Oral once PRN Blood Glucose LESS THAN 70 milliGRAM(s)/deciliter  glucagon  Injectable 1 milliGRAM(s) IntraMuscular once PRN Glucose LESS THAN 70 milligrams/deciliter  morphine  - Injectable 1 milliGRAM(s) IV Push every 6 hours PRN Severe Pain (7 - 10)    ______________________________________________________________________  ALL:   Allergies    No Known Allergies    Intolerances    lisinopril (Other)    ______________________________________________________________________  SH: Social History:  Patient denies any history of smoking, alcohol use or use of illicit drugs. Patient lives at home with his wife. (31 Dec 2019 01:55)    ______________________________________________________________________  FH:  FAMILY HISTORY:  No pertinent family history in first degree relatives    ______________________________________________________________________  ROS:    CONSTITUTIONAL:  No weight loss, fever, chills, weakness or fatigue.    HEENT:  Eyes:  No visual loss, blurred vision, double vision or yellow sclerae. Ears, Nose, Throat:  No hearing loss, sneezing, congestion, runny nose or sore throat.    SKIN:  No rash or itching.    CARDIOVASCULAR:  No chest pain, chest pressure or chest discomfort. No palpitations or edema.    RESPIRATORY:  No shortness of breath, cough or sputum.    GASTROINTESTINAL:  SEE HPI    GENITOURINARY:  No dysuria, hematuria, urinary frequency    NEUROLOGICAL:  No headache, dizziness, syncope, paralysis, ataxia, numbness or tingling in the extremities. No change in bowel or bladder control.    MUSCULOSKELETAL:  No muscle, back pain, joint pain or stiffness.    HEMATOLOGIC:  No anemia, bleeding or bruising.    LYMPHATICS:  No enlarged nodes. No history of splenectomy.    PSYCHIATRIC:  No history of depression or anxiety.    ENDOCRINOLOGIC:  No reports of sweating, cold or heat intolerance. No polyuria or polydipsia.    ALLERGIES:  No history of asthma, hives, eczema or rhinitis.  ______________________________________________________________________  PHYSICAL EXAM:  T(C): 36.4 (01-02-20 @ 08:00), Max: 37.6 (01-01-20 @ 15:42)  HR: 85 (01-02-20 @ 08:00)  BP: 98/55 (01-02-20 @ 08:00)  RR: 16 (01-02-20 @ 08:00)  SpO2: 97% (01-02-20 @ 08:00)  Wt(kg): --    01-01 - 01-02  --------------------------------------------------------  IN:    Solution: 200 mL    Solution: 200 mL  Total IN: 400 mL    OUT:  Total OUT: 0 mL    Total NET: 400 mL          GEN: NAD   CVS- S1 S2  ABD: soft nontender, non distended, bowel sounds+  LUNGS: clear to auscultation  NEURO: noin focal neuro exam; AAO x 3  Extremities: no cyanosis, no calf tenderness, no edema, no clubbing      ______________________________________________________________________  LABS:                        11.1   14.65 )-----------( 180      ( 02 Jan 2020 07:02 )             33.5     01-02    134<L>  |  99  |  34<H>  ----------------------------<  135<H>  3.5   |  28  |  1.53<H>    Ca    8.7      02 Jan 2020 07:02  Phos  2.2     01-02  Mg     2.0     01-02        ______________________________________________________________________  IMAGING:    ______________________________________________________________________  ASSESSMENT:  74y Male    PLAN:

## 2020-01-03 LAB
ANION GAP SERPL CALC-SCNC: 4 MMOL/L — LOW (ref 5–17)
BUN SERPL-MCNC: 24 MG/DL — HIGH (ref 7–18)
CALCIUM SERPL-MCNC: 8.9 MG/DL — SIGNIFICANT CHANGE UP (ref 8.4–10.5)
CHLORIDE SERPL-SCNC: 100 MMOL/L — SIGNIFICANT CHANGE UP (ref 96–108)
CO2 SERPL-SCNC: 31 MMOL/L — SIGNIFICANT CHANGE UP (ref 22–31)
CREAT SERPL-MCNC: 1.18 MG/DL — SIGNIFICANT CHANGE UP (ref 0.5–1.3)
GLUCOSE BLDC GLUCOMTR-MCNC: 124 MG/DL — HIGH (ref 70–99)
GLUCOSE BLDC GLUCOMTR-MCNC: 127 MG/DL — HIGH (ref 70–99)
GLUCOSE BLDC GLUCOMTR-MCNC: 129 MG/DL — HIGH (ref 70–99)
GLUCOSE SERPL-MCNC: 131 MG/DL — HIGH (ref 70–99)
HCT VFR BLD CALC: 32.9 % — LOW (ref 39–50)
HGB BLD-MCNC: 10.8 G/DL — LOW (ref 13–17)
MAGNESIUM SERPL-MCNC: 2.2 MG/DL — SIGNIFICANT CHANGE UP (ref 1.6–2.6)
MCHC RBC-ENTMCNC: 31.7 PG — SIGNIFICANT CHANGE UP (ref 27–34)
MCHC RBC-ENTMCNC: 32.8 GM/DL — SIGNIFICANT CHANGE UP (ref 32–36)
MCV RBC AUTO: 96.5 FL — SIGNIFICANT CHANGE UP (ref 80–100)
NRBC # BLD: 0 /100 WBCS — SIGNIFICANT CHANGE UP (ref 0–0)
PHOSPHATE SERPL-MCNC: 2.4 MG/DL — LOW (ref 2.5–4.5)
PLATELET # BLD AUTO: 194 K/UL — SIGNIFICANT CHANGE UP (ref 150–400)
POTASSIUM SERPL-MCNC: 4 MMOL/L — SIGNIFICANT CHANGE UP (ref 3.5–5.3)
POTASSIUM SERPL-SCNC: 4 MMOL/L — SIGNIFICANT CHANGE UP (ref 3.5–5.3)
RBC # BLD: 3.41 M/UL — LOW (ref 4.2–5.8)
RBC # FLD: 15.5 % — HIGH (ref 10.3–14.5)
SODIUM SERPL-SCNC: 135 MMOL/L — SIGNIFICANT CHANGE UP (ref 135–145)
WBC # BLD: 9.52 K/UL — SIGNIFICANT CHANGE UP (ref 3.8–10.5)
WBC # FLD AUTO: 9.52 K/UL — SIGNIFICANT CHANGE UP (ref 3.8–10.5)

## 2020-01-03 PROCEDURE — 99233 SBSQ HOSP IP/OBS HIGH 50: CPT | Mod: GC

## 2020-01-03 PROCEDURE — 99231 SBSQ HOSP IP/OBS SF/LOW 25: CPT

## 2020-01-03 RX ORDER — POTASSIUM PHOSPHATE, MONOBASIC POTASSIUM PHOSPHATE, DIBASIC 236; 224 MG/ML; MG/ML
15 INJECTION, SOLUTION INTRAVENOUS ONCE
Refills: 0 | Status: COMPLETED | OUTPATIENT
Start: 2020-01-03 | End: 2020-01-03

## 2020-01-03 RX ORDER — SIMETHICONE 80 MG/1
80 TABLET, CHEWABLE ORAL EVERY 8 HOURS
Refills: 0 | Status: DISCONTINUED | OUTPATIENT
Start: 2020-01-03 | End: 2020-01-05

## 2020-01-03 RX ADMIN — Medication 100 MILLIGRAM(S): at 16:39

## 2020-01-03 RX ADMIN — ATORVASTATIN CALCIUM 80 MILLIGRAM(S): 80 TABLET, FILM COATED ORAL at 23:01

## 2020-01-03 RX ADMIN — POTASSIUM PHOSPHATE, MONOBASIC POTASSIUM PHOSPHATE, DIBASIC 62.5 MILLIMOLE(S): 236; 224 INJECTION, SOLUTION INTRAVENOUS at 09:10

## 2020-01-03 RX ADMIN — Medication 100 MILLIGRAM(S): at 23:01

## 2020-01-03 RX ADMIN — HEPARIN SODIUM 5000 UNIT(S): 5000 INJECTION INTRAVENOUS; SUBCUTANEOUS at 14:43

## 2020-01-03 RX ADMIN — Medication 1 MILLIGRAM(S): at 11:57

## 2020-01-03 RX ADMIN — Medication 100 MILLIGRAM(S): at 09:09

## 2020-01-03 RX ADMIN — Medication 25 MILLIGRAM(S): at 05:40

## 2020-01-03 RX ADMIN — SENNA PLUS 2 TABLET(S): 8.6 TABLET ORAL at 11:57

## 2020-01-03 RX ADMIN — HEPARIN SODIUM 5000 UNIT(S): 5000 INJECTION INTRAVENOUS; SUBCUTANEOUS at 05:40

## 2020-01-03 RX ADMIN — Medication 25 MILLIGRAM(S): at 17:59

## 2020-01-03 RX ADMIN — CEFTRIAXONE 100 MILLIGRAM(S): 500 INJECTION, POWDER, FOR SOLUTION INTRAMUSCULAR; INTRAVENOUS at 20:40

## 2020-01-03 RX ADMIN — Medication 20 MILLIGRAM(S): at 11:57

## 2020-01-03 RX ADMIN — SIMETHICONE 80 MILLIGRAM(S): 80 TABLET, CHEWABLE ORAL at 17:59

## 2020-01-03 RX ADMIN — Medication 81 MILLIGRAM(S): at 11:57

## 2020-01-03 RX ADMIN — Medication 125 MICROGRAM(S): at 05:40

## 2020-01-03 NOTE — DIETITIAN INITIAL EVALUATION ADULT. - NS FNS WEIGHT CHANGE REASON
wt gain 8 to 10 lb x a few wks per pt, or gained 15 lb from [facility] EMR: 171.9 lb 5/31/19-->187.1 lb 1/3/20

## 2020-01-03 NOTE — DIETITIAN INITIAL EVALUATION ADULT. - PERTINENT LABORATORY DATA
01-03 Na135 mmol/L Glu 131 mg/dL<H> K+ 4.0 mmol/L Cr  1.18 mg/dL BUN 24 mg/dL<H>   01-03 Phos 2.4 mg/dL<L>   12-31 Alb 4.2 g/dL     12-31 AsdpsdntvqO4L 6.4 %<H>   12-31 Chol 183 mg/dL  mg/dL HDL 55 mg/dL Trig 53 mg/dL

## 2020-01-03 NOTE — DIETITIAN INITIAL EVALUATION ADULT. - PROBLEM SELECTOR PLAN 4
RLL 7mm lung nodule noted on CT scan which has slightly increased in size from imaging in May along with groundglass opacity in RLL  patient denies any shortness of breath  no history of smoking   pulm Dr. Shaw consulted per MD

## 2020-01-03 NOTE — PROGRESS NOTE ADULT - PROBLEM SELECTOR PLAN 2
T2 is elevated - Patient with no anginal symptoms trace trop likely due to  of RCA and cardiomyopathy. Dr hinton  cardio Dr. Hinton consulted.

## 2020-01-03 NOTE — DIETITIAN INITIAL EVALUATION ADULT. - PROBLEM SELECTOR PLAN 1
patient noted to have acute cholecystitis on imaging   given dose of cipro and flagyl in ED  surgery team consulted by ED team  tenderness in RUQ upon deep palpation  NPO except meds  IV cipro and flagyl  f/u urine and blood cultures   f/u surgery  Dr. Alfaro GI consulted   pain management with morphine and tylenol  hold off on IVF due to history of CHF  f/u RUQ US to r/o choledocholithiasis per MD

## 2020-01-03 NOTE — DIETITIAN INITIAL EVALUATION ADULT. - PROBLEM SELECTOR PLAN 2
patient noted to have chest pain with elevated troponin of 0.093 on admission  r/o ACS  f/u T2, T3  cardio Dr. Hinton consulted  f/u ECHO per MD

## 2020-01-03 NOTE — PROGRESS NOTE ADULT - PROBLEM SELECTOR PLAN 3
noted to have elevated creatinine from baseline of 1.56 on admission  likely prerenal or ATN due to diuretic use.  resolved.    Cr trended down.

## 2020-01-03 NOTE — PROGRESS NOTE ADULT - SUBJECTIVE AND OBJECTIVE BOX
SUBJECTIVE    Nausea [ ] YES [X ] NO  Vomiting [ ] YES [X ] NO  Voiding normally [X ] YES [ ] NO  Flatus [X ] YES [ ] NO  Pain under control [X ] YES [ ] NO-he says very little discomfort now  NPO  Ambulated [X ] YES NO [ ]    underwent HIDA scan yesterday showing acute cholecystitis    VITALS    ICU Vital Signs Last 24 Hrs  T(C): 36.7 (03 Jan 2020 07:59), Max: 37.3 (02 Jan 2020 23:45)  T(F): 98.1 (03 Jan 2020 07:59), Max: 99.2 (02 Jan 2020 23:45)  HR: 86 (03 Jan 2020 07:59) (84 - 93)  BP: 118/68 (03 Jan 2020 07:59) (104/56 - 135/72)  BP(mean): --  ABP: --  ABP(mean): --  RR: 16 (03 Jan 2020 07:59) (16 - 16)  SpO2: 99% (03 Jan 2020 07:59) (95% - 99%)    I&O's Detail    02 Jan 2020 07:01  -  03 Jan 2020 07:00  --------------------------------------------------------  IN:    Solution: 250 mL    Solution: 100 mL    Solution: 200 mL  Total IN: 550 mL    OUT:  Total OUT: 0 mL    Total NET: 550 mL            PHYSICAL EXAMINATION    Abdomen: protuberant, soft, minimally tender to right abdomen/RUQ    I&O's Summary    02 Jan 2020 07:01  -  03 Jan 2020 07:00  --------------------------------------------------------  IN: 550 mL / OUT: 0 mL / NET: 550 mL        LABS                        10.8   9.52  )-----------( 194      ( 03 Jan 2020 06:43 )             32.9             01-03    135  |  100  |  24<H>  ----------------------------<  131<H>  4.0   |  31  |  1.18    Ca    8.9      03 Jan 2020 06:43  Phos  2.4     01-03  Mg     2.2     01-03          MEDICATIONS:  MEDICATIONS  (STANDING):  aspirin  chewable 81 milliGRAM(s) Oral daily  atorvastatin 80 milliGRAM(s) Oral at bedtime  cefTRIAXone   IVPB 1000 milliGRAM(s) IV Intermittent every 24 hours  clonazePAM  Tablet 1 milliGRAM(s) Oral daily  dextrose 50% Injectable 12.5 Gram(s) IV Push once  dextrose 50% Injectable 25 Gram(s) IV Push once  dextrose 50% Injectable 25 Gram(s) IV Push once  FLUoxetine 20 milliGRAM(s) Oral daily  heparin  Injectable 5000 Unit(s) SubCutaneous every 8 hours  insulin lispro (HumaLOG) corrective regimen sliding scale   SubCutaneous three times a day before meals  insulin lispro (HumaLOG) corrective regimen sliding scale   SubCutaneous at bedtime  levothyroxine 125 MICROGram(s) Oral daily  metoprolol tartrate 25 milliGRAM(s) Oral two times a day  metroNIDAZOLE  IVPB 500 milliGRAM(s) IV Intermittent every 8 hours  potassium phosphate IVPB 15 milliMole(s) IV Intermittent once  senna 2 Tablet(s) Oral daily    MEDICATIONS  (PRN):  acetaminophen   Tablet .. 650 milliGRAM(s) Oral every 6 hours PRN Moderate Pain (4 - 6)  dextrose 40% Gel 15 Gram(s) Oral once PRN Blood Glucose LESS THAN 70 milliGRAM(s)/deciliter  glucagon  Injectable 1 milliGRAM(s) IntraMuscular once PRN Glucose LESS THAN 70 milligrams/deciliter  morphine  - Injectable 1 milliGRAM(s) IV Push every 6 hours PRN Severe Pain (7 - 10)

## 2020-01-03 NOTE — PROGRESS NOTE ADULT - SUBJECTIVE AND OBJECTIVE BOX
PGY 1 Note discussed with supervising resident and primary attending    Patient is a 74y old  Male who presents with a chief complaint of left sided chest and flank pain (03 Jan 2020 08:15)      INTERVAL HPI/OVERNIGHT EVENTS: Patient was seen and examined at bedside, not comlaining of abdominal pain any more, tolerating full liquid diet well, advance to regualr.  -will continue IV antibiotics till monday.  -WBC count trended down.      MEDICATIONS  (STANDING):  aspirin  chewable 81 milliGRAM(s) Oral daily  atorvastatin 80 milliGRAM(s) Oral at bedtime  cefTRIAXone   IVPB 1000 milliGRAM(s) IV Intermittent every 24 hours  clonazePAM  Tablet 1 milliGRAM(s) Oral daily  dextrose 50% Injectable 12.5 Gram(s) IV Push once  dextrose 50% Injectable 25 Gram(s) IV Push once  dextrose 50% Injectable 25 Gram(s) IV Push once  FLUoxetine 20 milliGRAM(s) Oral daily  heparin  Injectable 5000 Unit(s) SubCutaneous every 8 hours  insulin lispro (HumaLOG) corrective regimen sliding scale   SubCutaneous three times a day before meals  insulin lispro (HumaLOG) corrective regimen sliding scale   SubCutaneous at bedtime  levothyroxine 125 MICROGram(s) Oral daily  metoprolol tartrate 25 milliGRAM(s) Oral two times a day  metroNIDAZOLE  IVPB 500 milliGRAM(s) IV Intermittent every 8 hours  senna 2 Tablet(s) Oral daily    MEDICATIONS  (PRN):  acetaminophen   Tablet .. 650 milliGRAM(s) Oral every 6 hours PRN Moderate Pain (4 - 6)  dextrose 40% Gel 15 Gram(s) Oral once PRN Blood Glucose LESS THAN 70 milliGRAM(s)/deciliter  glucagon  Injectable 1 milliGRAM(s) IntraMuscular once PRN Glucose LESS THAN 70 milligrams/deciliter  morphine  - Injectable 1 milliGRAM(s) IV Push every 6 hours PRN Severe Pain (7 - 10)      __________________________________________________  REVIEW OF SYSTEMS:    CONSTITUTIONAL: No fever,   EYES: no acute visual disturbances  NECK: No pain or stiffness  RESPIRATORY: No cough; No shortness of breath  CARDIOVASCULAR: No chest pain, no palpitations  GASTROINTESTINAL: No pain. No nausea or vomiting; No diarrhea   NEUROLOGICAL: No headache or numbness, no tremors  MUSCULOSKELETAL: No joint pain, no muscle pain  GENITOURINARY: no dysuria, no frequency, no hesitancy  PSYCHIATRY: no depression , no anxiety  ALL OTHER  ROS negative        Vital Signs Last 24 Hrs  T(C): 36.7 (03 Jan 2020 07:59), Max: 37.3 (02 Jan 2020 23:45)  T(F): 98.1 (03 Jan 2020 07:59), Max: 99.2 (02 Jan 2020 23:45)  HR: 86 (03 Jan 2020 07:59) (84 - 93)  BP: 118/68 (03 Jan 2020 07:59) (104/56 - 135/72)  BP(mean): --  RR: 16 (03 Jan 2020 07:59) (16 - 16)  SpO2: 99% (03 Jan 2020 07:59) (95% - 99%)    ________________________________________________  PHYSICAL EXAM:  GENERAL: NAD  HEENT: Normocephalic;  conjunctivae and sclerae clear; moist mucous membranes;   NECK : supple  CHEST/LUNG: Clear to auscultation bilaterally with good air entry   HEART: S1 S2  regular; no murmurs, gallops or rubs  ABDOMEN: Soft, Nontender,distended; Bowel sounds present  EXTREMITIES: no cyanosis; no edema; no calf tenderness  SKIN: warm and dry; no rash  NERVOUS SYSTEM:  Awake and alert; Oriented  to place, person and time ; no new deficits    _________________________________________________  LABS:                        10.8   9.52  )-----------( 194      ( 03 Jan 2020 06:43 )             32.9     01-03    135  |  100  |  24<H>  ----------------------------<  131<H>  4.0   |  31  |  1.18    Ca    8.9      03 Jan 2020 06:43  Phos  2.4     01-03  Mg     2.2     01-03          CAPILLARY BLOOD GLUCOSE      POCT Blood Glucose.: 129 mg/dL (03 Jan 2020 08:24)        RADIOLOGY & ADDITIONAL TESTS:    Imaging Personally Reviewed:  YES/NO    Consultant(s) Notes Reviewed:   YES/ No    Care Discussed with Consultants :     Plan of care was discussed with patient and /or primary care giver; all questions and concerns were addressed and care was aligned with patient's wishes.

## 2020-01-03 NOTE — PROGRESS NOTE ADULT - SUBJECTIVE AND OBJECTIVE BOX
Patient denies chest pain or shortness of breath.   Review of systems otherwise (-)  	  acetaminophen   Tablet .. 650 milliGRAM(s) Oral every 6 hours PRN  aspirin  chewable 81 milliGRAM(s) Oral daily  atorvastatin 80 milliGRAM(s) Oral at bedtime  cefTRIAXone   IVPB 1000 milliGRAM(s) IV Intermittent every 24 hours  clonazePAM  Tablet 1 milliGRAM(s) Oral daily  dextrose 40% Gel 15 Gram(s) Oral once PRN  dextrose 50% Injectable 12.5 Gram(s) IV Push once  dextrose 50% Injectable 25 Gram(s) IV Push once  dextrose 50% Injectable 25 Gram(s) IV Push once  FLUoxetine 20 milliGRAM(s) Oral daily  glucagon  Injectable 1 milliGRAM(s) IntraMuscular once PRN  heparin  Injectable 5000 Unit(s) SubCutaneous every 8 hours  insulin lispro (HumaLOG) corrective regimen sliding scale   SubCutaneous three times a day before meals  insulin lispro (HumaLOG) corrective regimen sliding scale   SubCutaneous at bedtime  levothyroxine 125 MICROGram(s) Oral daily  metoprolol tartrate 25 milliGRAM(s) Oral two times a day  metroNIDAZOLE  IVPB 500 milliGRAM(s) IV Intermittent every 8 hours  morphine  - Injectable 1 milliGRAM(s) IV Push every 6 hours PRN  senna 2 Tablet(s) Oral daily                            10.8   9.52  )-----------( 194      ( 03 Jan 2020 06:43 )             32.9       Hemoglobin: 10.8 g/dL (01-03 @ 06:43)  Hemoglobin: 11.1 g/dL (01-02 @ 07:02)  Hemoglobin: 10.9 g/dL (01-01 @ 07:32)  Hemoglobin: 12.8 g/dL (12-31 @ 05:38)  Hemoglobin: 12.7 g/dL (12-30 @ 18:54)      01-03    135  |  100  |  24<H>  ----------------------------<  131<H>  4.0   |  31  |  1.18    Ca    8.9      03 Jan 2020 06:43  Phos  2.4     01-03  Mg     2.2     01-03      Creatinine Trend: 1.18<--, 1.53<--, 1.44<--, 1.37<--, 1.56<--    COAGS:           T(C): 36.8 (01-03-20 @ 11:31), Max: 37.3 (01-02-20 @ 23:45)  HR: 87 (01-03-20 @ 11:31) (84 - 93)  BP: 128/58 (01-03-20 @ 11:31) (104/56 - 135/72)  RR: 16 (01-03-20 @ 11:31) (16 - 16)  SpO2: 98% (01-03-20 @ 11:31) (95% - 99%)  Wt(kg): --    I&O's Summary    02 Jan 2020 07:01  -  03 Jan 2020 07:00  --------------------------------------------------------  IN: 550 mL / OUT: 0 mL / NET: 550 mL      Gen: Appears well in NAD  HEENT:  (-)icterus (-)pallor  CV: N S1 S2 1/6 ANGELLA (+)2 Pulses B/l  Resp:  Clear to ausculatation B/L, normal effort  GI: (+) BS Soft, NT, ND  Lymph:  (-)Edema, (-)obvious lymphadenopathy  Skin: Warm to touch, Normal turgor  Psych: Appropriate mood and affect      TELEMETRY: 	  OFF        ASSESSMENT/PLAN: 	74y  Male PMH of MI s/p PPM, AS s/p TAVR 2016, HF Mild segmental LV dysfx 40-45% chronically occluded RCA (cath 2016) Complete heart block  s/p BiV ICD, cholelithiasis, anxiety, hypothyroidism, DM, Hodgkin lymphoma s/p radiation and chemo now in remission with left lung base radiation fibrosis, b/l carotid stenosis s/p carotid endarterectomy, pericardial effusion s/p pericardiocentesis, and b/l inguinal hernia, who came in to the ED due to RUQ pain radiating to the back found with cholecystitis.    - (+) HIDA scan.  - Discuss with primary team, by virtue of having a hx of CAD, CHF and DM, undergoing an intraperitoneal surgery he is considered high risk but not prohibitive for a necessary surgery since he is optimized from a cardiac standpoint.  - D/W patient and family at bedside thet understand the risk and are willing to accept it   - Surgery f/u noted, trial of antibiotics    Lobo Hinton MD, [facility]  BEEPER [phone]

## 2020-01-03 NOTE — PROGRESS NOTE ADULT - ASSESSMENT
74M with acute cholecystitis that appears to be responding to antibiotics. His WBC is normal limits today and he is afebrile. Given high risk category, I explained options to treat are antibiotics, IR drainage and surgery. Surgery is not recommended unless he fails antibiotic therapy. He does not wish IR drainage and would rather undergo an operation if antibiotics fail.     1) advance diet  2) continue abx IV until Monday  3) if he tolerates diet without increased pain, remains afebrile and with a normal exam and WBC, then could DC on oral antibiotics  4) following  5) d/w Dr. Ricketts

## 2020-01-03 NOTE — PROGRESS NOTE ADULT - PROBLEM SELECTOR PLAN 4
RLL 7mm lung nodule noted on CT scan which has slightly increased in size from imaging in May along with groundglass opacity in RLL  patient denies any shortness of breath  no history of smoking   pulm Dr. Shaw consuled.  pulm consult noted

## 2020-01-03 NOTE — DIETITIAN INITIAL EVALUATION ADULT. - PROBLEM SELECTOR PLAN 3
noted to have elevated creatinine from baseline of 1.56 on admission  likely prerenal or ATN due to diuretic use  holding off on IVF for now due to CHF history  holding lasix and losartan due to RIANNA  f/u BMP in AM and monitor creatinine  f/u urine lytes per MD

## 2020-01-03 NOTE — PROGRESS NOTE ADULT - PROBLEM SELECTOR PLAN 1
patient noted to have acute cholecystitis on imaging   cw rocephin and cipro till monday  -started on regualr diet.  -blood cultures-ve Ucx -ve.  surgery , GI and cardio consults noted.  RUQ US showed cholelithiasis.  HIDA scan showed acute cholecystitis. patient noted to have acute cholecystitis on imaging   cw rocephin and flagyl till monday, WBC ttrended down , no fevers  -started on regualr diet.  -blood cultures-ve Ucx -ve.  surgery , GI and cardio consults noted.  RUQ US showed cholelithiasis.  HIDA scan showed acute cholecystitis.

## 2020-01-03 NOTE — DIETITIAN INITIAL EVALUATION ADULT. - OTHER INFO
Pt alert, oriented, well-communicated, lives home with family PTA; appetite good prior to acute illness PTA; some wt gain ( see below) ? etiology; denied GI distress, chewing or swallowing problem at present, no specific food preferences/intolerance when diet advanced; NPO/liquid diet x 3 to 4d

## 2020-01-04 ENCOUNTER — TRANSCRIPTION ENCOUNTER (OUTPATIENT)
Age: 75
End: 2020-01-04

## 2020-01-04 LAB
ANION GAP SERPL CALC-SCNC: 7 MMOL/L — SIGNIFICANT CHANGE UP (ref 5–17)
BUN SERPL-MCNC: 18 MG/DL — SIGNIFICANT CHANGE UP (ref 7–18)
CALCIUM SERPL-MCNC: 8.9 MG/DL — SIGNIFICANT CHANGE UP (ref 8.4–10.5)
CHLORIDE SERPL-SCNC: 103 MMOL/L — SIGNIFICANT CHANGE UP (ref 96–108)
CO2 SERPL-SCNC: 27 MMOL/L — SIGNIFICANT CHANGE UP (ref 22–31)
CREAT SERPL-MCNC: 0.99 MG/DL — SIGNIFICANT CHANGE UP (ref 0.5–1.3)
GLUCOSE BLDC GLUCOMTR-MCNC: 130 MG/DL — HIGH (ref 70–99)
GLUCOSE BLDC GLUCOMTR-MCNC: 139 MG/DL — HIGH (ref 70–99)
GLUCOSE SERPL-MCNC: 144 MG/DL — HIGH (ref 70–99)
HCT VFR BLD CALC: 33.8 % — LOW (ref 39–50)
HGB BLD-MCNC: 11.2 G/DL — LOW (ref 13–17)
MAGNESIUM SERPL-MCNC: 2.4 MG/DL — SIGNIFICANT CHANGE UP (ref 1.6–2.6)
MCHC RBC-ENTMCNC: 31.7 PG — SIGNIFICANT CHANGE UP (ref 27–34)
MCHC RBC-ENTMCNC: 33.1 GM/DL — SIGNIFICANT CHANGE UP (ref 32–36)
MCV RBC AUTO: 95.8 FL — SIGNIFICANT CHANGE UP (ref 80–100)
NRBC # BLD: 0 /100 WBCS — SIGNIFICANT CHANGE UP (ref 0–0)
PHOSPHATE SERPL-MCNC: 2.4 MG/DL — LOW (ref 2.5–4.5)
PLATELET # BLD AUTO: 206 K/UL — SIGNIFICANT CHANGE UP (ref 150–400)
POTASSIUM SERPL-MCNC: 3.9 MMOL/L — SIGNIFICANT CHANGE UP (ref 3.5–5.3)
POTASSIUM SERPL-SCNC: 3.9 MMOL/L — SIGNIFICANT CHANGE UP (ref 3.5–5.3)
RBC # BLD: 3.53 M/UL — LOW (ref 4.2–5.8)
RBC # FLD: 15.5 % — HIGH (ref 10.3–14.5)
SODIUM SERPL-SCNC: 137 MMOL/L — SIGNIFICANT CHANGE UP (ref 135–145)
WBC # BLD: 7.29 K/UL — SIGNIFICANT CHANGE UP (ref 3.8–10.5)
WBC # FLD AUTO: 7.29 K/UL — SIGNIFICANT CHANGE UP (ref 3.8–10.5)

## 2020-01-04 PROCEDURE — 99233 SBSQ HOSP IP/OBS HIGH 50: CPT | Mod: GC

## 2020-01-04 RX ORDER — SODIUM,POTASSIUM PHOSPHATES 278-250MG
1 POWDER IN PACKET (EA) ORAL THREE TIMES A DAY
Refills: 0 | Status: COMPLETED | OUTPATIENT
Start: 2020-01-04 | End: 2020-01-05

## 2020-01-04 RX ADMIN — HEPARIN SODIUM 5000 UNIT(S): 5000 INJECTION INTRAVENOUS; SUBCUTANEOUS at 05:45

## 2020-01-04 RX ADMIN — Medication 81 MILLIGRAM(S): at 12:24

## 2020-01-04 RX ADMIN — CEFTRIAXONE 100 MILLIGRAM(S): 500 INJECTION, POWDER, FOR SOLUTION INTRAMUSCULAR; INTRAVENOUS at 20:36

## 2020-01-04 RX ADMIN — Medication 1 PACKET(S): at 13:22

## 2020-01-04 RX ADMIN — SENNA PLUS 2 TABLET(S): 8.6 TABLET ORAL at 12:24

## 2020-01-04 RX ADMIN — Medication 25 MILLIGRAM(S): at 05:45

## 2020-01-04 RX ADMIN — Medication 100 MILLIGRAM(S): at 17:17

## 2020-01-04 RX ADMIN — Medication 1 PACKET(S): at 12:23

## 2020-01-04 RX ADMIN — Medication 1 PACKET(S): at 23:16

## 2020-01-04 RX ADMIN — HEPARIN SODIUM 5000 UNIT(S): 5000 INJECTION INTRAVENOUS; SUBCUTANEOUS at 13:24

## 2020-01-04 RX ADMIN — Medication 25 MILLIGRAM(S): at 17:17

## 2020-01-04 RX ADMIN — Medication 100 MILLIGRAM(S): at 08:31

## 2020-01-04 RX ADMIN — Medication 100 MILLIGRAM(S): at 23:16

## 2020-01-04 RX ADMIN — Medication 125 MICROGRAM(S): at 05:45

## 2020-01-04 RX ADMIN — Medication 20 MILLIGRAM(S): at 12:24

## 2020-01-04 RX ADMIN — SIMETHICONE 80 MILLIGRAM(S): 80 TABLET, CHEWABLE ORAL at 13:22

## 2020-01-04 RX ADMIN — Medication 1 MILLIGRAM(S): at 12:27

## 2020-01-04 RX ADMIN — HEPARIN SODIUM 5000 UNIT(S): 5000 INJECTION INTRAVENOUS; SUBCUTANEOUS at 23:16

## 2020-01-04 RX ADMIN — ATORVASTATIN CALCIUM 80 MILLIGRAM(S): 80 TABLET, FILM COATED ORAL at 23:16

## 2020-01-04 NOTE — PROGRESS NOTE ADULT - ASSESSMENT
74 year old male with acute cholecystitis, extensive cardiac history    - continue regular diet  - continue antibiotics  - continue conservative management of acute cholecystitis at this time  - continue medical management

## 2020-01-04 NOTE — DISCHARGE NOTE PROVIDER - HOSPITAL COURSE
Patient is a 74 year old male from home, ambulates independently, with PMH of MI s/p PPM, AS s/p TAVR 2016, HF with reduced EF initially of 27% and now 40-45% s/p BiV ICD/pacemaker, cholelithiasis, anxiety, hypothyroidism, DM, Hodgkin lymphoma s/p radiation and chemo now in remission with left lung base radiation fibrosis, b/l carotid stenosis s/p carotid endarterectomy, pericardial effusion s/p pericardiocentesis, and b/l inguinal hernia, who came in to the ED due to left sided chest pain radiating to abdomen. Patient states pain started yesterday morning (12/30) after he had ate breakfast. Pain was sudden in onset and was about a 7/10 in severity. Also states the pain radiated to his shoulder blades. Denies any association with food. No diarrhea or constipation. Currently states the pain has improved in his chest and only feel pain in abdomen in the right upper quadrant. Denies any nausea, vomiting, leg pain or swelling or dizziness.            Acute cholecystitis.      Patient noted to have acute cholecystitis on imaging , he was treated with rocephin and flagyl WBC ttrended down , no fevers were reported, he was started on regualr diet, his blood cultures and urine cultures were negative, RUQ US showed cholelithiasis.    HIDA scan showed acute cholecystitis.         Surgery was consulted , they suggested to hold of foer surgery because of increased catrdiac risk factors and continue with antibiotics because he was responding to it.             Patients  T2 is elevated , Patient with no anginal symptoms trace trop likely due to  of RCA and cardiomyopathy. cardio Dr. Hinton  was consulted.          RIANNA (acute kidney injury). PAtient noted to have elevated creatinine from baseline of 1.56 on admission, likely prerenal or ATN due to diuretic use, was resolved during hospital stay. Patient is a 74 year old male from home, ambulates independently, with PMH of MI s/p PPM, AS s/p TAVR 2016, HF with reduced EF initially of 27% and now 40-45% s/p BiV ICD/pacemaker, cholelithiasis, anxiety, hypothyroidism, DM, Hodgkin lymphoma s/p radiation and chemo now in remission with left lung base radiation fibrosis, b/l carotid stenosis s/p carotid endarterectomy, pericardial effusion s/p pericardiocentesis, and b/l inguinal hernia, who came in to the ED due to left sided chest pain radiating to abdomen. Patient states pain started yesterday morning (12/30) after he had ate breakfast. Pain was sudden in onset and was about a 7/10 in severity. Also states the pain radiated to his shoulder blades. Denies any association with food. No diarrhea or constipation. Currently states the pain has improved in his chest and only feel pain in abdomen in the right upper quadrant. Denies any nausea, vomiting, leg pain or swelling or dizziness.            Acute cholecystitis.      Patient noted to have acute cholecystitis on imaging , he was treated with rocephin and flagyl WBC ttrended down , no fevers were reported, he was started on regualr diet, his blood cultures and urine cultures were negative, RUQ US showed cholelithiasis.    HIDA scan showed acute cholecystitis.         Surgery was consulted , they suggested to hold of foer surgery because of increased catrdiac risk factors and continue with antibiotics because he was responding to it.             Patients  T2 is elevated , Patient with no anginal symptoms trace trop likely due to  of RCA and cardiomyopathy. cardio Dr. Hinton  was consulted.          RIANNA (acute kidney injury). PAtient noted to have elevated creatinine from baseline of 1.56 on admission, likely prerenal or ATN due to diuretic use, was resolved during hospital stay.        Patient is clear for discharge Patient is a 74 year old male from home, ambulates independently, with PMH of MI s/p PPM, AS s/p TAVR 2016, HF with reduced EF initially of 27% and now 40-45% s/p BiV ICD/pacemaker, cholelithiasis, anxiety, hypothyroidism, DM, Hodgkin lymphoma s/p radiation and chemo now in remission with left lung base radiation fibrosis, b/l carotid stenosis s/p carotid endarterectomy, pericardial effusion s/p pericardiocentesis, and b/l inguinal hernia, who came in to the ED due to left sided chest pain radiating to abdomen. Patient states pain started yesterday morning (12/30) after he had ate breakfast. Pain was sudden in onset and was about a 7/10 in severity. Also states the pain radiated to his shoulder blades. Denies any association with food. No diarrhea or constipation. Currently states the pain has improved in his chest and only feel pain in abdomen in the right upper quadrant. Denies any nausea, vomiting, leg pain or swelling or dizziness.            Acute cholecystitis.      Patient noted to have acute cholecystitis on imaging , he was treated with rocephin and flagyl WBC trended down , no fevers were reported, he was started on regualr diet, his blood cultures and urine cultures were negative, RUQ US showed cholelithiasis. HIDA scan showed acute cholecystitis. Surgery was consulted , they suggested to hold of surgery because of increased cardiac risk factors and continue with antibiotics because he was responding to it.         Patients  T2 is elevated , Patient with no anginal symptoms trace trop likely due to  of RCA and cardiomyopathy. cardio Dr. Hinton  was consulted.          RIANNA (acute kidney injury). PAtient noted to have elevated creatinine from baseline of 1.56 on admission, likely prerenal or ATN due to diuretic use, was resolved during hospital stay.        Patient is clear for discharge to follow up with PCP.

## 2020-01-04 NOTE — PROGRESS NOTE ADULT - PROBLEM SELECTOR PLAN 3
noted to have elevated creatinine from baseline of 1.56 on admission  likely prerenal or ATN due to diuretic use.  resolved.    Cr trended down. Resolved

## 2020-01-04 NOTE — PROGRESS NOTE ADULT - PROBLEM SELECTOR PLAN 8
patient takes synthroid 125mcg at home  will continue home meds  Thyroid Stimulating Hormone, Serum: 0.42 uU/mL (12.31.19 @ 05:38)

## 2020-01-04 NOTE — PROGRESS NOTE ADULT - SUBJECTIVE AND OBJECTIVE BOX
Patient denies chest pain or shortness of breath.   ROS - '         acetaminophen   Tablet .. 650 milliGRAM(s) Oral every 6 hours PRN  aspirin  chewable 81 milliGRAM(s) Oral daily  atorvastatin 80 milliGRAM(s) Oral at bedtime  cefTRIAXone   IVPB 1000 milliGRAM(s) IV Intermittent every 24 hours  clonazePAM  Tablet 1 milliGRAM(s) Oral daily  dextrose 40% Gel 15 Gram(s) Oral once PRN  dextrose 50% Injectable 12.5 Gram(s) IV Push once  dextrose 50% Injectable 25 Gram(s) IV Push once  dextrose 50% Injectable 25 Gram(s) IV Push once  FLUoxetine 20 milliGRAM(s) Oral daily  glucagon  Injectable 1 milliGRAM(s) IntraMuscular once PRN  heparin  Injectable 5000 Unit(s) SubCutaneous every 8 hours  insulin lispro (HumaLOG) corrective regimen sliding scale   SubCutaneous three times a day before meals  insulin lispro (HumaLOG) corrective regimen sliding scale   SubCutaneous at bedtime  levothyroxine 125 MICROGram(s) Oral daily  metoprolol tartrate 25 milliGRAM(s) Oral two times a day  metroNIDAZOLE  IVPB 500 milliGRAM(s) IV Intermittent every 8 hours  morphine  - Injectable 1 milliGRAM(s) IV Push every 6 hours PRN  senna 2 Tablet(s) Oral daily  simethicone 80 milliGRAM(s) Chew every 8 hours PRN                            10.8   9.52  )-----------( 194      ( 03 Jan 2020 06:43 )             32.9       Hemoglobin: 10.8 g/dL (01-03 @ 06:43)  Hemoglobin: 11.1 g/dL (01-02 @ 07:02)  Hemoglobin: 10.9 g/dL (01-01 @ 07:32)  Hemoglobin: 12.8 g/dL (12-31 @ 05:38)  Hemoglobin: 12.7 g/dL (12-30 @ 18:54)      01-03    135  |  100  |  24<H>  ----------------------------<  131<H>  4.0   |  31  |  1.18    Ca    8.9      03 Jan 2020 06:43  Phos  2.4     01-03  Mg     2.2     01-03      Creatinine Trend: 1.18<--, 1.53<--, 1.44<--, 1.37<--, 1.56<--    COAGS:           T(C): 37.2 (01-04-20 @ 04:47), Max: 37.2 (01-04-20 @ 04:47)  HR: 89 (01-04-20 @ 04:47) (83 - 97)  BP: 136/52 (01-04-20 @ 04:47) (100/60 - 136/52)  RR: 19 (01-04-20 @ 04:47) (16 - 19)  SpO2: 99% (01-04-20 @ 04:47) (97% - 99%)  Wt(kg): --    I&O's Summary    02 Jan 2020 07:01  -  03 Jan 2020 07:00  --------------------------------------------------------  IN: 550 mL / OUT: 0 mL / NET: 550 mL    03 Jan 2020 07:01  -  04 Jan 2020 06:45  --------------------------------------------------------  IN: 75 mL / OUT: 350 mL / NET: -275 mL      Gen: Appears well in NAD  HEENT:  (-)icterus (-)pallor  CV: N S1 S2 1/6 ANGELLA (+)2 Pulses B/l  Resp:  Clear to ausculatation B/L, normal effort  GI: (+) BS Soft, NT, ND  Lymph:  (-)Edema, (-)obvious lymphadenopathy  Skin: Warm to touch, Normal turgor  Psych: Appropriate mood and affect      TELEMETRY: 	  OFF        ASSESSMENT/PLAN: 	74y  Male PMH of MI s/p PPM, AS s/p TAVR 2016, HF Mild segmental LV dysfx 40-45% chronically occluded RCA (cath 2016) Complete heart block  s/p BiV ICD, cholelithiasis, anxiety, hypothyroidism, DM, Hodgkin lymphoma s/p radiation and chemo now in remission with left lung base radiation fibrosis, b/l carotid stenosis s/p carotid endarterectomy, pericardial effusion s/p pericardiocentesis, and b/l inguinal hernia, who came in to the ED due to RUQ pain radiating to the back found with cholecystitis.    - (+) HIDA scan.  - Discuss with primary team, by virtue of having a hx of CAD, CHF and DM, undergoing an intraperitoneal surgery he is considered high risk but not prohibitive for a necessary surgery since he is optimized from a cardiac standpoint.   - Surgery f/u noted, trial of antibiotics  - ASA, statin   - keep K>4, mag >2.0  - HR stable , cont BB   D/W Dr Mo

## 2020-01-04 NOTE — PROGRESS NOTE ADULT - PROBLEM SELECTOR PLAN 1
patient noted to have acute cholecystitis on imaging   cw rocephin and flagyl till monday, WBC ttrended down , no fevers  -started on regualr diet.  -blood cultures-ve Ucx -ve.  surgery , GI and cardio consults noted.  RUQ US showed cholelithiasis.  HIDA scan showed acute cholecystitis. patient noted to have acute cholecystitis on imaging   cw rocephin and flagyl till monday, WBC trended down , no fevers  -tolerating  regualr diet.  DC morphine   -blood cultures-ve Ucx -ve.  surgery , GI and cardio consults noted.  RUQ US showed cholelithiasis.  HIDA scan showed acute cholecystitis.

## 2020-01-04 NOTE — PROGRESS NOTE ADULT - PROBLEM SELECTOR PLAN 6
patient takes Actos at home   will start HSS  monitor blood glucose q6 while NPO   HbA1c 6.4
patient takes Actos at home   On HSS, monitor blood sugars  HbA1c 6.4
patient takes Actos at home   On HSS, monitor blood sugars  HbA1c 6.4
patient takes Actos at home   on start HSS    HbA1c 6.4
patient takes Actos at home   will start HSS  monitor blood glucose q6 while NPO   HbA1c 6.4

## 2020-01-04 NOTE — PROGRESS NOTE ADULT - PROBLEM SELECTOR PLAN 4
RLL 7mm lung nodule noted on CT scan which has slightly increased in size from imaging in May along with groundglass opacity in RLL  patient denies any shortness of breath  no history of smoking   pulm Dr. Shaw consuled.  pulm consult noted RLL 7mm lung nodule noted on CT scan which has slightly increased in size from imaging in May along with groundglass opacity in RLL  patient denies any shortness of breath  no history of smoking   pulm Dr. Shaw consuled.  Work up after acute status resolved

## 2020-01-04 NOTE — PROGRESS NOTE ADULT - SUBJECTIVE AND OBJECTIVE BOX
Patient seen and examined at bedside with no complaints.   Admits to flatus/BM.   Admits to mild abdominal pain, has not worsened with eating.   Denies nausea/ vomiting.   Tolerating diet.    Vital Signs Last 24 Hrs  T(F): 98.9 (01-04-20 @ 07:36), Max: 99 (01-04-20 @ 04:47)  HR: 86 (01-04-20 @ 07:36)  BP: 147/66 (01-04-20 @ 07:36)  RR: 17 (01-04-20 @ 07:36)  SpO2: 99% (01-04-20 @ 07:36)  POCT Blood Glucose.: 127 mg/dL (03 Jan 2020 16:41)    GENERAL: Alert, NAD  CHEST/LUNG: respirations nonlabored  ABDOMEN: soft, mild diffuse abdominal tenderness, moderate distention    I&O's Detail    03 Jan 2020 07:01  -  04 Jan 2020 07:00  --------------------------------------------------------  IN:    Oral Fluid: 75 mL  Total IN: 75 mL    OUT:    Voided: 350 mL  Total OUT: 350 mL    Total NET: -275 mL    LABS:                        11.2   7.29  )-----------( 206      ( 04 Jan 2020 08:03 )             33.8     01-04    137  |  103  |  18  ----------------------------<  144<H>  3.9   |  27  |  0.99    Ca    8.9      04 Jan 2020 08:03  Phos  2.4     01-04  Mg     2.4     01-04

## 2020-01-04 NOTE — PROGRESS NOTE ADULT - PROBLEM SELECTOR PLAN 9
IMPROVE VTE Individual Risk Assessment  RISK                                                                Points  [  ] Previous VTE                                                  3  [  ] Thrombophilia                                               2  [  ] Lower limb paralysis                                      2       (unable to hold up >15 seconds)    [  ] Current Cancer                                              2        (within 6 months)  [  ] Immobilization > 24 hrs                                1  [  ] ICU/CCU stay > 24 hours                              1  [X ] Age > 60                                                      1    IMPROVE VTE Score  1    heparin for DVT prophylaxis

## 2020-01-04 NOTE — DISCHARGE NOTE PROVIDER - NSDCMRMEDTOKEN_GEN_ALL_CORE_FT
acetaminophen 325 mg oral tablet: 2 tab(s) orally every 6 hours, As needed, Mild Pain (1 - 3)  Actos 30 mg oral tablet: 2 tab(s) orally once a day  Aspirin Enteric Coated 81 mg oral delayed release tablet: 1 tab(s) orally once a day  atorvastatin 80 mg oral tablet: 1 tab(s) orally once a day (at bedtime)  KlonoPIN 1 mg oral tablet:  orally once a day  Lasix 40 mg oral tablet: 1 tab(s) orally once a day   levothyroxine 125 mcg (0.125 mg) oral capsule: 1 cap(s) orally once a day  (home and hospital)  losartan 25 mg oral tablet: 1 tab(s) orally once a day  metoprolol succinate 25 mg oral tablet, extended release: 3 tab(s) orally once a day  PROzac 20 mg oral capsule: 1 cap(s) orally once a day acetaminophen 325 mg oral tablet: 2 tab(s) orally every 6 hours, As needed, Mild Pain (1 - 3)  Actos 30 mg oral tablet: 2 tab(s) orally once a day  Aspirin Enteric Coated 81 mg oral delayed release tablet: 1 tab(s) orally once a day  atorvastatin 80 mg oral tablet: 1 tab(s) orally once a day (at bedtime)  cefuroxime 500 mg oral tablet: 1 tab(s) orally 2 times a day   Flagyl 500 mg oral tablet: 1 tab(s) orally 3 times a day   KlonoPIN 1 mg oral tablet:  orally once a day  Lasix 40 mg oral tablet: 1 tab(s) orally once a day   levothyroxine 125 mcg (0.125 mg) oral capsule: 1 cap(s) orally once a day  (home and hospital)  losartan 25 mg oral tablet: 1 tab(s) orally once a day  metoprolol succinate 25 mg oral tablet, extended release: 3 tab(s) orally once a day  PROzac 20 mg oral capsule: 1 cap(s) orally once a day acetaminophen 325 mg oral tablet: 2 tab(s) orally every 6 hours, As needed, Mild Pain (1 - 3)  Actos 30 mg oral tablet: 2 tab(s) orally once a day  Aspirin Enteric Coated 81 mg oral delayed release tablet: 1 tab(s) orally once a day  atorvastatin 80 mg oral tablet: 1 tab(s) orally once a day (at bedtime)  cefuroxime 500 mg oral tablet: 1 tab(s) orally 2 times a day   Flagyl 500 mg oral tablet: 1 tab(s) orally 3 times a day   KlonoPIN 1 mg oral tablet:  orally once a day  levothyroxine 125 mcg (0.125 mg) oral capsule: 1 cap(s) orally once a day  (home and hospital)  losartan 25 mg oral tablet: 1 tab(s) orally once a day  metoprolol succinate 25 mg oral tablet, extended release: 1 tab(s) orally once a day   PROzac 20 mg oral capsule: 1 cap(s) orally once a day

## 2020-01-04 NOTE — PROGRESS NOTE ADULT - PROBLEM SELECTOR PROBLEM 6
Type 2 diabetes mellitus without complication

## 2020-01-04 NOTE — DISCHARGE NOTE PROVIDER - NSDCPNSUBOBJ_GEN_ALL_CORE
74y old  Male who presents with a chief complaint of left sided chest and flank pain. Found to have acute cholecystitis. Treated conservatively with IV antibiotics.        Today    Patient was seen and examined at bedside today    Reports few episodes of loose stool, attributes to senna and potasium supplement     Denies any nausea, vomiting or abd pain     Requests to be discharged today        REVIEW OF SYSTEMS: denies fever, chills, SOB, palpitations, chest pain, abdominal pain, nausea, vomiting, diarrhea, constipation, dizziness        MEDICATIONS  (STANDING):    aspirin  chewable 81 milliGRAM(s) Oral daily    atorvastatin 80 milliGRAM(s) Oral at bedtime    cefTRIAXone   IVPB 1000 milliGRAM(s) IV Intermittent every 24 hours    clonazePAM  Tablet 1 milliGRAM(s) Oral daily    dextrose 50% Injectable 12.5 Gram(s) IV Push once    dextrose 50% Injectable 25 Gram(s) IV Push once    dextrose 50% Injectable 25 Gram(s) IV Push once    FLUoxetine 20 milliGRAM(s) Oral daily    heparin  Injectable 5000 Unit(s) SubCutaneous every 8 hours    insulin lispro (HumaLOG) corrective regimen sliding scale   SubCutaneous three times a day before meals    insulin lispro (HumaLOG) corrective regimen sliding scale   SubCutaneous at bedtime    levothyroxine 125 MICROGram(s) Oral daily    metoprolol tartrate 25 milliGRAM(s) Oral two times a day    metroNIDAZOLE  IVPB 500 milliGRAM(s) IV Intermittent every 8 hours    senna 2 Tablet(s) Oral daily        MEDICATIONS  (PRN):    acetaminophen   Tablet .. 650 milliGRAM(s) Oral every 6 hours PRN Moderate Pain (4 - 6)    dextrose 40% Gel 15 Gram(s) Oral once PRN Blood Glucose LESS THAN 70 milliGRAM(s)/deciliter    glucagon  Injectable 1 milliGRAM(s) IntraMuscular once PRN Glucose LESS THAN 70 milligrams/deciliter    simethicone 80 milliGRAM(s) Chew every 8 hours PRN Gas            PHYSICAL EXAM:    GENERAL: NAD, well-groomed, well-developed    HEAD:  Atraumatic, Normocephalic    EYES: EOMI, PERRLA, conjunctiva and sclera clear    ENMT: No tonsillar erythema, exudates, or enlargement; Moist mucous membranes, Good dentition, No lesions    NECK: Supple, No JVD, Normal thyroid    NERVOUS SYSTEM:  Alert & Oriented X3, Good concentration; Motor Strength 5/5 B/L upper and lower extremities; DTRs 2+ intact and symmetric    CHEST/LUNG: Clear to percussion bilaterally; No rales, rhonchi, wheezing, or rubs    HEART: Regular rate and rhythm; No murmurs, rubs, or gallops    ABDOMEN: Soft, Nontender, Nondistended; Bowel sounds present    EXTREMITIES:  2+ Peripheral Pulses, No clubbing, cyanosis, or edema    LYMPH: No lymphadenopathy noted    SKIN: No rashes or lesions    LABS:                            11.3     8.05  )-----------( 231      ( 05 Jan 2020 08:29 )               34.2         01-05        139  |  106  |  14    ----------------------------<  149<H>    4.1   |  27  |  1.00        Ca    8.5      05 Jan 2020 08:29    Phos  2.9     01-05    Mg     2.1     01-05                    CAPILLARY BLOOD GLUCOSE            POCT Blood Glucose.: 153 mg/dL (05 Jan 2020 08:15)    POCT Blood Glucose.: 139 mg/dL (04 Jan 2020 21:22)    POCT Blood Glucose.: 130 mg/dL (04 Jan 2020 16:35) 74y old  Male who presents with a chief complaint of left sided chest and flank pain. Found to have acute cholecystitis. Treated conservatively with IV antibiotics.        Today    Patient was seen and examined at bedside today    Reports few episodes of loose stool, attributes to senna and potasium supplement     Denies any nausea, vomiting or abd pain     Requests to be discharged today        REVIEW OF SYSTEMS: denies fever, chills, SOB, palpitations, chest pain, abdominal pain, nausea, vomiting, diarrhea, constipation, dizziness        MEDICATIONS  (STANDING):    aspirin  chewable 81 milliGRAM(s) Oral daily    atorvastatin 80 milliGRAM(s) Oral at bedtime    cefTRIAXone   IVPB 1000 milliGRAM(s) IV Intermittent every 24 hours    clonazePAM  Tablet 1 milliGRAM(s) Oral daily    dextrose 50% Injectable 12.5 Gram(s) IV Push once    dextrose 50% Injectable 25 Gram(s) IV Push once    dextrose 50% Injectable 25 Gram(s) IV Push once    FLUoxetine 20 milliGRAM(s) Oral daily    heparin  Injectable 5000 Unit(s) SubCutaneous every 8 hours    insulin lispro (HumaLOG) corrective regimen sliding scale   SubCutaneous three times a day before meals    insulin lispro (HumaLOG) corrective regimen sliding scale   SubCutaneous at bedtime    levothyroxine 125 MICROGram(s) Oral daily    metoprolol tartrate 25 milliGRAM(s) Oral two times a day    metroNIDAZOLE  IVPB 500 milliGRAM(s) IV Intermittent every 8 hours    senna 2 Tablet(s) Oral daily        MEDICATIONS  (PRN):    acetaminophen   Tablet .. 650 milliGRAM(s) Oral every 6 hours PRN Moderate Pain (4 - 6)    dextrose 40% Gel 15 Gram(s) Oral once PRN Blood Glucose LESS THAN 70 milliGRAM(s)/deciliter    glucagon  Injectable 1 milliGRAM(s) IntraMuscular once PRN Glucose LESS THAN 70 milligrams/deciliter    simethicone 80 milliGRAM(s) Chew every 8 hours PRN Gas            PHYSICAL EXAM:    GENERAL: NAD    NERVOUS SYSTEM:  Alert & Oriented X3, Good concentration; Motor Strength 5/5 B/L upper and lower extremities    CHEST/LUNG: Clear to auscultation bilaterally; No rales, rhonchi, wheezing, or rubs    HEART: Regular rate and rhythm; No murmurs, rubs, or gallops    ABDOMEN: Soft, Nontender, Nondistended; Bowel sounds present    EXTREMITIES:  2+ Peripheral Pulses, No clubbing, cyanosis, or edema    SKIN: No rashes or lesions    LABS:                            11.3     8.05  )-----------( 231      ( 05 Jan 2020 08:29 )               34.2         01-05        139  |  106  |  14    ----------------------------<  149<H>    4.1   |  27  |  1.00        Ca    8.5      05 Jan 2020 08:29    Phos  2.9     01-05    Mg     2.1     01-05            Assessment and plan:    1. Acute cholecystitis     Symptoms resolved     Cont Ceftin and flagyl until jan 13     Given extensive cardiac history will be high risk for any procedure    Surgery consult appreciated    Follow up with PCP        2. CHF, S/p BiV ICD, TAVR, CAD (  of RCA)    EF 45%     Will cont BB     Cont Aspirin and statin     Follow up with Cardiology as outpatient         4. Lung nodule    Will be followed as outpatient for PET scan and possible biopsy     H/O Hodgkin lymphoma s/p radiation and chemo now in remission with left lung base radiation fibrosis        5. b/l carotid stenosis s/p carotid endarterectomy    Cont Aspirin and statin        6. DM    Cont Home meds         7. Hypothyroidism     Cont Synthroid .            Patient was stable to be discharged, cleared from surgery, antibiotics sent to pharmacy, discharge plan discussed with patient and his wife.

## 2020-01-04 NOTE — PROGRESS NOTE ADULT - PROBLEM SELECTOR PLAN 7
patient takes lasix at home  will hold on lasix   hold on IVF  f/u ECHO
patient takes lasix at home.   hold on lasix   hold on IVF
patient takes lasix at home.   hold on lasix   hold on IVF  f/u ECHO

## 2020-01-04 NOTE — DISCHARGE NOTE PROVIDER - NSDCFUSCHEDAPPT_GEN_ALL_CORE_FT
FER HERNANDEZ ; 02/19/2020 ; [facility] 388-41 [phone] FER HERNANDEZ ; 02/19/2020 ; [facility] 553-33 83gy [facility]ER HERNANDEZ ; 02/19/2020 ; [facility] 785-93 78sq [facility]ER HERNANDEZ ; 02/19/2020 ; [facility] 399-71 29uf FER HERNANDEZ ; 02/19/2020 ; [facility] [id number] 89xr

## 2020-01-04 NOTE — PROGRESS NOTE ADULT - PROBLEM SELECTOR PROBLEM 7
Systolic CHF, chronic

## 2020-01-04 NOTE — PROGRESS NOTE ADULT - PROBLEM SELECTOR PROBLEM 3
RIANNA (acute kidney injury)

## 2020-01-04 NOTE — PROGRESS NOTE ADULT - PROBLEM SELECTOR PLAN 10
GOC discussed with patient and wife   Patient is FULL CODE

## 2020-01-04 NOTE — PROGRESS NOTE ADULT - PROBLEM SELECTOR PLAN 5
history of HTN  patient takes metoprolol 25 TID, losartan 25mg daily, klonopin 1mg daily at home   losartan on hold due to RIANNA  continue klonopin and metoprolol 25 mg BID  monitor BP and adjust meds as needed

## 2020-01-04 NOTE — DISCHARGE NOTE PROVIDER - NSDCCPCAREPLAN_GEN_ALL_CORE_FT
PRINCIPAL DISCHARGE DIAGNOSIS  Diagnosis: Cholecystitis  Assessment and Plan of Treatment: You were noted to have acute cholecystitis on imaging , you were treated with rocephin and flagyl WBC ttrended down , no fevers were reported, he was started on regualr diet, his blood cultures and urine cultures were negative, RUQ US showed cholelithiasis.  HIDA scan showed acute cholecystitis.   Surgery was consulted , they suggested to hold of foer surgery because of increased catrdiac risk factors and continue with antibiotics because he was responding to it.        SECONDARY DISCHARGE DIAGNOSES  Diagnosis: HTN (hypertension)  Assessment and Plan of Treatment: Blood Pressure Control , Please continue current medication regimen, and follow up with your PCP  - You should continue on the current antihypertensive regimen regularly.  - You blood pressure should be within 140-120/80-90.  - You should follow-up with your PCP within 1 week of your discharge for routine blood pressure monitoring at your next visit.  - Notify your doctor if you have any of the following symptoms:   (Dizziness, Lightheadedness, Blurry vision, Headache, Chest pain, Shortness of breath.)  - You should maintain healthy lifestyle by eating healthy low salt diet, avoid fatty food, weight loss, exercise regularly as tolerated 30 mins X 3 time per week.      Diagnosis: Systolic CHF, chronic  Assessment and Plan of Treatment: You have Hx of heart failure, we held lasix because of kidney function, you are recommended to follwo up with your PCP and cardiologist for further recommendatiosn. PRINCIPAL DISCHARGE DIAGNOSIS  Diagnosis: Cholecystitis  Assessment and Plan of Treatment: You were noted to have acute cholecystitis on imaging , you were treated with rocephin and flagyl WBC ttrended down , no fevers were reported you were started on regualr diet, blood cultures and urine cultures were negative, RUQ US showed cholelithiasis.  HIDA scan showed acute cholecystitis.   Surgery was consulted , they suggested to hold of surgery because of increased catrdiac risk factors and continue with antibiotics because you were responding to it.  You were tolrating regular diet, you will be discharged on PO antibiotics Ceftin and Flagyl. Continue to take antibiotics until jan 13. Please follow up with our PCP within 1 week of your discharge.         SECONDARY DISCHARGE DIAGNOSES  Diagnosis: Systolic CHF, chronic  Assessment and Plan of Treatment: You have Hx of heart failure, we held lasix because of kidney function, you are recommended to follow up with your PCP and cardiologist for further recommendation. Continue metoprolol.    Diagnosis: Lung nodule  Assessment and Plan of Treatment: Follow up with Pulmonologist as outpatient for further work up.    Diagnosis: HTN (hypertension)  Assessment and Plan of Treatment: Blood Pressure Control , Please continue current medication regimen, and follow up with your PCP  - You should continue on the current antihypertensive regimen regularly.  - You blood pressure should be within 140-120/80-90.  - You should follow-up with your PCP within 1 week of your discharge for routine blood pressure monitoring at your next visit.  - Notify your doctor if you have any of the following symptoms:   (Dizziness, Lightheadedness, Blurry vision, Headache, Chest pain, Shortness of breath.)  - You should maintain healthy lifestyle by eating healthy low salt diet, avoid fatty food, weight loss, exercise regularly as tolerated 30 mins X 3 time per week.      Diagnosis: Systolic CHF, chronic  Assessment and Plan of Treatment:

## 2020-01-04 NOTE — PROGRESS NOTE ADULT - PROBLEM SELECTOR PLAN 2
T2 is elevated - Patient with no anginal symptoms trace trop likely due to  of RCA and cardiomyopathy. Dr hinton  cardio Dr. Hinton consulted. Trop trended down  Patient with no anginal symptoms trace trop likely due to  of RCA and cardiomyopathy.  cardio Dr. Hinton consult appreciated  Will cont Aspirin and statin

## 2020-01-04 NOTE — PROGRESS NOTE ADULT - SUBJECTIVE AND OBJECTIVE BOX
PGY 1 Note discussed with supervising resident and primary attending    Patient is a 74y old  Male who presents with a chief complaint of left sided chest and flank pain (04 Jan 2020 06:42)      INTERVAL HPI/OVERNIGHT EVENTS: Patient was seen and examined at bed side , no new complains notes. will continue IV antibiotic treatment till mmonday. no fever reported, leukocytosis resolved.    MEDICATIONS  (STANDING):  aspirin  chewable 81 milliGRAM(s) Oral daily  atorvastatin 80 milliGRAM(s) Oral at bedtime  cefTRIAXone   IVPB 1000 milliGRAM(s) IV Intermittent every 24 hours  clonazePAM  Tablet 1 milliGRAM(s) Oral daily  dextrose 50% Injectable 12.5 Gram(s) IV Push once  dextrose 50% Injectable 25 Gram(s) IV Push once  dextrose 50% Injectable 25 Gram(s) IV Push once  FLUoxetine 20 milliGRAM(s) Oral daily  heparin  Injectable 5000 Unit(s) SubCutaneous every 8 hours  insulin lispro (HumaLOG) corrective regimen sliding scale   SubCutaneous three times a day before meals  insulin lispro (HumaLOG) corrective regimen sliding scale   SubCutaneous at bedtime  levothyroxine 125 MICROGram(s) Oral daily  metoprolol tartrate 25 milliGRAM(s) Oral two times a day  metroNIDAZOLE  IVPB 500 milliGRAM(s) IV Intermittent every 8 hours  potassium phosphate / sodium phosphate powder 1 Packet(s) Oral three times a day  senna 2 Tablet(s) Oral daily    MEDICATIONS  (PRN):  acetaminophen   Tablet .. 650 milliGRAM(s) Oral every 6 hours PRN Moderate Pain (4 - 6)  dextrose 40% Gel 15 Gram(s) Oral once PRN Blood Glucose LESS THAN 70 milliGRAM(s)/deciliter  glucagon  Injectable 1 milliGRAM(s) IntraMuscular once PRN Glucose LESS THAN 70 milligrams/deciliter  morphine  - Injectable 1 milliGRAM(s) IV Push every 6 hours PRN Severe Pain (7 - 10)  simethicone 80 milliGRAM(s) Chew every 8 hours PRN Gas      __________________________________________________  REVIEW OF SYSTEMS:    CONSTITUTIONAL: No fever,   EYES: no acute visual disturbances  NECK: No pain or stiffness  RESPIRATORY: No cough; No shortness of breath  CARDIOVASCULAR: No chest pain, no palpitations  GASTROINTESTINAL: No pain. No nausea or vomiting; No diarrhea   NEUROLOGICAL: No headache or numbness, no tremors  MUSCULOSKELETAL: No joint pain, no muscle pain  GENITOURINARY: no dysuria, no frequency, no hesitancy  PSYCHIATRY: no depression , no anxiety  ALL OTHER  ROS negative        Vital Signs Last 24 Hrs  T(C): 37.2 (04 Jan 2020 07:36), Max: 37.2 (04 Jan 2020 04:47)  T(F): 98.9 (04 Jan 2020 07:36), Max: 99 (04 Jan 2020 04:47)  HR: 86 (04 Jan 2020 07:36) (83 - 97)  BP: 147/66 (04 Jan 2020 07:36) (100/60 - 147/66)  BP(mean): --  RR: 17 (04 Jan 2020 07:36) (16 - 19)  SpO2: 99% (04 Jan 2020 07:36) (97% - 99%)    ________________________________________________  PHYSICAL EXAM:  GENERAL: NAD  HEENT: Normocephalic;  conjunctivae and sclerae clear; moist mucous membranes;   NECK : supple  CHEST/LUNG: Clear to auscultation bilaterally with good air entry   HEART: S1 S2  regular; no murmurs, gallops or rubs  ABDOMEN: Soft, Nontender, distended; Bowel sounds present  EXTREMITIES: no cyanosis; no edema; no calf tenderness  SKIN: warm and dry; no rash  NERVOUS SYSTEM:  Awake and alert; Oriented  to place, person and time ; no new deficits    _________________________________________________  LABS:                        11.2   7.29  )-----------( 206      ( 04 Jan 2020 08:03 )             33.8     01-04    137  |  103  |  18  ----------------------------<  144<H>  3.9   |  27  |  0.99    Ca    8.9      04 Jan 2020 08:03  Phos  2.4     01-04  Mg     2.4     01-04          CAPILLARY BLOOD GLUCOSE      POCT Blood Glucose.: 127 mg/dL (03 Jan 2020 16:41)  POCT Blood Glucose.: 124 mg/dL (03 Jan 2020 11:58)        RADIOLOGY & ADDITIONAL TESTS:    Imaging Personally Reviewed:  YES/NO    Consultant(s) Notes Reviewed:   YES/ No    Care Discussed with Consultants :     Plan of care was discussed with patient and /or primary care giver; all questions and concerns were addressed and care was aligned with patient's wishes. PGY 1 Note discussed with supervising resident and primary attending    Patient is a 74y old  Male who presents with a chief complaint of left sided chest and flank pain (04 Jan 2020 06:42)      INTERVAL HPI/OVERNIGHT EVENTS: Patient was seen and examined at bed side , no new complains notes. will continue IV antibiotic treatment till Monday. no fever reported, leukocytosis resolved.    MEDICATIONS  (STANDING):  aspirin  chewable 81 milliGRAM(s) Oral daily  atorvastatin 80 milliGRAM(s) Oral at bedtime  cefTRIAXone   IVPB 1000 milliGRAM(s) IV Intermittent every 24 hours  clonazePAM  Tablet 1 milliGRAM(s) Oral daily  dextrose 50% Injectable 12.5 Gram(s) IV Push once  dextrose 50% Injectable 25 Gram(s) IV Push once  dextrose 50% Injectable 25 Gram(s) IV Push once  FLUoxetine 20 milliGRAM(s) Oral daily  heparin  Injectable 5000 Unit(s) SubCutaneous every 8 hours  insulin lispro (HumaLOG) corrective regimen sliding scale   SubCutaneous three times a day before meals  insulin lispro (HumaLOG) corrective regimen sliding scale   SubCutaneous at bedtime  levothyroxine 125 MICROGram(s) Oral daily  metoprolol tartrate 25 milliGRAM(s) Oral two times a day  metroNIDAZOLE  IVPB 500 milliGRAM(s) IV Intermittent every 8 hours  potassium phosphate / sodium phosphate powder 1 Packet(s) Oral three times a day  senna 2 Tablet(s) Oral daily    MEDICATIONS  (PRN):  acetaminophen   Tablet .. 650 milliGRAM(s) Oral every 6 hours PRN Moderate Pain (4 - 6)  dextrose 40% Gel 15 Gram(s) Oral once PRN Blood Glucose LESS THAN 70 milliGRAM(s)/deciliter  glucagon  Injectable 1 milliGRAM(s) IntraMuscular once PRN Glucose LESS THAN 70 milligrams/deciliter  morphine  - Injectable 1 milliGRAM(s) IV Push every 6 hours PRN Severe Pain (7 - 10)  simethicone 80 milliGRAM(s) Chew every 8 hours PRN Gas      __________________________________________________  REVIEW OF SYSTEMS:    CONSTITUTIONAL: No fever,   EYES: no acute visual disturbances  NECK: No pain or stiffness  RESPIRATORY: No cough; No shortness of breath  CARDIOVASCULAR: No chest pain, no palpitations  GASTROINTESTINAL: No pain. No nausea or vomiting; No diarrhea   NEUROLOGICAL: No headache or numbness, no tremors  MUSCULOSKELETAL: No joint pain, no muscle pain  GENITOURINARY: no dysuria, no frequency, no hesitancy  PSYCHIATRY: no depression , no anxiety  ALL OTHER  ROS negative        Vital Signs Last 24 Hrs  T(C): 37.2 (04 Jan 2020 07:36), Max: 37.2 (04 Jan 2020 04:47)  T(F): 98.9 (04 Jan 2020 07:36), Max: 99 (04 Jan 2020 04:47)  HR: 86 (04 Jan 2020 07:36) (83 - 97)  BP: 147/66 (04 Jan 2020 07:36) (100/60 - 147/66)  BP(mean): --  RR: 17 (04 Jan 2020 07:36) (16 - 19)  SpO2: 99% (04 Jan 2020 07:36) (97% - 99%)    ________________________________________________  PHYSICAL EXAM:  GENERAL: NAD  HEENT: Normocephalic;  conjunctivae and sclerae clear; moist mucous membranes;   NECK : supple  CHEST/LUNG: Clear to auscultation bilaterally with good air entry   HEART: S1 S2  regular; no murmurs, gallops or rubs  ABDOMEN: Soft, very mildly tender in RUQ, distended; Bowel sounds present  EXTREMITIES: no cyanosis; no edema; no calf tenderness  SKIN: warm and dry; no rash  NERVOUS SYSTEM:  Awake and alert; Oriented  to place, person and time ; no new deficits    _________________________________________________  LABS:                        11.2   7.29  )-----------( 206      ( 04 Jan 2020 08:03 )             33.8     01-04    137  |  103  |  18  ----------------------------<  144<H>  3.9   |  27  |  0.99    Ca    8.9      04 Jan 2020 08:03  Phos  2.4     01-04  Mg     2.4     01-04          CAPILLARY BLOOD GLUCOSE      POCT Blood Glucose.: 127 mg/dL (03 Jan 2020 16:41)  POCT Blood Glucose.: 124 mg/dL (03 Jan 2020 11:58)        RADIOLOGY & ADDITIONAL TESTS:    Imaging Personally Reviewed:  YES/NO    Consultant(s) Notes Reviewed:   YES/ No    Care Discussed with Consultants :     Plan of care was discussed with patient and /or primary care giver; all questions and concerns were addressed and care was aligned with patient's wishes.

## 2020-01-05 ENCOUNTER — TRANSCRIPTION ENCOUNTER (OUTPATIENT)
Age: 75
End: 2020-01-05

## 2020-01-05 VITALS
TEMPERATURE: 98 F | OXYGEN SATURATION: 95 % | SYSTOLIC BLOOD PRESSURE: 102 MMHG | DIASTOLIC BLOOD PRESSURE: 63 MMHG | HEART RATE: 74 BPM | RESPIRATION RATE: 18 BRPM

## 2020-01-05 DIAGNOSIS — I50.22 CHRONIC SYSTOLIC (CONGESTIVE) HEART FAILURE: ICD-10-CM

## 2020-01-05 LAB
ANION GAP SERPL CALC-SCNC: 6 MMOL/L — SIGNIFICANT CHANGE UP (ref 5–17)
BUN SERPL-MCNC: 14 MG/DL — SIGNIFICANT CHANGE UP (ref 7–18)
CALCIUM SERPL-MCNC: 8.5 MG/DL — SIGNIFICANT CHANGE UP (ref 8.4–10.5)
CHLORIDE SERPL-SCNC: 106 MMOL/L — SIGNIFICANT CHANGE UP (ref 96–108)
CO2 SERPL-SCNC: 27 MMOL/L — SIGNIFICANT CHANGE UP (ref 22–31)
CREAT SERPL-MCNC: 1 MG/DL — SIGNIFICANT CHANGE UP (ref 0.5–1.3)
CULTURE RESULTS: SIGNIFICANT CHANGE UP
CULTURE RESULTS: SIGNIFICANT CHANGE UP
GLUCOSE BLDC GLUCOMTR-MCNC: 153 MG/DL — HIGH (ref 70–99)
GLUCOSE SERPL-MCNC: 149 MG/DL — HIGH (ref 70–99)
HCT VFR BLD CALC: 34.2 % — LOW (ref 39–50)
HGB BLD-MCNC: 11.3 G/DL — LOW (ref 13–17)
MAGNESIUM SERPL-MCNC: 2.1 MG/DL — SIGNIFICANT CHANGE UP (ref 1.6–2.6)
MCHC RBC-ENTMCNC: 32.2 PG — SIGNIFICANT CHANGE UP (ref 27–34)
MCHC RBC-ENTMCNC: 33 GM/DL — SIGNIFICANT CHANGE UP (ref 32–36)
MCV RBC AUTO: 97.4 FL — SIGNIFICANT CHANGE UP (ref 80–100)
NRBC # BLD: 0 /100 WBCS — SIGNIFICANT CHANGE UP (ref 0–0)
PHOSPHATE SERPL-MCNC: 2.9 MG/DL — SIGNIFICANT CHANGE UP (ref 2.5–4.5)
PLATELET # BLD AUTO: 231 K/UL — SIGNIFICANT CHANGE UP (ref 150–400)
POTASSIUM SERPL-MCNC: 4.1 MMOL/L — SIGNIFICANT CHANGE UP (ref 3.5–5.3)
POTASSIUM SERPL-SCNC: 4.1 MMOL/L — SIGNIFICANT CHANGE UP (ref 3.5–5.3)
RBC # BLD: 3.51 M/UL — LOW (ref 4.2–5.8)
RBC # FLD: 15.6 % — HIGH (ref 10.3–14.5)
SODIUM SERPL-SCNC: 139 MMOL/L — SIGNIFICANT CHANGE UP (ref 135–145)
SPECIMEN SOURCE: SIGNIFICANT CHANGE UP
SPECIMEN SOURCE: SIGNIFICANT CHANGE UP
WBC # BLD: 8.05 K/UL — SIGNIFICANT CHANGE UP (ref 3.8–10.5)
WBC # FLD AUTO: 8.05 K/UL — SIGNIFICANT CHANGE UP (ref 3.8–10.5)

## 2020-01-05 PROCEDURE — 80053 COMPREHEN METABOLIC PANEL: CPT

## 2020-01-05 PROCEDURE — 81001 URINALYSIS AUTO W/SCOPE: CPT

## 2020-01-05 PROCEDURE — 99285 EMERGENCY DEPT VISIT HI MDM: CPT | Mod: 25

## 2020-01-05 PROCEDURE — 82607 VITAMIN B-12: CPT

## 2020-01-05 PROCEDURE — 71275 CT ANGIOGRAPHY CHEST: CPT

## 2020-01-05 PROCEDURE — 87040 BLOOD CULTURE FOR BACTERIA: CPT

## 2020-01-05 PROCEDURE — 83690 ASSAY OF LIPASE: CPT

## 2020-01-05 PROCEDURE — 78226 HEPATOBILIARY SYSTEM IMAGING: CPT

## 2020-01-05 PROCEDURE — 82550 ASSAY OF CK (CPK): CPT

## 2020-01-05 PROCEDURE — 84484 ASSAY OF TROPONIN QUANT: CPT

## 2020-01-05 PROCEDURE — 84100 ASSAY OF PHOSPHORUS: CPT

## 2020-01-05 PROCEDURE — 93005 ELECTROCARDIOGRAM TRACING: CPT

## 2020-01-05 PROCEDURE — 83036 HEMOGLOBIN GLYCOSYLATED A1C: CPT

## 2020-01-05 PROCEDURE — 80061 LIPID PANEL: CPT

## 2020-01-05 PROCEDURE — 85027 COMPLETE CBC AUTOMATED: CPT

## 2020-01-05 PROCEDURE — 87086 URINE CULTURE/COLONY COUNT: CPT

## 2020-01-05 PROCEDURE — 82553 CREATINE MB FRACTION: CPT

## 2020-01-05 PROCEDURE — A9537: CPT

## 2020-01-05 PROCEDURE — 82306 VITAMIN D 25 HYDROXY: CPT

## 2020-01-05 PROCEDURE — 85730 THROMBOPLASTIN TIME PARTIAL: CPT

## 2020-01-05 PROCEDURE — 74174 CTA ABD&PLVS W/CONTRAST: CPT

## 2020-01-05 PROCEDURE — 85610 PROTHROMBIN TIME: CPT

## 2020-01-05 PROCEDURE — 83880 ASSAY OF NATRIURETIC PEPTIDE: CPT

## 2020-01-05 PROCEDURE — 80048 BASIC METABOLIC PNL TOTAL CA: CPT

## 2020-01-05 PROCEDURE — 71045 X-RAY EXAM CHEST 1 VIEW: CPT

## 2020-01-05 PROCEDURE — 76705 ECHO EXAM OF ABDOMEN: CPT

## 2020-01-05 PROCEDURE — 82962 GLUCOSE BLOOD TEST: CPT

## 2020-01-05 PROCEDURE — 83735 ASSAY OF MAGNESIUM: CPT

## 2020-01-05 PROCEDURE — 84443 ASSAY THYROID STIM HORMONE: CPT

## 2020-01-05 PROCEDURE — 36415 COLL VENOUS BLD VENIPUNCTURE: CPT

## 2020-01-05 PROCEDURE — 99239 HOSP IP/OBS DSCHRG MGMT >30: CPT

## 2020-01-05 RX ORDER — METOPROLOL TARTRATE 50 MG
1 TABLET ORAL
Qty: 30 | Refills: 0
Start: 2020-01-05 | End: 2020-02-03

## 2020-01-05 RX ORDER — CEFUROXIME AXETIL 250 MG
1 TABLET ORAL
Qty: 16 | Refills: 0
Start: 2020-01-05 | End: 2020-01-12

## 2020-01-05 RX ORDER — METRONIDAZOLE 500 MG
1 TABLET ORAL
Qty: 24 | Refills: 0
Start: 2020-01-05 | End: 2020-01-12

## 2020-01-05 RX ADMIN — Medication 100 MILLIGRAM(S): at 08:25

## 2020-01-05 RX ADMIN — Medication 20 MILLIGRAM(S): at 12:27

## 2020-01-05 RX ADMIN — Medication 1 PACKET(S): at 05:08

## 2020-01-05 RX ADMIN — Medication 25 MILLIGRAM(S): at 05:08

## 2020-01-05 RX ADMIN — Medication 125 MICROGRAM(S): at 05:08

## 2020-01-05 RX ADMIN — Medication 1: at 08:25

## 2020-01-05 RX ADMIN — Medication 100 MILLIGRAM(S): at 16:44

## 2020-01-05 RX ADMIN — Medication 1 MILLIGRAM(S): at 12:27

## 2020-01-05 RX ADMIN — Medication 81 MILLIGRAM(S): at 12:27

## 2020-01-05 NOTE — PROGRESS NOTE ADULT - ATTENDING COMMENTS
Patient seen and examined.  Agree with above.   High risk for planned surgery but optimized from cv perspective    Brian Mo MD
Patient seen and examined.  Agree with above.   Surgical follow up noted - conservative management per surgery   No further inpatient cardiac workup needed at this time.   Treatment of diarrhea per primary team    Brian Mo MD
Patient seen and examined.  Agree with above.   Would treat patient as high risk for any planned procedures  Follow up surgery    Brian Mo MD
Patient seen . Agree with above
Patient seen and examined, his wife was at bedside.   No new complains, except for being anxious.   Had HIDA scan today, showing acute cholecystitis  Spoke to Dr. Kaiser - general surgeon. Per Dr. Kaiser recommendation, patient should have PTC done by IR team.   Patient requested to have second opinion with Dr. Peralta.     Vital Signs Last 24 Hrs  T(C): 36.4 (02 Jan 2020 08:00), Max: 37.6 (01 Jan 2020 15:42)  T(F): 97.6 (02 Jan 2020 08:00), Max: 99.6 (01 Jan 2020 15:42)  HR: 85 (02 Jan 2020 08:00) (83 - 90)  BP: 98/55 (02 Jan 2020 08:00) (97/57 - 102/63)  BP(mean): --  RR: 16 (02 Jan 2020 08:00) (16 - 18)  SpO2: 97% (02 Jan 2020 08:00) (95% - 97%)    1. Acute cholecystitis- clinically improving, afebrile, leukocytosis trending down. Advance diet to full liquid, c/w Ciprofloxacin and Metronidazole.   2. Hx of CAD with stents: c/w Aspirin and atorvastatin   3. Hx of CHF not in exacerbation: pt is optimized for surgery. c/w Metoprolol  4. RIANNA vs CKD ????  5. DVT prophylaxis     Plan of care discussed with patient in detail.
Patient is a 74y old  Male who is admitted for acute cholecystitis    Today  patient was seen and examined with wife at bedside   Reports abd pain has improved but still has some pain in RUQ  Denies any N/V    REVIEW OF SYSTEMS: denies fever, chills, SOB, palpitations, chest pain    PHYSICAL EXAM:  GENERAL: NAD  NERVOUS SYSTEM:  Alert & Oriented X3, Good concentration  CHEST/LUNG: Clear to auscultation bilaterally; No rales, rhonchi, wheezing, or rubs  HEART: Regular rate and rhythm; No murmurs, rubs, or gallops  ABDOMEN: Soft, mildly tender in RUQ, distended; Bowel sounds present  EXTREMITIES:  2+ Peripheral Pulses, No clubbing, cyanosis, or edema    LABS:                        10.9   17.68 )-----------( 183      ( 01 Jan 2020 07:32 )             33.1     135  |  100  |  31<H>  ----------------------------<  171<H>  3.6   |  28  |  1.44<H>    Assessment and plan:   1. Acute cholecystitis   Abd pain has mildly improved   Cont Cipro and flagyl   Diet advanced to clear liquid, tolerating   Monitor BS  Given extensive cardiac history will be high risk for any procedure  Surgery consult appreciated- obtain HIDA scan, IR for cholecystostomy if shows cholecystitis   Decreased morphine dose, cont bowel regimen     2. RIANNA  Cont to monitor  Avoid nephrotoxic med    3. CHF, S/p BiV ICD, TAVR, CAD (  of RCA)  EF 45% as per cardio note  Trop trended down  Cardiology consult appreciated  Will cont BB   Cont Aspirin and statin     4. Lung nodule  Will be followed as outpatient for PET scan and possible biopsy   H/O Hodgkin lymphoma s/p radiation and chemo now in remission with left lung base radiation fibrosis    5. b/l carotid stenosis s/p carotid endarterectomy  Cont Aspirin and statin    6. DM  ISS    7. Hypothyroidism   Cont Synthroid
Patient seen and examined. Wife was present during examination.   No complains. Abdominal pain is minimal, leukocytosis resolved.   Had extensive discussion with patient and his wife regarding further plans of care.   Vital signs and labs reviewed     Vital Signs Last 24 Hrs  T(C): 36.8 (03 Jan 2020 11:31), Max: 37.3 (02 Jan 2020 23:45)  T(F): 98.3 (03 Jan 2020 11:31), Max: 99.2 (02 Jan 2020 23:45)  HR: 87 (03 Jan 2020 11:31) (84 - 93)  BP: 128/58 (03 Jan 2020 11:31) (104/56 - 135/72)  BP(mean): --  RR: 16 (03 Jan 2020 11:31) (16 - 16)  SpO2: 98% (03 Jan 2020 11:31) (95% - 99%)    1. Acute cholecystitis. Improving on IV antibiotics. Will postpone surgery for now.   Advance diet to regular. c/w Metronidazole and Ceftriaxone for total of 14 days   IV antibiotics until monday. Possible discharge on monday.     2. Hx of CHF: not in exacerbation   3. Hx of CAD   The rest as above     Plan discussed with patient, his wife as well as with Jamaal Purcell and Arias PGY1
Agree with all the above   patient seen and examined together with Dr. Bianchi PGY1.   c/o of RUQ pain. Chest pain resolved   ROS and PE as above   vital signs and labs reviewed  Consults from surgery, GI noted.   Vital Signs Last 24 Hrs  T(C): 37.3 (31 Dec 2019 11:38), Max: 37.3 (31 Dec 2019 11:38)  T(F): 99.2 (31 Dec 2019 11:38), Max: 99.2 (31 Dec 2019 11:38)  HR: 89 (31 Dec 2019 11:38) (83 - 95)  BP: 115/48 (31 Dec 2019 11:38) (115/48 - 173/81)  BP(mean): --  RR: 18 (31 Dec 2019 11:38) (17 - 18)  SpO2: 97% (31 Dec 2019 11:38) (95% - 100%)    Assessment and plan as above and it discussed with patient.    patient would like to speak to his wife prior to making decisions regarding surgery. \  Provided my cell phone number for wife to call me.

## 2020-01-05 NOTE — DISCHARGE NOTE NURSING/CASE MANAGEMENT/SOCIAL WORK - PATIENT PORTAL LINK FT
You can access the FollowMyHealth Patient Portal offered by [facility] by registering at the following website: [email or web address]. By joining [facility]’s FollowMyHealth portal, you will also be able to view your health information using other applications (apps) compatible with our system.

## 2020-01-05 NOTE — PROGRESS NOTE ADULT - REASON FOR ADMISSION
left sided chest and flank pain

## 2020-01-05 NOTE — DISCHARGE NOTE NURSING/CASE MANAGEMENT/SOCIAL WORK - NSDCVIVACCINE_GEN_ALL_CORE_FT
If you are a smoker, it is important for your health to stop smoking. Please be aware that second hand smoke is also harmful. Influenza , 2016/10/30 12:07 , Dinah Brumfield (OLIVIER)

## 2020-01-05 NOTE — PROGRESS NOTE ADULT - SUBJECTIVE AND OBJECTIVE BOX
pt seen and examined, no complaints, ROS - .     no chest pain or sob on exam     + bm this am   + flatus          acetaminophen   Tablet .. 650 milliGRAM(s) Oral every 6 hours PRN  aspirin  chewable 81 milliGRAM(s) Oral daily  atorvastatin 80 milliGRAM(s) Oral at bedtime  cefTRIAXone   IVPB 1000 milliGRAM(s) IV Intermittent every 24 hours  clonazePAM  Tablet 1 milliGRAM(s) Oral daily  dextrose 40% Gel 15 Gram(s) Oral once PRN  dextrose 50% Injectable 12.5 Gram(s) IV Push once  dextrose 50% Injectable 25 Gram(s) IV Push once  dextrose 50% Injectable 25 Gram(s) IV Push once  FLUoxetine 20 milliGRAM(s) Oral daily  glucagon  Injectable 1 milliGRAM(s) IntraMuscular once PRN  heparin  Injectable 5000 Unit(s) SubCutaneous every 8 hours  insulin lispro (HumaLOG) corrective regimen sliding scale   SubCutaneous three times a day before meals  insulin lispro (HumaLOG) corrective regimen sliding scale   SubCutaneous at bedtime  levothyroxine 125 MICROGram(s) Oral daily  metoprolol tartrate 25 milliGRAM(s) Oral two times a day  metroNIDAZOLE  IVPB 500 milliGRAM(s) IV Intermittent every 8 hours  senna 2 Tablet(s) Oral daily  simethicone 80 milliGRAM(s) Chew every 8 hours PRN                            11.2   7.29  )-----------( 206      ( 04 Jan 2020 08:03 )             33.8       Hemoglobin: 11.2 g/dL (01-04 @ 08:03)  Hemoglobin: 10.8 g/dL (01-03 @ 06:43)  Hemoglobin: 11.1 g/dL (01-02 @ 07:02)  Hemoglobin: 10.9 g/dL (01-01 @ 07:32)      01-04    137  |  103  |  18  ----------------------------<  144<H>  3.9   |  27  |  0.99    Ca    8.9      04 Jan 2020 08:03  Phos  2.4     01-04  Mg     2.4     01-04      Creatinine Trend: 0.99<--, 1.18<--, 1.53<--, 1.44<--, 1.37<--, 1.56<--    COAGS:           T(C): 36.6 (01-05-20 @ 07:52), Max: 37.2 (01-05-20 @ 04:34)  HR: 79 (01-05-20 @ 07:52) (75 - 86)  BP: 135/68 (01-05-20 @ 07:52) (124/48 - 139/63)  RR: 18 (01-05-20 @ 07:52) (17 - 18)  SpO2: 96% (01-05-20 @ 07:52) (95% - 100%)  Wt(kg): --    I&O's Summary    Gen: Appears well in NAD  HEENT:  (-)icterus (-)pallor  CV: N S1 S2 1/6 ANGELLA (+)2 Pulses B/l  Resp:  Clear to ausculatation B/L, normal effort  GI: (+) BS Soft, NT, ND  Lymph:  (-)Edema, (-)obvious lymphadenopathy  Skin: Warm to touch, Normal turgor  Psych: Appropriate mood and affect      TELEMETRY: 	  OFF        ASSESSMENT/PLAN: 	74y  Male PMH of MI s/p PPM, AS s/p TAVR 2016, HF Mild segmental LV dysfx 40-45% chronically occluded RCA (cath 2016) Complete heart block  s/p BiV ICD, cholelithiasis, anxiety, hypothyroidism, DM, Hodgkin lymphoma s/p radiation and chemo now in remission with left lung base radiation fibrosis, b/l carotid stenosis s/p carotid endarterectomy, pericardial effusion s/p pericardiocentesis, and b/l inguinal hernia, who came in to the ED due to RUQ pain radiating to the back found with cholecystitis.     - (+) HIDA scan.  - Surgery f/u noted, trial of antibiotics  - ASA, statin   - keep K>4, mag >2.0  - HR stable , cont BB   - Discuss with primary team, by virtue of having a hx of CAD, CHF and DM, undergoing an intraperitoneal surgery he is considered high risk but not prohibitive for a necessary surgery since he is optimized from a cardiac standpoint.   D/W Dr Mo

## 2020-01-05 NOTE — PROGRESS NOTE ADULT - ASSESSMENT
74 year old male with acute cholecystitis, extensive cardiac history    - continue regular diet  - continue antibiotics  - continue conservative management of acute cholecystitis at this time  - continue medical management 74 year old male with acute cholecystitis, extensive cardiac history    - continue regular diet  - continue antibiotics, may d/c on oral antibiotics  - continue conservative management of acute cholecystitis at this time  - continue medical management

## 2020-01-05 NOTE — PROGRESS NOTE ADULT - SUBJECTIVE AND OBJECTIVE BOX
Patient seen and examined at bedside with no complaints.   Admits to flatus/loose BM.   Denies pain, nausea/ vomiting.   Tolerating diet.  Patient would like to be discharged today.    Vital Signs Last 24 Hrs  T(F): 97.9 (01-05-20 @ 07:52), Max: 99 (01-05-20 @ 04:34)  HR: 79 (01-05-20 @ 07:52)  BP: 135/68 (01-05-20 @ 07:52)  RR: 18 (01-05-20 @ 07:52)  SpO2: 96% (01-05-20 @ 07:52)  POCT Blood Glucose.: 153 mg/dL (05 Jan 2020 08:15)    GENERAL: Alert, NAD  CHEST/LUNG: respirations nonlabored  ABDOMEN: soft, Nontender, Nondistended  EXTREMITIES:  no calf tenderness, No edema    I&O's Detail    LABS:                        11.3   8.05  )-----------( 231      ( 05 Jan 2020 08:29 )             34.2     01-05    139  |  106  |  14  ----------------------------<  149<H>  4.1   |  27  |  1.00    Ca    8.5      05 Jan 2020 08:29  Phos  2.9     01-05  Mg     2.1     01-05

## 2020-01-29 ENCOUNTER — APPOINTMENT (OUTPATIENT)
Dept: SURGERY | Facility: CLINIC | Age: 75
End: 2020-01-29
Payer: MEDICARE

## 2020-01-29 VITALS
SYSTOLIC BLOOD PRESSURE: 102 MMHG | DIASTOLIC BLOOD PRESSURE: 58 MMHG | WEIGHT: 180 LBS | HEIGHT: 68 IN | RESPIRATION RATE: 15 BRPM | HEART RATE: 84 BPM | TEMPERATURE: 98.5 F | OXYGEN SATURATION: 97 % | BODY MASS INDEX: 27.28 KG/M2

## 2020-01-29 DIAGNOSIS — K80.20 CALCULUS OF GALLBLADDER W/OUT CHOLECYSTITIS W/OUT OBSTRUCTION: ICD-10-CM

## 2020-01-29 PROCEDURE — 99204 OFFICE O/P NEW MOD 45 MIN: CPT

## 2020-01-29 RX ORDER — METOPROLOL SUCCINATE 25 MG/1
25 TABLET, EXTENDED RELEASE ORAL DAILY
Refills: 0 | Status: DISCONTINUED | COMMUNITY
End: 2020-01-29

## 2020-01-29 RX ORDER — BLOOD SUGAR DIAGNOSTIC
STRIP MISCELLANEOUS
Qty: 50 | Refills: 0 | Status: DISCONTINUED | COMMUNITY
Start: 2016-11-21 | End: 2020-01-29

## 2020-01-29 RX ORDER — EZETIMIBE 10 MG/1
10 TABLET ORAL
Qty: 90 | Refills: 0 | Status: DISCONTINUED | COMMUNITY
Start: 2019-10-30 | End: 2020-01-29

## 2020-01-29 NOTE — REASON FOR VISIT
[Consultation] : a consultation visit [Spouse] : spouse [FreeTextEntry1] : referred by Dr. Xochitl Durham for gallstones.

## 2020-01-29 NOTE — PHYSICAL EXAM
[Normal Breath Sounds] : Normal breath sounds [Normal Heart Sounds] : normal heart sounds [No Rash or Lesion] : No rash or lesion [Calm] : calm [de-identified] : well-developed well-nourished in no acute distress [de-identified] : sclera clear [de-identified] : supple without adenopathy [de-identified] : Soft and nontender and nondistended. There are no masses and there is no Carpenter's sign. There is a well-healed incision at the umbilical hernia repair site [de-identified] : no clubbing cyanosis or edema [de-identified] : cranial nerves II through XII grossly intact

## 2020-01-29 NOTE — HISTORY OF PRESENT ILLNESS
[de-identified] : Al is a 73 y/o male here for a consultation for gallstones seen on US 12/ 31/19.\par \par This 74-year-old patient with significant heart disease complains of having had episodes of right upper quadrant pain radiating to the right back and intrascapular area for several years. He denies fatty food intolerance.  These episodes last up to several hours and then subside spontaneously.  He was evaluated at [facility] on [facility] and found to have gallstones. He subsequently underwent a HIDA scan which was positive. He was discharged home on antibiotics by mouth.

## 2020-01-29 NOTE — CONSULT LETTER
[Dear  ___] : Dear  [unfilled], [Please see my note below.] : Please see my note below. [Sincerely,] : Sincerely, [FreeTextEntry3] : Sukhdeep Abel M.D. [DrKhanh  ___] : Dr. GARVIN

## 2020-01-29 NOTE — ASSESSMENT
[FreeTextEntry1] : This patient suffered from an episode of acute cholecystitis. I suspect he has chronic cholecystitis. I have offered him a laparoscopic cholecystectomy.The procedure as well as risks(including, but not limited to bleeding, infection, injury to hollow viscus, injury to bile ducts), benefits (pain relief), and alternatives were explained to the patient.\par \par The patient understands that he is at increased risk for an open operation given his episode of acute cholecystitis.\par \par Please advise me and the safety of discontinuing his aspirin 5-7 days preoperatively. The patient understands that he is at increased risk for perioperative bleeding if the aspirin needs to be continued throughout the pre-and postoperative period

## 2020-02-03 ENCOUNTER — APPOINTMENT (OUTPATIENT)
Dept: SURGERY | Facility: HOSPITAL | Age: 75
End: 2020-02-03

## 2020-02-06 ENCOUNTER — OUTPATIENT (OUTPATIENT)
Dept: OUTPATIENT SERVICES | Facility: HOSPITAL | Age: 75
LOS: 1 days | End: 2020-02-06
Payer: MEDICARE

## 2020-02-06 VITALS
HEART RATE: 74 BPM | HEIGHT: 67 IN | SYSTOLIC BLOOD PRESSURE: 127 MMHG | WEIGHT: 179.9 LBS | TEMPERATURE: 98 F | RESPIRATION RATE: 16 BRPM | DIASTOLIC BLOOD PRESSURE: 73 MMHG | OXYGEN SATURATION: 96 %

## 2020-02-06 DIAGNOSIS — Z98.89 OTHER SPECIFIED POSTPROCEDURAL STATES: Chronic | ICD-10-CM

## 2020-02-06 DIAGNOSIS — Z98.890 OTHER SPECIFIED POSTPROCEDURAL STATES: Chronic | ICD-10-CM

## 2020-02-06 DIAGNOSIS — Z95.810 PRESENCE OF AUTOMATIC (IMPLANTABLE) CARDIAC DEFIBRILLATOR: ICD-10-CM

## 2020-02-06 DIAGNOSIS — K80.20 CALCULUS OF GALLBLADDER WITHOUT CHOLECYSTITIS WITHOUT OBSTRUCTION: ICD-10-CM

## 2020-02-06 DIAGNOSIS — Z98.49 CATARACT EXTRACTION STATUS, UNSPECIFIED EYE: Chronic | ICD-10-CM

## 2020-02-06 DIAGNOSIS — Z01.818 ENCOUNTER FOR OTHER PREPROCEDURAL EXAMINATION: ICD-10-CM

## 2020-02-06 DIAGNOSIS — I50.9 HEART FAILURE, UNSPECIFIED: ICD-10-CM

## 2020-02-06 DIAGNOSIS — Z95.810 PRESENCE OF AUTOMATIC (IMPLANTABLE) CARDIAC DEFIBRILLATOR: Chronic | ICD-10-CM

## 2020-02-06 DIAGNOSIS — Z95.2 PRESENCE OF PROSTHETIC HEART VALVE: Chronic | ICD-10-CM

## 2020-02-06 LAB
ALBUMIN SERPL ELPH-MCNC: 4.5 G/DL — SIGNIFICANT CHANGE UP (ref 3.3–5)
ALP SERPL-CCNC: 70 U/L — SIGNIFICANT CHANGE UP (ref 40–120)
ALT FLD-CCNC: 17 U/L — SIGNIFICANT CHANGE UP (ref 10–45)
ANION GAP SERPL CALC-SCNC: 16 MMOL/L — SIGNIFICANT CHANGE UP (ref 5–17)
AST SERPL-CCNC: 20 U/L — SIGNIFICANT CHANGE UP (ref 10–40)
BILIRUB SERPL-MCNC: 1 MG/DL — SIGNIFICANT CHANGE UP (ref 0.2–1.2)
BUN SERPL-MCNC: 17 MG/DL — SIGNIFICANT CHANGE UP (ref 7–23)
CALCIUM SERPL-MCNC: 9.9 MG/DL — SIGNIFICANT CHANGE UP (ref 8.4–10.5)
CHLORIDE SERPL-SCNC: 95 MMOL/L — LOW (ref 96–108)
CO2 SERPL-SCNC: 26 MMOL/L — SIGNIFICANT CHANGE UP (ref 22–31)
CREAT SERPL-MCNC: 1.43 MG/DL — HIGH (ref 0.5–1.3)
GLUCOSE SERPL-MCNC: 121 MG/DL — HIGH (ref 70–99)
HCT VFR BLD CALC: 39.1 % — SIGNIFICANT CHANGE UP (ref 39–50)
HGB BLD-MCNC: 12.3 G/DL — LOW (ref 13–17)
MCHC RBC-ENTMCNC: 31.5 GM/DL — LOW (ref 32–36)
MCHC RBC-ENTMCNC: 31.5 PG — SIGNIFICANT CHANGE UP (ref 27–34)
MCV RBC AUTO: 100 FL — SIGNIFICANT CHANGE UP (ref 80–100)
NRBC # BLD: 0 /100 WBCS — SIGNIFICANT CHANGE UP (ref 0–0)
PLATELET # BLD AUTO: 224 K/UL — SIGNIFICANT CHANGE UP (ref 150–400)
POTASSIUM SERPL-MCNC: 4.3 MMOL/L — SIGNIFICANT CHANGE UP (ref 3.5–5.3)
POTASSIUM SERPL-SCNC: 4.3 MMOL/L — SIGNIFICANT CHANGE UP (ref 3.5–5.3)
PROT SERPL-MCNC: 7.8 G/DL — SIGNIFICANT CHANGE UP (ref 6–8.3)
RBC # BLD: 3.91 M/UL — LOW (ref 4.2–5.8)
RBC # FLD: 16.4 % — HIGH (ref 10.3–14.5)
SODIUM SERPL-SCNC: 137 MMOL/L — SIGNIFICANT CHANGE UP (ref 135–145)
WBC # BLD: 6.51 K/UL — SIGNIFICANT CHANGE UP (ref 3.8–10.5)
WBC # FLD AUTO: 6.51 K/UL — SIGNIFICANT CHANGE UP (ref 3.8–10.5)

## 2020-02-06 PROCEDURE — 85027 COMPLETE CBC AUTOMATED: CPT

## 2020-02-06 PROCEDURE — G0463: CPT

## 2020-02-06 PROCEDURE — 80053 COMPREHEN METABOLIC PANEL: CPT

## 2020-02-06 RX ORDER — ATORVASTATIN CALCIUM 80 MG/1
1 TABLET, FILM COATED ORAL
Qty: 0 | Refills: 0 | DISCHARGE

## 2020-02-06 RX ORDER — ASPIRIN/CALCIUM CARB/MAGNESIUM 324 MG
1 TABLET ORAL
Qty: 0 | Refills: 0 | DISCHARGE

## 2020-02-06 RX ORDER — CEFOTETAN DISODIUM 1 G
2 VIAL (EA) INJECTION ONCE
Refills: 0 | Status: DISCONTINUED | OUTPATIENT
Start: 2020-02-10 | End: 2020-03-03

## 2020-02-06 NOTE — H&P PST ADULT - NEUROLOGICAL DETAILS
responds to verbal commands/responds to pain/alert and oriented x 3 no spontaneous movement/alert and oriented x 3

## 2020-02-06 NOTE — H&P PST ADULT - NSICDXPROBLEM_GEN_ALL_CORE_FT
PROBLEM DIAGNOSES  Problem: Calculus of gallbladder  Assessment and Plan: laparoscopic cholecystectomy  PST labs send  preprocedure instructions discussed     Problem: CHF (congestive heart failure)  Assessment and Plan: hold ASA, last dose 2/5/2020 as per cardio and Dr. Abel  Will ontain recent echo, stress report and cardiac eval     Problem: Presence of biventricular AICD  Assessment and Plan: last interrogation report 11/2019 on file   OR booking notified

## 2020-02-06 NOTE — H&P PST ADULT - NSANTHOSAYNRD_GEN_A_CORE
No. JACKLYN screening performed.  STOP BANG Legend: 0-2 = LOW Risk; 3-4 = INTERMEDIATE Risk; 5-8 = HIGH Risk

## 2020-02-06 NOTE — H&P PST ADULT - HISTORY OF PRESENT ILLNESS
Patient is a 74 year old male from home, ambulates independently, with PMH of MI s/p PPM, AS s/p TAVR 2016, HF with reduced EF initially of 27% and now 40-45% s/p BiV ICD/pacemaker, cholelithiasis, anxiety, hypothyroidism, DM, Hodgkin lymphoma s/p radiation and chemo now in remission with left lung base radiation fibrosis, b/l carotid stenosis s/p carotid endarterectomy, pericardial effusion s/p pericardiocentesis, and b/l inguinal hernia, who came in to the ED due to left sided chest pain radiating to abdomen. 74 year old male with PMH of MI, AS s/p TAVR 2016, HF ( EF 40%) s/p BiV ICD/pacemaker ( last interrogation 11/2019), anxiety, hypothyroidism, DM2, Hodgkin lymphoma s/p radiation and chemo now in remission with left lung base radiation fibrosis, b/l carotid stenosis s/p carotid endarterectomy, pericardial effusion s/p pericardiocentesis 4/2019, b/l inguinal hernia, recent hospitalization 12/31-1/5/2020 cholelithiasis planned for laparoscopic cholecystectomy.     ****** Patient was on ASA for Carotid disease and heart disease, patient was evaluated by cardio and stopped ASA last dose 2/5/2020 as per cardio and Dr. Abel.

## 2020-02-06 NOTE — H&P PST ADULT - GENERAL
ENDOCRINOLOGY THYROID NOTE   Patient Name Teri Sorenson  Date of Birth 1964  Today's date January 30, 2020  Date of admission 1/10/2020    Reason for initial visit: thyroid mass needs follow up    HISTORY OF PRESENT ILLNESS:  (taken from original endocrine note)  Teri Sorenson is a 55 year old female with history of CHF, GERD, HTN, Hyperlipidemia, SOULEYMANE and uterine cancer who was admitted for SOB, UTI, Septic shock with hypotension requiring pressors, acute renal failure. She was found to have bilateral PE, taken for thrombolysis therapy in IR subsequently coded with CPR for 22 minutes and ROSC achieved. S/p IVC filter. Patient placed on Heparin drip and coumadin therapy.     She was found to have an incidental thyroid mass on CT angio on 1/9/20. Follow up Thyroid US results below    Thyroid Ultrasound 1/28/20  FINDINGS:   The right lobe of the thyroid gland measures 6.3 x 2.3 x 2.2 cm.  Demonstrates normal echotexture. There are 3 nodules in the right lobe as  follows:  R1: 1.0 x 1.0 x 0.6 cm, solid, hypoechoic, smooth margins  R2: 1.4 x 1.1 x 1.0 cm, cystic, hypoechoic, smooth margins.  R3: 1.3 x 1.1 x 0.8 cm, solid, isoechoic, smooth margins.   The left lobe of the thyroid gland measures 8.3 x 4.9 x 4 6 cm.  Demonstrates normal echotexture. There is one nodule in the left lobe as  follows:  L1: 6.7 x 5.5 x 3.7 cm, , heterogeneous, smooth margins, with internal  Doppler flow.   The isthmus measures 8 mm in thickness. No focal vascular lesions are  identified. There is one nodule as follows:  I1: 2.0 x 1.6 x 1.5 cm, solid, isoechoic, smooth margins.     TSH (mcUnits/mL)   Date Value   01/27/2020 0.700   Patient running normal TSH since 2011.     Patient is currently on coumadin and Heparin infusion. She has not previous thyroid history except her goiter that she has known about for a few years. She denies pressure in neck, difficulty swallowing. Has sob and cough intermittently. No prior thyroid US. She  has strong family history of thyroid disorder with goiters in her mother, grandmother and sisters. 1 Sister had FNA of nodules which came back benign. 1 Sister had lobectomy for thyroid cancer and 1 niece had thyroidectomy for thyroid cancer.     Recent events: INR up to 1.8 today. Possible d/c tomorrow if INR >2.0 and accepting facility. IR not able to do FNA here.     Imaging: Imaging reports reviewed    History:   Past medical history, surgical history, social history, medications and allergies reviewed from initial consult.     REVIEW OF SYSTEMS:  +shortness of breath, cough  +anxiety  Patient denies nausea and vomiting, tolerating oral intake  No fevers overnight    Physical Exam:  Visit Vitals  /62   Pulse 81   Temp 97.8 °F (36.6 °C) (Oral)   Resp 18   Ht 5' 1\" (1.549 m)   Wt (!) 154.3 kg   SpO2 94%   BMI 64.27 kg/m²     Constitutional: Resting, morbidly obese female sitting on commode in NAD. Thinning hair and hirsitue  Eyes: Anicteric    ENT: Moist oropharynx. Good dentition. No ulcers.   Head: Normocephalic, atraumatic  Neck: Supple, no JVD. + thyromegaly L>R, distinct nodules on right, fullness on left. Neck is tender to palpation.   Respiratory: non-labored. No cyanosis.   Cardiac: + LE edema bilaterally trace to shins and +1 pitting to tops of feet  GI: Soft, nontender, nondistended and no masses or guarding.    Skin: Pale, Warm and dry, normal texture and temperature and no rashes.    Psych: Normal mood and affect. Mild anxiety.   Musculoskeletal: BADILLO x4, no bony deformities  Neuro: Awake, alert and oriented x3. EOMI and no tremor. Follows commands. Speech deliberate and delayed response at times    Assessment  Multinodular goiter  Family history of thyroid cancer  Dominant Left thyroid nodule 6.7 cm shown on CT this admission with multiple nodules identified on dedicated thyroid US    Plan:     D/w Dr. Shafer 1/29/20:  Patient with multinodular goiter and dominant 6.7cm L nodule. No suspicious  findings on US, but in light of size, meets indication for biopsy. At this time, she is on anticoagulation and discussed no true urgency to get this done, but this should be evaluated due to family history of PTC. IR consulted.     After discussion with IR, they will not be doing US-guided FNA biopsy of her nodule while inpatient. Thus, discussed with patient that this has to be coordinated in the outpatient setting locally in [place] and will set up followup endocrine close to home. At this time, she will need to focus on her acute medical conditions (ie, treatment of her b/l PE's).  Will need thyroidectomy in view of compressive large thyroid nodule even if benign cytology    D/W Dr. Chase on review there is some tracheal compression from the thyroid nodule(narrowest tracheal dimension 10 mm and post thyroid mass 17 mm)    Will sign off once outpatient follow up scheduled    Marcy Bragg NP    ADDENDUM    Pt was seen and examined by myself and  recommendations discussed with the APC    Sitting up in chair  Feeling better  Says may be discharged tomorrow  Denies any difficulty swallowing  Denies any shortness of breath at rest    O/E  Vitals: Reviewed  HEENT: NC/AT, mucosa moist.  Chest: Breathing unlabored.      Rec:  As outlined above   details…

## 2020-02-06 NOTE — H&P PST ADULT - NSICDXPASTMEDICALHX_GEN_ALL_CORE_FT
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    Myocardial infarction Card[facility]rest- 1994- no stents    Pericardial effusion drained in [place] in 4/19    Presence of biventricular AICD     Systolic CHF, chronic s/p ICD    Type 2 diabetes mellitus without complication PAST MEDICAL HISTORY:  Acquired hypothyroidism     AS (aortic stenosis) s/p TAVR    Cholelithiases     HLD (hyperlipidemia)     Hodgkin lymphoma on remission, chemo and rad [id number]    HTN (hypertension)     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

## 2020-02-06 NOTE — H&P PST ADULT - LAST ECHOCARDIOGRAM
2019 with cardio 2019 with cardio ( 4/2019 mild pulm HTN, mild MS, Mild Mod MR, moderate seg LV systolic dysfunction, moderate TR)

## 2020-02-06 NOTE — H&P PST ADULT - NSICDXPASTSURGICALHX_GEN_ALL_CORE_FT
PAST SURGICAL HISTORY:  H/O bilateral inguinal hernia repair     H/O carotid endarterectomy bilateral    History of umbilical hernia repair     S/P cataract surgery b/l    S/P ICD (internal cardiac defibrillator) procedure [employer]    S/P TAVR (transcatheter aortic valve replacement) 10/16 in [place] PAST SURGICAL HISTORY:  H/O bilateral inguinal hernia repair     H/O carotid endarterectomy bilateral    History of umbilical hernia repair     S/P cataract surgery b/l    S/P ICD (internal cardiac defibrillator) procedure [employer] [id number], last interrogation 11/2019    S/P TAVR (transcatheter aortic valve replacement) 10/16 in [place]

## 2020-02-06 NOTE — H&P PST ADULT - RS GEN PE MLT RESP DETAILS PC
clear to auscultation bilaterally/breath sounds equal breath sounds equal/clear to auscultation bilaterally/respirations non-labored/airway patent

## 2020-02-09 ENCOUNTER — TRANSCRIPTION ENCOUNTER (OUTPATIENT)
Age: 75
End: 2020-02-09

## 2020-02-10 ENCOUNTER — APPOINTMENT (OUTPATIENT)
Dept: SURGERY | Facility: HOSPITAL | Age: 75
End: 2020-02-10
Payer: MEDICARE

## 2020-02-10 ENCOUNTER — RESULT REVIEW (OUTPATIENT)
Age: 75
End: 2020-02-10

## 2020-02-10 ENCOUNTER — OUTPATIENT (OUTPATIENT)
Dept: OUTPATIENT SERVICES | Facility: HOSPITAL | Age: 75
LOS: 1 days | End: 2020-02-10
Payer: MEDICARE

## 2020-02-10 VITALS
OXYGEN SATURATION: 100 % | DIASTOLIC BLOOD PRESSURE: 55 MMHG | RESPIRATION RATE: 18 BRPM | HEIGHT: 67 IN | WEIGHT: 179.9 LBS | HEART RATE: 75 BPM | SYSTOLIC BLOOD PRESSURE: 108 MMHG | TEMPERATURE: 98 F

## 2020-02-10 VITALS
OXYGEN SATURATION: 98 % | TEMPERATURE: 98 F | HEART RATE: 80 BPM | DIASTOLIC BLOOD PRESSURE: 75 MMHG | RESPIRATION RATE: 16 BRPM | SYSTOLIC BLOOD PRESSURE: 136 MMHG

## 2020-02-10 DIAGNOSIS — Z98.890 OTHER SPECIFIED POSTPROCEDURAL STATES: Chronic | ICD-10-CM

## 2020-02-10 DIAGNOSIS — Z98.49 CATARACT EXTRACTION STATUS, UNSPECIFIED EYE: Chronic | ICD-10-CM

## 2020-02-10 DIAGNOSIS — Z95.2 PRESENCE OF PROSTHETIC HEART VALVE: Chronic | ICD-10-CM

## 2020-02-10 DIAGNOSIS — Z95.810 PRESENCE OF AUTOMATIC (IMPLANTABLE) CARDIAC DEFIBRILLATOR: Chronic | ICD-10-CM

## 2020-02-10 DIAGNOSIS — Z98.89 OTHER SPECIFIED POSTPROCEDURAL STATES: Chronic | ICD-10-CM

## 2020-02-10 DIAGNOSIS — K80.20 CALCULUS OF GALLBLADDER WITHOUT CHOLECYSTITIS WITHOUT OBSTRUCTION: ICD-10-CM

## 2020-02-10 LAB
ANION GAP SERPL CALC-SCNC: 13 MMOL/L — SIGNIFICANT CHANGE UP (ref 5–17)
BUN SERPL-MCNC: 21 MG/DL — SIGNIFICANT CHANGE UP (ref 7–23)
CALCIUM SERPL-MCNC: 8.5 MG/DL — SIGNIFICANT CHANGE UP (ref 8.4–10.5)
CHLORIDE SERPL-SCNC: 100 MMOL/L — SIGNIFICANT CHANGE UP (ref 96–108)
CK MB CFR SERPL CALC: 2 NG/ML — SIGNIFICANT CHANGE UP (ref 0–6.7)
CK SERPL-CCNC: 70 U/L — SIGNIFICANT CHANGE UP (ref 30–200)
CO2 SERPL-SCNC: 25 MMOL/L — SIGNIFICANT CHANGE UP (ref 22–31)
CREAT SERPL-MCNC: 1.34 MG/DL — HIGH (ref 0.5–1.3)
GLUCOSE BLDC GLUCOMTR-MCNC: 127 MG/DL — HIGH (ref 70–99)
GLUCOSE SERPL-MCNC: 159 MG/DL — HIGH (ref 70–99)
HCT VFR BLD CALC: 33.2 % — LOW (ref 39–50)
HCT VFR BLD CALC: 35.2 % — LOW (ref 39–50)
HGB BLD-MCNC: 10.5 G/DL — LOW (ref 13–17)
HGB BLD-MCNC: 11.3 G/DL — LOW (ref 13–17)
MCHC RBC-ENTMCNC: 31.5 PG — SIGNIFICANT CHANGE UP (ref 27–34)
MCHC RBC-ENTMCNC: 31.6 GM/DL — LOW (ref 32–36)
MCHC RBC-ENTMCNC: 32.1 GM/DL — SIGNIFICANT CHANGE UP (ref 32–36)
MCHC RBC-ENTMCNC: 32.1 PG — SIGNIFICANT CHANGE UP (ref 27–34)
MCV RBC AUTO: 100 FL — SIGNIFICANT CHANGE UP (ref 80–100)
MCV RBC AUTO: 99.7 FL — SIGNIFICANT CHANGE UP (ref 80–100)
NRBC # BLD: 0 /100 WBCS — SIGNIFICANT CHANGE UP (ref 0–0)
NRBC # BLD: 0 /100 WBCS — SIGNIFICANT CHANGE UP (ref 0–0)
PLATELET # BLD AUTO: 205 K/UL — SIGNIFICANT CHANGE UP (ref 150–400)
PLATELET # BLD AUTO: 236 K/UL — SIGNIFICANT CHANGE UP (ref 150–400)
POTASSIUM SERPL-MCNC: 3.9 MMOL/L — SIGNIFICANT CHANGE UP (ref 3.5–5.3)
POTASSIUM SERPL-SCNC: 3.9 MMOL/L — SIGNIFICANT CHANGE UP (ref 3.5–5.3)
RBC # BLD: 3.33 M/UL — LOW (ref 4.2–5.8)
RBC # BLD: 3.52 M/UL — LOW (ref 4.2–5.8)
RBC # FLD: 15.8 % — HIGH (ref 10.3–14.5)
RBC # FLD: 15.9 % — HIGH (ref 10.3–14.5)
SODIUM SERPL-SCNC: 138 MMOL/L — SIGNIFICANT CHANGE UP (ref 135–145)
TROPONIN T, HIGH SENSITIVITY RESULT: 22 NG/L — SIGNIFICANT CHANGE UP (ref 0–51)
WBC # BLD: 12.38 K/UL — HIGH (ref 3.8–10.5)
WBC # BLD: 13.29 K/UL — HIGH (ref 3.8–10.5)
WBC # FLD AUTO: 12.38 K/UL — HIGH (ref 3.8–10.5)
WBC # FLD AUTO: 13.29 K/UL — HIGH (ref 3.8–10.5)

## 2020-02-10 PROCEDURE — 85027 COMPLETE CBC AUTOMATED: CPT

## 2020-02-10 PROCEDURE — C1889: CPT

## 2020-02-10 PROCEDURE — 82553 CREATINE MB FRACTION: CPT

## 2020-02-10 PROCEDURE — 88312 SPECIAL STAINS GROUP 1: CPT | Mod: 26

## 2020-02-10 PROCEDURE — 47563 LAPARO CHOLECYSTECTOMY/GRAPH: CPT

## 2020-02-10 PROCEDURE — 84484 ASSAY OF TROPONIN QUANT: CPT

## 2020-02-10 PROCEDURE — 88312 SPECIAL STAINS GROUP 1: CPT

## 2020-02-10 PROCEDURE — 88304 TISSUE EXAM BY PATHOLOGIST: CPT | Mod: 26

## 2020-02-10 PROCEDURE — 82962 GLUCOSE BLOOD TEST: CPT

## 2020-02-10 PROCEDURE — 47563 LAPARO CHOLECYSTECTOMY/GRAPH: CPT | Mod: 82

## 2020-02-10 PROCEDURE — 82550 ASSAY OF CK (CPK): CPT

## 2020-02-10 PROCEDURE — 80048 BASIC METABOLIC PNL TOTAL CA: CPT

## 2020-02-10 PROCEDURE — 88304 TISSUE EXAM BY PATHOLOGIST: CPT

## 2020-02-10 RX ORDER — HYDROMORPHONE HYDROCHLORIDE 2 MG/ML
0.25 INJECTION INTRAMUSCULAR; INTRAVENOUS; SUBCUTANEOUS
Refills: 0 | Status: DISCONTINUED | OUTPATIENT
Start: 2020-02-10 | End: 2020-02-10

## 2020-02-10 RX ORDER — CHLORHEXIDINE GLUCONATE 213 G/1000ML
1 SOLUTION TOPICAL ONCE
Refills: 0 | Status: DISCONTINUED | OUTPATIENT
Start: 2020-02-10 | End: 2020-02-10

## 2020-02-10 RX ORDER — SODIUM CHLORIDE 9 MG/ML
1000 INJECTION, SOLUTION INTRAVENOUS
Refills: 0 | Status: DISCONTINUED | OUTPATIENT
Start: 2020-02-10 | End: 2020-02-10

## 2020-02-10 RX ORDER — LIDOCAINE HCL 20 MG/ML
0.2 VIAL (ML) INJECTION ONCE
Refills: 0 | Status: DISCONTINUED | OUTPATIENT
Start: 2020-02-10 | End: 2020-02-10

## 2020-02-10 RX ORDER — ACETAMINOPHEN 500 MG
1000 TABLET ORAL ONCE
Refills: 0 | Status: COMPLETED | OUTPATIENT
Start: 2020-02-10 | End: 2020-02-10

## 2020-02-10 RX ORDER — ONDANSETRON 8 MG/1
4 TABLET, FILM COATED ORAL ONCE
Refills: 0 | Status: DISCONTINUED | OUTPATIENT
Start: 2020-02-10 | End: 2020-02-10

## 2020-02-10 RX ORDER — SODIUM CHLORIDE 9 MG/ML
3 INJECTION INTRAMUSCULAR; INTRAVENOUS; SUBCUTANEOUS EVERY 8 HOURS
Refills: 0 | Status: DISCONTINUED | OUTPATIENT
Start: 2020-02-10 | End: 2020-02-10

## 2020-02-10 RX ADMIN — Medication 1000 MILLIGRAM(S): at 10:58

## 2020-02-10 RX ADMIN — Medication 400 MILLIGRAM(S): at 10:44

## 2020-02-10 NOTE — PRE-ANESTHESIA EVALUATION ADULT - LAST ECHOCARDIOGRAM
2019 with cardio ( 4/2019 mild pulm HTN, mild MS, Mild Mod MR, moderate seg LV systolic dysfunction, moderate TR)

## 2020-02-10 NOTE — ASU DISCHARGE PLAN (ADULT/PEDIATRIC) - CARE PROVIDER_API CALL
Sukhdeep Abel)  Surgery  [address], [address]  [place] [address]  Phone: [phone]  Fax: [phone]  Follow Up Time:

## 2020-02-10 NOTE — CHART NOTE - NSCHARTNOTEFT_GEN_A_CORE
called by PACU earlier- patient had episode of blood pressure 89/51 @ 11:45- patient was asymptomatic, not tachycardic- output 20cc from drain non bloody-  cardiac enzymes negative and hematocrit stable- spoke with senior resident regarding patient and plan- BP up to 105/55 with senior resident at bedside- will monitor patient - plan to discuss with green team regarding clearance for discharge home    Delmis Slater PA-C [id number] called by PACU earlier- patient had episode of blood pressure 89/51 @ 11:45- patient was asymptomatic, not tachycardic- output 20cc from drain non bloody-  cardiac enzymes negative and hematocrit stable- spoke with senior resident regarding patient and plan- BP up to 105/55 with senior resident at bedside- will monitor patient - plan to discuss with green team regarding clearance for discharge home    Delmis Slater PA-C [id number]    Addendum:  Patient discharged home by [facility] staff without any further contact to [facility].  ERNESTO Patricio Sx

## 2020-02-10 NOTE — BRIEF OPERATIVE NOTE - OPERATION/FINDINGS
Laparoscopic cholecystectomy revealed very inflammed gallbladder with adhesions to liver and omentum.  Duct and artery were able to be dissected free and CBD and cystic duct confirmed with ICG. Duct and artery then taken with Hemolok clips.  In trying to dissect the gallbladder free from the liver bed, there were dense adhesions with obliteration of the tissue planes. Spillage of stones and bile. Part of posterior wall left on liver and cauterized.

## 2020-02-10 NOTE — H&P ADULT - MS EXT PE MLT D E PC
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 She denies any fevers, chills, night sweats. No seizures.No vision changes. Headaches are unchanged. She continues to work. She currently is recovering from her URI.     ONC HX  Patient is a 40-year-old female referred for consultation regarding her WHO II astrocytoma. She initially presented to a [facility] from 06/24/2014 through 06/30/2014 after transfer from [facility] for tonic-clonic seizure. Patient had an MRI revealing a 4 x 4.2 x 2.9 cm low-density lesion in the right inferior frontal lobe with 5 mm midline shift to the left. The patient was started on Keppra and Decadron and transferred to our inpatient service for evaluation. On 06/26/2014, she underwent craniotomy and open biopsy. Final pathology was consistent with astrocytoma, WHO grade 2; IDH-1 positive; 1p/19q negative for deletion. After consultation, it was recommended she be treated with adjuvant radiation and chemotherapy per RTOG 9802. She completed approximately 53 CGy to her right frontal mass from 10/07/2014 2 11/20/2014 by Dr. Roche at the [facility]. She began PCV chemotherapy on 01/22/2015. She tolerated the first 3 cycles of chemotherapy well except for the end of cycle 3 in which he developed thrombocytopenia requiring dose reduction. She also developed irregular menstrual periods. Prior to cycle 5, she developed nausea/vomiting and diarrhea with LFT abnormalities but did not improve while off treatment. Ultrasound revealed gallstones.her treatment was discontinued after 4 cycles of PCV in June 2015 LFT abnormality and persistent thrombocytopenia and neutropenia. She did undergo IUD for management of her menstrual cycles.      PAST MEDICAL HISTORY:  Graves’  disease status post PTU now hypothyroid  Reflux    PAST SURGICAL HISTORY:  Laparoscopic cholecystectomy  Craniotomy  Foot surgery  Upper palate expansion  Tonsils    CURRENT MEDICATIONS:  Medications, doses, and frequency reviewed with documentation in the electronic medical record.    ALLERGIES:  NKDA    FAMILY HISTORY:  Father  age 53 of non-Hodgkin’s lymphoma.    SOCIAL HISTORY:    with 2 children. 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    PHYSICAL EXAMINATION:  Vital signs:. Today’s vital signs were reviewed in the electronic medical record.  Performance Status: ECOG 0  Constitutional: Alert, cooperative.  Mood and affect appropriate. Appears close to chronological age. Well developed. Well nourished.   Head and face: Normocephalic, no scars. Small 6mm left postauricular lymph node   Eyes:  Pupils equal and reactive; conjunctivae clear. Sclerae anicteric.    ENT:  Oropharynx clear and without erythema or exudates or mucositis.    Neck:  Supple with full range of motion.    Hematologic/Lymphatic:  No palpable cervical, axillary, or inguinal lymphadenopathy. No petechiae or purpura.  Lungs:  Normal respiratory effort.  Lungs bilaterally clear to auscultation.    Cardiovascular:  Regular rate and rhythm of heart without murmurs, rubs, or gallops.    Abdomen:  Bowel sounds normoactive.  Soft, nontender, nondistended without palpable masses.  No hepatosplenomegaly.   Extremities: No peripheral edema.   Skin: No rash.  Neurologic: CN II - XII intact grossly; no new focal deficits  LABORATORY:  none    PATHOLOGY:  2014—brain tumor:  Astrocytoma, WHO grade 2 ;positive IDH-1 with focal P. 53 positive nuclei; GI-67 labeling is 2 percent; fish 1p/19q negative for deletion    RADIOLOGY:  EXAM: MRI BRAIN WO/W  CON    CLINICAL INDICATION: Low-grade glioma right insula and adjacent frontal lobe.    COMPARISON: 05/22/2017, 04/17/2015.    TECHNIQUE: The study was acquired using the following pulse sequences: Sagittal T1, axial   T2, axial FLAIR, axial T1, axial GRE. Diffusion-weighted images and ADC maps were obtained.   Following uneventful intravenous administration of 8 ml of Gadavist, axial and coronal T1-  weighted images were acquired.    FINDINGS: Low T1, high T2/FLAIR signal intensity mildly expansile nonenhancing mass centered   in the right insula measures 3.9 x 2.8 cm AP/TR as compared to 3.9 x 2.8 cm AP/TR on the   recent comparison study and 4.6 x 3.5 cm 04/07/2015.  Abnormal signal involves adjacent right   posterior lateral orbitofrontal gyri, unchanged.  There is no mass effect.  Small 7 mm focus   of FLAIR hyperintensity in the periventricular white matter right temporal lobe, lateral to   the right atrium appears slightly larger since 20 14/15.  No evidence of abnormal post contrast   enhancement.  Diffusion-weighted images demonstrate no evidence of acute ischemia or infarction.    Its The lateral ventricles are symmetric, midline and normal in size    .    Normal signal flow-voids were identified within the proximal branches of the intracranial   arteries and major dural sinuses.  The pituitary fossa appears unremarkable. The craniocervical   junction is normal. The orbital structures appear symmetric and unremarkable. The mastoid air   cells are clear. The paranasal sinuses are clear.    IMPRESSION:  Stable size nonenhancing mildly expansile cortical/subcortical white matter lesion anterior to   thirds of the right insula and adjacent right posterior lateral orbitofrontal gyri.  Small focus   of T2/FLAIR hyperintensity in the right periatrial white matter appears slightly larger since   2014/15.  No evidence of abnormal post contrast enhancement.    ASSESSMENT AND PLAN:   Patient is a 40-year-old white  female with WHO grade 2 Astrocytoma.   1. Low-grade glioma. Completion of proton beam radiation therapy 11/20/14. PCV chemotherapy with completion of 4 cycles complicated by gallstones, severe thrombocytopenia and anemia. Her MRI brain remains stable. Continue to monitor per NCCN guidelines. Next appointment will be in 6 months with imaging prior to her visit. She understands she may contact us if she has any concerns prior to her visit.  2. Irregular menstrual. IUD. Stable.   3. Hx allergic reaction: no obvious trigger. She now carries an Epipen and bendryl  4. GERD: pantoprazole as needed  5. Chronic left foot neuropathy. Secondary to surgery on left foot. Grade 1. Monitor for toxicity with vincristine.    6. Numbness and tingling. Grade 1   7. Endocrinology. Continue follow up for surveillance of pituitary.  8. History of seizure. No new seizures. Continue Keppra.  9. Hypothyroidism. Status post treatment of Graves’ disease. Continue Synthroid.      PLANNED FOLLOW UP:  6 months with MRI brain    Signed Electronically by  Maty Roe M.D.  Hematology and Oncology  [facility]  [facility], [place], operated by [facility]      cc: Tony Maynard MD , M.D.; Stephen Aguilar M.D..;      no pedal edema

## 2020-02-10 NOTE — BRIEF OPERATIVE NOTE - NSEVIDNCEINFORABSCESSFT_GEN_ALL_CORE
----- Message from Luciano Cooney MD sent at 6/13/2018  2:11 PM CDT -----  Please let him know that the hemoglobin was slightly low compared to last time. Other labs were stable.  He can be started on methotrexate starting with 12.5 mg once a week for 2 weeks, 15 mg once a week for 2 weeks, 17.5 mg once a week for 2 weeks, and 20 mg once a week after that.  Folic acid 1 mg daily  He will need methotrexate toxicity labs every 2 weeks, and please make sure that he is aware of that and keeps up-to-date on that because of the need for monitoring medication toxicity    Thanks  
Call placed to patient and message relayed.  Patient verbalized understanding.  Lab orders entered, medication ordered.    
Patient calls stating he has reviewed the medication information on Methotrexate and he would like to proceed with this medication.  Please inform the patient on the next step.  Patient was seen 6/13/18.  
chronically inflammed gallbladder

## 2020-02-10 NOTE — PRE-ANESTHESIA EVALUATION ADULT - NSANTHOSAYNRD_GEN_A_CORE
No. JACKYLN screening performed.  STOP BANG Legend: 0-2 = LOW Risk; 3-4 = INTERMEDIATE Risk; 5-8 = HIGH Risk

## 2020-02-10 NOTE — ASU DISCHARGE PLAN (ADULT/PEDIATRIC) - CALL YOUR DOCTOR IF YOU HAVE ANY OF THE FOLLOWING:
Bleeding that does not stop/Pain not relieved by Medications/Wound/Surgical Site with redness, or foul smelling discharge or pus Nausea and vomiting that does not stop/Unable to urinate/Inability to tolerate liquids or foods/Bleeding that does not stop/Wound/Surgical Site with redness, or foul smelling discharge or pus/Pain not relieved by Medications

## 2020-02-13 PROBLEM — K80.20 CALCULUS OF GALLBLADDER WITHOUT CHOLECYSTITIS WITHOUT OBSTRUCTION: Chronic | Status: ACTIVE | Noted: 2020-02-06

## 2020-02-13 PROBLEM — Z95.810 PRESENCE OF AUTOMATIC (IMPLANTABLE) CARDIAC DEFIBRILLATOR: Chronic | Status: ACTIVE | Noted: 2020-02-06

## 2020-02-13 PROBLEM — I50.22 CHRONIC SYSTOLIC (CONGESTIVE) HEART FAILURE: Chronic | Status: ACTIVE | Noted: 2019-05-28

## 2020-02-19 ENCOUNTER — APPOINTMENT (OUTPATIENT)
Dept: SURGERY | Facility: CLINIC | Age: 75
End: 2020-02-19
Payer: MEDICARE

## 2020-02-19 ENCOUNTER — APPOINTMENT (OUTPATIENT)
Dept: ELECTROPHYSIOLOGY | Facility: CLINIC | Age: 75
End: 2020-02-19
Payer: MEDICARE

## 2020-02-19 VITALS
OXYGEN SATURATION: 98 % | DIASTOLIC BLOOD PRESSURE: 68 MMHG | SYSTOLIC BLOOD PRESSURE: 118 MMHG | HEART RATE: 85 BPM | TEMPERATURE: 98.2 F | RESPIRATION RATE: 15 BRPM

## 2020-02-19 LAB — SURGICAL PATHOLOGY STUDY: SIGNIFICANT CHANGE UP

## 2020-02-19 PROCEDURE — 93295 DEV INTERROG REMOTE 1/2/MLT: CPT

## 2020-02-19 PROCEDURE — 99024 POSTOP FOLLOW-UP VISIT: CPT

## 2020-02-19 PROCEDURE — 93296 REM INTERROG EVL PM/IDS: CPT

## 2020-02-19 NOTE — ASSESSMENT
[FreeTextEntry1] : I will see this patient again in 1 week's time.  If the drain output is less than 20 cc/day I will remove it

## 2020-02-19 NOTE — HISTORY OF PRESENT ILLNESS
[de-identified] : This patient planes only of pain at the skin site where the drain comes out.  It has been putting out at least 40 cc daily.

## 2020-02-19 NOTE — REASON FOR VISIT
[Post Op: _________] : a [unfilled] post op visit [Spouse] : spouse [FreeTextEntry1] : Laparoscopic cholecystectomy - here for ANDRIA drain removal

## 2020-02-19 NOTE — PHYSICAL EXAM
[de-identified] : Soft and nontender.  The wounds are clean dry and healing well.  There is serosanguineous drainage in the bulb

## 2020-02-27 ENCOUNTER — APPOINTMENT (OUTPATIENT)
Dept: SURGERY | Facility: CLINIC | Age: 75
End: 2020-02-27
Payer: MEDICARE

## 2020-02-27 VITALS
SYSTOLIC BLOOD PRESSURE: 112 MMHG | OXYGEN SATURATION: 97 % | TEMPERATURE: 98.1 F | DIASTOLIC BLOOD PRESSURE: 68 MMHG | RESPIRATION RATE: 15 BRPM | HEART RATE: 76 BPM

## 2020-02-27 DIAGNOSIS — Z09 ENCOUNTER FOR FOLLOW-UP EXAMINATION AFTER COMPLETED TREATMENT FOR CONDITIONS OTHER THAN MALIGNANT NEOPLASM: ICD-10-CM

## 2020-02-27 PROCEDURE — 99024 POSTOP FOLLOW-UP VISIT: CPT

## 2020-02-27 NOTE — PHYSICAL EXAM
[de-identified] : Soft and nontender.  The drain site is only irritated.  The drain was removed and a dry sterile dressing applied

## 2020-02-27 NOTE — REASON FOR VISIT
[Post Op: _________] : a [unfilled] post op visit [FreeTextEntry1] : Laparoscopic cholecystectomy. Last seen on 2/19/20.

## 2020-02-27 NOTE — PLAN
[FreeTextEntry1] : This patient will keep a dressing on his drain site until it is dry.  I will see him again on an as-needed basis

## 2020-03-30 LAB
ALBUMIN SERPL ELPH-MCNC: 4.6 G/DL
ALP BLD-CCNC: 60 U/L
ALT SERPL-CCNC: 19 U/L
ANION GAP SERPL CALC-SCNC: 12 MMOL/L
AST SERPL-CCNC: 18 U/L
BASOPHILS # BLD AUTO: 0.03 K/UL
BASOPHILS NFR BLD AUTO: 0.5 %
BILIRUB SERPL-MCNC: 0.7 MG/DL
BUN SERPL-MCNC: 33 MG/DL
CALCIUM SERPL-MCNC: 9.5 MG/DL
CHLORIDE SERPL-SCNC: 95 MMOL/L
CO2 SERPL-SCNC: 29 MMOL/L
CREAT SERPL-MCNC: 1.31 MG/DL
EOSINOPHIL # BLD AUTO: 0.19 K/UL
EOSINOPHIL NFR BLD AUTO: 3.4 %
ESTIMATED AVERAGE GLUCOSE: 134 MG/DL
GLUCOSE SERPL-MCNC: 136 MG/DL
HBA1C MFR BLD HPLC: 6.3 %
HCT VFR BLD CALC: 38.8 %
HGB BLD-MCNC: 12.3 G/DL
IMM GRANULOCYTES NFR BLD AUTO: 0.4 %
LYMPHOCYTES # BLD AUTO: 0.87 K/UL
LYMPHOCYTES NFR BLD AUTO: 15.5 %
MAN DIFF?: NORMAL
MCHC RBC-ENTMCNC: 31.6 PG
MCHC RBC-ENTMCNC: 31.7 GM/DL
MCV RBC AUTO: 99.7 FL
MONOCYTES # BLD AUTO: 0.61 K/UL
MONOCYTES NFR BLD AUTO: 10.9 %
NEUTROPHILS # BLD AUTO: 3.88 K/UL
NEUTROPHILS NFR BLD AUTO: 69.3 %
NT-PROBNP SERPL-MCNC: 1415 PG/ML
PLATELET # BLD AUTO: 252 K/UL
POTASSIUM SERPL-SCNC: 4.6 MMOL/L
PROT SERPL-MCNC: 7.1 G/DL
PSA SERPL-MCNC: 1.02 NG/ML
RBC # BLD: 3.89 M/UL
RBC # FLD: 15.1 %
SODIUM SERPL-SCNC: 136 MMOL/L
TSH SERPL-ACNC: 0.71 UIU/ML
WBC # FLD AUTO: 5.6 K/UL

## 2020-04-01 NOTE — ASU PREOP CHECKLIST - LATEX ALLERGY
no Pt seen with fellow.  Agree with above plan. Billing [id number]  B34.2 D/C 40 minutes Billing [id number] B34.2 Billing [id number] B34.2

## 2020-05-20 ENCOUNTER — APPOINTMENT (OUTPATIENT)
Dept: ELECTROPHYSIOLOGY | Facility: CLINIC | Age: 75
End: 2020-05-20
Payer: MEDICARE

## 2020-05-20 PROCEDURE — 93296 REM INTERROG EVL PM/IDS: CPT

## 2020-05-20 PROCEDURE — 93295 DEV INTERROG REMOTE 1/2/MLT: CPT

## 2020-08-20 ENCOUNTER — APPOINTMENT (OUTPATIENT)
Dept: ELECTROPHYSIOLOGY | Facility: CLINIC | Age: 75
End: 2020-08-20
Payer: MEDICARE

## 2020-08-20 PROCEDURE — 93295 DEV INTERROG REMOTE 1/2/MLT: CPT

## 2020-08-20 PROCEDURE — 93296 REM INTERROG EVL PM/IDS: CPT

## 2020-11-03 ENCOUNTER — APPOINTMENT (OUTPATIENT)
Dept: ELECTROPHYSIOLOGY | Facility: CLINIC | Age: 75
End: 2020-11-03

## 2020-11-19 ENCOUNTER — APPOINTMENT (OUTPATIENT)
Dept: ELECTROPHYSIOLOGY | Facility: CLINIC | Age: 75
End: 2020-11-19
Payer: MEDICARE

## 2020-11-19 PROCEDURE — 93295 DEV INTERROG REMOTE 1/2/MLT: CPT

## 2020-11-19 PROCEDURE — 93296 REM INTERROG EVL PM/IDS: CPT

## 2021-02-17 RX ORDER — METOPROLOL TARTRATE 25 MG/1
25 TABLET, FILM COATED ORAL
Refills: 0 | Status: DISCONTINUED | COMMUNITY
End: 2021-02-17

## 2021-02-18 ENCOUNTER — APPOINTMENT (OUTPATIENT)
Dept: ELECTROPHYSIOLOGY | Facility: CLINIC | Age: 76
End: 2021-02-18
Payer: MEDICARE

## 2021-02-18 ENCOUNTER — NON-APPOINTMENT (OUTPATIENT)
Age: 76
End: 2021-02-18

## 2021-02-18 PROCEDURE — 93296 REM INTERROG EVL PM/IDS: CPT

## 2021-02-18 PROCEDURE — 93295 DEV INTERROG REMOTE 1/2/MLT: CPT

## 2021-03-12 NOTE — DISCHARGE NOTE NURSING/CASE MANAGEMENT/SOCIAL WORK - NSPROEXTENSIONSOFSELF_GEN_A_NUR
What Type Of Note Output Would You Prefer (Optional)?: Bullet Format How Severe Is Your Skin Lesion?: mild Has Your Skin Lesion Been Treated?: not been treated Is This A New Presentation, Or A Follow-Up?: Skin Lesions Which Family Member (Optional)?: Dad none

## 2021-05-14 ENCOUNTER — APPOINTMENT (OUTPATIENT)
Dept: ELECTROPHYSIOLOGY | Facility: CLINIC | Age: 76
End: 2021-05-14
Payer: MEDICARE

## 2021-05-14 ENCOUNTER — NON-APPOINTMENT (OUTPATIENT)
Age: 76
End: 2021-05-14

## 2021-05-14 VITALS — DIASTOLIC BLOOD PRESSURE: 69 MMHG | SYSTOLIC BLOOD PRESSURE: 133 MMHG

## 2021-05-14 VITALS — DIASTOLIC BLOOD PRESSURE: 50 MMHG | SYSTOLIC BLOOD PRESSURE: 84 MMHG

## 2021-05-14 VITALS
OXYGEN SATURATION: 98 % | BODY MASS INDEX: 28.64 KG/M2 | SYSTOLIC BLOOD PRESSURE: 136 MMHG | HEART RATE: 74 BPM | HEIGHT: 68 IN | DIASTOLIC BLOOD PRESSURE: 69 MMHG

## 2021-05-14 VITALS — SYSTOLIC BLOOD PRESSURE: 93 MMHG | DIASTOLIC BLOOD PRESSURE: 52 MMHG

## 2021-05-14 VITALS — BODY MASS INDEX: 28.64 KG/M2 | WEIGHT: 188.38 LBS

## 2021-05-14 VITALS — TEMPERATURE: 97 F

## 2021-05-14 DIAGNOSIS — I48.0 PAROXYSMAL ATRIAL FIBRILLATION: ICD-10-CM

## 2021-05-14 DIAGNOSIS — Z95.2 PRESENCE OF PROSTHETIC HEART VALVE: ICD-10-CM

## 2021-05-14 PROCEDURE — 93000 ELECTROCARDIOGRAM COMPLETE: CPT | Mod: 59

## 2021-05-14 PROCEDURE — 99214 OFFICE O/P EST MOD 30 MIN: CPT

## 2021-05-14 PROCEDURE — 93284 PRGRMG EVAL IMPLANTABLE DFB: CPT

## 2021-05-14 RX ORDER — UBIDECARENONE/VIT E ACET 100MG-5
CAPSULE ORAL
Refills: 0 | Status: ACTIVE | COMMUNITY

## 2021-05-14 RX ORDER — ATORVASTATIN CALCIUM 40 MG/1
40 TABLET, FILM COATED ORAL
Refills: 0 | Status: DISCONTINUED | COMMUNITY
End: 2021-05-14

## 2021-05-14 NOTE — HISTORY OF PRESENT ILLNESS
[FreeTextEntry1] : Al Hutton is a 76y/o man with Hx of Hodgkins lymphoma, HTN, DM, HLD, severe AS s/p TAVR (2016), chronic systolic CHF with EF 34%, LBBB (EF improved to 40-45% with CRT-D and AVR), and MI s/p BiV ICD and paroxysmal afib (very brief, not on AC) who presents today for routine device check and follow up. Recently had an episode of VT requiring ICD shock yesterday on 5/13/2021. Recalls being in bathroom when he felt the shock. Denies any symptoms at time. No missed medication dosing. On metoprolol 75mg daily. Believes he had recent ECHO with LVEF 40% and recent stress test. Was last hospitalized in [place] in the spring of 2019 for pericardial effusion and 900cc of blood was aspirated. Now off Plavix and only on ASA. Was also admitted to [facility] in late May 2019 for acute on chronic systolic CHF. Admits doing well currently with no issues or complaints. Has had some weight gain, attributes to his poor diet. Denies chest pain, palpitations, SOB, syncope or near syncope.

## 2021-05-14 NOTE — CARDIOLOGY SUMMARY
[de-identified] : 12/13/2017, 1. Transcatheter aortic valve replacement. Peak transaorticvalve gradient equals 9 mm Hg, mean transaortic valvegradient equals 5 mm Hg, which is probably normal in thepresence of a transcatheter aortic valve replacement. Traceparavalvular regurgitation. No valvular regurgitation.2. Normal left ventricular internal dimensions and wallthicknesses.3. Severe segmental left ventricular systolic dysfunction.The basal to mid inferior, inferolateral walls are thin andakinetic.4. Mild diastolic dysfunction (Stage I).5. The right ventricle is not well visualized.*** Compared with echocardiogram of 6/16/2017, nosignificant changes noted. LVEF 30%

## 2021-05-17 LAB
ALBUMIN SERPL ELPH-MCNC: 4.7 G/DL
ALP BLD-CCNC: 76 U/L
ALT SERPL-CCNC: 13 U/L
ANION GAP SERPL CALC-SCNC: 15 MMOL/L
AST SERPL-CCNC: 21 U/L
BASOPHILS # BLD AUTO: 0.03 K/UL
BASOPHILS NFR BLD AUTO: 0.5 %
BILIRUB SERPL-MCNC: 0.8 MG/DL
BUN SERPL-MCNC: 23 MG/DL
CALCIUM SERPL-MCNC: 10.4 MG/DL
CHLORIDE SERPL-SCNC: 98 MMOL/L
CO2 SERPL-SCNC: 28 MMOL/L
CREAT SERPL-MCNC: 1.42 MG/DL
EOSINOPHIL # BLD AUTO: 0.16 K/UL
EOSINOPHIL NFR BLD AUTO: 2.6 %
HCT VFR BLD CALC: 41.5 %
HGB BLD-MCNC: 13.4 G/DL
IMM GRANULOCYTES NFR BLD AUTO: 0.2 %
LYMPHOCYTES # BLD AUTO: 0.97 K/UL
LYMPHOCYTES NFR BLD AUTO: 15.7 %
MAGNESIUM SERPL-MCNC: 2.2 MG/DL
MAN DIFF?: NORMAL
MCHC RBC-ENTMCNC: 32.3 GM/DL
MCHC RBC-ENTMCNC: 32.6 PG
MCV RBC AUTO: 101 FL
MONOCYTES # BLD AUTO: 0.51 K/UL
MONOCYTES NFR BLD AUTO: 8.3 %
NEUTROPHILS # BLD AUTO: 4.48 K/UL
NEUTROPHILS NFR BLD AUTO: 72.7 %
NT-PROBNP SERPL-MCNC: 2253 PG/ML
PLATELET # BLD AUTO: 274 K/UL
POTASSIUM SERPL-SCNC: 4.6 MMOL/L
PROT SERPL-MCNC: 8.3 G/DL
RBC # BLD: 4.11 M/UL
RBC # FLD: 14.9 %
SODIUM SERPL-SCNC: 141 MMOL/L
WBC # FLD AUTO: 6.16 K/UL

## 2021-05-18 ENCOUNTER — INPATIENT (INPATIENT)
Facility: HOSPITAL | Age: 76
LOS: 7 days | Discharge: HOME | End: 2021-05-26
Attending: INTERNAL MEDICINE | Admitting: INTERNAL MEDICINE
Payer: MEDICARE

## 2021-05-18 ENCOUNTER — NON-APPOINTMENT (OUTPATIENT)
Age: 76
End: 2021-05-18

## 2021-05-18 VITALS
OXYGEN SATURATION: 99 % | DIASTOLIC BLOOD PRESSURE: 66 MMHG | HEART RATE: 89 BPM | RESPIRATION RATE: 20 BRPM | TEMPERATURE: 98 F | SYSTOLIC BLOOD PRESSURE: 149 MMHG | HEIGHT: 67 IN

## 2021-05-18 DIAGNOSIS — Z98.89 OTHER SPECIFIED POSTPROCEDURAL STATES: Chronic | ICD-10-CM

## 2021-05-18 DIAGNOSIS — Z45.02 ENCOUNTER FOR ADJUSTMENT AND MANAGEMENT OF AUTOMATIC IMPLANTABLE CARDIAC DEFIBRILLATOR: ICD-10-CM

## 2021-05-18 DIAGNOSIS — Z95.810 PRESENCE OF AUTOMATIC (IMPLANTABLE) CARDIAC DEFIBRILLATOR: Chronic | ICD-10-CM

## 2021-05-18 DIAGNOSIS — E03.9 HYPOTHYROIDISM, UNSPECIFIED: ICD-10-CM

## 2021-05-18 DIAGNOSIS — I10 ESSENTIAL (PRIMARY) HYPERTENSION: ICD-10-CM

## 2021-05-18 DIAGNOSIS — E78.5 HYPERLIPIDEMIA, UNSPECIFIED: ICD-10-CM

## 2021-05-18 DIAGNOSIS — Z95.2 PRESENCE OF PROSTHETIC HEART VALVE: Chronic | ICD-10-CM

## 2021-05-18 DIAGNOSIS — Z98.49 CATARACT EXTRACTION STATUS, UNSPECIFIED EYE: Chronic | ICD-10-CM

## 2021-05-18 DIAGNOSIS — Z98.890 OTHER SPECIFIED POSTPROCEDURAL STATES: Chronic | ICD-10-CM

## 2021-05-18 DIAGNOSIS — I50.22 CHRONIC SYSTOLIC (CONGESTIVE) HEART FAILURE: ICD-10-CM

## 2021-05-18 DIAGNOSIS — E11.9 TYPE 2 DIABETES MELLITUS WITHOUT COMPLICATIONS: ICD-10-CM

## 2021-05-18 LAB
ALBUMIN SERPL ELPH-MCNC: 3.9 G/DL — SIGNIFICANT CHANGE UP (ref 3.3–5)
ALP SERPL-CCNC: 63 U/L — SIGNIFICANT CHANGE UP (ref 40–120)
ALT FLD-CCNC: 12 U/L — SIGNIFICANT CHANGE UP (ref 4–41)
ANION GAP SERPL CALC-SCNC: 11 MMOL/L — SIGNIFICANT CHANGE UP (ref 7–14)
AST SERPL-CCNC: 17 U/L — SIGNIFICANT CHANGE UP (ref 4–40)
BASOPHILS # BLD AUTO: 0.02 K/UL — SIGNIFICANT CHANGE UP (ref 0–0.2)
BASOPHILS NFR BLD AUTO: 0.3 % — SIGNIFICANT CHANGE UP (ref 0–2)
BILIRUB SERPL-MCNC: 0.4 MG/DL — SIGNIFICANT CHANGE UP (ref 0.2–1.2)
BUN SERPL-MCNC: 22 MG/DL — SIGNIFICANT CHANGE UP (ref 7–23)
CALCIUM SERPL-MCNC: 9.3 MG/DL — SIGNIFICANT CHANGE UP (ref 8.4–10.5)
CHLORIDE SERPL-SCNC: 101 MMOL/L — SIGNIFICANT CHANGE UP (ref 98–107)
CK MB BLD-MCNC: 1.8 % — SIGNIFICANT CHANGE UP (ref 0–2.5)
CK MB CFR SERPL CALC: 3.4 NG/ML — SIGNIFICANT CHANGE UP
CK SERPL-CCNC: 188 U/L — SIGNIFICANT CHANGE UP (ref 30–200)
CO2 SERPL-SCNC: 27 MMOL/L — SIGNIFICANT CHANGE UP (ref 22–31)
CREAT SERPL-MCNC: 1.09 MG/DL — SIGNIFICANT CHANGE UP (ref 0.5–1.3)
EOSINOPHIL # BLD AUTO: 0.14 K/UL — SIGNIFICANT CHANGE UP (ref 0–0.5)
EOSINOPHIL NFR BLD AUTO: 2.1 % — SIGNIFICANT CHANGE UP (ref 0–6)
GLUCOSE BLDC GLUCOMTR-MCNC: 103 MG/DL — HIGH (ref 70–99)
GLUCOSE BLDC GLUCOMTR-MCNC: 105 MG/DL — HIGH (ref 70–99)
GLUCOSE SERPL-MCNC: 161 MG/DL — HIGH (ref 70–99)
HCT VFR BLD CALC: 35.7 % — LOW (ref 39–50)
HGB BLD-MCNC: 11.3 G/DL — LOW (ref 13–17)
IANC: 5.15 K/UL — SIGNIFICANT CHANGE UP (ref 1.5–8.5)
IMM GRANULOCYTES NFR BLD AUTO: 0.3 % — SIGNIFICANT CHANGE UP (ref 0–1.5)
LYMPHOCYTES # BLD AUTO: 0.66 K/UL — LOW (ref 1–3.3)
LYMPHOCYTES # BLD AUTO: 10.1 % — LOW (ref 13–44)
MAGNESIUM SERPL-MCNC: 2 MG/DL — SIGNIFICANT CHANGE UP (ref 1.6–2.6)
MCHC RBC-ENTMCNC: 31.7 GM/DL — LOW (ref 32–36)
MCHC RBC-ENTMCNC: 32.1 PG — SIGNIFICANT CHANGE UP (ref 27–34)
MCV RBC AUTO: 101.4 FL — HIGH (ref 80–100)
MONOCYTES # BLD AUTO: 0.54 K/UL — SIGNIFICANT CHANGE UP (ref 0–0.9)
MONOCYTES NFR BLD AUTO: 8.3 % — SIGNIFICANT CHANGE UP (ref 2–14)
NEUTROPHILS # BLD AUTO: 5.15 K/UL — SIGNIFICANT CHANGE UP (ref 1.8–7.4)
NEUTROPHILS NFR BLD AUTO: 78.9 % — HIGH (ref 43–77)
NRBC # BLD: 0 /100 WBCS — SIGNIFICANT CHANGE UP
NRBC # FLD: 0 K/UL — SIGNIFICANT CHANGE UP
NT-PROBNP SERPL-SCNC: 4741 PG/ML — HIGH
PHOSPHATE SERPL-MCNC: 2.3 MG/DL — LOW (ref 2.5–4.5)
PLATELET # BLD AUTO: 203 K/UL — SIGNIFICANT CHANGE UP (ref 150–400)
POTASSIUM SERPL-MCNC: 4.2 MMOL/L — SIGNIFICANT CHANGE UP (ref 3.5–5.3)
POTASSIUM SERPL-SCNC: 4.2 MMOL/L — SIGNIFICANT CHANGE UP (ref 3.5–5.3)
PROT SERPL-MCNC: 6.9 G/DL — SIGNIFICANT CHANGE UP (ref 6–8.3)
RBC # BLD: 3.52 M/UL — LOW (ref 4.2–5.8)
RBC # FLD: 14.6 % — HIGH (ref 10.3–14.5)
SARS-COV-2 RNA SPEC QL NAA+PROBE: SIGNIFICANT CHANGE UP
SODIUM SERPL-SCNC: 139 MMOL/L — SIGNIFICANT CHANGE UP (ref 135–145)
TROPONIN T, HIGH SENSITIVITY RESULT: 31 NG/L — SIGNIFICANT CHANGE UP
WBC # BLD: 6.53 K/UL — SIGNIFICANT CHANGE UP (ref 3.8–10.5)
WBC # FLD AUTO: 6.53 K/UL — SIGNIFICANT CHANGE UP (ref 3.8–10.5)

## 2021-05-18 PROCEDURE — 99223 1ST HOSP IP/OBS HIGH 75: CPT | Mod: GC

## 2021-05-18 PROCEDURE — 99285 EMERGENCY DEPT VISIT HI MDM: CPT | Mod: CS,25,GC

## 2021-05-18 PROCEDURE — 71045 X-RAY EXAM CHEST 1 VIEW: CPT | Mod: 26

## 2021-05-18 PROCEDURE — 93284 PRGRMG EVAL IMPLANTABLE DFB: CPT | Mod: 26,GC

## 2021-05-18 PROCEDURE — 93010 ELECTROCARDIOGRAM REPORT: CPT

## 2021-05-18 RX ORDER — SODIUM CHLORIDE 9 MG/ML
1000 INJECTION, SOLUTION INTRAVENOUS
Refills: 0 | Status: DISCONTINUED | OUTPATIENT
Start: 2021-05-18 | End: 2021-05-26

## 2021-05-18 RX ORDER — FUROSEMIDE 40 MG
40 TABLET ORAL DAILY
Refills: 0 | Status: DISCONTINUED | OUTPATIENT
Start: 2021-05-18 | End: 2021-05-19

## 2021-05-18 RX ORDER — INSULIN LISPRO 100/ML
VIAL (ML) SUBCUTANEOUS
Refills: 0 | Status: DISCONTINUED | OUTPATIENT
Start: 2021-05-18 | End: 2021-05-26

## 2021-05-18 RX ORDER — INSULIN GLARGINE 100 [IU]/ML
10 INJECTION, SOLUTION SUBCUTANEOUS AT BEDTIME
Refills: 0 | Status: DISCONTINUED | OUTPATIENT
Start: 2021-05-18 | End: 2021-05-26

## 2021-05-18 RX ORDER — ATORVASTATIN CALCIUM 80 MG/1
80 TABLET, FILM COATED ORAL AT BEDTIME
Refills: 0 | Status: DISCONTINUED | OUTPATIENT
Start: 2021-05-18 | End: 2021-05-26

## 2021-05-18 RX ORDER — ASPIRIN/CALCIUM CARB/MAGNESIUM 324 MG
325 TABLET ORAL DAILY
Refills: 0 | Status: DISCONTINUED | OUTPATIENT
Start: 2021-05-18 | End: 2021-05-24

## 2021-05-18 RX ORDER — METOPROLOL TARTRATE 50 MG
50 TABLET ORAL DAILY
Refills: 0 | Status: DISCONTINUED | OUTPATIENT
Start: 2021-05-18 | End: 2021-05-18

## 2021-05-18 RX ORDER — GLUCAGON INJECTION, SOLUTION 0.5 MG/.1ML
1 INJECTION, SOLUTION SUBCUTANEOUS ONCE
Refills: 0 | Status: DISCONTINUED | OUTPATIENT
Start: 2021-05-18 | End: 2021-05-26

## 2021-05-18 RX ORDER — DEXTROSE 50 % IN WATER 50 %
25 SYRINGE (ML) INTRAVENOUS ONCE
Refills: 0 | Status: DISCONTINUED | OUTPATIENT
Start: 2021-05-18 | End: 2021-05-26

## 2021-05-18 RX ORDER — DEXTROSE 50 % IN WATER 50 %
15 SYRINGE (ML) INTRAVENOUS ONCE
Refills: 0 | Status: DISCONTINUED | OUTPATIENT
Start: 2021-05-18 | End: 2021-05-26

## 2021-05-18 RX ORDER — AMIODARONE HYDROCHLORIDE 400 MG/1
400 TABLET ORAL ONCE
Refills: 0 | Status: DISCONTINUED | OUTPATIENT
Start: 2021-05-18 | End: 2021-05-18

## 2021-05-18 RX ORDER — FUROSEMIDE 40 MG
40 TABLET ORAL DAILY
Refills: 0 | Status: DISCONTINUED | OUTPATIENT
Start: 2021-05-18 | End: 2021-05-18

## 2021-05-18 RX ORDER — CLONAZEPAM 1 MG
1 TABLET ORAL DAILY
Refills: 0 | Status: DISCONTINUED | OUTPATIENT
Start: 2021-05-18 | End: 2021-05-23

## 2021-05-18 RX ORDER — AMIODARONE HYDROCHLORIDE 400 MG/1
TABLET ORAL
Refills: 0 | Status: DISCONTINUED | OUTPATIENT
Start: 2021-05-18 | End: 2021-05-19

## 2021-05-18 RX ORDER — METOPROLOL TARTRATE 50 MG
100 TABLET ORAL DAILY
Refills: 0 | Status: DISCONTINUED | OUTPATIENT
Start: 2021-05-18 | End: 2021-05-18

## 2021-05-18 RX ORDER — ASPIRIN/CALCIUM CARB/MAGNESIUM 324 MG
325 TABLET ORAL DAILY
Refills: 0 | Status: DISCONTINUED | OUTPATIENT
Start: 2021-05-18 | End: 2021-05-18

## 2021-05-18 RX ORDER — LOSARTAN POTASSIUM 100 MG/1
25 TABLET, FILM COATED ORAL DAILY
Refills: 0 | Status: DISCONTINUED | OUTPATIENT
Start: 2021-05-18 | End: 2021-05-26

## 2021-05-18 RX ORDER — ACETAMINOPHEN 500 MG
650 TABLET ORAL EVERY 6 HOURS
Refills: 0 | Status: DISCONTINUED | OUTPATIENT
Start: 2021-05-18 | End: 2021-05-26

## 2021-05-18 RX ORDER — INSULIN LISPRO 100/ML
VIAL (ML) SUBCUTANEOUS AT BEDTIME
Refills: 0 | Status: DISCONTINUED | OUTPATIENT
Start: 2021-05-18 | End: 2021-05-26

## 2021-05-18 RX ORDER — METOPROLOL TARTRATE 50 MG
50 TABLET ORAL ONCE
Refills: 0 | Status: DISCONTINUED | OUTPATIENT
Start: 2021-05-18 | End: 2021-05-18

## 2021-05-18 RX ORDER — LOSARTAN POTASSIUM 100 MG/1
25 TABLET, FILM COATED ORAL DAILY
Refills: 0 | Status: DISCONTINUED | OUTPATIENT
Start: 2021-05-18 | End: 2021-05-18

## 2021-05-18 RX ORDER — METOPROLOL TARTRATE 50 MG
50 TABLET ORAL ONCE
Refills: 0 | Status: COMPLETED | OUTPATIENT
Start: 2021-05-18 | End: 2021-05-18

## 2021-05-18 RX ORDER — METOPROLOL TARTRATE 50 MG
150 TABLET ORAL DAILY
Refills: 0 | Status: DISCONTINUED | OUTPATIENT
Start: 2021-05-18 | End: 2021-05-26

## 2021-05-18 RX ORDER — ENOXAPARIN SODIUM 100 MG/ML
40 INJECTION SUBCUTANEOUS DAILY
Refills: 0 | Status: DISCONTINUED | OUTPATIENT
Start: 2021-05-18 | End: 2021-05-26

## 2021-05-18 RX ORDER — DEXTROSE 50 % IN WATER 50 %
12.5 SYRINGE (ML) INTRAVENOUS ONCE
Refills: 0 | Status: DISCONTINUED | OUTPATIENT
Start: 2021-05-18 | End: 2021-05-26

## 2021-05-18 RX ORDER — FLUOXETINE HCL 10 MG
20 CAPSULE ORAL DAILY
Refills: 0 | Status: DISCONTINUED | OUTPATIENT
Start: 2021-05-18 | End: 2021-05-26

## 2021-05-18 RX ORDER — LEVOTHYROXINE SODIUM 125 MCG
125 TABLET ORAL DAILY
Refills: 0 | Status: DISCONTINUED | OUTPATIENT
Start: 2021-05-18 | End: 2021-05-26

## 2021-05-18 RX ORDER — AMIODARONE HYDROCHLORIDE 400 MG/1
400 TABLET ORAL THREE TIMES A DAY
Refills: 0 | Status: DISCONTINUED | OUTPATIENT
Start: 2021-05-18 | End: 2021-05-18

## 2021-05-18 RX ORDER — AMIODARONE HYDROCHLORIDE 400 MG/1
400 TABLET ORAL EVERY 8 HOURS
Refills: 0 | Status: DISCONTINUED | OUTPATIENT
Start: 2021-05-18 | End: 2021-05-19

## 2021-05-18 RX ADMIN — Medication 650 MILLIGRAM(S): at 20:47

## 2021-05-18 RX ADMIN — ATORVASTATIN CALCIUM 80 MILLIGRAM(S): 80 TABLET, FILM COATED ORAL at 21:22

## 2021-05-18 RX ADMIN — AMIODARONE HYDROCHLORIDE 400 MILLIGRAM(S): 400 TABLET ORAL at 14:29

## 2021-05-18 RX ADMIN — INSULIN GLARGINE 10 UNIT(S): 100 INJECTION, SOLUTION SUBCUTANEOUS at 22:04

## 2021-05-18 RX ADMIN — Medication 650 MILLIGRAM(S): at 21:50

## 2021-05-18 RX ADMIN — Medication 50 MILLIGRAM(S): at 17:44

## 2021-05-18 NOTE — ED PROVIDER NOTE - CLINICAL SUMMARY MEDICAL DECISION MAKING FREE TEXT BOX
75 M hx of  PMH of MI s/p PPM, AS s/p TAVR 2016, CHF w/ BiV AICD, cholelithiasis, anxiety, hypothyroidism, DM, Hodgkin lymphoma s/p radiation and chemo now in remission with left lung base radiation fibrosis, b/l carotid stenosis s/p carotid endarterectomy presenting due to AICD firing since 4d ago.

## 2021-05-18 NOTE — H&P ADULT - NSICDXPASTSURGICALHX_GEN_ALL_CORE_FT
PAST SURGICAL HISTORY:  H/O bilateral inguinal hernia repair     H/O carotid endarterectomy bilateral    History of umbilical hernia repair     S/P cataract surgery b/l    S/P ICD (internal cardiac defibrillator) procedure [employer] [id number], last interrogation 11/2019    S/P TAVR (transcatheter aortic valve replacement) 10/16 in [place]

## 2021-05-18 NOTE — ED ADULT NURSE NOTE - OBJECTIVE STATEMENT
p/t received awake and responsive, c/o of AICD fired this am, cardiac monitor is on, vss, p/t denies any chest pain denies sob, nad noted, labs drawn and sent as per Md order, will continue to monitor

## 2021-05-18 NOTE — ED PROVIDER NOTE - ATTENDING CONTRIBUTION TO CARE
Pt was seen and evaluated by me. Pt is a 76 y/o male with PMhx of MI s/p PPM, AS s/p TAVR, CHF w/ BiV AICD, cholelithiasis, anxiety, hypothyroidism, type 2 DM, and Hodgkin lymphoma s/p radiation and chemo, who presented to the ED for AICD firing. Pt states 4 days ago his AICD fired and followed up with Dr. Godinez who adjusted his metoprolol. Pt notes today while sleeping his AICD fired again. Pt denies any fever, chills, nausea, vomiting, SOB, chest pain, or abd pain. Lungs CTA b/l. RRR. Abd soft, non-tender. No LE swelling.  Concern for Arrhythmia/CAD  Labs, EKG, CXR, Cardio consult

## 2021-05-18 NOTE — ED ADULT TRIAGE NOTE - CHIEF COMPLAINT QUOTE
pt sent by Herbert sent for eval. his AICD fired x 2, Thurs. and then this AM pt denies dizziness, no SOB, no CP, pt ambulatory to ER

## 2021-05-18 NOTE — CONSULT NOTE ADULT - SUBJECTIVE AND OBJECTIVE BOX
Patient seen and evaluated at bedside    Chief Complaint:  ICD shock     HPI:  75M hx of remote hx Hodgkins lymphoma, hypertension, hyperlipidemia, severe AS s/p TAVR, chronic systolic CHF with EF 34%, LBBB, MI s/p BiV-ICD (EF improved to 40-45%), pericardial effusion s/p drainage in past presenting with ICD firing.  Recently seen by Dr. Godinez in 5/14/21 noted to have ICD shock at this time, increased to metoprolol 100mg BID.  Pt states feeling well but had addl ICD shock this AM.  He states he has not had significant changes in weight, CP, LE edema, palpitations, fevers, illness, exercise tolerance, diet.  Reports medication compliance.      PMHx:   HTN (hypertension)    HLD (hyperlipidemia)    Diabetes mellitus    Hx of Hodgkins lymphoma    Iatrogenic hypothyroidism    Myocardial infarction, old    Myocardial infarction involving other coronary artery    Pacemaker    Acquired hypothyroidism    Hodgkin lymphoma    Type 2 diabetes mellitus without complication    Myocardial infarction    ICD (implantable cardioverter-defibrillator) in place    AS (aortic stenosis)    Systolic CHF, chronic    Lung fibrosis    Pericardial effusion    CHF, chronic    Presence of biventricular AICD    Cholelithiases        PSHx:   History of carotid endarterectomy    No significant past surgical history    H/O carotid endarterectomy    H/O bilateral inguinal hernia repair    History of umbilical hernia repair    S/P TAVR (transcatheter aortic valve replacement)    S/P ICD (internal cardiac defibrillator) procedure    S/P cataract surgery        Allergies:  lisinopril (Other)  No Known Allergies      Home Meds: reviewed    Current Medications:       FAMILY HISTORY:  No pertinent family history in first degree relatives        Social History:  Smoking History: denies  Alcohol Use: denies  Drug Use: denies    REVIEW OF SYSTEMS:  Constitutional:     [x ] negative [ ] fevers [ ] chills [ ] weight loss [ ] weight gain  HEENT:                  [x ] negative [ ] dry eyes [ ] eye irritation [ ] postnasal drip [ ] nasal congestion  CV:                         [ x] negative  [ ] chest pain [ ] orthopnea [ ] palpitations [ ] murmur  Resp:                     [x ] negative [ ] cough [ ] shortness of breath [ ] dyspnea [ ] wheezing [ ] sputum [ ]hemoptysis  GI:                          [ x] negative [ ] nausea [ ] vomiting [ ] diarrhea [ ] constipation [ ] abd pain [ ] dysphagia   :                        [ x] negative [ ] dysuria [ ] nocturia [ ] hematuria [ ] increased urinary frequency  Musculoskeletal: [x ] negative [ ] back pain [ ] myalgias [ ] arthralgias [ ] fracture  Skin:                       [ x] negative [ ] rash [ ] itch  Neurological:        [ x] negative [ ] headache [ ] dizziness [ ] syncope [ ] weakness [ ] numbness  Psychiatric:           [ x] negative [ ] anxiety [ ] depression  Endocrine:            [ x] negative [ ] diabetes [ ] thyroid problem  Heme/Lymph:      [ x] negative [ ] anemia [ ] bleeding problem  Allergic/Immune: [ x] negative [ ] itchy eyes [ ] nasal discharge [ ] hives [ ] angioedema    [ x] All other systems negative  [ ] Unable to assess ROS due to      Physical Exam:  T(F): 98.4 (05-18), Max: 98.4 (05-18)  HR: 78 (05-18) (78 - 89)  BP: 142/65 (05-18) (142/65 - 149/66)  RR: 20 (05-18)  SpO2: 99% (05-18)  GENERAL: No acute distress, well-developed  HEAD:  Atraumatic, Normocephalic  ENT: EOMI, PERRLA, conjunctiva and sclera clear, Neck supple, No JVD, moist mucosa  CHEST/LUNG: Clear to auscultation bilaterally; No wheeze, equal breath sounds bilaterally   BACK: No spinal tenderness  HEART: Regular rate and rhythm; No murmurs, rubs, or gallops  ABDOMEN: Soft, Nontender, Nondistended; Bowel sounds present  EXTREMITIES:  No clubbing, cyanosis, or edema  PSYCH: Nl behavior, nl affect  NEUROLOGY: AAOx3, non-focal, cranial nerves intact  SKIN: Normal color, No rashes or lesions  LINES:    Cardiovascular Diagnostic Testing:    ECG: Personally reviewed: A-sensed V-paced  CXR: Personally reviewed    Labs: Personally reviewed                        11.3   6.53  )-----------( 203      ( 18 May 2021 11:30 )             35.7     05-18    139  |  101  |  22  ----------------------------<  161<H>  4.2   |  27  |  1.09    Ca    9.3      18 May 2021 11:30  Phos  2.3     05-18  Mg     2.0     05-18    TPro  6.9  /  Alb  3.9  /  TBili  0.4  /  DBili  x   /  AST  17  /  ALT  12  /  AlkPhos  63  05-18      Serum Pro-Brain Natriuretic Peptide: 4741 pg/mL (05-18 @ 11:30)

## 2021-05-18 NOTE — H&P ADULT - ASSESSMENT
75 M hx of  PMH of MI s/p PPM, AS s/p TAVR 2016, CHF w/ BiV AICD, cholelithiasis, anxiety, hypothyroidism, DM, Hodgkin lymphoma s/p radiation and chemo now in remission with left lung base radiation fibrosis, b/l carotid stenosis s/p carotid endarterectomy presenting due to AICD firing since 4d ago. Fired 2x on thursday when walking to bathroom in his house, no preceding or subsequent symptoms, saw Dr. Godinez the next day with adjustment in metoprolol dose and plan for further outpt f/u. Today was sleeping when the shock went off. Called Kaye Godinez and Chidi today, advised to come to ER .Now I feel fine. .

## 2021-05-18 NOTE — ED PROVIDER NOTE - PHYSICAL EXAMINATION
Gen - NAD; well appearing; A+Ox3   HEENT - NCAT, EOMI, moist mucous membranes  Neck - supple  Resp - CTAB, symmetric breath sounds  CV -  RRR, s1 s2  Abd - soft, NT, ND; no guarding or rebound  MSK - 5/5 strength and FROM b/l UE and LE  Extrem - 3+ distal pulses b/L UE and LE; no LE edema  Skin - no rash or bruising, warm and well perfused  Neuro - no focal motor or sensation deficits

## 2021-05-18 NOTE — PROCEDURE NOTE - ADDITIONAL PROCEDURE DETAILS
5/18/21 VT @ 203BPM, 1x ATP, 1x 41J  5/13/21 VT @ 216BPM, 1x ATP, 1x 41J    Normally functional CRT-D otherwise 5/18/21 VT @ 203BPM, 1x ATP, 1x 41J  5/13/21 VT @ 216BPM, 1x ATP, 1x 41J    Lowered VT-2 zone to 170BPM from 185BPM.    Added additional ATP therapy (total ATP x2) for VT-2 zone at 170bpm    Normally functional CRT-D otherwise

## 2021-05-18 NOTE — CONSULT NOTE ADULT - ATTENDING COMMENTS
75M hx of remote hx Hodgkins lymphoma, hypertension, hyperlipidemia, severe AS s/p TAVR, chronic systolic CHF with EF 34%, LBBB, MI s/p BiV-ICD (EF improved to 40-45%), pericardial effusion s/p drainage in past presenting with ICD firing, device interrogated found to be in VT.    #VT  -2x ICD shocks for monomorphic VT ~200-210BPM on 5/13 and 5/18 of similar morphology  -HDS, asx  -increase metoprolol succinate 150mg PO daily  -start amiodarone 400mg TID for 10g load, then transition to 200mg PO daily  -lowered VT-2 zone to 170BPM from 185BPM and added addl ATP burst tx to account for slowing VT cycle lengths from amiodarone  -HF optimization per gen cards; likely ischemic eval

## 2021-05-18 NOTE — CONSULT NOTE ADULT - SUBJECTIVE AND OBJECTIVE BOX
Requesting Physician : Dr. Durham     Reason for Consultation: Cardiomyopathy, CAD     HISTORY OF PRESENT ILLNESS:  75 year old male with history of CAD (known 100% RCA stenosis from last cath in 2016), severe AS s/p TAVR in 2016, cardiomyopathy s/p BiV AICD, DM, Hodgkin lymphoma s/p radiation and chemotherapy, history of carotid stenosis s/p carotid endarterectomy who presented to the ED following AICD shock.  The patient reports his AICD firing twice Thursday prior to admission.  He denies chest pain or sob but does report slight LE swelling.  The pt. was seen by EP who recommended amiodarone.  The patient was admitted for further workup.          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&#x27;s    Pericardial effusion  drained in [place] in 4/19    Presence of biventricular AICD    Cholelithiases    H/O carotid endarterectomy  bilateral    H/O bilateral inguinal hernia repair    History of umbilical hernia repair    S/P TAVR (transcatheter aortic valve replacement)  10/16 in [place]    S/P ICD (internal cardiac defibrillator) procedure  [employer] [id number], last interrogation 11/2019    S/P cataract surgery  b/l            MEDICATIONS:  MEDICATIONS  (STANDING):  aMIOdarone    Tablet   Oral   aMIOdarone    Tablet 400 milliGRAM(s) Oral every 8 hours  aspirin 325 milliGRAM(s) Oral daily  atorvastatin 80 milliGRAM(s) Oral at bedtime  clonazePAM  Tablet 1 milliGRAM(s) Oral daily  dextrose 40% Gel 15 Gram(s) Oral once  dextrose 5%. 1000 milliLiter(s) (50 mL/Hr) IV Continuous <Continuous>  dextrose 5%. 1000 milliLiter(s) (100 mL/Hr) IV Continuous <Continuous>  dextrose 50% Injectable 25 Gram(s) IV Push once  dextrose 50% Injectable 12.5 Gram(s) IV Push once  dextrose 50% Injectable 25 Gram(s) IV Push once  enoxaparin Injectable 40 milliGRAM(s) SubCutaneous daily  FLUoxetine 20 milliGRAM(s) Oral daily  furosemide    Tablet 40 milliGRAM(s) Oral daily  glucagon  Injectable 1 milliGRAM(s) IntraMuscular once  insulin glargine Injectable (LANTUS) 10 Unit(s) SubCutaneous at bedtime  insulin lispro (ADMELOG) corrective regimen sliding scale   SubCutaneous three times a day before meals  insulin lispro (ADMELOG) corrective regimen sliding scale   SubCutaneous at bedtime  levothyroxine 125 MICROGram(s) Oral daily  losartan 25 milliGRAM(s) Oral daily  metoprolol succinate  milliGRAM(s) Oral daily  metoprolol succinate ER 50 milliGRAM(s) Oral daily      Allergies    No Known Allergies    Intolerances    lisinopril (Other)      FAMILY HISTORY:  No pertinent family history in first degree relatives      Non-contributary for premature coronary disease or sudden cardiac death    SOCIAL HISTORY:    [x ] Non-smoker  [ ] Smoker  [ ] Alcohol      REVIEW OF SYSTEMS:  [ ]chest pain  [  ]shortness of breath  [  ]palpitations  [  ]syncope  [ ]near syncope [ ]upper extremity weakness   [ ] lower extremity weakness  [  ]diplopia  [  ]altered mental status   [  ]fevers  [ ]chills [ ]nausea  [ ]vomitting  [  ]dysphagia    [ ]abdominal pain  [ ]melena  [ ]BRBPR    [  ]epistaxis  [  ]rash    [x ]lower extremity edema        [x ] All others negative	  [ ] Unable to obtain    PHYSICAL EXAM:  T(C): 37.1 (05-18-21 @ 14:11), Max: 37.1 (05-18-21 @ 14:11)  HR: 74 (05-18-21 @ 14:11) (74 - 89)  BP: 130/67 (05-18-21 @ 14:11) (130/67 - 149/66)  RR: 17 (05-18-21 @ 14:11) (17 - 20)  SpO2: 99% (05-18-21 @ 14:11) (99% - 99%)  Wt(kg): --  I&O's Summary        HEENT:   Normal oral mucosa, PERRL, EOMI	  Lymphatic: No lymphadenopathy ,trace edema bilaterally   Cardiovascular: Normal S1 S2, No JVD, No murmurs , Peripheral pulses palpable 2+ bilaterally  Respiratory: Lungs clear to auscultation, normal effort 	  Gastrointestinal:  Soft, Non-tender, + BS	  Skin: No rashes, No ecchymoses, No cyanosis, warm to touch  Musculoskeletal: Normal range of motion, normal strength  Psychiatry:  Mood & affect appropriate      TELEMETRY:     ECG:  	  RADIOLOGY:  OTHER:     DIAGNOSTIC TESTING:  [ ] Echocardiogram: < from: Transthoracic Echocardiogram (05.28.19 @ 15:23) >  1. Mitral annular calcification and calcified mitral  leaflets with decreased diastolic opening. Mild-moderate  mitral regurgitation. Mean transmitral valve gradient  equals 4 mm Hg, consistent with mild mitral stenosis.  2. Transcatheter aortic valve replacement. Suboptimal  transaortic gradients.  3. Normal left ventricular internal dimensions and wall  thicknesses.  4. Moderate segmental left ventricular systolic  dysfunction. Inferior and inferolateral wall hypokinesis.  Paradoxical septal wall motion.  5. The right ventricle is not well visualized; grossly  normal right ventricular systolic function.  6. Normal tricuspid valve.  Moderate tricuspid  regurgitation.  7. Estimated pulmonary artery systolic pressure equals 40  mm Hg, assuming right atrial pressure equals 10  mm Hg,  consistent with mild pulmonary hypertension.  8. Left pleural effusion.    < end of copied text >    [ ]  Catheterization: < from: Cardiac Cath Lab - Adult (10.18.16 @ 16:42) >  CORONARY VESSELS: The coronary circulation is right dominant.  LM:   --  LM: Angiography showed minor luminal irregularities with no flow  limiting lesions.  LAD:   --  LAD: Angiography showed minor luminal irregularities with no  flow limiting lesions.  CX:   --  Circumflex: Angiography showed minor luminal irregularities with  no flow limiting lesions.  RCA:   --  Proximal RCA: There was a 100 % stenosis.  COMPLICATIONS: There were no complications.  DIAGNOSTIC RECOMMENDATIONS: TAVR eval  INTERVENTIONAL RECOMMENDATIONS: TAVR eval  Prepared and signed by  Javier Marrero M.D.    < end of copied text >    [ ] Stress Test:    	  	  LABS:	 	    CARDIAC MARKERS:  CARDIAC MARKERS ( 18 May 2021 11:30 )  x     / x     / 188 U/L / x     / 3.4 ng/mL                              11.3   6.53  )-----------( 203      ( 18 May 2021 11:30 )             35.7     05-18    139  |  101  |  22  ----------------------------<  161<H>  4.2   |  27  |  1.09    Ca    9.3      18 May 2021 11:30  Phos  2.3     05-18  Mg     2.0     05-18    TPro  6.9  /  Alb  3.9  /  TBili  0.4  /  DBili  x   /  AST  17  /  ALT  12  /  AlkPhos  63  05-18    proBNP: Serum Pro-Brain Natriuretic Peptide: 4741 pg/mL (05-18 @ 11:30)    Lipid Profile:   HgA1c:   TSH:     ASSESSMENT/PLAN:  75 year old male with history of CAD (known 100% RCA stenosis from last cath in 2016), severe AS s/p TAVR in 2016, cardiomyopathy s/p BiV AICD, DM, Hodgkin lymphoma s/p radiation and chemotherapy, history of carotid stenosis s/p carotid endarterectomy who presented to the ED following AICD shock.     -pt. currently with no chest pain or anginal symptoms  -troponin indeterminate with negative CPK - do not suspect acs  -no urgent ischemic evaluation indicated at this time  -change lasix to IV for le edema on exam  -keep K > 4, Mg >2  -check TTE  -Amio load per EP  -plan for cardiac cath when medically optimized    Brian Mo MD

## 2021-05-18 NOTE — CONSULT NOTE ADULT - ASSESSMENT
75M hx of remote hx Hodgkins lymphoma, hypertension, hyperlipidemia, severe AS s/p TAVR, chronic systolic CHF with EF 34%, LBBB, MI s/p BiV-ICD (EF improved to 40-45%), pericardial effusion s/p drainage in past presenting with ICD firing, device interrogated found to be in VT.    #VT  -2x ICD shocks for monomorphic VT ~200-210BPM on 5/13 and 5/18 of similar morphology  -HDS, asx  -increase metoprolol succinate 150mg PO daily  -start amiodarone 400mg TID for 10g load, then transition to 200mg PO daily  -HF optimization per gen cards; likely ischemic eval    Isac Hernandez MD  Cardiology Fellow - PGY 5  Text or Call: [phone]  For all New Consults and Questions:  [email or web address]   Login: [employer]   75M hx of remote hx Hodgkins lymphoma, hypertension, hyperlipidemia, severe AS s/p TAVR, chronic systolic CHF with EF 34%, LBBB, MI s/p BiV-ICD (EF improved to 40-45%), pericardial effusion s/p drainage in past presenting with ICD firing, device interrogated found to be in VT.    #VT  -2x ICD shocks for monomorphic VT ~200-210BPM on 5/13 and 5/18 of similar morphology  -HDS, asx  -increase metoprolol succinate 150mg PO daily  -start amiodarone 400mg TID for 10g load, then transition to 200mg PO daily  -lowered VT-2 zone to 170BPM from 185BPM and added addl ATP burst tx to account for slowing VT cycle lengths from amiodarone  -HF optimization per gen cards; likely ischemic eval    Isac Hernandez MD  Cardiology Fellow - PGY 5  Text or Call: [phone]  For all New Consults and Questions:  [email or web address]   Login: [email or web address]

## 2021-05-18 NOTE — ED ADULT NURSE NOTE - NSIMPLEMENTINTERV_GEN_ALL_ED
Implemented All Universal Safety Interventions:  [place] to call system. Call bell, personal items and telephone within reach. Instruct patient to call for assistance. Room bathroom lighting operational. Non-slip footwear when patient is off stretcher. Physically safe environment: no spills, clutter or unnecessary equipment. Stretcher in lowest position, wheels locked, appropriate side rails in place. Universal Safety Interventions

## 2021-05-18 NOTE — ED PROVIDER NOTE - PMH
Acquired hypothyroidism    AS (aortic stenosis)  s/p TAVR  Cholelithiases    HLD (hyperlipidemia)    Hodgkin lymphoma  on remission, chemo and rad [id number]  HTN (hypertension)    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's  Myocardial infarction  Cardiac Arrest- 1994- no stents  Pericardial effusion  drained in [place] in 4/19  Presence of biventricular AICD    Systolic CHF, chronic  s/p ICD, denies any recent exacerbation or intubation hx  Type 2 diabetes mellitus without complication

## 2021-05-18 NOTE — ED PROVIDER NOTE - OBJECTIVE STATEMENT
75 M hx of  PMH of MI s/p PPM, AS s/p TAVR 2016, CHF w/ BiV AICD, cholelithiasis, anxiety, hypothyroidism, DM, Hodgkin lymphoma s/p radiation and chemo now in remission with left lung base radiation fibrosis, b/l carotid stenosis s/p carotid endarterectomy, pericardial effusion s/p pericardiocentesis, and b/l inguinal hernia    presenting due to AICD firing. Fired 2x on thursday and again once this AM.  On thursday was walking in his house when it happened and then saw Herbert the next day and planned for further outpt f/u. Today was sleeping when the shock went off. Called Herbert and Chidi today, advised to come to ER and be seen by Dr Mo. He was not having prodromal light headedness/palpitations/chest pain before it fired. 75 M hx of  PMH of MI s/p PPM, AS s/p TAVR 2016, CHF w/ BiV AICD, cholelithiasis, anxiety, hypothyroidism, DM, Hodgkin lymphoma s/p radiation and chemo now in remission with left lung base radiation fibrosis, b/l carotid stenosis s/p carotid endarterectomy presenting due to AICD firing since 4d ago. Fired 2x on thursday when walking to bathroom in his house, no preceding or subsequent symptoms, saw Dr. Godinez the next day with adjustment in metoprolol dose and plan for further outpt f/u. Today was sleeping when the shock went off. Called Kaye Godinez and Chidi today, advised to come to ER and be seen by Dr Mo. Denies any prodromal lightheadedness/palpitations/chest pain before it fired, currently denies any symptoms. +COVID vaccine. No fevers/chills, N/V, diaphoresis, SOB.

## 2021-05-18 NOTE — ED PROVIDER NOTE - PSH
H/O bilateral inguinal hernia repair    H/O carotid endarterectomy  bilateral  History of umbilical hernia repair    S/P cataract surgery  b/l  S/P ICD (internal cardiac defibrillator) procedure  [employer] [id number], last interrogation 11/2019  S/P TAVR (transcatheter aortic valve replacement)  10/16 in [place]

## 2021-05-18 NOTE — H&P ADULT - NSICDXPASTMEDICALHX_GEN_ALL_CORE_FT
PAST MEDICAL HISTORY:  Acquired hypothyroidism     AS (aortic stenosis) s/p TAVR    Cholelithiases     HLD (hyperlipidemia)     Hodgkin lymphoma on remission, chemo and rad [id number]    HTN (hypertension)     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

## 2021-05-19 LAB
A1C WITH ESTIMATED AVERAGE GLUCOSE RESULT: 5.5 % — SIGNIFICANT CHANGE UP (ref 4–5.6)
ANION GAP SERPL CALC-SCNC: 9 MMOL/L — SIGNIFICANT CHANGE UP (ref 7–14)
BUN SERPL-MCNC: 18 MG/DL — SIGNIFICANT CHANGE UP (ref 7–23)
CALCIUM SERPL-MCNC: 9.1 MG/DL — SIGNIFICANT CHANGE UP (ref 8.4–10.5)
CHLORIDE SERPL-SCNC: 105 MMOL/L — SIGNIFICANT CHANGE UP (ref 98–107)
CO2 SERPL-SCNC: 26 MMOL/L — SIGNIFICANT CHANGE UP (ref 22–31)
COVID-19 SPIKE DOMAIN AB INTERP: POSITIVE
COVID-19 SPIKE DOMAIN ANTIBODY RESULT: 172 U/ML — HIGH
CREAT SERPL-MCNC: 1.13 MG/DL — SIGNIFICANT CHANGE UP (ref 0.5–1.3)
ESTIMATED AVERAGE GLUCOSE: 111 MG/DL — SIGNIFICANT CHANGE UP (ref 68–114)
GLUCOSE BLDC GLUCOMTR-MCNC: 141 MG/DL — HIGH (ref 70–99)
GLUCOSE BLDC GLUCOMTR-MCNC: 179 MG/DL — HIGH (ref 70–99)
GLUCOSE BLDC GLUCOMTR-MCNC: 184 MG/DL — HIGH (ref 70–99)
GLUCOSE SERPL-MCNC: 119 MG/DL — HIGH (ref 70–99)
HCT VFR BLD CALC: 33.3 % — LOW (ref 39–50)
HGB BLD-MCNC: 10.9 G/DL — LOW (ref 13–17)
MAGNESIUM SERPL-MCNC: 1.9 MG/DL — SIGNIFICANT CHANGE UP (ref 1.6–2.6)
MCHC RBC-ENTMCNC: 32.1 PG — SIGNIFICANT CHANGE UP (ref 27–34)
MCHC RBC-ENTMCNC: 32.7 GM/DL — SIGNIFICANT CHANGE UP (ref 32–36)
MCV RBC AUTO: 97.9 FL — SIGNIFICANT CHANGE UP (ref 80–100)
NRBC # BLD: 0 /100 WBCS — SIGNIFICANT CHANGE UP
NRBC # FLD: 0 K/UL — SIGNIFICANT CHANGE UP
PHOSPHATE SERPL-MCNC: 2.8 MG/DL — SIGNIFICANT CHANGE UP (ref 2.5–4.5)
PLATELET # BLD AUTO: 204 K/UL — SIGNIFICANT CHANGE UP (ref 150–400)
POTASSIUM SERPL-MCNC: 4.1 MMOL/L — SIGNIFICANT CHANGE UP (ref 3.5–5.3)
POTASSIUM SERPL-SCNC: 4.1 MMOL/L — SIGNIFICANT CHANGE UP (ref 3.5–5.3)
RBC # BLD: 3.4 M/UL — LOW (ref 4.2–5.8)
RBC # FLD: 14.6 % — HIGH (ref 10.3–14.5)
SARS-COV-2 IGG+IGM SERPL QL IA: 172 U/ML — HIGH
SARS-COV-2 IGG+IGM SERPL QL IA: POSITIVE
SODIUM SERPL-SCNC: 140 MMOL/L — SIGNIFICANT CHANGE UP (ref 135–145)
WBC # BLD: 5.21 K/UL — SIGNIFICANT CHANGE UP (ref 3.8–10.5)
WBC # FLD AUTO: 5.21 K/UL — SIGNIFICANT CHANGE UP (ref 3.8–10.5)

## 2021-05-19 PROCEDURE — 99232 SBSQ HOSP IP/OBS MODERATE 35: CPT | Mod: GC

## 2021-05-19 PROCEDURE — 93306 TTE W/DOPPLER COMPLETE: CPT | Mod: 26

## 2021-05-19 RX ORDER — FUROSEMIDE 40 MG
40 TABLET ORAL DAILY
Refills: 0 | Status: DISCONTINUED | OUTPATIENT
Start: 2021-05-20 | End: 2021-05-20

## 2021-05-19 RX ORDER — AMIODARONE HYDROCHLORIDE 400 MG/1
200 TABLET ORAL DAILY
Refills: 0 | Status: DISCONTINUED | OUTPATIENT
Start: 2021-05-20 | End: 2021-05-26

## 2021-05-19 RX ADMIN — Medication 20 MILLIGRAM(S): at 13:07

## 2021-05-19 RX ADMIN — ENOXAPARIN SODIUM 40 MILLIGRAM(S): 100 INJECTION SUBCUTANEOUS at 13:07

## 2021-05-19 RX ADMIN — Medication 125 MICROGRAM(S): at 05:36

## 2021-05-19 RX ADMIN — Medication 150 MILLIGRAM(S): at 05:37

## 2021-05-19 RX ADMIN — LOSARTAN POTASSIUM 25 MILLIGRAM(S): 100 TABLET, FILM COATED ORAL at 05:36

## 2021-05-19 RX ADMIN — Medication 325 MILLIGRAM(S): at 13:08

## 2021-05-19 RX ADMIN — Medication 40 MILLIGRAM(S): at 05:36

## 2021-05-19 RX ADMIN — AMIODARONE HYDROCHLORIDE 400 MILLIGRAM(S): 400 TABLET ORAL at 05:36

## 2021-05-19 RX ADMIN — AMIODARONE HYDROCHLORIDE 400 MILLIGRAM(S): 400 TABLET ORAL at 13:07

## 2021-05-19 RX ADMIN — Medication 1 MILLIGRAM(S): at 13:07

## 2021-05-19 RX ADMIN — INSULIN GLARGINE 10 UNIT(S): 100 INJECTION, SOLUTION SUBCUTANEOUS at 22:01

## 2021-05-19 RX ADMIN — ATORVASTATIN CALCIUM 80 MILLIGRAM(S): 80 TABLET, FILM COATED ORAL at 22:00

## 2021-05-19 NOTE — SBIRT NOTE ADULT - NSSBIRTUNABLESCR_GEN_A_CORE
Pt declined participation in SBIRT screen. He denies any substance abuse issues and refused to go further with interview./Patient refused

## 2021-05-19 NOTE — PROGRESS NOTE ADULT - SUBJECTIVE AND OBJECTIVE BOX
Date of Service  : 05-19-21 @ 17:16    INTERVAL HPI/OVERNIGHT EVENTS: Seen and examined earlier today .   Vital Signs Last 24 Hrs  T(C): 36.7 (19 May 2021 13:00), Max: 37.2 (18 May 2021 17:48)  T(F): 98.1 (19 May 2021 13:00), Max: 98.9 (18 May 2021 17:48)  HR: 70 (19 May 2021 13:00) (65 - 83)  BP: 132/63 (19 May 2021 13:00) (104/85 - 162/69)  BP(mean): --  RR: 17 (19 May 2021 13:00) (17 - 17)  SpO2: 98% (19 May 2021 13:00) (97% - 99%)  I&O's Summary    MEDICATIONS  (STANDING):  aMIOdarone    Tablet   Oral   aMIOdarone    Tablet 400 milliGRAM(s) Oral every 8 hours  aspirin 325 milliGRAM(s) Oral daily  atorvastatin 80 milliGRAM(s) Oral at bedtime  clonazePAM  Tablet 1 milliGRAM(s) Oral daily  dextrose 40% Gel 15 Gram(s) Oral once  dextrose 5%. 1000 milliLiter(s) (50 mL/Hr) IV Continuous <Continuous>  dextrose 5%. 1000 milliLiter(s) (100 mL/Hr) IV Continuous <Continuous>  dextrose 50% Injectable 25 Gram(s) IV Push once  dextrose 50% Injectable 12.5 Gram(s) IV Push once  dextrose 50% Injectable 25 Gram(s) IV Push once  enoxaparin Injectable 40 milliGRAM(s) SubCutaneous daily  FLUoxetine 20 milliGRAM(s) Oral daily  glucagon  Injectable 1 milliGRAM(s) IntraMuscular once  insulin glargine Injectable (LANTUS) 10 Unit(s) SubCutaneous at bedtime  insulin lispro (ADMELOG) corrective regimen sliding scale   SubCutaneous three times a day before meals  insulin lispro (ADMELOG) corrective regimen sliding scale   SubCutaneous at bedtime  levothyroxine 125 MICROGram(s) Oral daily  losartan 25 milliGRAM(s) Oral daily  metoprolol succinate  milliGRAM(s) Oral daily    MEDICATIONS  (PRN):  acetaminophen   Tablet .. 650 milliGRAM(s) Oral every 6 hours PRN Mild Pain (1 - 3), Moderate Pain (4 - 6)    LABS:                        10.9   5.21  )-----------( 204      ( 19 May 2021 06:30 )             33.3     05-19    140  |  105  |  18  ----------------------------<  119<H>  4.1   |  26  |  1.13    Ca    9.1      19 May 2021 06:30  Phos  2.8     05-19  Mg     1.9     05-19    TPro  6.9  /  Alb  3.9  /  TBili  0.4  /  DBili  x   /  AST  17  /  ALT  12  /  AlkPhos  63  05-18        CAPILLARY BLOOD GLUCOSE      POCT Blood Glucose.: 141 mg/dL (19 May 2021 08:49)  POCT Blood Glucose.: 105 mg/dL (18 May 2021 21:37)  POCT Blood Glucose.: 103 mg/dL (18 May 2021 17:34)          REVIEW OF SYSTEMS:  CONSTITUTIONAL: No fever, weight loss, or fatigue  EYES: No eye pain, visual disturbances, or discharge  ENMT:  No difficulty hearing, tinnitus, vertigo; No sinus or throat pain  NECK: No pain or stiffness  RESPIRATORY: No cough, wheezing, chills or hemoptysis; No shortness of breath  CARDIOVASCULAR: No chest pain, palpitations, dizziness, or leg swelling  GASTROINTESTINAL: No abdominal or epigastric pain. No nausea, vomiting, or hematemesis; No diarrhea or constipation. No melena or hematochezia.  GENITOURINARY: No dysuria, frequency, hematuria, or incontinence  NEUROLOGICAL: No headaches, memory loss, loss of strength, numbness, or tremors      Consultant(s) Notes Reviewed:  [x ] YES  [ ] NO    PHYSICAL EXAM:  GENERAL: NAD, well-groomed, well-developed, not in any distress ,  HEAD:  Atraumatic, Normocephalic  EYES: EOMI, PERRLA, conjunctiva and sclera clear  ENMT: No tonsillar erythema, exudates, or enlargement; Moist mucous membranes, Good dentition, No lesions  NECK: Supple, No JVD, Normal thyroid  NERVOUS SYSTEM:  Alert & Oriented X3, No focal deficit   CHEST/LUNG: Good air entry bilateral with no  rales, rhonchi, wheezing, or rubs  HEART: Regular rate and rhythm; No murmurs, rubs, or gallops  ABDOMEN: Soft, Nontender, Nondistended; Bowel sounds present  EXTREMITIES:  2+ Peripheral Pulses, No clubbing, cyanosis, or edema  SKIN: No rashes or lesions    Care Discussed with Consultants/Other Providers [ x] YES  [ ] NO

## 2021-05-19 NOTE — PROGRESS NOTE ADULT - SUBJECTIVE AND OBJECTIVE BOX
chief complaint:  ICD fire    extended hpi: 75 year old male with history of CAD (known 100% RCA stenosis from last cath in 2016), severe AS s/p TAVR in 2016, cardiomyopathy s/p BiV AICD, DM, Hodgkin lymphoma s/p radiation and chemotherapy, history of carotid stenosis s/p carotid endarterectomy who presented to the ED following AICD shock.     Today complains of diarrhea.  Denies Palps, CP or SOB. ROS otherwise negative.    Review of Systems:   Constitutional: [ ] fevers, [ ] chills.   Skin: [ ] dry skin. [ ] rashes.  Psychiatric: [ ] depression, [ ] anxiety.   Gastrointestinal: [ ] BRBPR, [ ] melena.   Neurological: [ ] confusion. [ ] seizures. [ ] shuffling gait.   Ears,Nose,Mouth and Throat: [ ] ear pain [ ] sore throat.   Eyes: [ ] diplopia.   Respiratory: [ ] hemoptysis. [ ] shortness of breath  Cardiovascular: See HPI above  Hematologic/Lymphatic: [ ] anemia. [ ] painful nodes. [ ] prolonged bleeding.   Genitourinary: [ ] hematuria. [ ] flank pain.   Endocrine: [ ] significant change in weight. [ ] intolerance to heat and cold.     Review of systems [x ] otherwise negative, [ ] otherwise unable to obtain    FH: no family history of sudden cardiac death in first degree relatives    SH: [ ] tobacco, [ ] alcohol, [ ] drugs    acetaminophen   Tablet .. 650 milliGRAM(s) Oral every 6 hours PRN  aMIOdarone    Tablet   Oral   aMIOdarone    Tablet 400 milliGRAM(s) Oral every 8 hours  aspirin 325 milliGRAM(s) Oral daily  atorvastatin 80 milliGRAM(s) Oral at bedtime  clonazePAM  Tablet 1 milliGRAM(s) Oral daily  dextrose 40% Gel 15 Gram(s) Oral once  dextrose 5%. 1000 milliLiter(s) IV Continuous <Continuous>  dextrose 5%. 1000 milliLiter(s) IV Continuous <Continuous>  dextrose 50% Injectable 25 Gram(s) IV Push once  dextrose 50% Injectable 12.5 Gram(s) IV Push once  dextrose 50% Injectable 25 Gram(s) IV Push once  enoxaparin Injectable 40 milliGRAM(s) SubCutaneous daily  FLUoxetine 20 milliGRAM(s) Oral daily  glucagon  Injectable 1 milliGRAM(s) IntraMuscular once  insulin glargine Injectable (LANTUS) 10 Unit(s) SubCutaneous at bedtime  insulin lispro (ADMELOG) corrective regimen sliding scale   SubCutaneous three times a day before meals  insulin lispro (ADMELOG) corrective regimen sliding scale   SubCutaneous at bedtime  levothyroxine 125 MICROGram(s) Oral daily  losartan 25 milliGRAM(s) Oral daily  metoprolol succinate  milliGRAM(s) Oral daily                            10.9   5.21  )-----------( 204      ( 19 May 2021 06:30 )             33.3       05-19    140  |  105  |  18  ----------------------------<  119<H>  4.1   |  26  |  1.13    Ca    9.1      19 May 2021 06:30  Phos  2.8     05-19  Mg     1.9     05-19    TPro  6.9  /  Alb  3.9  /  TBili  0.4  /  DBili  x   /  AST  17  /  ALT  12  /  AlkPhos  63  05-18      CARDIAC MARKERS ( 18 May 2021 11:30 )  x     / x     / 188 U/L / x     / 3.4 ng/mL        T(C): 36.7 (05-19-21 @ 13:00), Max: 37.2 (05-18-21 @ 17:48)  HR: 70 (05-19-21 @ 13:00) (65 - 84)  BP: 132/63 (05-19-21 @ 13:00) (104/85 - 162/69)  RR: 17 (05-19-21 @ 13:00) (17 - 17)  SpO2: 98% (05-19-21 @ 13:00) (97% - 99%)    General: Well nourished in no acute distress. Alert and Oriented * 3.   Head: Normocephalic and atraumatic.   Neck: No JVD. No bruits. Supple. Does not appear to be enlarged.   Cardiovascular: + S1,S2 ; RRR Soft systolic murmur at the left lower sternal border. No rubs noted.    Lungs: CTA b/l. No rhonchi, rales or wheezes.   Abdomen: + BS, soft. Non tender. Non distended. No rebound. No guarding.   Extremities: No clubbing/cyanosis/ trace RLE edema.   Neurologic: Moves all four extremities. Full range of motion.   Skin: Warm and moist. The patient's skin has normal elasticity and good skin turgor.   Psychiatric: Appropriate mood and affect.  Musculoskeletal: Normal range of motion, normal strength    DATA    ASSESSMENT/PLAN:  75 year old male with history of CAD (known 100% RCA stenosis from last cath in 2016), severe AS s/p TAVR in 2016, cardiomyopathy s/p BiV AICD, DM, Hodgkin lymphoma s/p radiation and chemotherapy, history of carotid stenosis s/p carotid endarterectomy who presented to the ED following AICD shock.     -pt. currently with no chest pain or anginal symptoms  -EP eval appreciated, started on Amio  -f/u TTE  -plan for LHC per interventional Cardiology  -Pt states LE edema improved

## 2021-05-19 NOTE — PROGRESS NOTE ADULT - ASSESSMENT
75M hx of remote hx Hodgkins lymphoma, hypertension, hyperlipidemia, severe AS s/p TAVR, chronic systolic CHF with EF 34%, LBBB, MI s/p BiV-ICD (EF improved to 40-45%), pericardial effusion s/p drainage in past presenting with ICD firing, device interrogated found to be in VT.    #VT  -2x ICD shocks for monomorphic VT ~200-210BPM on 5/13 and 5/18 of similar morphology  -HDS, asx  -c/w increased Toprol XL 150mg daily  -c/w amiodarone 400mg TID for 10g load, then transition to 200mg PO daily  -lowered VT-2 zone to 170BPM from 185BPM and added addl ATP burst tx to account for slowing VT cycle lengths from amiodarone  -HF optimization per gen cards; likely ischemic eval    Isac Hernandez MD  Cardiology Fellow - PGY 5  Text or Call: [phone]  For all New Consults and Questions:  [email or web address]   Login: [facility]

## 2021-05-19 NOTE — PROGRESS NOTE ADULT - SUBJECTIVE AND OBJECTIVE BOX
Patient seen and examined at bedside.    Overnight Events:  No events overnight.     Review Of Systems: No chest pain, shortness of breath, or palpitations            Current Meds:  acetaminophen   Tablet .. 650 milliGRAM(s) Oral every 6 hours PRN  aMIOdarone    Tablet   Oral   aMIOdarone    Tablet 400 milliGRAM(s) Oral every 8 hours  aspirin 325 milliGRAM(s) Oral daily  atorvastatin 80 milliGRAM(s) Oral at bedtime  clonazePAM  Tablet 1 milliGRAM(s) Oral daily  dextrose 40% Gel 15 Gram(s) Oral once  dextrose 5%. 1000 milliLiter(s) IV Continuous <Continuous>  dextrose 5%. 1000 milliLiter(s) IV Continuous <Continuous>  dextrose 50% Injectable 25 Gram(s) IV Push once  dextrose 50% Injectable 12.5 Gram(s) IV Push once  dextrose 50% Injectable 25 Gram(s) IV Push once  enoxaparin Injectable 40 milliGRAM(s) SubCutaneous daily  FLUoxetine 20 milliGRAM(s) Oral daily  glucagon  Injectable 1 milliGRAM(s) IntraMuscular once  insulin glargine Injectable (LANTUS) 10 Unit(s) SubCutaneous at bedtime  insulin lispro (ADMELOG) corrective regimen sliding scale   SubCutaneous three times a day before meals  insulin lispro (ADMELOG) corrective regimen sliding scale   SubCutaneous at bedtime  levothyroxine 125 MICROGram(s) Oral daily  losartan 25 milliGRAM(s) Oral daily  metoprolol succinate  milliGRAM(s) Oral daily      Vitals:  T(F): 98.3 (05-19), Max: 98.9 (05-18)  HR: 65 (05-19) (65 - 84)  BP: 104/85 (05-19) (104/85 - 162/69)  RR: 17 (05-19)  SpO2: 97% (05-19)  I&O's Summary      Physical Exam:  Appearance: No acute distress; well appearing  Eyes: PERRL, EOMI, pink conjunctiva  HEENT: Normal oral mucosa  Cardiovascular: RRR, S1, S2, no murmurs, rubs, or gallops;   Respiratory: Clear to auscultation bilaterally  Gastrointestinal: soft, non-tender, non-distended with normal bowel sounds  Musculoskeletal: No clubbing; no joint deformity   Neurologic: Non-focal  Lymphatic: No lymphadenopathy  Psychiatry: AAOx3, mood & affect appropriate  Skin: No rashes, ecchymoses, or cyanosis                          10.9   5.21  )-----------( 204      ( 19 May 2021 06:30 )             33.3     05-19    140  |  105  |  18  ----------------------------<  119<H>  4.1   |  26  |  1.13    Ca    9.1      19 May 2021 06:30  Phos  2.8     05-19  Mg     1.9     05-19    TPro  6.9  /  Alb  3.9  /  TBili  0.4  /  DBili  x   /  AST  17  /  ALT  12  /  AlkPhos  63  05-18      CARDIAC MARKERS ( 18 May 2021 11:30 )  31 ng/L / x     / x     / 188 U/L / x     / 3.4 ng/mL      Serum Pro-Brain Natriuretic Peptide: 4741 pg/mL (05-18 @ 11:30)            Interpretation of Telemetry: a-sensed v-paced

## 2021-05-19 NOTE — PROGRESS NOTE ADULT - ASSESSMENT
75 M hx of  PMH of MI s/p PPM, AS s/p TAVR 2016, CHF w/ BiV AICD, cholelithiasis, anxiety, hypothyroidism, DM, Hodgkin lymphoma s/p radiation and chemo now in remission with left lung base radiation fibrosis, b/l carotid stenosis s/p carotid endarterectomy presenting due to AICD firing since 4d ago. Fired 2x on thursday when walking to bathroom in his house, no preceding or subsequent symptoms, saw Dr. Godinez the next day with adjustment in metoprolol dose and plan for further outpt f/u. Today was sleeping when the shock went off. Called Kaye Godinez and Chidi today, advised to come to ER .Now I feel fine. .      Problem/Plan - 1:  ·  Problem: AICD discharge.  Plan: presently asymptomatic . EP and cardiology helping. Awaiting cardiac cath.      Problem/Plan - 2:  ·  Problem: Systolic CHF, chronic.  Plan: On Lasix and euvolemic.      Problem/Plan - 3:  ·  Problem: Type 2 diabetes mellitus without complication.  Plan: Holding Actos and SSI with Lantus.      Problem/Plan - 4:  ·  Problem: HTN (hypertension).  Plan: BP meds with hold parameters.      Problem/Plan - 5:  ·  Problem: Acquired hypothyroidism.  Plan: synthroid 125mcg and will check TFT.      Problem/Plan - 6:  Problem: HLD (hyperlipidemia). Plan: Statin.

## 2021-05-20 LAB
ANION GAP SERPL CALC-SCNC: 9 MMOL/L — SIGNIFICANT CHANGE UP (ref 7–14)
BUN SERPL-MCNC: 22 MG/DL — SIGNIFICANT CHANGE UP (ref 7–23)
CALCIUM SERPL-MCNC: 9.4 MG/DL — SIGNIFICANT CHANGE UP (ref 8.4–10.5)
CHLORIDE SERPL-SCNC: 103 MMOL/L — SIGNIFICANT CHANGE UP (ref 98–107)
CO2 SERPL-SCNC: 30 MMOL/L — SIGNIFICANT CHANGE UP (ref 22–31)
CREAT SERPL-MCNC: 1.38 MG/DL — HIGH (ref 0.5–1.3)
GLUCOSE BLDC GLUCOMTR-MCNC: 102 MG/DL — HIGH (ref 70–99)
GLUCOSE BLDC GLUCOMTR-MCNC: 122 MG/DL — HIGH (ref 70–99)
GLUCOSE BLDC GLUCOMTR-MCNC: 128 MG/DL — HIGH (ref 70–99)
GLUCOSE BLDC GLUCOMTR-MCNC: 136 MG/DL — HIGH (ref 70–99)
GLUCOSE SERPL-MCNC: 108 MG/DL — HIGH (ref 70–99)
HCT VFR BLD CALC: 35.7 % — LOW (ref 39–50)
HGB BLD-MCNC: 11.4 G/DL — LOW (ref 13–17)
MAGNESIUM SERPL-MCNC: 1.9 MG/DL — SIGNIFICANT CHANGE UP (ref 1.6–2.6)
MCHC RBC-ENTMCNC: 31.9 GM/DL — LOW (ref 32–36)
MCHC RBC-ENTMCNC: 32.1 PG — SIGNIFICANT CHANGE UP (ref 27–34)
MCV RBC AUTO: 100.6 FL — HIGH (ref 80–100)
NRBC # BLD: 0 /100 WBCS — SIGNIFICANT CHANGE UP
NRBC # FLD: 0 K/UL — SIGNIFICANT CHANGE UP
PHOSPHATE SERPL-MCNC: 3.2 MG/DL — SIGNIFICANT CHANGE UP (ref 2.5–4.5)
PLATELET # BLD AUTO: 211 K/UL — SIGNIFICANT CHANGE UP (ref 150–400)
POTASSIUM SERPL-MCNC: 4.1 MMOL/L — SIGNIFICANT CHANGE UP (ref 3.5–5.3)
POTASSIUM SERPL-SCNC: 4.1 MMOL/L — SIGNIFICANT CHANGE UP (ref 3.5–5.3)
RBC # BLD: 3.55 M/UL — LOW (ref 4.2–5.8)
RBC # FLD: 14.8 % — HIGH (ref 10.3–14.5)
SODIUM SERPL-SCNC: 142 MMOL/L — SIGNIFICANT CHANGE UP (ref 135–145)
WBC # BLD: 6.07 K/UL — SIGNIFICANT CHANGE UP (ref 3.8–10.5)
WBC # FLD AUTO: 6.07 K/UL — SIGNIFICANT CHANGE UP (ref 3.8–10.5)

## 2021-05-20 PROCEDURE — 99231 SBSQ HOSP IP/OBS SF/LOW 25: CPT | Mod: GC

## 2021-05-20 RX ORDER — FUROSEMIDE 40 MG
40 TABLET ORAL
Refills: 0 | Status: DISCONTINUED | OUTPATIENT
Start: 2021-05-20 | End: 2021-05-23

## 2021-05-20 RX ADMIN — Medication 325 MILLIGRAM(S): at 13:58

## 2021-05-20 RX ADMIN — INSULIN GLARGINE 10 UNIT(S): 100 INJECTION, SOLUTION SUBCUTANEOUS at 22:09

## 2021-05-20 RX ADMIN — Medication 40 MILLIGRAM(S): at 05:16

## 2021-05-20 RX ADMIN — LOSARTAN POTASSIUM 25 MILLIGRAM(S): 100 TABLET, FILM COATED ORAL at 05:15

## 2021-05-20 RX ADMIN — Medication 1 MILLIGRAM(S): at 13:58

## 2021-05-20 RX ADMIN — Medication 20 MILLIGRAM(S): at 13:59

## 2021-05-20 RX ADMIN — ATORVASTATIN CALCIUM 80 MILLIGRAM(S): 80 TABLET, FILM COATED ORAL at 22:09

## 2021-05-20 RX ADMIN — Medication 125 MICROGRAM(S): at 05:15

## 2021-05-20 RX ADMIN — AMIODARONE HYDROCHLORIDE 200 MILLIGRAM(S): 400 TABLET ORAL at 05:15

## 2021-05-20 RX ADMIN — Medication 150 MILLIGRAM(S): at 05:16

## 2021-05-20 RX ADMIN — Medication 40 MILLIGRAM(S): at 17:51

## 2021-05-20 RX ADMIN — ENOXAPARIN SODIUM 40 MILLIGRAM(S): 100 INJECTION SUBCUTANEOUS at 13:58

## 2021-05-20 NOTE — PROGRESS NOTE ADULT - ASSESSMENT
75 M hx of  PMH of MI s/p PPM, AS s/p TAVR 2016, CHF w/ BiV AICD, cholelithiasis, anxiety, hypothyroidism, DM, Hodgkin lymphoma s/p radiation and chemo now in remission with left lung base radiation fibrosis, b/l carotid stenosis s/p carotid endarterectomy presenting due to AICD firing since 4d ago. Fired 2x on thursday when walking to bathroom in his house, no preceding or subsequent symptoms, saw Dr. Godinez the next day with adjustment in metoprolol dose and plan for further outpt f/u. Today was sleeping when the shock went off. Called Kaye Godinez and Chidi today, advised to come to ER .Now I feel fine. .      Problem/Plan - 1:  ·  Problem: AICD discharge.  Plan: presently asymptomatic . EP and cardiology helping.    S/P cardiac cath.      Problem/Plan - 2:  ·  Problem: Systolic CHF, chronic.  Plan: On IV  Lasix 40 mg BID.      Problem/Plan - 3:  ·  Problem: Type 2 diabetes mellitus without complication.  Plan: Holding Actos and SSI with Lantus.      Problem/Plan - 4:  ·  Problem: HTN (hypertension).  Plan: BP meds with hold parameters.      Problem/Plan - 5:  ·  Problem: Acquired hypothyroidism.  Plan: synthroid 125mcg and will check TFT.      Problem/Plan - 6:  Problem: CAD . Plan: Awaiting rpt cath with PCI .     Problem/Plan - 7:  Problem: HLD (hyperlipidemia). Plan: Statin.     Problem/Plan - 8:  Problem: RIANNA . Plan: S/P Cardiac cath and on Lasix.     D/W pt and his wife in detail.

## 2021-05-20 NOTE — PROGRESS NOTE ADULT - SUBJECTIVE AND OBJECTIVE BOX
Patient seen and examined at bedside.    Overnight Events:   diarrhea yesterday evening.  no complaints.    Review Of Systems: No chest pain, shortness of breath, or palpitations            Current Meds:  acetaminophen   Tablet .. 650 milliGRAM(s) Oral every 6 hours PRN  aMIOdarone    Tablet 200 milliGRAM(s) Oral daily  aspirin 325 milliGRAM(s) Oral daily  atorvastatin 80 milliGRAM(s) Oral at bedtime  clonazePAM  Tablet 1 milliGRAM(s) Oral daily  dextrose 40% Gel 15 Gram(s) Oral once  dextrose 5%. 1000 milliLiter(s) IV Continuous <Continuous>  dextrose 5%. 1000 milliLiter(s) IV Continuous <Continuous>  dextrose 50% Injectable 25 Gram(s) IV Push once  dextrose 50% Injectable 12.5 Gram(s) IV Push once  dextrose 50% Injectable 25 Gram(s) IV Push once  enoxaparin Injectable 40 milliGRAM(s) SubCutaneous daily  FLUoxetine 20 milliGRAM(s) Oral daily  furosemide   Injectable 40 milliGRAM(s) IV Push daily  glucagon  Injectable 1 milliGRAM(s) IntraMuscular once  insulin glargine Injectable (LANTUS) 10 Unit(s) SubCutaneous at bedtime  insulin lispro (ADMELOG) corrective regimen sliding scale   SubCutaneous three times a day before meals  insulin lispro (ADMELOG) corrective regimen sliding scale   SubCutaneous at bedtime  levothyroxine 125 MICROGram(s) Oral daily  losartan 25 milliGRAM(s) Oral daily  metoprolol succinate  milliGRAM(s) Oral daily      Vitals:  T(F): 97.7 (05-20), Max: 98.2 (05-19)  HR: 66 (05-20) (66 - 74)  BP: 128/55 (05-20) (108/50 - 132/63)  RR: 17 (05-20)  SpO2: 100% (05-20)  I&O's Summary      Physical Exam:  Appearance: No acute distress; well appearing  Eyes: PERRL, EOMI, pink conjunctiva  HEENT: Normal oral mucosa  Cardiovascular: RRR, S1, S2, no murmurs, rubs, or gallops; no edema; no JVD  Respiratory: Clear to auscultation bilaterally  Gastrointestinal: soft, non-tender, non-distended with normal bowel sounds  Musculoskeletal: No clubbing; no joint deformity   Neurologic: Non-focal  Lymphatic: No lymphadenopathy  Psychiatry: AAOx3, mood & affect appropriate  Skin: No rashes, ecchymoses, or cyanosis                          11.4   6.07  )-----------( 211      ( 20 May 2021 08:18 )             35.7     05-20    142  |  103  |  22  ----------------------------<  108<H>  4.1   |  30  |  1.38<H>    Ca    9.4      20 May 2021 08:18  Phos  3.2     05-20  Mg     1.9     05-20    TPro  6.9  /  Alb  3.9  /  TBili  0.4  /  DBili  x   /  AST  17  /  ALT  12  /  AlkPhos  63  05-18      CARDIAC MARKERS ( 18 May 2021 11:30 )  31 ng/L / x     / x     / 188 U/L / x     / 3.4 ng/mL      Serum Pro-Brain Natriuretic Peptide: 4741 pg/mL (05-18 @ 11:30)    Interpretation of Telemetry:  no events

## 2021-05-20 NOTE — PROGRESS NOTE ADULT - SUBJECTIVE AND OBJECTIVE BOX
chief complaint:  ICD fire    extended hpi: 75 year old male with history of CAD (known 100% RCA stenosis from last cath in 2016), severe AS s/p TAVR in 2016, cardiomyopathy s/p BiV AICD, DM, Hodgkin lymphoma s/p radiation and chemotherapy, history of carotid stenosis s/p carotid endarterectomy who presented to the ED following AICD shock.     S: no chest pain or sob; ros otherwise negative.     Review of Systems:   Constitutional: [ ] fevers, [ ] chills.   Skin: [ ] dry skin. [ ] rashes.  Psychiatric: [ ] depression, [ ] anxiety.   Gastrointestinal: [ ] BRBPR, [ ] melena.   Neurological: [ ] confusion. [ ] seizures. [ ] shuffling gait.   Ears,Nose,Mouth and Throat: [ ] ear pain [ ] sore throat.   Eyes: [ ] diplopia.   Respiratory: [ ] hemoptysis. [ ] shortness of breath  Cardiovascular: See HPI above  Hematologic/Lymphatic: [ ] anemia. [ ] painful nodes. [ ] prolonged bleeding.   Genitourinary: [ ] hematuria. [ ] flank pain.   Endocrine: [ ] significant change in weight. [ ] intolerance to heat and cold.     Review of systems [x ] otherwise negative, [ ] otherwise unable to obtain    FH: no family history of sudden cardiac death in first degree relatives    SH: [ ] tobacco, [ ] alcohol, [ ] drugs    acetaminophen   Tablet .. 650 milliGRAM(s) Oral every 6 hours PRN  aMIOdarone    Tablet 200 milliGRAM(s) Oral daily  aspirin 325 milliGRAM(s) Oral daily  atorvastatin 80 milliGRAM(s) Oral at bedtime  clonazePAM  Tablet 1 milliGRAM(s) Oral daily  dextrose 40% Gel 15 Gram(s) Oral once  dextrose 5%. 1000 milliLiter(s) IV Continuous <Continuous>  dextrose 5%. 1000 milliLiter(s) IV Continuous <Continuous>  dextrose 50% Injectable 25 Gram(s) IV Push once  dextrose 50% Injectable 12.5 Gram(s) IV Push once  dextrose 50% Injectable 25 Gram(s) IV Push once  enoxaparin Injectable 40 milliGRAM(s) SubCutaneous daily  FLUoxetine 20 milliGRAM(s) Oral daily  furosemide   Injectable 40 milliGRAM(s) IV Push two times a day  glucagon  Injectable 1 milliGRAM(s) IntraMuscular once  insulin glargine Injectable (LANTUS) 10 Unit(s) SubCutaneous at bedtime  insulin lispro (ADMELOG) corrective regimen sliding scale   SubCutaneous three times a day before meals  insulin lispro (ADMELOG) corrective regimen sliding scale   SubCutaneous at bedtime  levothyroxine 125 MICROGram(s) Oral daily  losartan 25 milliGRAM(s) Oral daily  metoprolol succinate  milliGRAM(s) Oral daily                            11.4   6.07  )-----------( 211      ( 20 May 2021 08:18 )             35.7       05-20    142  |  103  |  22  ----------------------------<  108<H>  4.1   |  30  |  1.38<H>    Ca    9.4      20 May 2021 08:18  Phos  3.2     05-20  Mg     1.9     05-20        CARDIAC MARKERS ( 18 May 2021 11:30 )  x     / x     / 188 U/L / x     / 3.4 ng/mL        T(C): 36.2 (05-20-21 @ 14:03), Max: 36.8 (05-19-21 @ 18:51)  HR: 68 (05-20-21 @ 14:03) (66 - 74)  BP: 106/40 (05-20-21 @ 14:03) (106/40 - 130/51)  RR: 18 (05-20-21 @ 14:03) (17 - 18)  SpO2: 100% (05-20-21 @ 14:03) (98% - 100%)  Wt(kg): --    I&O's Summary      General: Well nourished in no acute distress. Alert and Oriented * 3.   Head: Normocephalic and atraumatic.   Neck: +JVD;  No bruits. Supple. Does not appear to be enlarged.   Cardiovascular: + S1,S2 ; RRR Soft systolic murmur at the left lower sternal border. No rubs noted.    Lungs: CTA b/l. No rhonchi, rales or wheezes.   Abdomen: + BS, soft. Non tender. Non distended. No rebound. No guarding.   Extremities: No clubbing/cyanosis/ + edema in LE bilaterally   Neurologic: Moves all four extremities. Full range of motion.   Skin: Warm and moist. The patient's skin has normal elasticity and good skin turgor.   Psychiatric: Appropriate mood and affect.  Musculoskeletal: Normal range of motion, normal strength  Right wrist: no hematoma; 2+ pulses  Right groin: no hematoma; 2+ pulses     DATA    TTE: < from: TTE with Doppler (w/Cont) (05.19.21 @ 07:53) >  1. Mitral annular calcification and calcified mitral  leaflets with decreased diastolic opening. Mild mitral  regurgitation.  Mean transmitral valve gradient equals 3 mm  Hg, consistent with mild mitral stenosis.  2. Normal left ventricular internal dimensions and wall  thicknesses.  3. Endocardial visualization enhanced with intravenous  injection of echo contrast (Definity).  Moderate to severe  segmental left ventricular systolic dysfunction.  Inferior  and inferolateral wall  hypokinesis.  4. The right ventricle is not well visualized grossly  enalerged. Device wire is noted in the right heart.  5. Estimated pulmonary artery systolic pressure equals 50  mm Hg, assuming right atrialpressure equals 10  mm Hg,  consistent with moderate pulmonary hypertension.    < end of copied text >      ASSESSMENT/PLAN:  75 year old male with history of CAD (known 100% RCA stenosis from last cath in 2016), severe AS s/p TAVR in 2016, cardiomyopathy s/p BiV AICD, DM, Hodgkin lymphoma s/p radiation and chemotherapy, history of carotid stenosis s/p carotid endarterectomy who presented to the ED following AICD shock.     -pt. s/p R/LHC demonstrating severe D1 lesion and elevated PCWP with 26  -Cr elevated today - appreciate renal evaluation   -given elevated PCWP with evidence of volume overload on exam, will continue with IV diuresis  -AAD for VT per EP  -will plan on staged PCI when medically optimized and volume status improves    Brian Mo MD

## 2021-05-20 NOTE — PROGRESS NOTE ADULT - ATTENDING COMMENTS
Patient seen and examined.  Agree with above.   Cath performed today demonstrating severe d1 lesion and elevated PCWP  Will plan on diuresis to keep O > I  When patient euvolemic, able to lie flat, and medically optimized, will plan on D1 PCI    Brian Mo MD
Patient underwent right and left heart catheterization earlier today which showed a diagonal stenosis and elevated PCWP.  Patient to be diuresed.  He was started on amiodarone and after the 4th tablet developed diarrhea. Recommended holding amiodarone for tonight and beginning 200 mg starting tomorrow.
Diarrhea resolved after reducing amiodarone load.  Continue amiodarone 200 mg per day for now.

## 2021-05-20 NOTE — CONSULT NOTE ADULT - SUBJECTIVE AND OBJECTIVE BOX
NEPHROLOGY - NSN    Patient seen and examined.    HPI:  75 M hx of  PMH of MI s/p PPM, AS s/p TAVR 2016, CHF w/ BiV AICD, cholelithiasis, anxiety, hypothyroidism, DM, Hodgkin lymphoma s/p radiation and chemo now in remission with left lung base radiation fibrosis, b/l carotid stenosis s/p carotid endarterectomy presenting due to AICD firing since 4d ago. Fired 2x on thursday when walking to bathroom in his house, no preceding or subsequent symptoms, saw Dr. Godinez the next day with adjustment in metoprolol dose and plan for further outpt f/u. Today was sleeping when the shock went off. Called Kaye Godinez and Chidi today, advised to come to ER .Now I feel fine. .  (18 May 2021 13:08)  Systolic CHF, chronic.  Plan: On Lasix and euvolemic.   -TTE: EF 35%, Mild mitral regurgitation. Mild mitral stenosis. Moderate to severe segmental LV systolic dysfunction.  Inferior and inferolateral wall  hypokinesis. Moderate pulmonary hypertension.  5/19 LHC: mild luminal irregularities; RRA access  RHC CO 5.11, CI 2.56; RFV access;  Wedge was 26 and on IV lasix   When patient euvolemic, able to lie flat, and medically optimized, will plan on D1 PCI      PAST MEDICAL & SURGICAL HISTORY:  HTN (hypertension)    HLD (hyperlipidemia)    Acquired hypothyroidism    Hodgkin lymphoma  on remission, chemo and rad [id number]    Type 2 diabetes mellitus without complication    Myocardial infarction  Cardiac Arrest- 1994- no stents    AS (aortic stenosis)  s/p TAVR    Systolic CHF, chronic  s/p ICD, denies any recent exacerbation or intubation hx    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&#x27;s    Pericardial effusion  drained in [place] in 4/19    Presence of biventricular AICD    Cholelithiases    H/O carotid endarterectomy  bilateral    H/O bilateral inguinal hernia repair    History of umbilical hernia repair    S/P TAVR (transcatheter aortic valve replacement)  10/16 in [place]    S/P ICD (internal cardiac defibrillator) procedure  [employer] [id number], last interrogation 11/2019    S/P cataract surgery  b/l        MEDICATIONS  (STANDING):  aMIOdarone    Tablet 200 milliGRAM(s) Oral daily  aspirin 325 milliGRAM(s) Oral daily  atorvastatin 80 milliGRAM(s) Oral at bedtime  clonazePAM  Tablet 1 milliGRAM(s) Oral daily  dextrose 40% Gel 15 Gram(s) Oral once  dextrose 5%. 1000 milliLiter(s) (50 mL/Hr) IV Continuous <Continuous>  dextrose 5%. 1000 milliLiter(s) (100 mL/Hr) IV Continuous <Continuous>  dextrose 50% Injectable 25 Gram(s) IV Push once  dextrose 50% Injectable 12.5 Gram(s) IV Push once  dextrose 50% Injectable 25 Gram(s) IV Push once  enoxaparin Injectable 40 milliGRAM(s) SubCutaneous daily  FLUoxetine 20 milliGRAM(s) Oral daily  furosemide   Injectable 40 milliGRAM(s) IV Push daily  glucagon  Injectable 1 milliGRAM(s) IntraMuscular once  insulin glargine Injectable (LANTUS) 10 Unit(s) SubCutaneous at bedtime  insulin lispro (ADMELOG) corrective regimen sliding scale   SubCutaneous three times a day before meals  insulin lispro (ADMELOG) corrective regimen sliding scale   SubCutaneous at bedtime  levothyroxine 125 MICROGram(s) Oral daily  losartan 25 milliGRAM(s) Oral daily  metoprolol succinate  milliGRAM(s) Oral daily      Allergies    No Known Allergies    Intolerances    lisinopril (Other)      SOCIAL HISTORY:  Denies alcohol abuse, drug abuse or tobacco usage.     FAMILY HISTORY:  No pertinent family history in first degree relatives        VITALS:  T(C): 36.5 (05-20-21 @ 05:30), Max: 36.8 (05-19-21 @ 18:51)  HR: 66 (05-20-21 @ 05:30) (66 - 74)  BP: 128/55 (05-20-21 @ 05:30) (108/50 - 132/63)  RR: 17 (05-20-21 @ 05:30) (17 - 18)  SpO2: 100% (05-20-21 @ 05:30) (98% - 100%)    REVIEW OF SYSTEMS:  Denies any nausea, vomiting, diarrhea, fever or chills. Denies chest pain, SOB, focal weakness, hematuria or dysuria. Good oral intake and denies fatigue or weakness. All other pertinent systems are reviewed and are negative.    PHYSICAL EXAM:  Constitutional: NAD  HEENT: EOMI  Neck:  No JVD, supple   Respiratory: CTA B/L  Cardiovascular: S1 and S2, RRR  Gastrointestinal: + BS, soft, NT, ND  Extremities: No peripheral edema, + peripheral pulses  Neurological: A/O x 3, CN2-12 intact  Psychiatric: Normal mood, normal affect  : No Be  Skin: No rashes, C/D/I  Access: Not applicable    I and O's:        LABS:                        11.4   6.07  )-----------( 211      ( 20 May 2021 08:18 )             35.7     05-20    142  |  103  |  22  ----------------------------<  108<H>  4.1   |  30  |  1.38<H>    Ca    9.4      20 May 2021 08:18  Phos  3.2     05-20  Mg     1.9     05-20        URINE:      RADIOLOGY & ADDITIONAL STUDIES:    CONCLUSIONS:  1. Mitral annular calcification and calcified mitral  leaflets with decreased diastolic opening. Mild mitral  regurgitation.  Mean transmitral valve gradient equals 3 mm  Hg, consistent with mild mitral stenosis.  2. Normal left ventricular internal dimensions and wall  thicknesses.  3. Endocardial visualization enhanced with intravenous  injection of echo contrast (Definity).  Moderate to severe  segmental left ventricular systolic dysfunction.  Inferior  and inferolateral wall  hypokinesis.  4. The right ventricle is not well visualized grossly  enalerged. Device wire is noted in the right heart.  5. Estimated pulmonary artery systolic pressure equals 50  mm Hg, assuming right atrial pressure equals 10  mm Hg,  consistent with moderate pulmonary hypertension.  ------------------------------------------------------------------------  Confirmed on  5/19/2021 - 12:14:49 by FRANKLIN Ceballos   NEPHROLOGY - NSN    Patient seen and examined.    HPI:  75 M hx of  PMH of MI s/p PPM, AS s/p TAVR 2016, CHF w/ BiV AICD, cholelithiasis, anxiety, hypothyroidism, DM, Hodgkin lymphoma s/p radiation and chemo now in remission with left lung base radiation fibrosis, b/l carotid stenosis s/p carotid endarterectomy presenting due to AICD firing since 4d ago. Fired 2x on thursday when walking to bathroom in his house, no preceding or subsequent symptoms, saw Dr. Godinez the next day with adjustment in metoprolol dose and plan for further outpt f/u. Today was sleeping when the shock went off. Called Kaye Godinez and Chidi today, advised to come to ER .Now I feel fine. .  (18 May 2021 13:08)  Systolic CHF, chronic.  Plan: On Lasix and euvolemic.   -TTE: EF 35%, Mild mitral regurgitation. Mild mitral stenosis. Moderate to severe segmental LV systolic dysfunction.  Inferior and inferolateral wall  hypokinesis. Moderate pulmonary hypertension.  5/19 LHC: mild luminal irregularities; RRA access  RHC CO 5.11, CI 2.56; RFV access;  Wedge was 26 and on IV lasix   When patient euvolemic, able to lie flat, and medically optimized, will plan on D1 PCI  He has no prior hx of RIANNA  There is no hematuria or bubbles in the urine.  No history of NSAIDS or nephrolithisis.  The patient urinates once or twice in the night and there is no incontinence.  No family hx or renal disease or back pain.    No recent abx use.  No alleviating or aggravating factors with respect to the kidneys.   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&#x27;s    Pericardial effusion  drained in [place] in 4/19    Presence of biventricular AICD    Cholelithiases    H/O carotid endarterectomy  bilateral    H/O bilateral inguinal hernia repair    History of umbilical hernia repair    S/P TAVR (transcatheter aortic valve replacement)  10/16 in [place]    S/P ICD (internal cardiac defibrillator) procedure  [employer] [id number], last interrogation 11/2019    S/P cataract surgery  b/l        MEDICATIONS  (STANDING):  aMIOdarone    Tablet 200 milliGRAM(s) Oral daily  aspirin 325 milliGRAM(s) Oral daily  atorvastatin 80 milliGRAM(s) Oral at bedtime  clonazePAM  Tablet 1 milliGRAM(s) Oral daily  dextrose 40% Gel 15 Gram(s) Oral once  dextrose 5%. 1000 milliLiter(s) (50 mL/Hr) IV Continuous <Continuous>  dextrose 5%. 1000 milliLiter(s) (100 mL/Hr) IV Continuous <Continuous>  dextrose 50% Injectable 25 Gram(s) IV Push once  dextrose 50% Injectable 12.5 Gram(s) IV Push once  dextrose 50% Injectable 25 Gram(s) IV Push once  enoxaparin Injectable 40 milliGRAM(s) SubCutaneous daily  FLUoxetine 20 milliGRAM(s) Oral daily  furosemide   Injectable 40 milliGRAM(s) IV Push daily  glucagon  Injectable 1 milliGRAM(s) IntraMuscular once  insulin glargine Injectable (LANTUS) 10 Unit(s) SubCutaneous at bedtime  insulin lispro (ADMELOG) corrective regimen sliding scale   SubCutaneous three times a day before meals  insulin lispro (ADMELOG) corrective regimen sliding scale   SubCutaneous at bedtime  levothyroxine 125 MICROGram(s) Oral daily  losartan 25 milliGRAM(s) Oral daily  metoprolol succinate  milliGRAM(s) Oral daily      Allergies    No Known Allergies    Intolerances    lisinopril (Other)      SOCIAL HISTORY:  Denies alcohol abuse, drug abuse or tobacco usage.     FAMILY HISTORY:  No pertinent family history in first degree relatives        VITALS:  T(C): 36.5 (05-20-21 @ 05:30), Max: 36.8 (05-19-21 @ 18:51)  HR: 66 (05-20-21 @ 05:30) (66 - 74)  BP: 128/55 (05-20-21 @ 05:30) (108/50 - 132/63)  RR: 17 (05-20-21 @ 05:30) (17 - 18)  SpO2: 100% (05-20-21 @ 05:30) (98% - 100%)    REVIEW OF SYSTEMS:  Denies any nausea, vomiting, diarrhea, fever or chills. Denies chest pain, SOB, focal weakness, hematuria or dysuria. Good oral intake and denies fatigue or weakness. All other pertinent systems are reviewed and are negative.    PHYSICAL EXAM:  Constitutional: NAD  HEENT: EOMI  Neck:  +  JVD at 4 cm over clavicle at 30 degrees , supple   Respiratory: CTA B/L  Cardiovascular: S1 and S2, RRR  Gastrointestinal: + BS, soft, NT, ND  Extremities: No peripheral edema, + peripheral pulses  Neurological: A/O x 3, CN2-12 intact  Psychiatric: Normal mood, normal affect  : No Be  Skin: No rashes, C/D/I  Access: Not applicable    I and O's:        LABS:                        11.4   6.07  )-----------( 211      ( 20 May 2021 08:18 )             35.7     05-20    142  |  103  |  22  ----------------------------<  108<H>  4.1   |  30  |  1.38<H>    Ca    9.4      20 May 2021 08:18  Phos  3.2     05-20  Mg     1.9     05-20        URINE:      RADIOLOGY & ADDITIONAL STUDIES:    CONCLUSIONS:  1. Mitral annular calcification and calcified mitral  leaflets with decreased diastolic opening. Mild mitral  regurgitation.  Mean transmitral valve gradient equals 3 mm  Hg, consistent with mild mitral stenosis.  2. Normal left ventricular internal dimensions and wall  thicknesses.  3. Endocardial visualization enhanced with intravenous  injection of echo contrast (Definity).  Moderate to severe  segmental left ventricular systolic dysfunction.  Inferior  and inferolateral wall  hypokinesis.  4. The right ventricle is not well visualized grossly  enalerged. Device wire is noted in the right heart.  5. Estimated pulmonary artery systolic pressure equals 50  mm Hg, assuming right atrial pressure equals 10  mm Hg,  consistent with moderate pulmonary hypertension.  ------------------------------------------------------------------------  Confirmed on  5/19/2021 - 12:14:49 by FRANKLIN Ceballos

## 2021-05-20 NOTE — PROGRESS NOTE ADULT - SUBJECTIVE AND OBJECTIVE BOX
Date of Service  : 05-20-21 @ 18:36    INTERVAL HPI/OVERNIGHT EVENTS:  Vital Signs Last 24 Hrs  T(C): 36.2 (20 May 2021 14:03), Max: 36.8 (19 May 2021 18:51)  T(F): 97.2 (20 May 2021 14:03), Max: 98.2 (19 May 2021 18:51)  HR: 68 (20 May 2021 14:03) (66 - 74)  BP: 106/40 (20 May 2021 14:03) (106/40 - 130/51)  BP(mean): --  RR: 18 (20 May 2021 14:03) (17 - 18)  SpO2: 100% (20 May 2021 14:03) (98% - 100%)  I&O's Summary    MEDICATIONS  (STANDING):  aMIOdarone    Tablet 200 milliGRAM(s) Oral daily  aspirin 325 milliGRAM(s) Oral daily  atorvastatin 80 milliGRAM(s) Oral at bedtime  clonazePAM  Tablet 1 milliGRAM(s) Oral daily  dextrose 40% Gel 15 Gram(s) Oral once  dextrose 5%. 1000 milliLiter(s) (50 mL/Hr) IV Continuous <Continuous>  dextrose 5%. 1000 milliLiter(s) (100 mL/Hr) IV Continuous <Continuous>  dextrose 50% Injectable 25 Gram(s) IV Push once  dextrose 50% Injectable 12.5 Gram(s) IV Push once  dextrose 50% Injectable 25 Gram(s) IV Push once  enoxaparin Injectable 40 milliGRAM(s) SubCutaneous daily  FLUoxetine 20 milliGRAM(s) Oral daily  furosemide   Injectable 40 milliGRAM(s) IV Push two times a day  glucagon  Injectable 1 milliGRAM(s) IntraMuscular once  insulin glargine Injectable (LANTUS) 10 Unit(s) SubCutaneous at bedtime  insulin lispro (ADMELOG) corrective regimen sliding scale   SubCutaneous three times a day before meals  insulin lispro (ADMELOG) corrective regimen sliding scale   SubCutaneous at bedtime  levothyroxine 125 MICROGram(s) Oral daily  losartan 25 milliGRAM(s) Oral daily  metoprolol succinate  milliGRAM(s) Oral daily    MEDICATIONS  (PRN):  acetaminophen   Tablet .. 650 milliGRAM(s) Oral every 6 hours PRN Mild Pain (1 - 3), Moderate Pain (4 - 6)    LABS:                        11.4   6.07  )-----------( 211      ( 20 May 2021 08:18 )             35.7     05-20    142  |  103  |  22  ----------------------------<  108<H>  4.1   |  30  |  1.38<H>    Ca    9.4      20 May 2021 08:18  Phos  3.2     05-20  Mg     1.9     05-20          CAPILLARY BLOOD GLUCOSE      POCT Blood Glucose.: 122 mg/dL (20 May 2021 17:58)  POCT Blood Glucose.: 136 mg/dL (20 May 2021 12:02)  POCT Blood Glucose.: 102 mg/dL (20 May 2021 08:30)  POCT Blood Glucose.: 179 mg/dL (19 May 2021 21:58)  POCT Blood Glucose.: 184 mg/dL (19 May 2021 21:29)          REVIEW OF SYSTEMS:  CONSTITUTIONAL: No fever, weight loss, or fatigue  EYES: No eye pain, visual disturbances, or discharge  ENMT:  No difficulty hearing, tinnitus, vertigo; No sinus or throat pain  NECK: No pain or stiffness  RESPIRATORY: No cough, wheezing, chills or hemoptysis; No shortness of breath  CARDIOVASCULAR: No chest pain, palpitations, dizziness, or leg swelling  GASTROINTESTINAL: No abdominal or epigastric pain. No nausea, vomiting, or hematemesis; No diarrhea or constipation. No melena or hematochezia.  GENITOURINARY: No dysuria, frequency, hematuria, or incontinence  NEUROLOGICAL: No headaches, memory loss, loss of strength, numbness, or tremors      Consultant(s) Notes Reviewed:  [x ] YES  [ ] NO    PHYSICAL EXAM:  GENERAL: NAD, well-groomed, well-developed,not in any distress ,  HEAD:  Atraumatic, Normocephalic  EYES: EOMI, PERRLA, conjunctiva and sclera clear  ENMT: No tonsillar erythema, exudates, or enlargement; Moist mucous membranes, Good dentition, No lesions  NECK: Supple, No JVD, Normal thyroid  NERVOUS SYSTEM:  Alert & Oriented X3, No focal deficit   CHEST/LUNG: Good air entry bilateral with no  rales, rhonchi, wheezing, or rubs  HEART: Regular rate and rhythm; No murmurs, rubs, or gallops  ABDOMEN: Soft, Nontender, Nondistended; Bowel sounds present  EXTREMITIES:  2+ Peripheral Pulses, No clubbing, cyanosis, or edema      Care Discussed with Consultants/Other Providers [ x] YES  [ ] NO

## 2021-05-20 NOTE — PROGRESS NOTE ADULT - ASSESSMENT
75M hx of remote hx Hodgkins lymphoma, hypertension, hyperlipidemia, severe AS s/p TAVR, chronic systolic CHF with EF 34%, LBBB, MI s/p BiV-ICD (EF improved to 40-45%), pericardial effusion s/p drainage in past presenting with ICD firing, device interrogated found to be in VT.    #VT  -2x ICD shocks for monomorphic VT ~200-210BPM on 5/13 and 5/18 of similar morphology  -HDS, asx  -c/w Toprol XL 150mg daily  -c/w amiodarone 200mg daily given side effects on loading dose  -lowered VT-2 zone to 170BPM from 185BPM and added addl ATP burst tx to account for slowing VT cycle lengths from amiodarone  -HF optimization per gen britney Hernandez MD  Cardiology Fellow - PGY 5  Text or Call: [phone]  For all New Consults and Questions:  [email or web address]   Login: [email or web address]

## 2021-05-21 LAB
ANION GAP SERPL CALC-SCNC: 11 MMOL/L — SIGNIFICANT CHANGE UP (ref 7–14)
BUN SERPL-MCNC: 28 MG/DL — HIGH (ref 7–23)
CALCIUM SERPL-MCNC: 9 MG/DL — SIGNIFICANT CHANGE UP (ref 8.4–10.5)
CHLORIDE SERPL-SCNC: 103 MMOL/L — SIGNIFICANT CHANGE UP (ref 98–107)
CO2 SERPL-SCNC: 26 MMOL/L — SIGNIFICANT CHANGE UP (ref 22–31)
CREAT SERPL-MCNC: 1.36 MG/DL — HIGH (ref 0.5–1.3)
GLUCOSE BLDC GLUCOMTR-MCNC: 121 MG/DL — HIGH (ref 70–99)
GLUCOSE BLDC GLUCOMTR-MCNC: 130 MG/DL — HIGH (ref 70–99)
GLUCOSE BLDC GLUCOMTR-MCNC: 136 MG/DL — HIGH (ref 70–99)
GLUCOSE BLDC GLUCOMTR-MCNC: 142 MG/DL — HIGH (ref 70–99)
GLUCOSE SERPL-MCNC: 109 MG/DL — HIGH (ref 70–99)
HCT VFR BLD CALC: 32.8 % — LOW (ref 39–50)
HGB BLD-MCNC: 10.6 G/DL — LOW (ref 13–17)
MAGNESIUM SERPL-MCNC: 1.8 MG/DL — SIGNIFICANT CHANGE UP (ref 1.6–2.6)
MCHC RBC-ENTMCNC: 32.3 GM/DL — SIGNIFICANT CHANGE UP (ref 32–36)
MCHC RBC-ENTMCNC: 32.3 PG — SIGNIFICANT CHANGE UP (ref 27–34)
MCV RBC AUTO: 100 FL — SIGNIFICANT CHANGE UP (ref 80–100)
NRBC # BLD: 0 /100 WBCS — SIGNIFICANT CHANGE UP
NRBC # FLD: 0 K/UL — SIGNIFICANT CHANGE UP
PHOSPHATE SERPL-MCNC: 3.9 MG/DL — SIGNIFICANT CHANGE UP (ref 2.5–4.5)
PLATELET # BLD AUTO: 209 K/UL — SIGNIFICANT CHANGE UP (ref 150–400)
POTASSIUM SERPL-MCNC: 4 MMOL/L — SIGNIFICANT CHANGE UP (ref 3.5–5.3)
POTASSIUM SERPL-SCNC: 4 MMOL/L — SIGNIFICANT CHANGE UP (ref 3.5–5.3)
RBC # BLD: 3.28 M/UL — LOW (ref 4.2–5.8)
RBC # FLD: 14.7 % — HIGH (ref 10.3–14.5)
SODIUM SERPL-SCNC: 140 MMOL/L — SIGNIFICANT CHANGE UP (ref 135–145)
WBC # BLD: 5.15 K/UL — SIGNIFICANT CHANGE UP (ref 3.8–10.5)
WBC # FLD AUTO: 5.15 K/UL — SIGNIFICANT CHANGE UP (ref 3.8–10.5)

## 2021-05-21 RX ADMIN — AMIODARONE HYDROCHLORIDE 200 MILLIGRAM(S): 400 TABLET ORAL at 05:56

## 2021-05-21 RX ADMIN — Medication 40 MILLIGRAM(S): at 18:00

## 2021-05-21 RX ADMIN — Medication 325 MILLIGRAM(S): at 12:24

## 2021-05-21 RX ADMIN — ENOXAPARIN SODIUM 40 MILLIGRAM(S): 100 INJECTION SUBCUTANEOUS at 12:24

## 2021-05-21 RX ADMIN — Medication 125 MICROGRAM(S): at 05:56

## 2021-05-21 RX ADMIN — Medication 150 MILLIGRAM(S): at 05:56

## 2021-05-21 RX ADMIN — Medication 1 MILLIGRAM(S): at 12:24

## 2021-05-21 RX ADMIN — LOSARTAN POTASSIUM 25 MILLIGRAM(S): 100 TABLET, FILM COATED ORAL at 05:56

## 2021-05-21 RX ADMIN — Medication 40 MILLIGRAM(S): at 05:56

## 2021-05-21 RX ADMIN — ATORVASTATIN CALCIUM 80 MILLIGRAM(S): 80 TABLET, FILM COATED ORAL at 22:27

## 2021-05-21 RX ADMIN — INSULIN GLARGINE 10 UNIT(S): 100 INJECTION, SOLUTION SUBCUTANEOUS at 22:27

## 2021-05-21 RX ADMIN — Medication 20 MILLIGRAM(S): at 12:25

## 2021-05-21 NOTE — PROGRESS NOTE ADULT - SUBJECTIVE AND OBJECTIVE BOX
chief complaint:  ICD fire    extended hpi: 75 year old male with history of CAD (known 100% RCA stenosis from last cath in 2016), severe AS s/p TAVR in 2016, cardiomyopathy s/p BiV AICD, DM, Hodgkin lymphoma s/p radiation and chemotherapy, history of carotid stenosis s/p carotid endarterectomy who presented to the ED following AICD shock.     S: no chest pain or sob; ros otherwise negative.     Review of Systems:   Constitutional: [ ] fevers, [ ] chills.   Skin: [ ] dry skin. [ ] rashes.  Psychiatric: [ ] depression, [ ] anxiety.   Gastrointestinal: [ ] BRBPR, [ ] melena.   Neurological: [ ] confusion. [ ] seizures. [ ] shuffling gait.   Ears,Nose,Mouth and Throat: [ ] ear pain [ ] sore throat.   Eyes: [ ] diplopia.   Respiratory: [ ] hemoptysis. [ ] shortness of breath  Cardiovascular: See HPI above  Hematologic/Lymphatic: [ ] anemia. [ ] painful nodes. [ ] prolonged bleeding.   Genitourinary: [ ] hematuria. [ ] flank pain.   Endocrine: [ ] significant change in weight. [ ] intolerance to heat and cold.     Review of systems [x ] otherwise negative, [ ] otherwise unable to obtain    FH: no family history of sudden cardiac death in first degree relatives    SH: [ ] tobacco, [ ] alcohol, [ ] drugs      acetaminophen   Tablet .. 650 milliGRAM(s) Oral every 6 hours PRN  aMIOdarone    Tablet 200 milliGRAM(s) Oral daily  aspirin 325 milliGRAM(s) Oral daily  atorvastatin 80 milliGRAM(s) Oral at bedtime  clonazePAM  Tablet 1 milliGRAM(s) Oral daily  dextrose 40% Gel 15 Gram(s) Oral once  dextrose 5%. 1000 milliLiter(s) IV Continuous <Continuous>  dextrose 5%. 1000 milliLiter(s) IV Continuous <Continuous>  dextrose 50% Injectable 25 Gram(s) IV Push once  dextrose 50% Injectable 12.5 Gram(s) IV Push once  dextrose 50% Injectable 25 Gram(s) IV Push once  enoxaparin Injectable 40 milliGRAM(s) SubCutaneous daily  FLUoxetine 20 milliGRAM(s) Oral daily  furosemide   Injectable 40 milliGRAM(s) IV Push two times a day  glucagon  Injectable 1 milliGRAM(s) IntraMuscular once  insulin glargine Injectable (LANTUS) 10 Unit(s) SubCutaneous at bedtime  insulin lispro (ADMELOG) corrective regimen sliding scale   SubCutaneous three times a day before meals  insulin lispro (ADMELOG) corrective regimen sliding scale   SubCutaneous at bedtime  levothyroxine 125 MICROGram(s) Oral daily  losartan 25 milliGRAM(s) Oral daily  metoprolol succinate  milliGRAM(s) Oral daily                            10.6   5.15  )-----------( 209      ( 21 May 2021 06:37 )             32.8       05-21    140  |  103  |  28<H>  ----------------------------<  109<H>  4.0   |  26  |  1.36<H>    Ca    9.0      21 May 2021 06:37  Phos  3.9     05-21  Mg     1.8     05-21              T(C): 36.7 (05-21-21 @ 05:50), Max: 36.7 (05-21-21 @ 05:50)  HR: 79 (05-21-21 @ 05:50) (64 - 79)  BP: 132/58 (05-21-21 @ 05:50) (106/40 - 132/58)  RR: 18 (05-21-21 @ 05:50) (18 - 19)  SpO2: 100% (05-21-21 @ 05:50) (100% - 100%)  Wt(kg): --    I&O's Summary    20 May 2021 07:01  -  21 May 2021 07:00  --------------------------------------------------------  IN: 240 mL / OUT: 1000 mL / NET: -760 mL    21 May 2021 07:01  -  21 May 2021 13:01  --------------------------------------------------------  IN: 0 mL / OUT: 200 mL / NET: -200 mL          General: Well nourished in no acute distress. Alert and Oriented * 3.   Head: Normocephalic and atraumatic.   Neck: +JVD;  No bruits. Supple. Does not appear to be enlarged.   Cardiovascular: + S1,S2 ; RRR Soft systolic murmur at the left lower sternal border. No rubs noted.    Lungs: CTA b/l. No rhonchi, rales or wheezes.   Abdomen: + BS, soft. Non tender. Non distended. No rebound. No guarding.   Extremities: No clubbing/cyanosis/ + edema in LE bilaterally   Neurologic: Moves all four extremities. Full range of motion.   Skin: Warm and moist. The patient's skin has normal elasticity and good skin turgor.   Psychiatric: Appropriate mood and affect.  Musculoskeletal: Normal range of motion, normal strength  Right wrist: no hematoma; 2+ pulses  Right groin: no hematoma; 2+ pulses     DATA    TTE: < from: TTE with Doppler (w/Cont) (05.19.21 @ 07:53) >  1. Mitral annular calcification and calcified mitral  leaflets with decreased diastolic opening. Mild mitral  regurgitation.  Mean transmitral valve gradient equals 3 mm  Hg, consistent with mild mitral stenosis.  2. Normal left ventricular internal dimensions and wall  thicknesses.  3. Endocardial visualization enhanced with intravenous  injection of echo contrast (Definity).  Moderate to severe  segmental left ventricular systolic dysfunction.  Inferior  and inferolateral wall  hypokinesis.  4. The right ventricle is not well visualized grossly  enalerged. Device wire is noted in the right heart.  5. Estimated pulmonary artery systolic pressure equals 50  mm Hg, assuming right atrialpressure equals 10  mm Hg,  consistent with moderate pulmonary hypertension.    < end of copied text >      ASSESSMENT/PLAN:  75 year old male with history of CAD (known 100% RCA stenosis from last cath in 2016), severe AS s/p TAVR in 2016, cardiomyopathy s/p BiV AICD, DM, Hodgkin lymphoma s/p radiation and chemotherapy, history of carotid stenosis s/p carotid endarterectomy who presented to the ED following AICD shock.     -pt. s/p R/LHC demonstrating severe D1 lesion and elevated PCWP with 26  -Cr stable today - appreciate renal evaluation   -given elevated PCWP with evidence of volume overload on exam, will continue with IV diuresis  -AAD for VT per EP  -will plan on staged PCI when medically optimized and volume status improves, hopefully Monday     Brian Mo MD

## 2021-05-21 NOTE — PROGRESS NOTE ADULT - SUBJECTIVE AND OBJECTIVE BOX
[facility] [phone]        Patient seen and examined in bed.  He was in good spirits         MEDICATIONS  (STANDING):  aMIOdarone    Tablet 200 milliGRAM(s) Oral daily  aspirin 325 milliGRAM(s) Oral daily  atorvastatin 80 milliGRAM(s) Oral at bedtime  clonazePAM  Tablet 1 milliGRAM(s) Oral daily  dextrose 40% Gel 15 Gram(s) Oral once  dextrose 5%. 1000 milliLiter(s) (50 mL/Hr) IV Continuous <Continuous>  dextrose 5%. 1000 milliLiter(s) (100 mL/Hr) IV Continuous <Continuous>  dextrose 50% Injectable 25 Gram(s) IV Push once  dextrose 50% Injectable 12.5 Gram(s) IV Push once  dextrose 50% Injectable 25 Gram(s) IV Push once  enoxaparin Injectable 40 milliGRAM(s) SubCutaneous daily  FLUoxetine 20 milliGRAM(s) Oral daily  furosemide   Injectable 40 milliGRAM(s) IV Push two times a day  glucagon  Injectable 1 milliGRAM(s) IntraMuscular once  insulin glargine Injectable (LANTUS) 10 Unit(s) SubCutaneous at bedtime  insulin lispro (ADMELOG) corrective regimen sliding scale   SubCutaneous three times a day before meals  insulin lispro (ADMELOG) corrective regimen sliding scale   SubCutaneous at bedtime  levothyroxine 125 MICROGram(s) Oral daily  losartan 25 milliGRAM(s) Oral daily  metoprolol succinate  milliGRAM(s) Oral daily      VITAL:  T(C): , Max: 36.8 (05-21-21 @ 11:58)  T(F): , Max: 98.3 (05-21-21 @ 11:58)  HR: 79 (05-21-21 @ 11:58)  BP: 106/43 (05-21-21 @ 11:58)  BP(mean): --  RR: 18 (05-21-21 @ 11:58)  SpO2: 100% (05-21-21 @ 11:58)  Wt(kg): --    I and O's:    05-20 @ 07:01  -  05-21 @ 07:00  --------------------------------------------------------  IN: 240 mL / OUT: 1000 mL / NET: -760 mL    05-21 @ 07:01  -  05-21 @ 14:49  --------------------------------------------------------  IN: 0 mL / OUT: 400 mL / NET: -400 mL          PHYSICAL EXAM:    Constitutional: NAD  Neck:  No JVD  Respiratory: CTAB/L  Cardiovascular: S1 and S2  Gastrointestinal: BS+, soft, NT/ND  Extremities: No peripheral edema  Neurological: A/O x 3, no focal deficits  Psychiatric: Normal mood, normal affect  : No Be  Skin: No rashes  Access: Not applicable    LABS:                        10.6   5.15  )-----------( 209      ( 21 May 2021 06:37 )             32.8     05-21    140  |  103  |  28<H>  ----------------------------<  109<H>  4.0   |  26  |  1.36<H>    Ca    9.0      21 May 2021 06:37  Phos  3.9     05-21  Mg     1.8     05-21            Urine Studies:          RADIOLOGY & ADDITIONAL STUDIES:

## 2021-05-21 NOTE — PROGRESS NOTE ADULT - ASSESSMENT
75 M hx of  PMH of MI s/p PPM, AS s/p TAVR 2016, CHF w/ BiV AICD, cholelithiasis, anxiety, hypothyroidism, DM, Hodgkin lymphoma s/p radiation and chemo now in remission with left lung base radiation fibrosis, b/l carotid stenosis s/p carotid endarterectomy presenting due to AICD firing since 4d ago  RIANNA following the cath and elevated wedge     1 Renal- IV lasix bid at present and may add zaroxolyn to the mix today   2 [facility]-For now tentative date for cath  is slated for Monday.    3 EPS-On Amiodarone for AICD shock    before dc will increase the Cozaar.  No reason to stop at present     [facility]   [phone]

## 2021-05-22 LAB
ANION GAP SERPL CALC-SCNC: 13 MMOL/L — SIGNIFICANT CHANGE UP (ref 7–14)
BUN SERPL-MCNC: 29 MG/DL — HIGH (ref 7–23)
CALCIUM SERPL-MCNC: 9.4 MG/DL — SIGNIFICANT CHANGE UP (ref 8.4–10.5)
CHLORIDE SERPL-SCNC: 99 MMOL/L — SIGNIFICANT CHANGE UP (ref 98–107)
CO2 SERPL-SCNC: 26 MMOL/L — SIGNIFICANT CHANGE UP (ref 22–31)
CREAT SERPL-MCNC: 1.48 MG/DL — HIGH (ref 0.5–1.3)
GLUCOSE BLDC GLUCOMTR-MCNC: 125 MG/DL — HIGH (ref 70–99)
GLUCOSE BLDC GLUCOMTR-MCNC: 125 MG/DL — HIGH (ref 70–99)
GLUCOSE BLDC GLUCOMTR-MCNC: 142 MG/DL — HIGH (ref 70–99)
GLUCOSE BLDC GLUCOMTR-MCNC: 156 MG/DL — HIGH (ref 70–99)
GLUCOSE SERPL-MCNC: 132 MG/DL — HIGH (ref 70–99)
HCT VFR BLD CALC: 37.1 % — LOW (ref 39–50)
HGB BLD-MCNC: 11.9 G/DL — LOW (ref 13–17)
MAGNESIUM SERPL-MCNC: 1.9 MG/DL — SIGNIFICANT CHANGE UP (ref 1.6–2.6)
MCHC RBC-ENTMCNC: 31.9 PG — SIGNIFICANT CHANGE UP (ref 27–34)
MCHC RBC-ENTMCNC: 32.1 GM/DL — SIGNIFICANT CHANGE UP (ref 32–36)
MCV RBC AUTO: 99.5 FL — SIGNIFICANT CHANGE UP (ref 80–100)
NRBC # BLD: 0 /100 WBCS — SIGNIFICANT CHANGE UP
NRBC # FLD: 0 K/UL — SIGNIFICANT CHANGE UP
PHOSPHATE SERPL-MCNC: 4.2 MG/DL — SIGNIFICANT CHANGE UP (ref 2.5–4.5)
PLATELET # BLD AUTO: 241 K/UL — SIGNIFICANT CHANGE UP (ref 150–400)
POTASSIUM SERPL-MCNC: 3.6 MMOL/L — SIGNIFICANT CHANGE UP (ref 3.5–5.3)
POTASSIUM SERPL-SCNC: 3.6 MMOL/L — SIGNIFICANT CHANGE UP (ref 3.5–5.3)
RBC # BLD: 3.73 M/UL — LOW (ref 4.2–5.8)
RBC # FLD: 14.6 % — HIGH (ref 10.3–14.5)
SODIUM SERPL-SCNC: 138 MMOL/L — SIGNIFICANT CHANGE UP (ref 135–145)
WBC # BLD: 7.01 K/UL — SIGNIFICANT CHANGE UP (ref 3.8–10.5)
WBC # FLD AUTO: 7.01 K/UL — SIGNIFICANT CHANGE UP (ref 3.8–10.5)

## 2021-05-22 RX ORDER — NYSTATIN CREAM 100000 [USP'U]/G
1 CREAM TOPICAL
Refills: 0 | Status: DISCONTINUED | OUTPATIENT
Start: 2021-05-22 | End: 2021-05-26

## 2021-05-22 RX ADMIN — ENOXAPARIN SODIUM 40 MILLIGRAM(S): 100 INJECTION SUBCUTANEOUS at 12:09

## 2021-05-22 RX ADMIN — Medication 40 MILLIGRAM(S): at 05:34

## 2021-05-22 RX ADMIN — NYSTATIN CREAM 1 APPLICATION(S): 100000 CREAM TOPICAL at 17:42

## 2021-05-22 RX ADMIN — INSULIN GLARGINE 10 UNIT(S): 100 INJECTION, SOLUTION SUBCUTANEOUS at 22:03

## 2021-05-22 RX ADMIN — LOSARTAN POTASSIUM 25 MILLIGRAM(S): 100 TABLET, FILM COATED ORAL at 05:33

## 2021-05-22 RX ADMIN — AMIODARONE HYDROCHLORIDE 200 MILLIGRAM(S): 400 TABLET ORAL at 05:34

## 2021-05-22 RX ADMIN — Medication 40 MILLIGRAM(S): at 17:42

## 2021-05-22 RX ADMIN — Medication 325 MILLIGRAM(S): at 12:12

## 2021-05-22 RX ADMIN — Medication 20 MILLIGRAM(S): at 12:08

## 2021-05-22 RX ADMIN — Medication 1 MILLIGRAM(S): at 12:08

## 2021-05-22 RX ADMIN — Medication 150 MILLIGRAM(S): at 05:33

## 2021-05-22 RX ADMIN — Medication 125 MICROGRAM(S): at 05:33

## 2021-05-22 RX ADMIN — ATORVASTATIN CALCIUM 80 MILLIGRAM(S): 80 TABLET, FILM COATED ORAL at 21:48

## 2021-05-22 NOTE — PROGRESS NOTE ADULT - SUBJECTIVE AND OBJECTIVE BOX
chief complaint:  ICD fire    extended hpi: 75 year old male with history of CAD (known 100% RCA stenosis from last cath in 2016), severe AS s/p TAVR in 2016, cardiomyopathy s/p BiV AICD, DM, Hodgkin lymphoma s/p radiation and chemotherapy, history of carotid stenosis s/p carotid endarterectomy who presented to the ED following AICD shock.     S: no chest pain or sob; ros otherwise negative.     Review of Systems:   Constitutional: [ ] fevers, [ ] chills.   Skin: [ ] dry skin. [ ] rashes.  Psychiatric: [ ] depression, [ ] anxiety.   Gastrointestinal: [ ] BRBPR, [ ] melena.   Neurological: [ ] confusion. [ ] seizures. [ ] shuffling gait.   Ears,Nose,Mouth and Throat: [ ] ear pain [ ] sore throat.   Eyes: [ ] diplopia.   Respiratory: [ ] hemoptysis. [ ] shortness of breath  Cardiovascular: See HPI above  Hematologic/Lymphatic: [ ] anemia. [ ] painful nodes. [ ] prolonged bleeding.   Genitourinary: [ ] hematuria. [ ] flank pain.   Endocrine: [ ] significant change in weight. [ ] intolerance to heat and cold.     Review of systems [x ] otherwise negative, [ ] otherwise unable to obtain    FH: no family history of sudden cardiac death in first degree relatives    SH: [ ] tobacco, [ ] alcohol, [ ] drugs    acetaminophen   Tablet .. 650 milliGRAM(s) Oral every 6 hours PRN  aMIOdarone    Tablet 200 milliGRAM(s) Oral daily  aspirin 325 milliGRAM(s) Oral daily  atorvastatin 80 milliGRAM(s) Oral at bedtime  clonazePAM  Tablet 1 milliGRAM(s) Oral daily  dextrose 40% Gel 15 Gram(s) Oral once  dextrose 5%. 1000 milliLiter(s) IV Continuous <Continuous>  dextrose 5%. 1000 milliLiter(s) IV Continuous <Continuous>  dextrose 50% Injectable 25 Gram(s) IV Push once  dextrose 50% Injectable 12.5 Gram(s) IV Push once  dextrose 50% Injectable 25 Gram(s) IV Push once  enoxaparin Injectable 40 milliGRAM(s) SubCutaneous daily  FLUoxetine 20 milliGRAM(s) Oral daily  furosemide   Injectable 40 milliGRAM(s) IV Push two times a day  glucagon  Injectable 1 milliGRAM(s) IntraMuscular once  insulin glargine Injectable (LANTUS) 10 Unit(s) SubCutaneous at bedtime  insulin lispro (ADMELOG) corrective regimen sliding scale   SubCutaneous three times a day before meals  insulin lispro (ADMELOG) corrective regimen sliding scale   SubCutaneous at bedtime  levothyroxine 125 MICROGram(s) Oral daily  losartan 25 milliGRAM(s) Oral daily  metoprolol succinate  milliGRAM(s) Oral daily                            11.9   7.01  )-----------( 241      ( 22 May 2021 07:21 )             37.1     138  |  99  |  29<H>  ----------------------------<  132<H>  3.6   |  26  |  1.48<H>    Ca    9.4      22 May 2021 07:21  Phos  4.2     05-22  Mg     1.9     05-22    T(C): 36.6 (05-22-21 @ 12:55), Max: 36.7 (05-21-21 @ 17:56)  HR: 67 (05-22-21 @ 12:55) (67 - 80)  BP: 117/53 (05-22-21 @ 12:55) (117/53 - 134/51)  RR: 17 (05-22-21 @ 12:55) (17 - 18)  SpO2: 99% (05-22-21 @ 12:55) (97% - 100%)    General: Well nourished in no acute distress. Alert and Oriented * 3.   Head: Normocephalic and atraumatic.   Neck: No JVD. No bruits. Supple. Does not appear to be enlarged.   Cardiovascular: + S1,S2 ; RRR Soft systolic murmur at the left lower sternal border. No rubs noted.    Lungs: CTA b/l. No rhonchi, rales or wheezes.   Abdomen: + BS, soft. Non tender. Non distended. No rebound. No guarding.   Extremities: No clubbing/cyanosis/edema.   Neurologic: Moves all four extremities. Full range of motion.   Skin: Warm and moist. The patient's skin has normal elasticity and good skin turgor.   Psychiatric: Appropriate mood and affect.  Musculoskeletal: Normal range of motion, normal strength      DATA    TTE: < from: TTE with Doppler (w/Cont) (05.19.21 @ 07:53) >  1. Mitral annular calcification and calcified mitral  leaflets with decreased diastolic opening. Mild mitral  regurgitation.  Mean transmitral valve gradient equals 3 mm  Hg, consistent with mild mitral stenosis.  2. Normal left ventricular internal dimensions and wall  thicknesses.  3. Endocardial visualization enhanced with intravenous  injection of echo contrast (Definity).  Moderate to severe  segmental left ventricular systolic dysfunction.  Inferior  and inferolateral wall  hypokinesis.  4. The right ventricle is not well visualized grossly  enalerged. Device wire is noted in the right heart.  5. Estimated pulmonary artery systolic pressure equals 50  mm Hg, assuming right atrialpressure equals 10  mm Hg,  consistent with moderate pulmonary hypertension.    < end of copied text >      ASSESSMENT/PLAN:  75 year old male with history of CAD (known 100% RCA stenosis from last cath in 2016), severe AS s/p TAVR in 2016, cardiomyopathy s/p BiV AICD, DM, Hodgkin lymphoma s/p radiation and chemotherapy, history of carotid stenosis s/p carotid endarterectomy who presented to the ED following AICD shock.     -pt. s/p R/LHC demonstrating severe D1 lesion and elevated PCWP with 26  -Cr stable today - appreciate renal evaluation   -given elevated PCWP with evidence of volume overload on exam, will continue with IV diuresis  -AAD for VT per EP  -will plan on staged PCI when medically optimized and volume status improves, hopefully Monday

## 2021-05-22 NOTE — PROGRESS NOTE ADULT - SUBJECTIVE AND OBJECTIVE BOX
Date of Service  : 05-22-21     INTERVAL HPI/OVERNIGHT EVENTS: No new concerns.   Vital Signs Last 24 Hrs  T(C): 36.7 (22 May 2021 17:46), Max: 36.7 (21 May 2021 17:56)  T(F): 98.1 (22 May 2021 17:46), Max: 98.1 (21 May 2021 17:56)  HR: 70 (22 May 2021 17:46) (67 - 80)  BP: 124/58 (22 May 2021 17:46) (117/53 - 134/51)  BP(mean): --  RR: 18 (22 May 2021 17:46) (17 - 18)  SpO2: 98% (22 May 2021 17:46) (97% - 100%)  I&O's Summary    21 May 2021 07:01  -  22 May 2021 07:00  --------------------------------------------------------  IN: 0 mL / OUT: 400 mL / NET: -400 mL    22 May 2021 07:01  -  22 May 2021 17:50  --------------------------------------------------------  IN: 560 mL / OUT: 400 mL / NET: 160 mL      MEDICATIONS  (STANDING):  aMIOdarone    Tablet 200 milliGRAM(s) Oral daily  aspirin 325 milliGRAM(s) Oral daily  atorvastatin 80 milliGRAM(s) Oral at bedtime  clonazePAM  Tablet 1 milliGRAM(s) Oral daily  dextrose 40% Gel 15 Gram(s) Oral once  dextrose 5%. 1000 milliLiter(s) (50 mL/Hr) IV Continuous <Continuous>  dextrose 5%. 1000 milliLiter(s) (100 mL/Hr) IV Continuous <Continuous>  dextrose 50% Injectable 25 Gram(s) IV Push once  dextrose 50% Injectable 12.5 Gram(s) IV Push once  dextrose 50% Injectable 25 Gram(s) IV Push once  enoxaparin Injectable 40 milliGRAM(s) SubCutaneous daily  FLUoxetine 20 milliGRAM(s) Oral daily  furosemide   Injectable 40 milliGRAM(s) IV Push two times a day  glucagon  Injectable 1 milliGRAM(s) IntraMuscular once  insulin glargine Injectable (LANTUS) 10 Unit(s) SubCutaneous at bedtime  insulin lispro (ADMELOG) corrective regimen sliding scale   SubCutaneous three times a day before meals  insulin lispro (ADMELOG) corrective regimen sliding scale   SubCutaneous at bedtime  levothyroxine 125 MICROGram(s) Oral daily  losartan 25 milliGRAM(s) Oral daily  metoprolol succinate  milliGRAM(s) Oral daily  nystatin Powder 1 Application(s) Topical two times a day    MEDICATIONS  (PRN):  acetaminophen   Tablet .. 650 milliGRAM(s) Oral every 6 hours PRN Mild Pain (1 - 3), Moderate Pain (4 - 6)    LABS:                        11.9   7.01  )-----------( 241      ( 22 May 2021 07:21 )             37.1     05-22    138  |  99  |  29<H>  ----------------------------<  132<H>  3.6   |  26  |  1.48<H>    Ca    9.4      22 May 2021 07:21  Phos  4.2     05-22  Mg     1.9     05-22          CAPILLARY BLOOD GLUCOSE      POCT Blood Glucose.: 125 mg/dL (22 May 2021 17:48)  POCT Blood Glucose.: 142 mg/dL (22 May 2021 12:38)  POCT Blood Glucose.: 125 mg/dL (22 May 2021 08:58)  POCT Blood Glucose.: 136 mg/dL (21 May 2021 22:17)          REVIEW OF SYSTEMS:  CONSTITUTIONAL: No fever, weight loss, or fatigue  EYES: No eye pain, visual disturbances, or discharge  ENMT:  No difficulty hearing, tinnitus, vertigo; No sinus or throat pain  NECK: No pain or stiffness  RESPIRATORY: No cough, wheezing, chills or hemoptysis; No shortness of breath  CARDIOVASCULAR: No chest pain, palpitations, dizziness, or leg swelling  GASTROINTESTINAL: No abdominal or epigastric pain. No nausea, vomiting, or hematemesis; No diarrhea or constipation. No melena or hematochezia.  GENITOURINARY: No dysuria, frequency, hematuria, or incontinence  NEUROLOGICAL: No headaches, memory loss, loss of strength, numbness, or tremors  SKIN: No itching, burning, rashes, or lesions   ENDOCRINE: No heat or cold intolerance; No hair loss  MUSCULOSKELETAL: No joint pain or swelling; No muscle, back, or extremity pain  PSYCHIATRIC: No depression, anxiety, mood swings, or difficulty sleeping  HEME/LYMPH: No easy bruising, or bleeding gums  ALLERY AND IMMUNOLOGIC: No hives or eczema    RADIOLOGY & ADDITIONAL TESTS:    Consultant(s) Notes Reviewed:  [x ] YES  [ ] NO    PHYSICAL EXAM:  GENERAL: NAD, well-groomed, well-developed,not in any distress ,  HEAD:  Atraumatic, Normocephalic  EYES: EOMI, PERRLA, conjunctiva and sclera clear  ENMT: No tonsillar erythema, exudates, or enlargement; Moist mucous membranes, Good dentition, No lesions  NECK: Supple, No JVD, Normal thyroid  NERVOUS SYSTEM:  Alert & Oriented X3, No focal deficit   CHEST/LUNG: Good air entry bilateral with no  rales, rhonchi, wheezing, or rubs  HEART: Regular rate and rhythm; No murmurs, rubs, or gallops  ABDOMEN: Soft, Nontender, Nondistended; Bowel sounds present  EXTREMITIES:  2+ Peripheral Pulses, No clubbing, cyanosis, or edema  SKIN: No rashes or lesions    Care Discussed with Consultants/Other Providers [ x] YES  [ ] NO

## 2021-05-22 NOTE — PROGRESS NOTE ADULT - ASSESSMENT
75 M hx of  PMH of MI s/p PPM, AS s/p TAVR 2016, CHF w/ BiV AICD, cholelithiasis, anxiety, hypothyroidism, DM, Hodgkin lymphoma s/p radiation and chemo now in remission with left lung base radiation fibrosis, b/l carotid stenosis s/p carotid endarterectomy presenting due to AICD firing since 4d ago. Fired 2x on thursday when walking to bathroom in his house, no preceding or subsequent symptoms, saw Dr. Godinez the next day with adjustment in metoprolol dose and plan for further outpt f/u. Today was sleeping when the shock went off. Called Kaye Godinez and Chidi today, advised to come to ER .Now I feel fine. .      Problem/Plan - 1:  ·  Problem: AICD discharge.  Plan: presently asymptomatic . EP and cardiology helping.    S/P cardiac cath.      Problem/Plan - 2:  ·  Problem: Systolic CHF, chronic.  Plan: On IV  Lasix 40 mg BID.      Problem/Plan - 3:  ·  Problem: Type 2 diabetes mellitus without complication.  Plan: Holding Actos and SSI with Lantus.      Problem/Plan - 4:  ·  Problem: HTN (hypertension).  Plan: BP meds with hold parameters.      Problem/Plan - 5:  ·  Problem: Acquired hypothyroidism.  Plan: synthroid 125mcg and will check TFT.      Problem/Plan - 6:  Problem: CAD . Plan: Awaiting rpt cath with PCI .     Problem/Plan - 7:  Problem: HLD (hyperlipidemia). Plan: Statin.     Problem/Plan - 8:  Problem: RIANNA . Plan: S/P Cardiac cath and on Lasix. Creatinine trending up.

## 2021-05-22 NOTE — PROGRESS NOTE ADULT - SUBJECTIVE AND OBJECTIVE BOX
Patient seen and examined in bed.  Denies SOB/TALBERT.      REVIEW OF SYSTEMS:  As per HPI, otherwise 8 full 10 ROS were unremarkable    MEDICATIONS  (STANDING):  aMIOdarone    Tablet 200 milliGRAM(s) Oral daily  aspirin 325 milliGRAM(s) Oral daily  atorvastatin 80 milliGRAM(s) Oral at bedtime  clonazePAM  Tablet 1 milliGRAM(s) Oral daily  dextrose 40% Gel 15 Gram(s) Oral once  dextrose 5%. 1000 milliLiter(s) (50 mL/Hr) IV Continuous <Continuous>  dextrose 5%. 1000 milliLiter(s) (100 mL/Hr) IV Continuous <Continuous>  dextrose 50% Injectable 25 Gram(s) IV Push once  dextrose 50% Injectable 12.5 Gram(s) IV Push once  dextrose 50% Injectable 25 Gram(s) IV Push once  enoxaparin Injectable 40 milliGRAM(s) SubCutaneous daily  FLUoxetine 20 milliGRAM(s) Oral daily  furosemide   Injectable 40 milliGRAM(s) IV Push two times a day  glucagon  Injectable 1 milliGRAM(s) IntraMuscular once  insulin glargine Injectable (LANTUS) 10 Unit(s) SubCutaneous at bedtime  insulin lispro (ADMELOG) corrective regimen sliding scale   SubCutaneous three times a day before meals  insulin lispro (ADMELOG) corrective regimen sliding scale   SubCutaneous at bedtime  levothyroxine 125 MICROGram(s) Oral daily  losartan 25 milliGRAM(s) Oral daily  metoprolol succinate  milliGRAM(s) Oral daily      VITAL:  T(C): , Max: 36.7 (05-21-21 @ 17:56)  T(F): , Max: 98.1 (05-21-21 @ 17:56)  HR: 67 (05-22-21 @ 12:55)  BP: 117/53 (05-22-21 @ 12:55)  BP(mean): --  RR: 17 (05-22-21 @ 12:55)  SpO2: 99% (05-22-21 @ 12:55)  Wt(kg): --    I and O's:    05-21 @ 07:01  -  05-22 @ 07:00  --------------------------------------------------------  IN: 0 mL / OUT: 400 mL / NET: -400 mL          PHYSICAL EXAM:    Constitutional: NAD  HEENT: PERRLA, EOMI,  MMM  Neck: No LAD, No JVD  Respiratory: CTAB  Cardiovascular: S1 and S2  Gastrointestinal: BS+, soft, NT; some abdominal distension noted  Extremities: No peripheral edema  Neurological: A/O x 3, no focal deficits  : No Be  Skin: No rashes  Access: Not applicable    LABS:                        11.9   7.01  )-----------( 241      ( 22 May 2021 07:21 )             37.1     05-22    138  |  99  |  29<H>  ----------------------------<  132<H>  3.6   |  26  |  1.48<H>    Ca    9.4      22 May 2021 07:21  Phos  4.2     05-22  Mg     1.9     05-22      Assessment and Plan:   · Assessment	     75 M hx of  PMH of MI s/p PPM, AS s/p TAVR 2016, CHF w/ BiV AICD, cholelithiasis, anxiety, hypothyroidism, DM, Hodgkin lymphoma s/p radiation and chemo now in remission with left lung base radiation fibrosis, b/l carotid stenosis s/p carotid endarterectomy presenting due to AICD firing since 4d ago  RIANNA following the cath and elevated wedge     1 Renal- IV lasix bid at present; no objection to continue as ordered.  Renal fxn stable.  Monitor I & Os.  Daily weight.  2 CVS-For now tentative date for cath  is slated for Monday so long renal fxn remains stable   3 EPS-On Amiodarone for AICD shock    before dc will increase the Cozaar.  No reason to stop at present

## 2021-05-23 LAB
ANION GAP SERPL CALC-SCNC: 14 MMOL/L — SIGNIFICANT CHANGE UP (ref 7–14)
BUN SERPL-MCNC: 42 MG/DL — HIGH (ref 7–23)
CALCIUM SERPL-MCNC: 9.4 MG/DL — SIGNIFICANT CHANGE UP (ref 8.4–10.5)
CHLORIDE SERPL-SCNC: 98 MMOL/L — SIGNIFICANT CHANGE UP (ref 98–107)
CO2 SERPL-SCNC: 26 MMOL/L — SIGNIFICANT CHANGE UP (ref 22–31)
CREAT SERPL-MCNC: 1.88 MG/DL — HIGH (ref 0.5–1.3)
GLUCOSE BLDC GLUCOMTR-MCNC: 104 MG/DL — HIGH (ref 70–99)
GLUCOSE BLDC GLUCOMTR-MCNC: 135 MG/DL — HIGH (ref 70–99)
GLUCOSE SERPL-MCNC: 138 MG/DL — HIGH (ref 70–99)
HCT VFR BLD CALC: 36.6 % — LOW (ref 39–50)
HGB BLD-MCNC: 12.1 G/DL — LOW (ref 13–17)
MAGNESIUM SERPL-MCNC: 1.8 MG/DL — SIGNIFICANT CHANGE UP (ref 1.6–2.6)
MCHC RBC-ENTMCNC: 32.2 PG — SIGNIFICANT CHANGE UP (ref 27–34)
MCHC RBC-ENTMCNC: 33.1 GM/DL — SIGNIFICANT CHANGE UP (ref 32–36)
MCV RBC AUTO: 97.3 FL — SIGNIFICANT CHANGE UP (ref 80–100)
NRBC # BLD: 0 /100 WBCS — SIGNIFICANT CHANGE UP
NRBC # FLD: 0 K/UL — SIGNIFICANT CHANGE UP
PHOSPHATE SERPL-MCNC: 4.6 MG/DL — HIGH (ref 2.5–4.5)
PLATELET # BLD AUTO: 230 K/UL — SIGNIFICANT CHANGE UP (ref 150–400)
POTASSIUM SERPL-MCNC: 3.2 MMOL/L — LOW (ref 3.5–5.3)
POTASSIUM SERPL-SCNC: 3.2 MMOL/L — LOW (ref 3.5–5.3)
RBC # BLD: 3.76 M/UL — LOW (ref 4.2–5.8)
RBC # FLD: 14.6 % — HIGH (ref 10.3–14.5)
SODIUM SERPL-SCNC: 138 MMOL/L — SIGNIFICANT CHANGE UP (ref 135–145)
WBC # BLD: 6.43 K/UL — SIGNIFICANT CHANGE UP (ref 3.8–10.5)
WBC # FLD AUTO: 6.43 K/UL — SIGNIFICANT CHANGE UP (ref 3.8–10.5)

## 2021-05-23 RX ORDER — CLONAZEPAM 1 MG
1 TABLET ORAL DAILY
Refills: 0 | Status: DISCONTINUED | OUTPATIENT
Start: 2021-05-23 | End: 2021-05-26

## 2021-05-23 RX ORDER — POTASSIUM CHLORIDE 20 MEQ
40 PACKET (EA) ORAL EVERY 4 HOURS
Refills: 0 | Status: COMPLETED | OUTPATIENT
Start: 2021-05-23 | End: 2021-05-23

## 2021-05-23 RX ADMIN — ENOXAPARIN SODIUM 40 MILLIGRAM(S): 100 INJECTION SUBCUTANEOUS at 11:54

## 2021-05-23 RX ADMIN — Medication 20 MILLIGRAM(S): at 11:55

## 2021-05-23 RX ADMIN — ATORVASTATIN CALCIUM 80 MILLIGRAM(S): 80 TABLET, FILM COATED ORAL at 21:30

## 2021-05-23 RX ADMIN — Medication 40 MILLIEQUIVALENT(S): at 17:41

## 2021-05-23 RX ADMIN — NYSTATIN CREAM 1 APPLICATION(S): 100000 CREAM TOPICAL at 17:41

## 2021-05-23 RX ADMIN — Medication 125 MICROGRAM(S): at 05:04

## 2021-05-23 RX ADMIN — Medication 40 MILLIGRAM(S): at 05:05

## 2021-05-23 RX ADMIN — NYSTATIN CREAM 1 APPLICATION(S): 100000 CREAM TOPICAL at 05:05

## 2021-05-23 RX ADMIN — Medication 1 MILLIGRAM(S): at 11:55

## 2021-05-23 RX ADMIN — AMIODARONE HYDROCHLORIDE 200 MILLIGRAM(S): 400 TABLET ORAL at 05:04

## 2021-05-23 RX ADMIN — LOSARTAN POTASSIUM 25 MILLIGRAM(S): 100 TABLET, FILM COATED ORAL at 05:05

## 2021-05-23 RX ADMIN — Medication 325 MILLIGRAM(S): at 11:55

## 2021-05-23 RX ADMIN — Medication 150 MILLIGRAM(S): at 05:04

## 2021-05-23 RX ADMIN — Medication 40 MILLIEQUIVALENT(S): at 13:20

## 2021-05-23 NOTE — PROGRESS NOTE ADULT - ASSESSMENT
75 M hx of  PMH of MI s/p PPM, AS s/p TAVR 2016, CHF w/ BiV AICD, cholelithiasis, anxiety, hypothyroidism, DM, Hodgkin lymphoma s/p radiation and chemo now in remission with left lung base radiation fibrosis, b/l carotid stenosis s/p carotid endarterectomy presenting due to AICD firing since 4d ago. Fired 2x on thursday when walking to bathroom in his house, no preceding or subsequent symptoms, saw Dr. Godinez the next day with adjustment in metoprolol dose and plan for further outpt f/u. Today was sleeping when the shock went off. Called Kaye Godinez and Chidi today, advised to come to ER .Now I feel fine. .      Problem/Plan - 1:  ·  Problem: AICD discharge.  Plan: presently asymptomatic . EP and cardiology helping.    S/P cardiac cath.      Problem/Plan - 2:  ·  Problem: Systolic CHF, chronic.  Plan: On IV  Lasix 40 mg BID. Holding as RIANNA .      Problem/Plan - 3:  ·  Problem: Type 2 diabetes mellitus without complication.  Plan: Holding Actos and SSI with Lantus.      Problem/Plan - 4:  ·  Problem: HTN (hypertension).  Plan: BP meds with hold parameters.      Problem/Plan - 5:  ·  Problem: Acquired hypothyroidism.  Plan: synthroid 125mcg and will check TFT.      Problem/Plan - 6:  Problem: CAD . Plan: Awaiting rpt cath with PCI .     Problem/Plan - 7:  Problem: HLD (hyperlipidemia). Plan: Statin.     Problem/Plan - 8:  Problem: RIANNA . Plan: S/P Cardiac cath and on Lasix. Creatinine trending up. Holding Lasix.

## 2021-05-23 NOTE — PROGRESS NOTE ADULT - SUBJECTIVE AND OBJECTIVE BOX
Patient seen and examined in bed.  Patient upset this AM and wants to go home.  He states he wants to sign AMA reporting "I'm tired of being here".  I had a lengthy conversation with patient discussing his plan of care and disadvantages of him leaving at this time AMA.  Patient verbalized understanding.    REVIEW OF SYSTEMS:  As per HPI, otherwise 8 full 10 ROS were unremarkable    MEDICATIONS  (STANDING):  aMIOdarone    Tablet 200 milliGRAM(s) Oral daily  aspirin 325 milliGRAM(s) Oral daily  atorvastatin 80 milliGRAM(s) Oral at bedtime  clonazePAM  Tablet 1 milliGRAM(s) Oral daily  dextrose 40% Gel 15 Gram(s) Oral once  dextrose 5%. 1000 milliLiter(s) (50 mL/Hr) IV Continuous <Continuous>  dextrose 5%. 1000 milliLiter(s) (100 mL/Hr) IV Continuous <Continuous>  dextrose 50% Injectable 25 Gram(s) IV Push once  dextrose 50% Injectable 12.5 Gram(s) IV Push once  dextrose 50% Injectable 25 Gram(s) IV Push once  enoxaparin Injectable 40 milliGRAM(s) SubCutaneous daily  FLUoxetine 20 milliGRAM(s) Oral daily  glucagon  Injectable 1 milliGRAM(s) IntraMuscular once  insulin glargine Injectable (LANTUS) 10 Unit(s) SubCutaneous at bedtime  insulin lispro (ADMELOG) corrective regimen sliding scale   SubCutaneous three times a day before meals  insulin lispro (ADMELOG) corrective regimen sliding scale   SubCutaneous at bedtime  levothyroxine 125 MICROGram(s) Oral daily  losartan 25 milliGRAM(s) Oral daily  metoprolol succinate  milliGRAM(s) Oral daily  nystatin Powder 1 Application(s) Topical two times a day  potassium chloride    Tablet ER 40 milliEquivalent(s) Oral every 4 hours      VITAL:  T(C): , Max: 36.7 (05-22-21 @ 17:46)  T(F): , Max: 98.1 (05-22-21 @ 17:46)  HR: 67 (05-23-21 @ 05:02)  BP: 113/50 (05-23-21 @ 05:02)  BP(mean): --  RR: 17 (05-23-21 @ 05:02)  SpO2: 97% (05-23-21 @ 05:02)  Wt(kg): --    I and O's:    05-22 @ 07:01  -  05-23 @ 07:00  --------------------------------------------------------  IN: 560 mL / OUT: 400 mL / NET: 160 mL          PHYSICAL EXAM:    Constitutional: NAD  HEENT: PERRLA, EOMI,  MMM  Neck: No LAD, mild JVD  Respiratory: distension   Cardiovascular: S1 and S2  Gastrointestinal: BS+, +abdominal distension   Extremities: No peripheral edema  Neurological: A/O x 3, no focal deficits  Psychiatric: Normal mood, normal affect  : No Be  Skin: No rashes  Access: Not applicable    LABS:                        12.1   6.43  )-----------( 230      ( 23 May 2021 06:19 )             36.6     05-23    138  |  98  |  42<H>  ----------------------------<  138<H>  3.2<L>   |  26  |  1.88<H>    Ca    9.4      23 May 2021 06:19  Phos  4.6     05-23  Mg     1.8     05-23      Assessment and Plan:   · Assessment	     75 M hx of  PMH of MI s/p PPM, AS s/p TAVR 2016, CHF w/ BiV AICD, cholelithiasis, anxiety, hypothyroidism, DM, Hodgkin lymphoma s/p radiation and chemo now in remission with left lung base radiation fibrosis, b/l carotid stenosis s/p carotid endarterectomy presenting due to AICD firing since 4d ago      1 Renal-  non-oliguric:  RIANNA following the cath and elevated wedge:  IV lasix bid at present though will discontinue due to azotemia.  Monitor I & Os.  Daily weight.  Will defer IVF administration at this time given volume status.  2 CVS- Goal was to proceed with cath tomorrow AM.  Given azotemia would trend renal fxn and re-evaluate in AM to evaluate if numbers improve to proceed.    3 EPS-On Amiodarone for AICD shock

## 2021-05-23 NOTE — PROGRESS NOTE ADULT - SUBJECTIVE AND OBJECTIVE BOX
Date of Service  : 05-23-21     INTERVAL HPI/OVERNIGHT EVENTS: i feel fine.   Vital Signs Last 24 Hrs  T(C): 36.4 (23 May 2021 21:00), Max: 36.7 (23 May 2021 05:02)  T(F): 97.6 (23 May 2021 21:00), Max: 98 (23 May 2021 05:02)  HR: 67 (23 May 2021 21:00) (67 - 67)  BP: 110/54 (23 May 2021 21:00) (110/54 - 113/50)  BP(mean): 71 (23 May 2021 21:00) (71 - 71)  RR: 16 (23 May 2021 21:00) (16 - 17)  SpO2: 100% (23 May 2021 21:00) (97% - 100%)  I&O's Summary    22 May 2021 07:01  -  23 May 2021 07:00  --------------------------------------------------------  IN: 560 mL / OUT: 400 mL / NET: 160 mL      MEDICATIONS  (STANDING):  aMIOdarone    Tablet 200 milliGRAM(s) Oral daily  aspirin 325 milliGRAM(s) Oral daily  atorvastatin 80 milliGRAM(s) Oral at bedtime  clonazePAM  Tablet 1 milliGRAM(s) Oral daily  dextrose 40% Gel 15 Gram(s) Oral once  dextrose 5%. 1000 milliLiter(s) (50 mL/Hr) IV Continuous <Continuous>  dextrose 5%. 1000 milliLiter(s) (100 mL/Hr) IV Continuous <Continuous>  dextrose 50% Injectable 25 Gram(s) IV Push once  dextrose 50% Injectable 12.5 Gram(s) IV Push once  dextrose 50% Injectable 25 Gram(s) IV Push once  enoxaparin Injectable 40 milliGRAM(s) SubCutaneous daily  FLUoxetine 20 milliGRAM(s) Oral daily  glucagon  Injectable 1 milliGRAM(s) IntraMuscular once  insulin glargine Injectable (LANTUS) 10 Unit(s) SubCutaneous at bedtime  insulin lispro (ADMELOG) corrective regimen sliding scale   SubCutaneous three times a day before meals  insulin lispro (ADMELOG) corrective regimen sliding scale   SubCutaneous at bedtime  levothyroxine 125 MICROGram(s) Oral daily  losartan 25 milliGRAM(s) Oral daily  metoprolol succinate  milliGRAM(s) Oral daily  nystatin Powder 1 Application(s) Topical two times a day    MEDICATIONS  (PRN):  acetaminophen   Tablet .. 650 milliGRAM(s) Oral every 6 hours PRN Mild Pain (1 - 3), Moderate Pain (4 - 6)    LABS:                        12.1   6.43  )-----------( 230      ( 23 May 2021 06:19 )             36.6     05-23    138  |  98  |  42<H>  ----------------------------<  138<H>  3.2<L>   |  26  |  1.88<H>    Ca    9.4      23 May 2021 06:19  Phos  4.6     05-23  Mg     1.8     05-23          CAPILLARY BLOOD GLUCOSE      POCT Blood Glucose.: 104 mg/dL (23 May 2021 17:42)  POCT Blood Glucose.: 135 mg/dL (23 May 2021 12:40)          REVIEW OF SYSTEMS:  CONSTITUTIONAL: No fever, weight loss, or fatigue  EYES: No eye pain, visual disturbances, or discharge  ENMT:  No difficulty hearing, tinnitus, vertigo; No sinus or throat pain  NECK: No pain or stiffness  RESPIRATORY: No cough, wheezing, chills or hemoptysis; No shortness of breath  CARDIOVASCULAR: No chest pain, palpitations, dizziness, or leg swelling  GASTROINTESTINAL: No abdominal or epigastric pain. No nausea, vomiting, or hematemesis; No diarrhea or constipation. No melena or hematochezia.  GENITOURINARY: No dysuria, frequency, hematuria, or incontinence  NEUROLOGICAL: No headaches, memory loss, loss of strength, numbness, or tremors      Consultant(s) Notes Reviewed:  [x ] YES  [ ] NO    PHYSICAL EXAM:  GENERAL: NAD, well-groomed, well-developed, not in any distress ,  HEAD:  Atraumatic, Normocephalic  EYES: EOMI, PERRLA, conjunctiva and sclera clear  ENMT: No tonsillar erythema, exudates, or enlargement; Moist mucous membranes, Good dentition, No lesions  NECK: Supple, No JVD, Normal thyroid  NERVOUS SYSTEM:  Alert & Oriented X3, No focal deficit   CHEST/LUNG: Good air entry bilateral with no  rales, rhonchi, wheezing, or rubs  HEART: Regular rate and rhythm; No murmurs, rubs, or gallops  ABDOMEN: Soft, Nontender, Nondistended; Bowel sounds present  EXTREMITIES:  2+ Peripheral Pulses, No clubbing, cyanosis, or edema  SKIN: No rashes or lesions    Care Discussed with Consultants/Other Providers [ x] YES  [ ] NO

## 2021-05-24 LAB
ANION GAP SERPL CALC-SCNC: 13 MMOL/L — SIGNIFICANT CHANGE UP (ref 7–14)
BUN SERPL-MCNC: 46 MG/DL — HIGH (ref 7–23)
CALCIUM SERPL-MCNC: 9.2 MG/DL — SIGNIFICANT CHANGE UP (ref 8.4–10.5)
CHLORIDE SERPL-SCNC: 103 MMOL/L — SIGNIFICANT CHANGE UP (ref 98–107)
CO2 SERPL-SCNC: 24 MMOL/L — SIGNIFICANT CHANGE UP (ref 22–31)
CREAT SERPL-MCNC: 1.67 MG/DL — HIGH (ref 0.5–1.3)
GLUCOSE BLDC GLUCOMTR-MCNC: 115 MG/DL — HIGH (ref 70–99)
GLUCOSE BLDC GLUCOMTR-MCNC: 124 MG/DL — HIGH (ref 70–99)
GLUCOSE BLDC GLUCOMTR-MCNC: 134 MG/DL — HIGH (ref 70–99)
GLUCOSE BLDC GLUCOMTR-MCNC: 137 MG/DL — HIGH (ref 70–99)
GLUCOSE SERPL-MCNC: 137 MG/DL — HIGH (ref 70–99)
HCT VFR BLD CALC: 38.1 % — LOW (ref 39–50)
HGB BLD-MCNC: 12.3 G/DL — LOW (ref 13–17)
MAGNESIUM SERPL-MCNC: 1.9 MG/DL — SIGNIFICANT CHANGE UP (ref 1.6–2.6)
MCHC RBC-ENTMCNC: 32.1 PG — SIGNIFICANT CHANGE UP (ref 27–34)
MCHC RBC-ENTMCNC: 32.3 GM/DL — SIGNIFICANT CHANGE UP (ref 32–36)
MCV RBC AUTO: 99.5 FL — SIGNIFICANT CHANGE UP (ref 80–100)
NRBC # BLD: 0 /100 WBCS — SIGNIFICANT CHANGE UP
NRBC # FLD: 0 K/UL — SIGNIFICANT CHANGE UP
PHOSPHATE SERPL-MCNC: 3.1 MG/DL — SIGNIFICANT CHANGE UP (ref 2.5–4.5)
PLATELET # BLD AUTO: 219 K/UL — SIGNIFICANT CHANGE UP (ref 150–400)
POTASSIUM SERPL-MCNC: 4.3 MMOL/L — SIGNIFICANT CHANGE UP (ref 3.5–5.3)
POTASSIUM SERPL-SCNC: 4.3 MMOL/L — SIGNIFICANT CHANGE UP (ref 3.5–5.3)
RBC # BLD: 3.83 M/UL — LOW (ref 4.2–5.8)
RBC # FLD: 14.6 % — HIGH (ref 10.3–14.5)
SODIUM SERPL-SCNC: 140 MMOL/L — SIGNIFICANT CHANGE UP (ref 135–145)
WBC # BLD: 5.97 K/UL — SIGNIFICANT CHANGE UP (ref 3.8–10.5)
WBC # FLD AUTO: 5.97 K/UL — SIGNIFICANT CHANGE UP (ref 3.8–10.5)

## 2021-05-24 RX ORDER — ASPIRIN/CALCIUM CARB/MAGNESIUM 324 MG
81 TABLET ORAL DAILY
Refills: 0 | Status: DISCONTINUED | OUTPATIENT
Start: 2021-05-24 | End: 2021-05-26

## 2021-05-24 RX ADMIN — Medication 125 MICROGRAM(S): at 05:22

## 2021-05-24 RX ADMIN — INSULIN GLARGINE 10 UNIT(S): 100 INJECTION, SOLUTION SUBCUTANEOUS at 21:20

## 2021-05-24 RX ADMIN — ENOXAPARIN SODIUM 40 MILLIGRAM(S): 100 INJECTION SUBCUTANEOUS at 12:53

## 2021-05-24 RX ADMIN — NYSTATIN CREAM 1 APPLICATION(S): 100000 CREAM TOPICAL at 17:19

## 2021-05-24 RX ADMIN — Medication 325 MILLIGRAM(S): at 12:53

## 2021-05-24 RX ADMIN — ATORVASTATIN CALCIUM 80 MILLIGRAM(S): 80 TABLET, FILM COATED ORAL at 21:19

## 2021-05-24 RX ADMIN — LOSARTAN POTASSIUM 25 MILLIGRAM(S): 100 TABLET, FILM COATED ORAL at 05:22

## 2021-05-24 RX ADMIN — NYSTATIN CREAM 1 APPLICATION(S): 100000 CREAM TOPICAL at 05:23

## 2021-05-24 RX ADMIN — Medication 1 MILLIGRAM(S): at 12:56

## 2021-05-24 RX ADMIN — Medication 150 MILLIGRAM(S): at 05:23

## 2021-05-24 RX ADMIN — AMIODARONE HYDROCHLORIDE 200 MILLIGRAM(S): 400 TABLET ORAL at 05:22

## 2021-05-24 RX ADMIN — Medication 20 MILLIGRAM(S): at 12:53

## 2021-05-24 NOTE — PROGRESS NOTE ADULT - ASSESSMENT
75 M hx of  PMH of MI s/p PPM, AS s/p TAVR 2016, CHF w/ BiV AICD, cholelithiasis, anxiety, hypothyroidism, DM, Hodgkin lymphoma s/p radiation and chemo now in remission with left lung base radiation fibrosis, b/l carotid stenosis s/p carotid endarterectomy presenting due to AICD firing    1 Renal-  non-oliguric:  RIANNA that is improving.  No need for IVF;  Off diuretics   2 CVS- Goal was to proceed with cath tomorrow AM.  No renal objection to proceed     3 EPS-On Amiodarone for AICD shock    dw wife and DR Billings    Sayed [employer]   [phone]          75 M hx of  PMH of MI s/p PPM, AS s/p TAVR 2016, CHF w/ BiV AICD, cholelithiasis, anxiety, hypothyroidism, DM, Hodgkin lymphoma s/p radiation and chemo now in remission with left lung base radiation fibrosis, b/l carotid stenosis s/p carotid endarterectomy presenting due to AICD firing    1 Renal-  non-oliguric:  RIANNA that is improving.  No need for IVF;  Off diuretics   2 CVS- Goal was to proceed with cath tomorrow AM.  No renal objection to proceed     3 EPS-On Amiodarone for AICD shock    dw wife and  Cards    Sayed [employer]   [phone]

## 2021-05-24 NOTE — PROGRESS NOTE ADULT - SUBJECTIVE AND OBJECTIVE BOX
chief complaint:  ICD fire    extended hpi: 75 year old male with history of CAD (known 100% RCA stenosis from last cath in 2016), severe AS s/p TAVR in 2016, cardiomyopathy s/p BiV AICD, DM, Hodgkin lymphoma s/p radiation and chemotherapy, history of carotid stenosis s/p carotid endarterectomy who presented to the ED following AICD shock.     S: no chest pain or sob; ros otherwise negative.     Review of Systems:   Constitutional: [ ] fevers, [ ] chills.   Skin: [ ] dry skin. [ ] rashes.  Psychiatric: [ ] depression, [ ] anxiety.   Gastrointestinal: [ ] BRBPR, [ ] melena.   Neurological: [ ] confusion. [ ] seizures. [ ] shuffling gait.   Ears,Nose,Mouth and Throat: [ ] ear pain [ ] sore throat.   Eyes: [ ] diplopia.   Respiratory: [ ] hemoptysis. [ ] shortness of breath  Cardiovascular: See HPI above  Hematologic/Lymphatic: [ ] anemia. [ ] painful nodes. [ ] prolonged bleeding.   Genitourinary: [ ] hematuria. [ ] flank pain.   Endocrine: [ ] significant change in weight. [ ] intolerance to heat and cold.     Review of systems [x ] otherwise negative, [ ] otherwise unable to obtain    FH: no family history of sudden cardiac death in first degree relatives    SH: [ ] tobacco, [ ] alcohol, [ ] drugs      acetaminophen   Tablet .. 650 milliGRAM(s) Oral every 6 hours PRN  aMIOdarone    Tablet 200 milliGRAM(s) Oral daily  aspirin 325 milliGRAM(s) Oral daily  atorvastatin 80 milliGRAM(s) Oral at bedtime  clonazePAM  Tablet 1 milliGRAM(s) Oral daily  dextrose 40% Gel 15 Gram(s) Oral once  dextrose 5%. 1000 milliLiter(s) IV Continuous <Continuous>  dextrose 5%. 1000 milliLiter(s) IV Continuous <Continuous>  dextrose 50% Injectable 25 Gram(s) IV Push once  dextrose 50% Injectable 12.5 Gram(s) IV Push once  dextrose 50% Injectable 25 Gram(s) IV Push once  enoxaparin Injectable 40 milliGRAM(s) SubCutaneous daily  FLUoxetine 20 milliGRAM(s) Oral daily  glucagon  Injectable 1 milliGRAM(s) IntraMuscular once  insulin glargine Injectable (LANTUS) 10 Unit(s) SubCutaneous at bedtime  insulin lispro (ADMELOG) corrective regimen sliding scale   SubCutaneous three times a day before meals  insulin lispro (ADMELOG) corrective regimen sliding scale   SubCutaneous at bedtime  levothyroxine 125 MICROGram(s) Oral daily  losartan 25 milliGRAM(s) Oral daily  metoprolol succinate  milliGRAM(s) Oral daily  nystatin Powder 1 Application(s) Topical two times a day                            12.3   5.97  )-----------( 219      ( 24 May 2021 07:28 )             38.1       05-24    140  |  103  |  46<H>  ----------------------------<  137<H>  4.3   |  24  |  1.67<H>    Ca    9.2      24 May 2021 07:28  Phos  3.1     05-24  Mg     1.9     05-24              T(C): 36.6 (05-24-21 @ 13:00), Max: 36.7 (05-24-21 @ 05:20)  HR: 88 (05-24-21 @ 13:00) (67 - 88)  BP: 108/70 (05-24-21 @ 13:00) (108/70 - 117/65)  RR: 17 (05-24-21 @ 13:00) (16 - 17)  SpO2: 100% (05-24-21 @ 13:00) (100% - 100%)  Wt(kg): --    I&O's Summary    General: Well nourished in no acute distress. Alert and Oriented * 3.   Head: Normocephalic and atraumatic.   Neck: No JVD. No bruits. Supple. Does not appear to be enlarged.   Cardiovascular: + S1,S2 ; RRR Soft systolic murmur at the left lower sternal border. No rubs noted.    Lungs: CTA b/l. No rhonchi, rales or wheezes.   Abdomen: + BS, soft. Non tender. Non distended. No rebound. No guarding.   Extremities: No clubbing/cyanosis/edema.   Neurologic: Moves all four extremities. Full range of motion.   Skin: Warm and moist. The patient's skin has normal elasticity and good skin turgor.   Psychiatric: Appropriate mood and affect.  Musculoskeletal: Normal range of motion, normal strength      DATA    TTE: < from: TTE with Doppler (w/Cont) (05.19.21 @ 07:53) >  1. Mitral annular calcification and calcified mitral  leaflets with decreased diastolic opening. Mild mitral  regurgitation.  Mean transmitral valve gradient equals 3 mm  Hg, consistent with mild mitral stenosis.  2. Normal left ventricular internal dimensions and wall  thicknesses.  3. Endocardial visualization enhanced with intravenous  injection of echo contrast (Definity).  Moderate to severe  segmental left ventricular systolic dysfunction.  Inferior  and inferolateral wall  hypokinesis.  4. The right ventricle is not well visualized grossly  enalerged. Device wire is noted in the right heart.  5. Estimated pulmonary artery systolic pressure equals 50  mm Hg, assuming right atrialpressure equals 10  mm Hg,  consistent with moderate pulmonary hypertension.    < end of copied text >      ASSESSMENT/PLAN:  75 year old male with history of CAD (known 100% RCA stenosis from last cath in 2016), severe AS s/p TAVR in 2016, cardiomyopathy s/p BiV AICD, DM, Hodgkin lymphoma s/p radiation and chemotherapy, history of carotid stenosis s/p carotid endarterectomy who presented to the ED following AICD shock.     -pt. s/p R/LHC demonstrating severe D1 lesion and elevated PCWP with 26  -Cr trending down after initial increase   -Diuresis now on hold   -AAD for VT per EP  -will plan on staged PCI when medically optimized and volume status improves, hopefully tomorrow    Brian Mo MD

## 2021-05-24 NOTE — PROVIDER CONTACT NOTE (OTHER) - RECOMMENDATIONS
education provided. care notes/written material on DM type 2 management given to patient. provider notified

## 2021-05-24 NOTE — PROGRESS NOTE ADULT - SUBJECTIVE AND OBJECTIVE BOX
Date of Service  : 05-24-21     INTERVAL HPI/OVERNIGHT EVENTS: No new concerns.   Vital Signs Last 24 Hrs  T(C): 36.7 (24 May 2021 17:30), Max: 36.7 (24 May 2021 05:20)  T(F): 98 (24 May 2021 17:30), Max: 98.1 (24 May 2021 05:20)  HR: 66 (24 May 2021 17:30) (66 - 88)  BP: 105/65 (24 May 2021 17:30) (105/65 - 117/65)  BP(mean): 80 (24 May 2021 05:20) (80 - 80)  RR: 17 (24 May 2021 17:30) (16 - 17)  SpO2: 100% (24 May 2021 17:30) (100% - 100%)  I&O's Summary    MEDICATIONS  (STANDING):  aMIOdarone    Tablet 200 milliGRAM(s) Oral daily  aspirin enteric coated 81 milliGRAM(s) Oral daily  atorvastatin 80 milliGRAM(s) Oral at bedtime  clonazePAM  Tablet 1 milliGRAM(s) Oral daily  dextrose 40% Gel 15 Gram(s) Oral once  dextrose 5%. 1000 milliLiter(s) (50 mL/Hr) IV Continuous <Continuous>  dextrose 5%. 1000 milliLiter(s) (100 mL/Hr) IV Continuous <Continuous>  dextrose 50% Injectable 25 Gram(s) IV Push once  dextrose 50% Injectable 12.5 Gram(s) IV Push once  dextrose 50% Injectable 25 Gram(s) IV Push once  enoxaparin Injectable 40 milliGRAM(s) SubCutaneous daily  FLUoxetine 20 milliGRAM(s) Oral daily  glucagon  Injectable 1 milliGRAM(s) IntraMuscular once  insulin glargine Injectable (LANTUS) 10 Unit(s) SubCutaneous at bedtime  insulin lispro (ADMELOG) corrective regimen sliding scale   SubCutaneous three times a day before meals  insulin lispro (ADMELOG) corrective regimen sliding scale   SubCutaneous at bedtime  levothyroxine 125 MICROGram(s) Oral daily  losartan 25 milliGRAM(s) Oral daily  metoprolol succinate  milliGRAM(s) Oral daily  nystatin Powder 1 Application(s) Topical two times a day    MEDICATIONS  (PRN):  acetaminophen   Tablet .. 650 milliGRAM(s) Oral every 6 hours PRN Mild Pain (1 - 3), Moderate Pain (4 - 6)    LABS:                        12.3   5.97  )-----------( 219      ( 24 May 2021 07:28 )             38.1     05-24    140  |  103  |  46<H>  ----------------------------<  137<H>  4.3   |  24  |  1.67<H>    Ca    9.2      24 May 2021 07:28  Phos  3.1     05-24  Mg     1.9     05-24          CAPILLARY BLOOD GLUCOSE      POCT Blood Glucose.: 134 mg/dL (24 May 2021 21:17)  POCT Blood Glucose.: 124 mg/dL (24 May 2021 17:49)  POCT Blood Glucose.: 115 mg/dL (24 May 2021 12:35)  POCT Blood Glucose.: 137 mg/dL (24 May 2021 06:33)          REVIEW OF SYSTEMS:  CONSTITUTIONAL: No fever, weight loss, or fatigue  EYES: No eye pain, visual disturbances, or discharge  ENMT:  No difficulty hearing, tinnitus, vertigo; No sinus or throat pain  NECK: No pain or stiffness  RESPIRATORY: No cough, wheezing, chills or hemoptysis; No shortness of breath  CARDIOVASCULAR: No chest pain, palpitations, dizziness, or leg swelling  GASTROINTESTINAL: No abdominal or epigastric pain. No nausea, vomiting, or hematemesis; No diarrhea or constipation. No melena or hematochezia.  GENITOURINARY: No dysuria, frequency, hematuria, or incontinence  NEUROLOGICAL: No headaches, memory loss, loss of strength, numbness, or tremors      Consultant(s) Notes Reviewed:  [x ] YES  [ ] NO    PHYSICAL EXAM:  GENERAL: NAD, well-groomed, well-developed ,not in any distress ,  HEAD:  Atraumatic, Normocephalic  EYES: EOMI, PERRLA, conjunctiva and sclera clear  ENMT: No tonsillar erythema, exudates, or enlargement; Moist mucous membranes, Good dentition, No lesions  NECK: Supple, No JVD, Normal thyroid  NERVOUS SYSTEM:  Alert & Oriented X3, No focal deficit   CHEST/LUNG: Good air entry bilateral with no  rales, rhonchi, wheezing, or rubs  HEART: Regular rate and rhythm; No murmurs, rubs, or gallops  ABDOMEN: Soft, Nontender, Nondistended; Bowel sounds present  EXTREMITIES:  2+ Peripheral Pulses, No clubbing, cyanosis, or edema  SKIN: No rashes or lesions    Care Discussed with Consultants/Other Providers [ x] YES  [ ] NO

## 2021-05-24 NOTE — PROVIDER CONTACT NOTE (OTHER) - ASSESSMENT
patient refusing despite RN educating. as per patient "I don't check my sugar this often at home. I'm not taking insulin I don't take that at home either." RN educated patient on hospital policy and risk of oral antidiabetic medications interactions with contrast. patient verbalized understanding but still refusing

## 2021-05-24 NOTE — PROVIDER CONTACT NOTE (OTHER) - BACKGROUND
patient admitted for ICD firing. history of DM type 2, on oral antidiabetic medications at home. most recent A1c 5.5. NPO 5/24 for cardiac cath.

## 2021-05-24 NOTE — PROGRESS NOTE ADULT - ASSESSMENT
75 M hx of  PMH of MI s/p PPM, AS s/p TAVR 2016, CHF w/ BiV AICD, cholelithiasis, anxiety, hypothyroidism, DM, Hodgkin lymphoma s/p radiation and chemo now in remission with left lung base radiation fibrosis, b/l carotid stenosis s/p carotid endarterectomy presenting due to AICD firing since 4d ago. Fired 2x on thursday when walking to bathroom in his house, no preceding or subsequent symptoms, saw Dr. Godinez the next day with adjustment in metoprolol dose and plan for further outpt f/u. Today was sleeping when the shock went off. Called Kaye Godinez and Chidi today, advised to come to ER .Now I feel fine. .      Problem/Plan - 1:  ·  Problem: AICD discharge.  Plan: presently asymptomatic . EP and cardiology helping.    S/P cardiac cath.      Problem/Plan - 2:  ·  Problem: Systolic CHF, chronic.  Plan: On IV  Lasix 40 mg BID. Holding as RIANNA .      Problem/Plan - 3:  ·  Problem: Type 2 diabetes mellitus without complication.  Plan: Holding Actos and SSI with Lantus.      Problem/Plan - 4:  ·  Problem: HTN (hypertension).  Plan: BP meds with hold parameters.      Problem/Plan - 5:  ·  Problem: Acquired hypothyroidism.  Plan: synthroid 125mcg and will check TFT.      Problem/Plan - 6:  Problem: CAD . Plan: Awaiting rpt cath with staged PCI .     Problem/Plan - 7:  Problem: HLD (hyperlipidemia). Plan: Statin.     Problem/Plan - 8:  Problem: RIANNA . Plan: S/P Cardiac cath and on Lasix. Creatinine trending down . Holding Lasix.

## 2021-05-24 NOTE — PROGRESS NOTE ADULT - SUBJECTIVE AND OBJECTIVE BOX
NEPHROLOGY-[facility] [phone]        Patient seen and examined         MEDICATIONS  (STANDING):  aMIOdarone    Tablet 200 milliGRAM(s) Oral daily  aspirin 325 milliGRAM(s) Oral daily  atorvastatin 80 milliGRAM(s) Oral at bedtime  clonazePAM  Tablet 1 milliGRAM(s) Oral daily  dextrose 40% Gel 15 Gram(s) Oral once  dextrose 5%. 1000 milliLiter(s) (50 mL/Hr) IV Continuous <Continuous>  dextrose 5%. 1000 milliLiter(s) (100 mL/Hr) IV Continuous <Continuous>  dextrose 50% Injectable 25 Gram(s) IV Push once  dextrose 50% Injectable 12.5 Gram(s) IV Push once  dextrose 50% Injectable 25 Gram(s) IV Push once  enoxaparin Injectable 40 milliGRAM(s) SubCutaneous daily  FLUoxetine 20 milliGRAM(s) Oral daily  glucagon  Injectable 1 milliGRAM(s) IntraMuscular once  insulin glargine Injectable (LANTUS) 10 Unit(s) SubCutaneous at bedtime  insulin lispro (ADMELOG) corrective regimen sliding scale   SubCutaneous three times a day before meals  insulin lispro (ADMELOG) corrective regimen sliding scale   SubCutaneous at bedtime  levothyroxine 125 MICROGram(s) Oral daily  losartan 25 milliGRAM(s) Oral daily  metoprolol succinate  milliGRAM(s) Oral daily  nystatin Powder 1 Application(s) Topical two times a day      VITAL:  T(C): , Max: 36.7 (05-24-21 @ 05:20)  T(F): , Max: 98.1 (05-24-21 @ 05:20)  HR: 88 (05-24-21 @ 13:00)  BP: 108/70 (05-24-21 @ 13:00)  BP(mean): 80 (05-24-21 @ 05:20)  RR: 17 (05-24-21 @ 13:00)  SpO2: 100% (05-24-21 @ 13:00)  Wt(kg): --    I and O's:        PHYSICAL EXAM:    Constitutional: NAD  Neck:  No JVD  Respiratory: CTAB/L  Cardiovascular: S1 and S2  Gastrointestinal: BS+, soft, NT/ND  Extremities: No peripheral edema  Neurological: A/O x 3, no focal deficits  Psychiatric: Normal mood, normal affect  : No Be  Skin: No rashes  Access: Not applicable    LABS:                        12.3   5.97  )-----------( 219      ( 24 May 2021 07:28 )             38.1     05-24    140  |  103  |  46<H>  ----------------------------<  137<H>  4.3   |  24  |  1.67<H>    Ca    9.2      24 May 2021 07:28  Phos  3.1     05-24  Mg     1.9     05-24            Urine Studies:          RADIOLOGY & ADDITIONAL STUDIES:             NEPHROLOGY-[facility] [phone]        Patient seen and examined in bed.  Very pleasant this am   Wife at bedside         MEDICATIONS  (STANDING):  aMIOdarone    Tablet 200 milliGRAM(s) Oral daily  aspirin 325 milliGRAM(s) Oral daily  atorvastatin 80 milliGRAM(s) Oral at bedtime  clonazePAM  Tablet 1 milliGRAM(s) Oral daily  dextrose 40% Gel 15 Gram(s) Oral once  dextrose 5%. 1000 milliLiter(s) (50 mL/Hr) IV Continuous <Continuous>  dextrose 5%. 1000 milliLiter(s) (100 mL/Hr) IV Continuous <Continuous>  dextrose 50% Injectable 25 Gram(s) IV Push once  dextrose 50% Injectable 12.5 Gram(s) IV Push once  dextrose 50% Injectable 25 Gram(s) IV Push once  enoxaparin Injectable 40 milliGRAM(s) SubCutaneous daily  FLUoxetine 20 milliGRAM(s) Oral daily  glucagon  Injectable 1 milliGRAM(s) IntraMuscular once  insulin glargine Injectable (LANTUS) 10 Unit(s) SubCutaneous at bedtime  insulin lispro (ADMELOG) corrective regimen sliding scale   SubCutaneous three times a day before meals  insulin lispro (ADMELOG) corrective regimen sliding scale   SubCutaneous at bedtime  levothyroxine 125 MICROGram(s) Oral daily  losartan 25 milliGRAM(s) Oral daily  metoprolol succinate  milliGRAM(s) Oral daily  nystatin Powder 1 Application(s) Topical two times a day      VITAL:  T(C): , Max: 36.7 (05-24-21 @ 05:20)  T(F): , Max: 98.1 (05-24-21 @ 05:20)  HR: 88 (05-24-21 @ 13:00)  BP: 108/70 (05-24-21 @ 13:00)  BP(mean): 80 (05-24-21 @ 05:20)  RR: 17 (05-24-21 @ 13:00)  SpO2: 100% (05-24-21 @ 13:00)  Wt(kg): --    I and O's:        PHYSICAL EXAM:    Constitutional: NAD  Neck:  No JVD  Respiratory: CTAB/L  Cardiovascular: S1 and S2  Gastrointestinal: BS+, soft, NT/ND  Extremities: No peripheral edema  Neurological: A/O x 3, no focal deficits  Psychiatric: Normal mood, normal affect  : No Be  Skin: No rashes  Access: Not applicable    LABS:                        12.3   5.97  )-----------( 219      ( 24 May 2021 07:28 )             38.1     05-24    140  |  103  |  46<H>  ----------------------------<  137<H>  4.3   |  24  |  1.67<H>    Ca    9.2      24 May 2021 07:28  Phos  3.1     05-24  Mg     1.9     05-24            Urine Studies:          RADIOLOGY & ADDITIONAL STUDIES:

## 2021-05-25 LAB
ANION GAP SERPL CALC-SCNC: 15 MMOL/L — HIGH (ref 7–14)
BUN SERPL-MCNC: 39 MG/DL — HIGH (ref 7–23)
CALCIUM SERPL-MCNC: 9.9 MG/DL — SIGNIFICANT CHANGE UP (ref 8.4–10.5)
CHLORIDE SERPL-SCNC: 102 MMOL/L — SIGNIFICANT CHANGE UP (ref 98–107)
CO2 SERPL-SCNC: 25 MMOL/L — SIGNIFICANT CHANGE UP (ref 22–31)
CREAT SERPL-MCNC: 1.48 MG/DL — HIGH (ref 0.5–1.3)
GLUCOSE BLDC GLUCOMTR-MCNC: 104 MG/DL — HIGH (ref 70–99)
GLUCOSE BLDC GLUCOMTR-MCNC: 117 MG/DL — HIGH (ref 70–99)
GLUCOSE BLDC GLUCOMTR-MCNC: 129 MG/DL — HIGH (ref 70–99)
GLUCOSE BLDC GLUCOMTR-MCNC: 144 MG/DL — HIGH (ref 70–99)
GLUCOSE SERPL-MCNC: 111 MG/DL — HIGH (ref 70–99)
HCT VFR BLD CALC: 40.6 % — SIGNIFICANT CHANGE UP (ref 39–50)
HGB BLD-MCNC: 13.3 G/DL — SIGNIFICANT CHANGE UP (ref 13–17)
MAGNESIUM SERPL-MCNC: 2.1 MG/DL — SIGNIFICANT CHANGE UP (ref 1.6–2.6)
MCHC RBC-ENTMCNC: 31.9 PG — SIGNIFICANT CHANGE UP (ref 27–34)
MCHC RBC-ENTMCNC: 32.8 GM/DL — SIGNIFICANT CHANGE UP (ref 32–36)
MCV RBC AUTO: 97.4 FL — SIGNIFICANT CHANGE UP (ref 80–100)
NRBC # BLD: 0 /100 WBCS — SIGNIFICANT CHANGE UP
NRBC # FLD: 0 K/UL — SIGNIFICANT CHANGE UP
PHOSPHATE SERPL-MCNC: 3.1 MG/DL — SIGNIFICANT CHANGE UP (ref 2.5–4.5)
PLATELET # BLD AUTO: 241 K/UL — SIGNIFICANT CHANGE UP (ref 150–400)
POTASSIUM SERPL-MCNC: 4.3 MMOL/L — SIGNIFICANT CHANGE UP (ref 3.5–5.3)
POTASSIUM SERPL-SCNC: 4.3 MMOL/L — SIGNIFICANT CHANGE UP (ref 3.5–5.3)
RBC # BLD: 4.17 M/UL — LOW (ref 4.2–5.8)
RBC # FLD: 14.4 % — SIGNIFICANT CHANGE UP (ref 10.3–14.5)
SODIUM SERPL-SCNC: 142 MMOL/L — SIGNIFICANT CHANGE UP (ref 135–145)
WBC # BLD: 5.77 K/UL — SIGNIFICANT CHANGE UP (ref 3.8–10.5)
WBC # FLD AUTO: 5.77 K/UL — SIGNIFICANT CHANGE UP (ref 3.8–10.5)

## 2021-05-25 PROCEDURE — 93010 ELECTROCARDIOGRAM REPORT: CPT

## 2021-05-25 RX ORDER — CLOPIDOGREL BISULFATE 75 MG/1
75 TABLET, FILM COATED ORAL DAILY
Refills: 0 | Status: DISCONTINUED | OUTPATIENT
Start: 2021-05-26 | End: 2021-05-26

## 2021-05-25 RX ORDER — CLOPIDOGREL BISULFATE 75 MG/1
600 TABLET, FILM COATED ORAL ONCE
Refills: 0 | Status: COMPLETED | OUTPATIENT
Start: 2021-05-25 | End: 2021-05-25

## 2021-05-25 RX ORDER — SODIUM CHLORIDE 9 MG/ML
1000 INJECTION INTRAMUSCULAR; INTRAVENOUS; SUBCUTANEOUS
Refills: 0 | Status: DISCONTINUED | OUTPATIENT
Start: 2021-05-25 | End: 2021-05-26

## 2021-05-25 RX ADMIN — Medication 81 MILLIGRAM(S): at 11:49

## 2021-05-25 RX ADMIN — Medication 150 MILLIGRAM(S): at 06:03

## 2021-05-25 RX ADMIN — SODIUM CHLORIDE 75 MILLILITER(S): 9 INJECTION INTRAMUSCULAR; INTRAVENOUS; SUBCUTANEOUS at 17:06

## 2021-05-25 RX ADMIN — Medication 125 MICROGRAM(S): at 06:04

## 2021-05-25 RX ADMIN — INSULIN GLARGINE 10 UNIT(S): 100 INJECTION, SOLUTION SUBCUTANEOUS at 21:47

## 2021-05-25 RX ADMIN — AMIODARONE HYDROCHLORIDE 200 MILLIGRAM(S): 400 TABLET ORAL at 06:03

## 2021-05-25 RX ADMIN — NYSTATIN CREAM 1 APPLICATION(S): 100000 CREAM TOPICAL at 21:46

## 2021-05-25 RX ADMIN — CLOPIDOGREL BISULFATE 600 MILLIGRAM(S): 75 TABLET, FILM COATED ORAL at 14:04

## 2021-05-25 RX ADMIN — NYSTATIN CREAM 1 APPLICATION(S): 100000 CREAM TOPICAL at 06:04

## 2021-05-25 RX ADMIN — ATORVASTATIN CALCIUM 80 MILLIGRAM(S): 80 TABLET, FILM COATED ORAL at 21:46

## 2021-05-25 RX ADMIN — LOSARTAN POTASSIUM 25 MILLIGRAM(S): 100 TABLET, FILM COATED ORAL at 06:04

## 2021-05-25 NOTE — PROGRESS NOTE ADULT - ASSESSMENT
75 M hx of  PMH of MI s/p PPM, AS s/p TAVR 2016, CHF w/ BiV AICD, cholelithiasis, anxiety, hypothyroidism, DM, Hodgkin lymphoma s/p radiation and chemo now in remission with left lung base radiation fibrosis, b/l carotid stenosis s/p carotid endarterectomy presenting due to AICD firing    1 Renal-  non-oliguric:  RIANNA that is improving.  No need for IVF;  Off diuretics .  No renal objection to proceed with cath   2 CVS- Goal was to proceed with cath tomorrow AM.    3 EPS-On Amiodarone for AICD shock       Sayed [facility]   [phone]

## 2021-05-25 NOTE — PROGRESS NOTE ADULT - SUBJECTIVE AND OBJECTIVE BOX
Date of Service  : 05-25-21     INTERVAL HPI/OVERNIGHT EVENTS: Seen and examined . I feel fine.   Vital Signs Last 24 Hrs  T(C): 36.4 (25 May 2021 05:57), Max: 36.7 (24 May 2021 17:30)  T(F): 97.6 (25 May 2021 05:57), Max: 98 (24 May 2021 17:30)  HR: 64 (25 May 2021 11:47) (64 - 70)  BP: 127/62 (25 May 2021 11:47) (105/65 - 139/60)  BP(mean): --  RR: 20 (25 May 2021 11:47) (17 - 20)  SpO2: 99% (25 May 2021 11:47) (99% - 100%)  I&O's Summary    MEDICATIONS  (STANDING):  aMIOdarone    Tablet 200 milliGRAM(s) Oral daily  aspirin enteric coated 81 milliGRAM(s) Oral daily  atorvastatin 80 milliGRAM(s) Oral at bedtime  clonazePAM  Tablet 1 milliGRAM(s) Oral daily  dextrose 40% Gel 15 Gram(s) Oral once  dextrose 5%. 1000 milliLiter(s) (50 mL/Hr) IV Continuous <Continuous>  dextrose 5%. 1000 milliLiter(s) (100 mL/Hr) IV Continuous <Continuous>  dextrose 50% Injectable 25 Gram(s) IV Push once  dextrose 50% Injectable 12.5 Gram(s) IV Push once  dextrose 50% Injectable 25 Gram(s) IV Push once  enoxaparin Injectable 40 milliGRAM(s) SubCutaneous daily  FLUoxetine 20 milliGRAM(s) Oral daily  glucagon  Injectable 1 milliGRAM(s) IntraMuscular once  insulin glargine Injectable (LANTUS) 10 Unit(s) SubCutaneous at bedtime  insulin lispro (ADMELOG) corrective regimen sliding scale   SubCutaneous three times a day before meals  insulin lispro (ADMELOG) corrective regimen sliding scale   SubCutaneous at bedtime  levothyroxine 125 MICROGram(s) Oral daily  losartan 25 milliGRAM(s) Oral daily  metoprolol succinate  milliGRAM(s) Oral daily  nystatin Powder 1 Application(s) Topical two times a day  sodium chloride 0.9%. 1000 milliLiter(s) (75 mL/Hr) IV Continuous <Continuous>    MEDICATIONS  (PRN):  acetaminophen   Tablet .. 650 milliGRAM(s) Oral every 6 hours PRN Mild Pain (1 - 3), Moderate Pain (4 - 6)    LABS:                        13.3   5.77  )-----------( 241      ( 25 May 2021 06:45 )             40.6     05-25    142  |  102  |  39<H>  ----------------------------<  111<H>  4.3   |  25  |  1.48<H>    Ca    9.9      25 May 2021 06:45  Phos  3.1     05-25  Mg     2.1     05-25          CAPILLARY BLOOD GLUCOSE      POCT Blood Glucose.: 129 mg/dL (25 May 2021 08:43)  POCT Blood Glucose.: 104 mg/dL (25 May 2021 03:47)  POCT Blood Glucose.: 134 mg/dL (24 May 2021 21:17)  POCT Blood Glucose.: 124 mg/dL (24 May 2021 17:49)          REVIEW OF SYSTEMS:  CONSTITUTIONAL: No fever, weight loss, or fatigue  EYES: No eye pain, visual disturbances, or discharge  ENMT:  No difficulty hearing, tinnitus, vertigo; No sinus or throat pain  NECK: No pain or stiffness  RESPIRATORY: No cough, wheezing, chills or hemoptysis; No shortness of breath  CARDIOVASCULAR: No chest pain, palpitations, dizziness, or leg swelling  GASTROINTESTINAL: No abdominal or epigastric pain. No nausea, vomiting, or hematemesis; No diarrhea or constipation. No melena or hematochezia.  GENITOURINARY: No dysuria, frequency, hematuria, or incontinence  NEUROLOGICAL: No headaches, memory loss, loss of strength, numbness, or tremors      Consultant(s) Notes Reviewed:  [x ] YES  [ ] NO    PHYSICAL EXAM:  GENERAL: NAD, well-groomed, well-developed, not in any distress ,  HEAD:  Atraumatic, Normocephalic  EYES: EOMI, PERRLA, conjunctiva and sclera clear  ENMT: No tonsillar erythema, exudates, or enlargement; Moist mucous membranes, Good dentition, No lesions  NECK: Supple, No JVD, Normal thyroid  NERVOUS SYSTEM:  Alert & Oriented X3, No focal deficit   CHEST/LUNG: Good air entry bilateral with no  rales, rhonchi, wheezing, or rubs  HEART: Regular rate and rhythm; No murmurs, rubs, or gallops  ABDOMEN: Soft, Nontender, Nondistended; Bowel sounds present  EXTREMITIES:  2+ Peripheral Pulses, No clubbing, cyanosis, or edema      Care Discussed with Consultants/Other Providers [ x] YES  [ ] NO

## 2021-05-25 NOTE — PROGRESS NOTE ADULT - SUBJECTIVE AND OBJECTIVE BOX
chief complaint:  ICD fire    extended hpi: 75 year old male with history of CAD (known 100% RCA stenosis from last cath in 2016), severe AS s/p TAVR in 2016, cardiomyopathy s/p BiV AICD, DM, Hodgkin lymphoma s/p radiation and chemotherapy, history of carotid stenosis s/p carotid endarterectomy who presented to the ED following AICD shock.     S: no chest pain or sob; ros otherwise negative.     Review of Systems:   Constitutional: [ ] fevers, [ ] chills.   Skin: [ ] dry skin. [ ] rashes.  Psychiatric: [ ] depression, [ ] anxiety.   Gastrointestinal: [ ] BRBPR, [ ] melena.   Neurological: [ ] confusion. [ ] seizures. [ ] shuffling gait.   Ears,Nose,Mouth and Throat: [ ] ear pain [ ] sore throat.   Eyes: [ ] diplopia.   Respiratory: [ ] hemoptysis. [ ] shortness of breath  Cardiovascular: See HPI above  Hematologic/Lymphatic: [ ] anemia. [ ] painful nodes. [ ] prolonged bleeding.   Genitourinary: [ ] hematuria. [ ] flank pain.   Endocrine: [ ] significant change in weight. [ ] intolerance to heat and cold.     Review of systems [x ] otherwise negative, [ ] otherwise unable to obtain    FH: no family history of sudden cardiac death in first degree relatives    SH: [ ] tobacco, [ ] alcohol, [ ] drugs      acetaminophen   Tablet .. 650 milliGRAM(s) Oral every 6 hours PRN  aMIOdarone    Tablet 200 milliGRAM(s) Oral daily  aspirin enteric coated 81 milliGRAM(s) Oral daily  atorvastatin 80 milliGRAM(s) Oral at bedtime  clonazePAM  Tablet 1 milliGRAM(s) Oral daily  clopidogrel Tablet 600 milliGRAM(s) Oral once  dextrose 40% Gel 15 Gram(s) Oral once  dextrose 5%. 1000 milliLiter(s) IV Continuous <Continuous>  dextrose 5%. 1000 milliLiter(s) IV Continuous <Continuous>  dextrose 50% Injectable 25 Gram(s) IV Push once  dextrose 50% Injectable 12.5 Gram(s) IV Push once  dextrose 50% Injectable 25 Gram(s) IV Push once  enoxaparin Injectable 40 milliGRAM(s) SubCutaneous daily  FLUoxetine 20 milliGRAM(s) Oral daily  glucagon  Injectable 1 milliGRAM(s) IntraMuscular once  insulin glargine Injectable (LANTUS) 10 Unit(s) SubCutaneous at bedtime  insulin lispro (ADMELOG) corrective regimen sliding scale   SubCutaneous three times a day before meals  insulin lispro (ADMELOG) corrective regimen sliding scale   SubCutaneous at bedtime  levothyroxine 125 MICROGram(s) Oral daily  losartan 25 milliGRAM(s) Oral daily  metoprolol succinate  milliGRAM(s) Oral daily  nystatin Powder 1 Application(s) Topical two times a day                            13.3   5.77  )-----------( 241      ( 25 May 2021 06:45 )             40.6       05-25    142  |  102  |  39<H>  ----------------------------<  111<H>  4.3   |  25  |  1.48<H>    Ca    9.9      25 May 2021 06:45  Phos  3.1     05-25  Mg     2.1     05-25              T(C): 36.4 (05-25-21 @ 05:57), Max: 36.7 (05-24-21 @ 17:30)  HR: 64 (05-25-21 @ 11:47) (64 - 70)  BP: 127/62 (05-25-21 @ 11:47) (105/65 - 139/60)  RR: 20 (05-25-21 @ 11:47) (17 - 20)  SpO2: 99% (05-25-21 @ 11:47) (99% - 100%)  Wt(kg): --    I&O's Summary    General: Well nourished in no acute distress. Alert and Oriented * 3.   Head: Normocephalic and atraumatic.   Neck: No JVD. No bruits. Supple. Does not appear to be enlarged.   Cardiovascular: + S1,S2 ; RRR Soft systolic murmur at the left lower sternal border. No rubs noted.    Lungs: CTA b/l. No rhonchi, rales or wheezes.   Abdomen: + BS, soft. Non tender. Non distended. No rebound. No guarding.   Extremities: No clubbing/cyanosis/edema.   Neurologic: Moves all four extremities. Full range of motion.   Skin: Warm and moist. The patient's skin has normal elasticity and good skin turgor.   Psychiatric: Appropriate mood and affect.  Musculoskeletal: Normal range of motion, normal strength      DATA    TTE: < from: TTE with Doppler (w/Cont) (05.19.21 @ 07:53) >  1. Mitral annular calcification and calcified mitral  leaflets with decreased diastolic opening. Mild mitral  regurgitation.  Mean transmitral valve gradient equals 3 mm  Hg, consistent with mild mitral stenosis.  2. Normal left ventricular internal dimensions and wall  thicknesses.  3. Endocardial visualization enhanced with intravenous  injection of echo contrast (Definity).  Moderate to severe  segmental left ventricular systolic dysfunction.  Inferior  and inferolateral wall  hypokinesis.  4. The right ventricle is not well visualized grossly  enalerged. Device wire is noted in the right heart.  5. Estimated pulmonary artery systolic pressure equals 50  mm Hg, assuming right atrialpressure equals 10  mm Hg,  consistent with moderate pulmonary hypertension.    < end of copied text >      ASSESSMENT/PLAN:  75 year old male with history of CAD (known 100% RCA stenosis from last cath in 2016), severe AS s/p TAVR in 2016, cardiomyopathy s/p BiV AICD, DM, Hodgkin lymphoma s/p radiation and chemotherapy, history of carotid stenosis s/p carotid endarterectomy who presented to the ED following AICD shock and chest pain.     -pt. s/p R/LHC demonstrating severe D1 lesion and elevated PCWP with 26 but with normal CI  -Cr trending down and optimized from renal perspective for cath today per renal   -Diuresis now on hold   -AAD for VT per EP  -All risks, benefits, indications, contraindications, inferior alternative options of cardiac cath and PCI explained to the patient.  He understood everything and elected for PCI today.    -Cath/staged PCI to diag today     Brian Mo MD

## 2021-05-25 NOTE — PROGRESS NOTE ADULT - SUBJECTIVE AND OBJECTIVE BOX
NEPHROLOGY-[facility] [phone]        Patient seen and examined in bed.  He was in good spirit s        MEDICATIONS  (STANDING):  aMIOdarone    Tablet 200 milliGRAM(s) Oral daily  aspirin enteric coated 81 milliGRAM(s) Oral daily  atorvastatin 80 milliGRAM(s) Oral at bedtime  clonazePAM  Tablet 1 milliGRAM(s) Oral daily  dextrose 40% Gel 15 Gram(s) Oral once  dextrose 5%. 1000 milliLiter(s) (50 mL/Hr) IV Continuous <Continuous>  dextrose 5%. 1000 milliLiter(s) (100 mL/Hr) IV Continuous <Continuous>  dextrose 50% Injectable 25 Gram(s) IV Push once  dextrose 50% Injectable 12.5 Gram(s) IV Push once  dextrose 50% Injectable 25 Gram(s) IV Push once  enoxaparin Injectable 40 milliGRAM(s) SubCutaneous daily  FLUoxetine 20 milliGRAM(s) Oral daily  glucagon  Injectable 1 milliGRAM(s) IntraMuscular once  insulin glargine Injectable (LANTUS) 10 Unit(s) SubCutaneous at bedtime  insulin lispro (ADMELOG) corrective regimen sliding scale   SubCutaneous three times a day before meals  insulin lispro (ADMELOG) corrective regimen sliding scale   SubCutaneous at bedtime  levothyroxine 125 MICROGram(s) Oral daily  losartan 25 milliGRAM(s) Oral daily  metoprolol succinate  milliGRAM(s) Oral daily  nystatin Powder 1 Application(s) Topical two times a day      VITAL:  T(C): , Max: 36.7 (05-24-21 @ 17:30)  T(F): , Max: 98 (05-24-21 @ 17:30)  HR: 64 (05-25-21 @ 11:47)  BP: 127/62 (05-25-21 @ 11:47)  BP(mean): --  RR: 20 (05-25-21 @ 11:47)  SpO2: 99% (05-25-21 @ 11:47)  Wt(kg): --    I and O's:        PHYSICAL EXAM:    Constitutional: NAD  Neck:  No JVD  Respiratory: CTAB/L  Cardiovascular: S1 and S2  Gastrointestinal: BS+, soft, NT/ND  Extremities: No peripheral edema  Neurological: A/O x 3, no focal deficits  Psychiatric: Normal mood, normal affect  : No Be  Skin: No rashes  Access: Not applicable    LABS:                        13.3   5.77  )-----------( 241      ( 25 May 2021 06:45 )             40.6     05-25    142  |  102  |  39<H>  ----------------------------<  111<H>  4.3   |  25  |  1.48<H>    Ca    9.9      25 May 2021 06:45  Phos  3.1     05-25  Mg     2.1     05-25            Urine Studies:          RADIOLOGY & ADDITIONAL STUDIES:

## 2021-05-26 ENCOUNTER — TRANSCRIPTION ENCOUNTER (OUTPATIENT)
Age: 76
End: 2021-05-26

## 2021-05-26 VITALS
DIASTOLIC BLOOD PRESSURE: 66 MMHG | TEMPERATURE: 99 F | SYSTOLIC BLOOD PRESSURE: 100 MMHG | OXYGEN SATURATION: 98 % | HEART RATE: 67 BPM | RESPIRATION RATE: 19 BRPM

## 2021-05-26 LAB
ANION GAP SERPL CALC-SCNC: 13 MMOL/L — SIGNIFICANT CHANGE UP (ref 7–14)
BUN SERPL-MCNC: 32 MG/DL — HIGH (ref 7–23)
CALCIUM SERPL-MCNC: 9 MG/DL — SIGNIFICANT CHANGE UP (ref 8.4–10.5)
CHLORIDE SERPL-SCNC: 102 MMOL/L — SIGNIFICANT CHANGE UP (ref 98–107)
CO2 SERPL-SCNC: 24 MMOL/L — SIGNIFICANT CHANGE UP (ref 22–31)
CREAT SERPL-MCNC: 1.53 MG/DL — HIGH (ref 0.5–1.3)
GLUCOSE SERPL-MCNC: 130 MG/DL — HIGH (ref 70–99)
HCT VFR BLD CALC: 34.8 % — LOW (ref 39–50)
HGB BLD-MCNC: 11.5 G/DL — LOW (ref 13–17)
MAGNESIUM SERPL-MCNC: 1.9 MG/DL — SIGNIFICANT CHANGE UP (ref 1.6–2.6)
MCHC RBC-ENTMCNC: 32.7 PG — SIGNIFICANT CHANGE UP (ref 27–34)
MCHC RBC-ENTMCNC: 33 GM/DL — SIGNIFICANT CHANGE UP (ref 32–36)
MCV RBC AUTO: 98.9 FL — SIGNIFICANT CHANGE UP (ref 80–100)
NRBC # BLD: 0 /100 WBCS — SIGNIFICANT CHANGE UP
NRBC # FLD: 0 K/UL — SIGNIFICANT CHANGE UP
PHOSPHATE SERPL-MCNC: 3.1 MG/DL — SIGNIFICANT CHANGE UP (ref 2.5–4.5)
PLATELET # BLD AUTO: 215 K/UL — SIGNIFICANT CHANGE UP (ref 150–400)
POTASSIUM SERPL-MCNC: 4.4 MMOL/L — SIGNIFICANT CHANGE UP (ref 3.5–5.3)
POTASSIUM SERPL-SCNC: 4.4 MMOL/L — SIGNIFICANT CHANGE UP (ref 3.5–5.3)
RBC # BLD: 3.52 M/UL — LOW (ref 4.2–5.8)
RBC # FLD: 14.5 % — SIGNIFICANT CHANGE UP (ref 10.3–14.5)
SODIUM SERPL-SCNC: 139 MMOL/L — SIGNIFICANT CHANGE UP (ref 135–145)
WBC # BLD: 6.13 K/UL — SIGNIFICANT CHANGE UP (ref 3.8–10.5)
WBC # FLD AUTO: 6.13 K/UL — SIGNIFICANT CHANGE UP (ref 3.8–10.5)

## 2021-05-26 RX ORDER — ASPIRIN/CALCIUM CARB/MAGNESIUM 324 MG
0.5 TABLET ORAL
Qty: 0 | Refills: 0 | DISCHARGE

## 2021-05-26 RX ORDER — ASPIRIN/CALCIUM CARB/MAGNESIUM 324 MG
1 TABLET ORAL
Qty: 0 | Refills: 0 | DISCHARGE
Start: 2021-05-26

## 2021-05-26 RX ORDER — PIOGLITAZONE HYDROCHLORIDE 15 MG/1
2 TABLET ORAL
Qty: 0 | Refills: 0 | DISCHARGE

## 2021-05-26 RX ORDER — FUROSEMIDE 40 MG
1 TABLET ORAL
Qty: 0 | Refills: 0 | DISCHARGE

## 2021-05-26 RX ORDER — FUROSEMIDE 40 MG
1 TABLET ORAL
Qty: 30 | Refills: 0
Start: 2021-05-26 | End: 2021-06-24

## 2021-05-26 RX ORDER — AMIODARONE HYDROCHLORIDE 400 MG/1
1 TABLET ORAL
Qty: 30 | Refills: 0
Start: 2021-05-26 | End: 2021-06-24

## 2021-05-26 RX ORDER — METOPROLOL TARTRATE 50 MG
3 TABLET ORAL
Qty: 90 | Refills: 0
Start: 2021-05-26 | End: 2021-06-24

## 2021-05-26 RX ORDER — METOPROLOL TARTRATE 50 MG
1 TABLET ORAL
Qty: 0 | Refills: 0 | DISCHARGE

## 2021-05-26 RX ORDER — CLOPIDOGREL BISULFATE 75 MG/1
1 TABLET, FILM COATED ORAL
Qty: 30 | Refills: 2
Start: 2021-05-26 | End: 2021-08-23

## 2021-05-26 RX ORDER — CLOPIDOGREL BISULFATE 75 MG/1
1 TABLET, FILM COATED ORAL
Qty: 0 | Refills: 0 | DISCHARGE
Start: 2021-05-26

## 2021-05-26 RX ADMIN — Medication 125 MICROGRAM(S): at 05:44

## 2021-05-26 RX ADMIN — Medication 150 MILLIGRAM(S): at 05:44

## 2021-05-26 RX ADMIN — AMIODARONE HYDROCHLORIDE 200 MILLIGRAM(S): 400 TABLET ORAL at 11:21

## 2021-05-26 RX ADMIN — Medication 81 MILLIGRAM(S): at 11:21

## 2021-05-26 RX ADMIN — CLOPIDOGREL BISULFATE 75 MILLIGRAM(S): 75 TABLET, FILM COATED ORAL at 11:21

## 2021-05-26 RX ADMIN — Medication 20 MILLIGRAM(S): at 11:21

## 2021-05-26 RX ADMIN — NYSTATIN CREAM 1 APPLICATION(S): 100000 CREAM TOPICAL at 05:45

## 2021-05-26 RX ADMIN — LOSARTAN POTASSIUM 25 MILLIGRAM(S): 100 TABLET, FILM COATED ORAL at 05:45

## 2021-05-26 RX ADMIN — Medication 1 MILLIGRAM(S): at 11:21

## 2021-05-26 NOTE — PROGRESS NOTE ADULT - ASSESSMENT
75 M hx of  PMH of MI s/p PPM, AS s/p TAVR 2016, CHF w/ BiV AICD, cholelithiasis, anxiety, hypothyroidism, DM, Hodgkin lymphoma s/p radiation and chemo now in remission with left lung base radiation fibrosis, b/l carotid stenosis s/p carotid endarterectomy presenting due to AICD firing since 4d ago. Fired 2x on thursday when walking to bathroom in his house, no preceding or subsequent symptoms, saw Dr. Godinez the next day with adjustment in metoprolol dose and plan for further outpt f/u. Today was sleeping when the shock went off. Called Kaye Godinez and Chidi today, advised to come to ER .Now I feel fine. .      Problem/Plan - 1:  ·  Problem: AICD discharge.  Plan: presently asymptomatic . EP and cardiology helping.    S/P cardiac cath.      Problem/Plan - 2:  ·  Problem: Systolic CHF, chronic.  Plan: On IV  Lasix 40 mg BID. Holding as RIANNA .      Problem/Plan - 3:  ·  Problem: Type 2 diabetes mellitus without complication.  Plan: Holding Actos and SSI with Lantus.   Sugars good control and will hold actos as CHF . Outpt follow up.    Problem/Plan - 4:  ·  Problem: HTN (hypertension).  Plan: BP meds with hold parameters.      Problem/Plan - 5:  ·  Problem: Acquired hypothyroidism.  Plan: synthroid 125mcg .     Problem/Plan - 6:  Problem: CAD . Plan: S/P  rpt cath with staged PCI .     Problem/Plan - 7:  Problem: HLD (hyperlipidemia). Plan: Statin.     Problem/Plan - 8:  Problem: RIANNA . Plan: S/P Cardiac cath and on Lasix. Creatinine trending down . Holding Lasix.

## 2021-05-26 NOTE — DISCHARGE NOTE PROVIDER - NSDCCPCAREPLAN_GEN_ALL_CORE_FT
PRINCIPAL DISCHARGE DIAGNOSIS  Diagnosis: AICD discharge  Assessment and Plan of Treatment: You came to the hospital because your ICD fired. The electrophysiology team (Dr. Godinez) evaluated your device and adjusted it as necessary. You were started on amiodarone to control your heart rhythm in addition to metoprolol which you should take daily as prescribed. You echocardiogram (ultrasound of the heart) showed a decreased heart pumping function for which you were started on Lasix for heart failure. Your lasix was on hold for your cardiac angiogram however per nephrology you can restart your LASIX on 5/27*. You cardiac catheterization showed a blockage in your one of the arteries of the heart for which you got a stent. In order to keep this stent open, you must take ASPIRIN and PLAVIX as prescribed. DO NOT stop these medications without speaking with a cardiologist. Follow up with Dr. Godinez and your cardiologist at your scheduled appointment times.      SECONDARY DISCHARGE DIAGNOSES  Diagnosis: RIANNA (acute kidney injury)  Assessment and Plan of Treatment: Your kidney function (creatinine) was elevated in the hospital. The nephrology team followed you. Take all medications as prescribed and stay well hydrated. Follow up with your PCP within ONE WEEK for repeat kidney function blood work.    Diagnosis: HTN (hypertension)  Assessment and Plan of Treatment: Continue blood pressure medications as prescribed. Routine follow up with your PCP for blood pressure checks and medication management, A low salt diet is recommended.    Diagnosis: HLD (hyperlipidemia)  Assessment and Plan of Treatment: Continue your cholesterol medication as prescribed. Routine follow up with your PCP for cholesterol blood work and medication management.    Diagnosis: Systolic CHF, chronic  Assessment and Plan of Treatment: See above. Continue all medications as prescribed and follow up with your cardiologist.    Diagnosis: DM2 (diabetes mellitus, type 2)  Assessment and Plan of Treatment: Continue your medication regimen and a consistent carbohydrate diet (Meaning eating the same amount of carbohydrates at the same time each day). Monitor blood glucose levels throughout the day before meals and at bedtime. Record blood sugars and bring to outpatient providers appointment in order to be reviewed by your doctor for management modifications. If your sugars are more than 400 or less than 70 you should contact your PCP immediately. Monitor for signs/symptoms of low blood glucose, such as, dizziness, altered mental status, or cool/clammy skin. In addition, monitor for signs/symptoms of high blood glucose, such as, feeling hot, dry, fatigued, or with increased thirst/urination. Make regular podiatry appointments in order to have feet checked for wounds and uncontrolled toe nail growth to prevent infections, as well as, appointments with an ophthalmologist to monitor your vision.     PRINCIPAL DISCHARGE DIAGNOSIS  Diagnosis: AICD discharge  Assessment and Plan of Treatment: You came to the hospital because your ICD fired. The electrophysiology team (Dr. Godinez) evaluated your device and adjusted it as necessary. You were started on amiodarone to control your heart rhythm in addition to metoprolol which you should take daily as prescribed. AMIODARONE is a medication that requires monitoring with pulmonary function testing and thyroid blood work. Follow up with your cardiologist when to appropriately check these tests. You echocardiogram (ultrasound of the heart) showed a decreased heart pumping function for which you were started on Lasix for heart failure. Your lasix was on hold for your cardiac angiogram however per nephrology you can restart your LASIX on 5/27*. You cardiac catheterization showed a blockage in your one of the arteries of the heart for which you got a stent. In order to keep this stent open, you must take ASPIRIN and PLAVIX as prescribed. DO NOT stop these medications without speaking with a cardiologist. Follow up with Dr. Godinez and your cardiologist at your scheduled appointment times.      SECONDARY DISCHARGE DIAGNOSES  Diagnosis: RIANNA (acute kidney injury)  Assessment and Plan of Treatment: Your kidney function (creatinine) was elevated in the hospital. The nephrology team followed you. Take all medications as prescribed and stay well hydrated. Follow up with your PCP within ONE WEEK for repeat kidney function blood work.    Diagnosis: HTN (hypertension)  Assessment and Plan of Treatment: Continue blood pressure medications as prescribed. Routine follow up with your PCP for blood pressure checks and medication management, A low salt diet is recommended.    Diagnosis: HLD (hyperlipidemia)  Assessment and Plan of Treatment: Continue your cholesterol medication as prescribed. Routine follow up with your PCP for cholesterol blood work and medication management.    Diagnosis: Systolic CHF, chronic  Assessment and Plan of Treatment: See above. Continue all medications as prescribed and follow up with your cardiologist.    Diagnosis: DM2 (diabetes mellitus, type 2)  Assessment and Plan of Treatment: Your A1c is 5.5% which is not consistent with a diagnosis of diabetes. Your blood glucose measurements were normal in the hospital and you do not require diabetes medications at this time. STOP TAKING ACTOS as your have decreased heart function and this medication can cause heart problems. Follow up with your primary care physician for continued diabetes screening.

## 2021-05-26 NOTE — DISCHARGE NOTE PROVIDER - NSDCFUSCHEDAPPT_GEN_ALL_CORE_FT
FER HERNANDEZ ; 07/13/2021 ; [facility] [phone]  FER HERNANDEZ ; 08/16/2021 ; [facility] [phone]

## 2021-05-26 NOTE — DISCHARGE NOTE NURSING/CASE MANAGEMENT/SOCIAL WORK - PATIENT PORTAL LINK FT
You can access the FollowMyHealth Patient Portal offered by [facility] by registering at the following website: [email or web address]. By joining [facility]’s FollowMyHealth portal, you will also be able to view your health information using other applications (apps) compatible with our system.

## 2021-05-26 NOTE — DISCHARGE NOTE PROVIDER - NSDCFUADDAPPT_GEN_ALL_CORE_FT
Follow up with EP Dr. Godinez 7/13/21 at 8:00AM.   Follow up with cardiologist Dr. Durham 6/2/21 at 9:00AM.

## 2021-05-26 NOTE — PROGRESS NOTE ADULT - NSICDXPILOT_GEN_ALL_CORE
[place]

## 2021-05-26 NOTE — PROGRESS NOTE ADULT - SUBJECTIVE AND OBJECTIVE BOX
NEPHROLOGY-[facility] [phone]        Patient seen and examined in bed.  He was in good spirit s        MEDICATIONS  (STANDING):  aMIOdarone    Tablet 200 milliGRAM(s) Oral daily  aspirin enteric coated 81 milliGRAM(s) Oral daily  atorvastatin 80 milliGRAM(s) Oral at bedtime  clonazePAM  Tablet 1 milliGRAM(s) Oral daily  clopidogrel Tablet 75 milliGRAM(s) Oral daily  dextrose 40% Gel 15 Gram(s) Oral once  dextrose 5%. 1000 milliLiter(s) (50 mL/Hr) IV Continuous <Continuous>  dextrose 5%. 1000 milliLiter(s) (100 mL/Hr) IV Continuous <Continuous>  dextrose 50% Injectable 25 Gram(s) IV Push once  dextrose 50% Injectable 12.5 Gram(s) IV Push once  dextrose 50% Injectable 25 Gram(s) IV Push once  enoxaparin Injectable 40 milliGRAM(s) SubCutaneous daily  FLUoxetine 20 milliGRAM(s) Oral daily  glucagon  Injectable 1 milliGRAM(s) IntraMuscular once  insulin glargine Injectable (LANTUS) 10 Unit(s) SubCutaneous at bedtime  insulin lispro (ADMELOG) corrective regimen sliding scale   SubCutaneous three times a day before meals  insulin lispro (ADMELOG) corrective regimen sliding scale   SubCutaneous at bedtime  levothyroxine 125 MICROGram(s) Oral daily  losartan 25 milliGRAM(s) Oral daily  metoprolol succinate  milliGRAM(s) Oral daily  nystatin Powder 1 Application(s) Topical two times a day  sodium chloride 0.9%. 1000 milliLiter(s) (75 mL/Hr) IV Continuous <Continuous>      VITAL:  T(C): , Max: 37 (05-26-21 @ 11:15)  T(F): , Max: 98.6 (05-26-21 @ 11:15)  HR: 67 (05-26-21 @ 11:15)  BP: 100/66 (05-26-21 @ 11:15)  BP(mean): --  RR: 19 (05-26-21 @ 11:15)  SpO2: 98% (05-26-21 @ 11:15)  Wt(kg): --    I and O's:        PHYSICAL EXAM:    Constitutional: NAD  Neck:  No JVD  Respiratory: CTAB/L  Cardiovascular: S1 and S2  Gastrointestinal: BS+, soft, NT/ND  Extremities: No peripheral edema  Neurological: A/O x 3, no focal deficits  Psychiatric: Normal mood, normal affect  : No Be  Skin: No rashes  Access: Not applicable    LABS:                        11.5   6.13  )-----------( 215      ( 26 May 2021 07:24 )             34.8     05-26    139  |  102  |  32<H>  ----------------------------<  130<H>  4.4   |  24  |  1.53<H>    Ca    9.0      26 May 2021 07:24  Phos  3.1     05-26  Mg     1.9     05-26            Urine Studies:          RADIOLOGY & ADDITIONAL STUDIES:

## 2021-05-26 NOTE — CHART NOTE - NSCHARTNOTEFT_GEN_A_CORE
[facility] MEDICINE NIGHT COVERAGE    Notified by primary RN - patient refusing to take Amiodarone. Patient seen and assessed at bedside w/ Dr. Godinez. Patient reports Amiodarone causes diarrhea. Educated patient on importance of taking Amiodarone, to which patient verbalized understanding. Per Dr. Godinez, will decrease Amiodarone dose to 200mg daily and trial again in AM. RN made aware of plan.     Kathryn Prieto PA-C  Medicine [id number]
Patient is s/p cardiac cath via right radial access. Patient without any complaints. Site is stable with no hematoma or active bleed noted. No swelling, dressing is clean/intact/dry. Right Radial pulse palpable.  strength is equal bilaterally. Patient has full ROM in right wrist. Capillary refill < 2 seconds. Will continue to monitor closely.       EDWINA Soares  Department of Medicine   In House # [id number]
Patient with A1c 5.5%. On Actos at home for DM. On Lantus 10U with appropriate blood glucose measurements inpatient. Actos is not a good medication choice for patient with a diagnosis of heart failure. Discussed with Dr. Light who recommends discharging off diabetes medications at this time and following up with his PCP.
[facility] MEDICINE NIGHT COVERAGE    Patient seen at bedside status post cath via R femoral and R radial artery. Sites clean, dry and intact. No bleeding, underlying hematoma, or erythema. No bruit auscultated at R femoral artery site. Patient denies chest pain, SOB, numbness/weakness/tingling. Pulses present, capillary refill appropriate. Patient educated and understands if any of the above symptoms were to arise, to notify RN. Will continue to monitor closely.    T(C): 36.4 (05-19-21 @ 20:53), Max: 36.8 (05-19-21 @ 05:34)  HR: 68 (05-19-21 @ 20:53) (65 - 74)  BP: 108/50 (05-19-21 @ 20:53) (104/85 - 132/63)  RR: 18 (05-19-21 @ 20:53) (17 - 18)  SpO2: 98% (05-19-21 @ 20:53) (97% - 98%)    Kathryn Prieto PA-C  Medicine [id number]
Tele Reviewed. Pt v paced in the 60-70s. No alarms triggered.     -continue amiodarone 200mg daily  -continue Toprol XL 150mg PO daily  -outpatient follow up with Dr. Godinez, scheduled for 7/13/2021 @ 8AM.    Isac Hernandez MD  Cardiology Fellow - PGY 5  Text or Call: [phone]  For all New Consults and Questions:  [email or web address]   Login: [email or web address].
Tele Reviewed. Pt v paced in the 60-80s. No alarms triggered.     -continue amiodarone 200mg daily  -continue Toprol XL 150mg PO daily  -outpatient follow up with Dr. Godinez, scheduled for 7/13/2021 @ 8AM.
Tele Reviewed. Pt v paced in the 60-80s. No alarms triggered.     -continue amiodarone 200mg daily  -continue Toprol XL 150mg PO daily  -outpatient follow up with Dr. Godinez, scheduled for 7/13/2021 @ 8AM.    Isac Hernandez MD  Cardiology Fellow - PGY 5  Text or Call: [phone]  For all New Consults and Questions:  [email or web address]   Login: [email or web address]
Tele Reviewed. Pt v paced in the 60s overnight. No alarms triggered.     -continue amiodarone 200mg daily  -continue Toprol XL 150mg PO daily  -outpatient follow up with Dr. Godinez, scheduled for 7/13/2021 @ 8AM.
Notified by RN patient refusing FS, SS insulin. Pt educated on importance of FS - explained that when you are admitted to the hospital you are off placed off antidiabetic agents as our diets change and in case of further testing or procedures are needed with contrast therefore, FS are taken regularly and placed on SS coverage. pt still refusing stating he doesn't take his FS at home. Patient verbalizes understanding and still refusing despite education.
Tele Reviewed. Pt v paced in the 60s overnight. No alarms triggered.     -continue amiodarone 200mg daily  -continue Toprol XL 150mg PO daily  -outpatient follow up with Dr. Godinez, scheduled for 7/13/2021 @ 8AM
Telemetry reviewed.  No Events.    -continue amiodarone 200mg daily  -continue Toprol XL 150mg PO daily  -outpatient follow up with Dr. Godinez, scheduled for 7/13/2021 @ 8AM    Isac Hernandez MD  Cardiology Fellow - PGY 5  Text or Call: [phone]  For all New Consults and Questions:  [email or web address]   Login: [email or web address]

## 2021-05-26 NOTE — PROGRESS NOTE ADULT - ASSESSMENT
75 M hx of  PMH of MI s/p PPM, AS s/p TAVR 2016, CHF w/ BiV AICD, cholelithiasis, anxiety, hypothyroidism, DM, Hodgkin lymphoma s/p radiation and chemo now in remission with left lung base radiation fibrosis, b/l carotid stenosis s/p carotid endarterectomy presenting due to AICD firing    1 Renal-  non-oliguric:    No need for IVF;  Off diuretics .  No renal objection to dc home and start qd lasix in 24-48 hours   Cont ARB  2 [facility]- Appt with Dr Murillo next week and blood draw as well    3 EPS-On Amiodarone for AICD shock         Sayed [facility]   [phone]

## 2021-05-26 NOTE — DISCHARGE NOTE PROVIDER - HOSPITAL COURSE
75 M hx of  PMHx of MI s/p PPM, AS s/p TAVR 2016, CHF w/ BiV AICD, cholelithiasis, anxiety, hypothyroidism, DM, Hodgkin lymphoma s/p radiation and chemo now in remission with left lung base radiation fibrosis, b/l carotid stenosis s/p carotid endarterectomy presenting due to AICD firing x2. Reports he saw electrophysiologist Dr. Godinez who adjusted his metoprolol dose. His ICD subsequently fired again prompting him to come to the hospital. EP followed. ICD interrogation revealed ATP x2 secondary to VT. EP adjusted device thresholds and patient started on amiodarone for arrhythmia. TTE with EF 35% with inferior and inferolateral wall hypokinesis and moderate pulmonary HTN. Underwent RHC which showed elevated PCWP and patient was diuresed until euvolemic. Undwrwent LHC: D1 80% s/p SADE x 1. LCx 50%. RCA . RRA access. On DAPT with ASA and Plavix.       Follow up with EP Dr. Godinez ______________   Follow up with cardiologist Dr. Joel ___________ 75 M hx of  PMHx of MI s/p PPM, AS s/p TAVR 2016, CHF w/ BiV AICD, cholelithiasis, anxiety, hypothyroidism, DM, Hodgkin lymphoma s/p radiation and chemo now in remission with left lung base radiation fibrosis, b/l carotid stenosis s/p carotid endarterectomy presenting due to AICD firing x2. Reports he saw electrophysiologist Dr. Godinez who adjusted his metoprolol dose. His ICD subsequently fired again prompting him to come to the hospital. EP followed. ICD interrogation revealed ATP x2 secondary to VT. EP adjusted device thresholds and patient started on amiodarone for arrhythmia. TTE with EF 35% with inferior and inferolateral wall hypokinesis and moderate pulmonary HTN. Underwent RHC which showed elevated PCWP and patient was diuresed until euvolemic. Undwrwent LHC: D1 80% s/p SADE x 1. LCx 50%. RCA . RRA access. On DAPT with ASA and Plavix.     Follow up with EP Dr. Godinez 7/13/21 at 8:00AM.   Follow up with cardiologist Dr. Durham 6/2/21 at 9:00AM.     Patient seen and evaluated. Reviewed discharge medications with patient and attending. All new medications requiring new prescriptions were sent to the pharmacy of patient's choice. Reviewed need for prescription for previous home medications and new prescriptions sent if requested. Medically cleared/stable for discharge as per Dr. Mo on 5/26/21 with appropriate follow up. Patient understands and agrees with plan of care.

## 2021-05-26 NOTE — DISCHARGE NOTE PROVIDER - CARE PROVIDERS DIRECT ADDRESSES
,DirectAddress_Unknown,[email or web address]

## 2021-05-26 NOTE — PROGRESS NOTE ADULT - SUBJECTIVE AND OBJECTIVE BOX
chief complaint:  ICD fire    extended hpi: 75 year old male with history of CAD (known 100% RCA stenosis from last cath in 2016), severe AS s/p TAVR in 2016, cardiomyopathy s/p BiV AICD, DM, Hodgkin lymphoma s/p radiation and chemotherapy, history of carotid stenosis s/p carotid endarterectomy who presented to the ED following AICD shock.     S: no chest pain or sob; ros otherwise negative.     Review of Systems:   Constitutional: [ ] fevers, [ ] chills.   Skin: [ ] dry skin. [ ] rashes.  Psychiatric: [ ] depression, [ ] anxiety.   Gastrointestinal: [ ] BRBPR, [ ] melena.   Neurological: [ ] confusion. [ ] seizures. [ ] shuffling gait.   Ears,Nose,Mouth and Throat: [ ] ear pain [ ] sore throat.   Eyes: [ ] diplopia.   Respiratory: [ ] hemoptysis. [ ] shortness of breath  Cardiovascular: See HPI above  Hematologic/Lymphatic: [ ] anemia. [ ] painful nodes. [ ] prolonged bleeding.   Genitourinary: [ ] hematuria. [ ] flank pain.   Endocrine: [ ] significant change in weight. [ ] intolerance to heat and cold.     Review of systems [x ] otherwise negative, [ ] otherwise unable to obtain    FH: no family history of sudden cardiac death in first degree relatives    SH: [ ] tobacco, [ ] alcohol, [ ] drugs        acetaminophen   Tablet .. 650 milliGRAM(s) Oral every 6 hours PRN  aMIOdarone    Tablet 200 milliGRAM(s) Oral daily  aspirin enteric coated 81 milliGRAM(s) Oral daily  atorvastatin 80 milliGRAM(s) Oral at bedtime  clonazePAM  Tablet 1 milliGRAM(s) Oral daily  clopidogrel Tablet 75 milliGRAM(s) Oral daily  dextrose 40% Gel 15 Gram(s) Oral once  dextrose 5%. 1000 milliLiter(s) IV Continuous <Continuous>  dextrose 5%. 1000 milliLiter(s) IV Continuous <Continuous>  dextrose 50% Injectable 25 Gram(s) IV Push once  dextrose 50% Injectable 12.5 Gram(s) IV Push once  dextrose 50% Injectable 25 Gram(s) IV Push once  enoxaparin Injectable 40 milliGRAM(s) SubCutaneous daily  FLUoxetine 20 milliGRAM(s) Oral daily  glucagon  Injectable 1 milliGRAM(s) IntraMuscular once  insulin glargine Injectable (LANTUS) 10 Unit(s) SubCutaneous at bedtime  insulin lispro (ADMELOG) corrective regimen sliding scale   SubCutaneous three times a day before meals  insulin lispro (ADMELOG) corrective regimen sliding scale   SubCutaneous at bedtime  levothyroxine 125 MICROGram(s) Oral daily  losartan 25 milliGRAM(s) Oral daily  metoprolol succinate  milliGRAM(s) Oral daily  nystatin Powder 1 Application(s) Topical two times a day  sodium chloride 0.9%. 1000 milliLiter(s) IV Continuous <Continuous>                            11.5   6.13  )-----------( 215      ( 26 May 2021 07:24 )             34.8       05-26    139  |  102  |  32<H>  ----------------------------<  130<H>  4.4   |  24  |  1.53<H>    Ca    9.0      26 May 2021 07:24  Phos  3.1     05-26  Mg     1.9     05-26              T(C): 37 (05-26-21 @ 11:15), Max: 37 (05-26-21 @ 11:15)  HR: 67 (05-26-21 @ 11:15) (62 - 71)  BP: 100/66 (05-26-21 @ 11:15) (100/66 - 147/65)  RR: 19 (05-26-21 @ 11:15) (17 - 19)  SpO2: 98% (05-26-21 @ 11:15) (97% - 98%)  Wt(kg): --    I&O's Summary      General: Well nourished in no acute distress. Alert and Oriented * 3.   Head: Normocephalic and atraumatic.   Neck: No JVD. No bruits. Supple. Does not appear to be enlarged.   Cardiovascular: + S1,S2 ; RRR Soft systolic murmur at the left lower sternal border. No rubs noted.    Lungs: CTA b/l. No rhonchi, rales or wheezes.   Abdomen: + BS, soft. Non tender. Non distended. No rebound. No guarding.   Extremities: No clubbing/cyanosis/edema.   Neurologic: Moves all four extremities. Full range of motion.   Skin: Warm and moist. The patient's skin has normal elasticity and good skin turgor.   Psychiatric: Appropriate mood and affect.  Musculoskeletal: Normal range of motion, normal strength  Radial artery site: no hematoma; 2+ pulses       DATA    TTE: < from: TTE with Doppler (w/Cont) (05.19.21 @ 07:53) >  1. Mitral annular calcification and calcified mitral  leaflets with decreased diastolic opening. Mild mitral  regurgitation.  Mean transmitral valve gradient equals 3 mm  Hg, consistent with mild mitral stenosis.  2. Normal left ventricular internal dimensions and wall  thicknesses.  3. Endocardial visualization enhanced with intravenous  injection of echo contrast (Definity).  Moderate to severe  segmental left ventricular systolic dysfunction.  Inferior  and inferolateral wall  hypokinesis.  4. The right ventricle is not well visualized grossly  enalerged. Device wire is noted in the right heart.  5. Estimated pulmonary artery systolic pressure equals 50  mm Hg, assuming right atrialpressure equals 10  mm Hg,  consistent with moderate pulmonary hypertension.    < end of copied text >      ASSESSMENT/PLAN:  75 year old male with history of CAD (known 100% RCA stenosis from last cath in 2016), severe AS s/p TAVR in 2016, cardiomyopathy s/p BiV AICD, DM, Hodgkin lymphoma s/p radiation and chemotherapy, history of carotid stenosis s/p carotid endarterectomy who presented to the ED following AICD shock and chest pain.     -pt. s/p R/LHC demonstrating severe D1 lesion and elevated PCWP with 26 but with normal CI  -Cr trending down and optimized from renal perspective for cath today per renal   -Diuresis now on hold - discussed with renal - can start lasix 40mg PO daily in 48hr for maintenance diuretics   -AAD for VT per EP  -All risks, benefits, indications, contraindications, inferior alternative options of cardiac cath and PCI explained to the patient.  He understood everything and elected for PCI.   -Pt. now s/p successful SADE to D1  -Continue with dapt with asa 81mg PO daily and Plavix 75mg PO daily  -No further inpatient cardiac workup indicated at this time  -DC home with outpatient follow up with Dr. Durham on 06/02/21 at [address] [facility] [id number].  Appointment card given to patient.     Brian Mo MD

## 2021-05-26 NOTE — DIETITIAN INITIAL EVALUATION ADULT. - PERTINENT MEDS FT
MEDICATIONS  (STANDING):  aMIOdarone    Tablet 200 milliGRAM(s) Oral daily  aspirin enteric coated 81 milliGRAM(s) Oral daily  atorvastatin 80 milliGRAM(s) Oral at bedtime  clonazePAM  Tablet 1 milliGRAM(s) Oral daily  clopidogrel Tablet 75 milliGRAM(s) Oral daily  dextrose 40% Gel 15 Gram(s) Oral once  dextrose 5%. 1000 milliLiter(s) (50 mL/Hr) IV Continuous <Continuous>  dextrose 5%. 1000 milliLiter(s) (100 mL/Hr) IV Continuous <Continuous>  dextrose 50% Injectable 25 Gram(s) IV Push once  dextrose 50% Injectable 12.5 Gram(s) IV Push once  dextrose 50% Injectable 25 Gram(s) IV Push once  enoxaparin Injectable 40 milliGRAM(s) SubCutaneous daily  FLUoxetine 20 milliGRAM(s) Oral daily  glucagon  Injectable 1 milliGRAM(s) IntraMuscular once  insulin glargine Injectable (LANTUS) 10 Unit(s) SubCutaneous at bedtime  insulin lispro (ADMELOG) corrective regimen sliding scale   SubCutaneous three times a day before meals  insulin lispro (ADMELOG) corrective regimen sliding scale   SubCutaneous at bedtime  levothyroxine 125 MICROGram(s) Oral daily  losartan 25 milliGRAM(s) Oral daily  metoprolol succinate  milliGRAM(s) Oral daily  nystatin Powder 1 Application(s) Topical two times a day  sodium chloride 0.9%. 1000 milliLiter(s) (75 mL/Hr) IV Continuous <Continuous>

## 2021-05-26 NOTE — DIETITIAN INITIAL EVALUATION ADULT. - OTHER INFO
Initial Dietitian Evaluation 2/2 to extended length of stay. Met with patient at bedside.  States that his PO intake in-house is "so-so", but PO intake prior to admission is good. Denies weight changes. No GI distress (nausea/vomiting/diarrhea/constipation), last BM 5/24. No reported difficulties chewing and swallowing.  Furthermore, patient states that he take vitamin D " sometimes" prior to admission.  PO intake/ menu selections encouraged.  Patient reports that he is with plans to be d/c'ed home today.  Glucerna Shakes recommended if suboptimal PO intake persists. Recommended diet reviewed with patient.

## 2021-05-26 NOTE — PROGRESS NOTE ADULT - REASON FOR ADMISSION
BRUCE Fired

## 2021-05-26 NOTE — DISCHARGE NOTE PROVIDER - INSTRUCTIONS
Low salt, low fat and low cholesterol diet.   Carbohydrate controlled diabetes diet.  Low salt, low fat and low cholesterol diet.

## 2021-05-26 NOTE — DISCHARGE NOTE PROVIDER - NSDCMRMEDTOKEN_GEN_ALL_CORE_FT
Actos 30 mg oral tablet: 2 tab(s) orally once a day  amiodarone 200 mg oral tablet: 1 tab(s) orally once a day  aspirin 81 mg oral delayed release tablet: 1 tab(s) orally once a day  atorvastatin 80 mg oral tablet: 1 tab(s) orally once a day  clopidogrel 75 mg oral tablet: 1 tab(s) orally once a day  KlonoPIN 1 mg oral tablet:  orally once a day  Lasix 20 mg oral tablet: 1 tab(s) orally once a day  levothyroxine 125 mcg (0.125 mg) oral capsule: 1 cap(s) orally once a day    losartan 25 mg oral tablet: 1 tab(s) orally once a day  metoprolol succinate 50 mg oral tablet, extended release: 3 tab(s) orally once a day  PROzac 20 mg oral capsule: 1 cap(s) orally once a day   amiodarone 200 mg oral tablet: 1 tab(s) orally once a day  aspirin 81 mg oral delayed release tablet: 1 tab(s) orally once a day  atorvastatin 80 mg oral tablet: 1 tab(s) orally once a day  clopidogrel 75 mg oral tablet: 1 tab(s) orally once a day  KlonoPIN 1 mg oral tablet:  orally once a day  Lasix 20 mg oral tablet: 1 tab(s) orally once a day  levothyroxine 125 mcg (0.125 mg) oral capsule: 1 cap(s) orally once a day    losartan 25 mg oral tablet: 1 tab(s) orally once a day  metoprolol succinate 50 mg oral tablet, extended release: 3 tab(s) orally once a day  PROzac 20 mg oral capsule: 1 cap(s) orally once a day

## 2021-05-26 NOTE — PROGRESS NOTE ADULT - SUBJECTIVE AND OBJECTIVE BOX
Date of Service  : 05-26-21     INTERVAL HPI/OVERNIGHT EVENTS: nO new concerns.   Vital Signs Last 24 Hrs  T(C): 36.4 (26 May 2021 05:24), Max: 36.4 (26 May 2021 05:24)  T(F): 97.6 (26 May 2021 05:24), Max: 97.6 (26 May 2021 05:24)  HR: 71 (26 May 2021 05:24) (62 - 71)  BP: 147/65 (26 May 2021 05:24) (102/54 - 147/65)  BP(mean): --  RR: 17 (26 May 2021 05:24) (17 - 20)  SpO2: 97% (26 May 2021 05:24) (97% - 99%)  I&O's Summary    MEDICATIONS  (STANDING):  aMIOdarone    Tablet 200 milliGRAM(s) Oral daily  aspirin enteric coated 81 milliGRAM(s) Oral daily  atorvastatin 80 milliGRAM(s) Oral at bedtime  clonazePAM  Tablet 1 milliGRAM(s) Oral daily  clopidogrel Tablet 75 milliGRAM(s) Oral daily  dextrose 40% Gel 15 Gram(s) Oral once  dextrose 5%. 1000 milliLiter(s) (50 mL/Hr) IV Continuous <Continuous>  dextrose 5%. 1000 milliLiter(s) (100 mL/Hr) IV Continuous <Continuous>  dextrose 50% Injectable 25 Gram(s) IV Push once  dextrose 50% Injectable 12.5 Gram(s) IV Push once  dextrose 50% Injectable 25 Gram(s) IV Push once  enoxaparin Injectable 40 milliGRAM(s) SubCutaneous daily  FLUoxetine 20 milliGRAM(s) Oral daily  glucagon  Injectable 1 milliGRAM(s) IntraMuscular once  insulin glargine Injectable (LANTUS) 10 Unit(s) SubCutaneous at bedtime  insulin lispro (ADMELOG) corrective regimen sliding scale   SubCutaneous three times a day before meals  insulin lispro (ADMELOG) corrective regimen sliding scale   SubCutaneous at bedtime  levothyroxine 125 MICROGram(s) Oral daily  losartan 25 milliGRAM(s) Oral daily  metoprolol succinate  milliGRAM(s) Oral daily  nystatin Powder 1 Application(s) Topical two times a day  sodium chloride 0.9%. 1000 milliLiter(s) (75 mL/Hr) IV Continuous <Continuous>    MEDICATIONS  (PRN):  acetaminophen   Tablet .. 650 milliGRAM(s) Oral every 6 hours PRN Mild Pain (1 - 3), Moderate Pain (4 - 6)    LABS:                        11.5   6.13  )-----------( 215      ( 26 May 2021 07:24 )             34.8     05-26    139  |  102  |  32<H>  ----------------------------<  130<H>  4.4   |  24  |  1.53<H>    Ca    9.0      26 May 2021 07:24  Phos  3.1     05-26  Mg     1.9     05-26          CAPILLARY BLOOD GLUCOSE      POCT Blood Glucose.: 144 mg/dL (25 May 2021 21:45)  POCT Blood Glucose.: 117 mg/dL (25 May 2021 17:46)          REVIEW OF SYSTEMS:  CONSTITUTIONAL: No fever, weight loss, or fatigue  EYES: No eye pain, visual disturbances, or discharge  ENMT:  No difficulty hearing, tinnitus, vertigo; No sinus or throat pain  NECK: No pain or stiffness  RESPIRATORY: No cough, wheezing, chills or hemoptysis; No shortness of breath  CARDIOVASCULAR: No chest pain, palpitations, dizziness, or leg swelling  GASTROINTESTINAL: No abdominal or epigastric pain. No nausea, vomiting, or hematemesis; No diarrhea or constipation. No melena or hematochezia.  GENITOURINARY: No dysuria, frequency, hematuria, or incontinence  NEUROLOGICAL: No headaches, memory loss, loss of strength, numbness, or tremors      Consultant(s) Notes Reviewed:  [x ] YES  [ ] NO    PHYSICAL EXAM:  GENERAL: NAD, well-groomed, well-developed, not in any distress ,  HEAD:  Atraumatic, Normocephalic  EYES: EOMI, PERRLA, conjunctiva and sclera clear  ENMT: No tonsillar erythema, exudates, or enlargement; Moist mucous membranes, Good dentition, No lesions  NECK: Supple, No JVD, Normal thyroid  NERVOUS SYSTEM:  Alert & Oriented X3, No focal deficit   CHEST/LUNG: Good air entry bilateral with no  rales, rhonchi, wheezing, or rubs  HEART: Regular rate and rhythm; No murmurs, rubs, or gallops  ABDOMEN: Soft, Nontender, Nondistended; Bowel sounds present  EXTREMITIES:  2+ Peripheral Pulses, No clubbing, cyanosis, or edema  SKIN: No rashes or lesions    Care Discussed with Consultants/Other Providers [ x] YES  [ ] NO

## 2021-05-26 NOTE — CHART NOTE - NSCHARTNOTESELECT_GEN_ALL_CORE
EP TELE/Event Note
Event Note
Telemetry - EP/Event Note
EP TELE/Event Note
Event Note
RFA/RRA site check/Event Note

## 2021-05-26 NOTE — DISCHARGE NOTE PROVIDER - CARE PROVIDER_API CALL
Kerry Murillo)  Cardiovascular Disease; Internal Medicine  [address] [facility], [address]  [place] [address]  Phone: [phone]  Fax: [phone]  Follow Up Time:     Sotero Godinez)  Cardiac Electrophysiology; Cardiovascular Disease; Internal Medicine  [address] [place] [address], [address]  [place] [address]  Phone: [phone]  Fax: [phone]  Follow Up Time:

## 2021-06-10 RX ORDER — AMIODARONE HYDROCHLORIDE 200 MG/1
200 TABLET ORAL DAILY
Qty: 30 | Refills: 1 | Status: DISCONTINUED | COMMUNITY
Start: 2021-05-27 | End: 2021-06-10

## 2021-06-13 NOTE — ASU PREOP CHECKLIST - AS BP NONINV SITE
Pt here for nplate injection. PT states feeling good and has no concerns. Nplate reviewed and administered in right upper arm/bandaid placed on injection site.follow up reviewed and pt dc'd steady gait  
left upper arm

## 2021-07-23 ENCOUNTER — APPOINTMENT (OUTPATIENT)
Dept: ELECTROPHYSIOLOGY | Facility: CLINIC | Age: 76
End: 2021-07-23
Payer: MEDICARE

## 2021-07-23 VITALS
OXYGEN SATURATION: 98 % | BODY MASS INDEX: 28.49 KG/M2 | RESPIRATION RATE: 16 BRPM | HEIGHT: 68 IN | HEART RATE: 62 BPM | SYSTOLIC BLOOD PRESSURE: 154 MMHG | TEMPERATURE: 94.6 F | WEIGHT: 188 LBS | DIASTOLIC BLOOD PRESSURE: 73 MMHG

## 2021-07-23 VITALS — HEART RATE: 61 BPM | SYSTOLIC BLOOD PRESSURE: 148 MMHG | DIASTOLIC BLOOD PRESSURE: 66 MMHG

## 2021-07-23 VITALS — SYSTOLIC BLOOD PRESSURE: 138 MMHG | DIASTOLIC BLOOD PRESSURE: 64 MMHG | HEART RATE: 61 BPM

## 2021-07-23 PROCEDURE — 99213 OFFICE O/P EST LOW 20 MIN: CPT

## 2021-07-23 PROCEDURE — 93000 ELECTROCARDIOGRAM COMPLETE: CPT

## 2021-07-24 NOTE — HISTORY OF PRESENT ILLNESS
[FreeTextEntry1] : Al Hutton is a 77y/o man with Hx of Hodgkins lymphoma, HTN, DM, HLD, severe AS s/p TAVR (2016), chronic systolic CHF with EF 34%, LBBB (EF improved to 40-45% with CRT-D and AVR), and MI s/p BiV ICD and paroxysmal afib (very brief, not on AC) who presents today for routine device check and follow up. Had an episode of VT requiring ICD shock on 5/13/2021. Recalls being in bathroom when he felt the shock. Denies any symptoms at time. No missed medication dosing. On metoprolol 75mg daily. Believes he had recent ECHO with LVEF 40% and recent stress test. Was last hospitalized in [place] in the spring of 2019 for pericardial effusion and 900cc of blood was aspirated. Now off Plavix and only on ASA. Was also admitted to [facility] in late May 2019 for acute on chronic systolic CHF. He does note dyspnea at times and mild edema a few days ago but went down. Also notes being "a little phlegmy (green)" with no fevers or chills. He has to periodically double up on diuretic to reduce edema.

## 2021-07-24 NOTE — DISCUSSION/SUMMARY
[FreeTextEntry1] : In summary Al Hutton is a 75y/o man with Hx of Hodgkins lymphoma, HTN, DM, HLD, severe AS s/p TAVR (2016), chronic systolic CHF with EF 34%, LBBB (EF improved to 40-45% with CRT-D and AVR), and MI s/p BiV ICD and paroxysmal afib (very brief, not on AC) who presents today for routine device check and follow up. Had an episode of VT requiring ICD shock on 5/13/2021. ICD check showed no ATP or shock since May. \par \par 1. VT. Patient self discontinued amiodarone citing constipation as the reason for not continuing the medication. Increase metoprolol to 200 mg per day to compensate for discontinuing amiodarone. \par \par 2. Chronic systolic CHF:  continue optimal medical therapy and patient has CRT device. Metoprolol and Losartan. Also taking furosemide. \par \par 3. DM: on Actos\par \par 4. Phlegm and cough: asked patient to see Dr. Mercer as patient concerned because the cough reminded him of past lymphoma. \par \par Sincerely,\par \par Sotero Godinez MD

## 2021-08-15 ENCOUNTER — NON-APPOINTMENT (OUTPATIENT)
Age: 76
End: 2021-08-15

## 2021-08-16 ENCOUNTER — NON-APPOINTMENT (OUTPATIENT)
Age: 76
End: 2021-08-16

## 2021-08-16 ENCOUNTER — APPOINTMENT (OUTPATIENT)
Dept: ELECTROPHYSIOLOGY | Facility: CLINIC | Age: 76
End: 2021-08-16
Payer: MEDICARE

## 2021-08-16 PROCEDURE — 93296 REM INTERROG EVL PM/IDS: CPT

## 2021-08-16 PROCEDURE — 93295 DEV INTERROG REMOTE 1/2/MLT: CPT

## 2021-11-13 ENCOUNTER — INPATIENT (INPATIENT)
Facility: HOSPITAL | Age: 76
LOS: 1 days | Discharge: HOME | DRG: 291 | End: 2021-11-15
Attending: STUDENT IN AN ORGANIZED HEALTH CARE EDUCATION/TRAINING PROGRAM | Admitting: STUDENT IN AN ORGANIZED HEALTH CARE EDUCATION/TRAINING PROGRAM
Payer: MEDICARE

## 2021-11-13 VITALS
OXYGEN SATURATION: 96 % | TEMPERATURE: 98 F | WEIGHT: 179.9 LBS | RESPIRATION RATE: 16 BRPM | HEART RATE: 82 BPM | HEIGHT: 68 IN | DIASTOLIC BLOOD PRESSURE: 83 MMHG | SYSTOLIC BLOOD PRESSURE: 176 MMHG

## 2021-11-13 DIAGNOSIS — Z98.89 OTHER SPECIFIED POSTPROCEDURAL STATES: Chronic | ICD-10-CM

## 2021-11-13 DIAGNOSIS — Z98.890 OTHER SPECIFIED POSTPROCEDURAL STATES: Chronic | ICD-10-CM

## 2021-11-13 DIAGNOSIS — Z98.49 CATARACT EXTRACTION STATUS, UNSPECIFIED EYE: Chronic | ICD-10-CM

## 2021-11-13 DIAGNOSIS — Z95.2 PRESENCE OF PROSTHETIC HEART VALVE: Chronic | ICD-10-CM

## 2021-11-13 DIAGNOSIS — Z95.810 PRESENCE OF AUTOMATIC (IMPLANTABLE) CARDIAC DEFIBRILLATOR: Chronic | ICD-10-CM

## 2021-11-13 PROCEDURE — 93010 ELECTROCARDIOGRAM REPORT: CPT

## 2021-11-13 PROCEDURE — 99285 EMERGENCY DEPT VISIT HI MDM: CPT

## 2021-11-13 RX ORDER — METOCLOPRAMIDE HCL 10 MG
10 TABLET ORAL ONCE
Refills: 0 | Status: COMPLETED | OUTPATIENT
Start: 2021-11-13 | End: 2021-11-13

## 2021-11-13 RX ORDER — SODIUM CHLORIDE 9 MG/ML
3 INJECTION INTRAMUSCULAR; INTRAVENOUS; SUBCUTANEOUS EVERY 8 HOURS
Refills: 0 | Status: DISCONTINUED | OUTPATIENT
Start: 2021-11-13 | End: 2021-11-15

## 2021-11-13 RX ORDER — ACETAMINOPHEN 500 MG
650 TABLET ORAL ONCE
Refills: 0 | Status: COMPLETED | OUTPATIENT
Start: 2021-11-13 | End: 2021-11-13

## 2021-11-14 DIAGNOSIS — I24.9 ACUTE ISCHEMIC HEART DISEASE, UNSPECIFIED: ICD-10-CM

## 2021-11-14 DIAGNOSIS — E11.9 TYPE 2 DIABETES MELLITUS WITHOUT COMPLICATIONS: ICD-10-CM

## 2021-11-14 DIAGNOSIS — Z29.9 ENCOUNTER FOR PROPHYLACTIC MEASURES, UNSPECIFIED: ICD-10-CM

## 2021-11-14 DIAGNOSIS — I10 ESSENTIAL (PRIMARY) HYPERTENSION: ICD-10-CM

## 2021-11-14 DIAGNOSIS — I50.23 ACUTE ON CHRONIC SYSTOLIC (CONGESTIVE) HEART FAILURE: ICD-10-CM

## 2021-11-14 DIAGNOSIS — N18.9 CHRONIC KIDNEY DISEASE, UNSPECIFIED: ICD-10-CM

## 2021-11-14 DIAGNOSIS — I21.4 NON-ST ELEVATION (NSTEMI) MYOCARDIAL INFARCTION: ICD-10-CM

## 2021-11-14 DIAGNOSIS — E78.5 HYPERLIPIDEMIA, UNSPECIFIED: ICD-10-CM

## 2021-11-14 DIAGNOSIS — E03.9 HYPOTHYROIDISM, UNSPECIFIED: ICD-10-CM

## 2021-11-14 LAB
ALBUMIN SERPL ELPH-MCNC: 4 G/DL — SIGNIFICANT CHANGE UP (ref 3.5–5)
ALP SERPL-CCNC: 61 U/L — SIGNIFICANT CHANGE UP (ref 40–120)
ALT FLD-CCNC: 16 U/L DA — SIGNIFICANT CHANGE UP (ref 10–60)
ANION GAP SERPL CALC-SCNC: 7 MMOL/L — SIGNIFICANT CHANGE UP (ref 5–17)
ANION GAP SERPL CALC-SCNC: 7 MMOL/L — SIGNIFICANT CHANGE UP (ref 5–17)
APTT BLD: 36.5 SEC — HIGH (ref 27.5–35.5)
AST SERPL-CCNC: 15 U/L — SIGNIFICANT CHANGE UP (ref 10–40)
BASOPHILS # BLD AUTO: 0.02 K/UL — SIGNIFICANT CHANGE UP (ref 0–0.2)
BASOPHILS NFR BLD AUTO: 0.3 % — SIGNIFICANT CHANGE UP (ref 0–2)
BILIRUB SERPL-MCNC: 0.8 MG/DL — SIGNIFICANT CHANGE UP (ref 0.2–1.2)
BUN SERPL-MCNC: 31 MG/DL — HIGH (ref 7–18)
BUN SERPL-MCNC: 31 MG/DL — HIGH (ref 7–18)
CALCIUM SERPL-MCNC: 8.9 MG/DL — SIGNIFICANT CHANGE UP (ref 8.4–10.5)
CALCIUM SERPL-MCNC: 9.3 MG/DL — SIGNIFICANT CHANGE UP (ref 8.4–10.5)
CHLORIDE SERPL-SCNC: 100 MMOL/L — SIGNIFICANT CHANGE UP (ref 96–108)
CHLORIDE SERPL-SCNC: 102 MMOL/L — SIGNIFICANT CHANGE UP (ref 96–108)
CHOLEST SERPL-MCNC: 122 MG/DL — SIGNIFICANT CHANGE UP
CO2 SERPL-SCNC: 31 MMOL/L — SIGNIFICANT CHANGE UP (ref 22–31)
CO2 SERPL-SCNC: 31 MMOL/L — SIGNIFICANT CHANGE UP (ref 22–31)
CREAT SERPL-MCNC: 1.4 MG/DL — HIGH (ref 0.5–1.3)
CREAT SERPL-MCNC: 1.48 MG/DL — HIGH (ref 0.5–1.3)
EOSINOPHIL # BLD AUTO: 0.08 K/UL — SIGNIFICANT CHANGE UP (ref 0–0.5)
EOSINOPHIL NFR BLD AUTO: 1.1 % — SIGNIFICANT CHANGE UP (ref 0–6)
GLUCOSE BLDC GLUCOMTR-MCNC: 118 MG/DL — HIGH (ref 70–99)
GLUCOSE BLDC GLUCOMTR-MCNC: 123 MG/DL — HIGH (ref 70–99)
GLUCOSE BLDC GLUCOMTR-MCNC: 142 MG/DL — HIGH (ref 70–99)
GLUCOSE BLDC GLUCOMTR-MCNC: 142 MG/DL — HIGH (ref 70–99)
GLUCOSE SERPL-MCNC: 134 MG/DL — HIGH (ref 70–99)
GLUCOSE SERPL-MCNC: 136 MG/DL — HIGH (ref 70–99)
HCT VFR BLD CALC: 32.1 % — LOW (ref 39–50)
HCT VFR BLD CALC: 32.4 % — LOW (ref 39–50)
HDLC SERPL-MCNC: 44 MG/DL — SIGNIFICANT CHANGE UP
HGB BLD-MCNC: 10.7 G/DL — LOW (ref 13–17)
HGB BLD-MCNC: 10.8 G/DL — LOW (ref 13–17)
IMM GRANULOCYTES NFR BLD AUTO: 0.4 % — SIGNIFICANT CHANGE UP (ref 0–1.5)
INR BLD: 1.13 RATIO — SIGNIFICANT CHANGE UP (ref 0.88–1.16)
LIPID PNL WITH DIRECT LDL SERPL: 67 MG/DL — SIGNIFICANT CHANGE UP
LYMPHOCYTES # BLD AUTO: 0.58 K/UL — LOW (ref 1–3.3)
LYMPHOCYTES # BLD AUTO: 7.8 % — LOW (ref 13–44)
MAGNESIUM SERPL-MCNC: 2.1 MG/DL — SIGNIFICANT CHANGE UP (ref 1.6–2.6)
MCHC RBC-ENTMCNC: 31.2 PG — SIGNIFICANT CHANGE UP (ref 27–34)
MCHC RBC-ENTMCNC: 31.2 PG — SIGNIFICANT CHANGE UP (ref 27–34)
MCHC RBC-ENTMCNC: 33.3 GM/DL — SIGNIFICANT CHANGE UP (ref 32–36)
MCHC RBC-ENTMCNC: 33.3 GM/DL — SIGNIFICANT CHANGE UP (ref 32–36)
MCV RBC AUTO: 93.6 FL — SIGNIFICANT CHANGE UP (ref 80–100)
MCV RBC AUTO: 93.6 FL — SIGNIFICANT CHANGE UP (ref 80–100)
MONOCYTES # BLD AUTO: 0.55 K/UL — SIGNIFICANT CHANGE UP (ref 0–0.9)
MONOCYTES NFR BLD AUTO: 7.4 % — SIGNIFICANT CHANGE UP (ref 2–14)
NEUTROPHILS # BLD AUTO: 6.13 K/UL — SIGNIFICANT CHANGE UP (ref 1.8–7.4)
NEUTROPHILS NFR BLD AUTO: 83 % — HIGH (ref 43–77)
NON HDL CHOLESTEROL: 78 MG/DL — SIGNIFICANT CHANGE UP
NRBC # BLD: 0 /100 WBCS — SIGNIFICANT CHANGE UP (ref 0–0)
NRBC # BLD: 0 /100 WBCS — SIGNIFICANT CHANGE UP (ref 0–0)
NT-PROBNP SERPL-SCNC: 9945 PG/ML — HIGH (ref 0–450)
PHOSPHATE SERPL-MCNC: 3.3 MG/DL — SIGNIFICANT CHANGE UP (ref 2.5–4.5)
PLATELET # BLD AUTO: 225 K/UL — SIGNIFICANT CHANGE UP (ref 150–400)
PLATELET # BLD AUTO: 225 K/UL — SIGNIFICANT CHANGE UP (ref 150–400)
POTASSIUM SERPL-MCNC: 3.7 MMOL/L — SIGNIFICANT CHANGE UP (ref 3.5–5.3)
POTASSIUM SERPL-MCNC: 3.9 MMOL/L — SIGNIFICANT CHANGE UP (ref 3.5–5.3)
POTASSIUM SERPL-SCNC: 3.7 MMOL/L — SIGNIFICANT CHANGE UP (ref 3.5–5.3)
POTASSIUM SERPL-SCNC: 3.9 MMOL/L — SIGNIFICANT CHANGE UP (ref 3.5–5.3)
PROT SERPL-MCNC: 7.5 G/DL — SIGNIFICANT CHANGE UP (ref 6–8.3)
PROTHROM AB SERPL-ACNC: 13.4 SEC — SIGNIFICANT CHANGE UP (ref 10.6–13.6)
RBC # BLD: 3.43 M/UL — LOW (ref 4.2–5.8)
RBC # BLD: 3.46 M/UL — LOW (ref 4.2–5.8)
RBC # FLD: 15.1 % — HIGH (ref 10.3–14.5)
RBC # FLD: 15.2 % — HIGH (ref 10.3–14.5)
SARS-COV-2 RNA SPEC QL NAA+PROBE: SIGNIFICANT CHANGE UP
SODIUM SERPL-SCNC: 138 MMOL/L — SIGNIFICANT CHANGE UP (ref 135–145)
SODIUM SERPL-SCNC: 140 MMOL/L — SIGNIFICANT CHANGE UP (ref 135–145)
TRIGL SERPL-MCNC: 53 MG/DL — SIGNIFICANT CHANGE UP
TROPONIN I, HIGH SENSITIVITY RESULT: 296.8 NG/L — HIGH
TROPONIN I, HIGH SENSITIVITY RESULT: 316.8 NG/L — HIGH
TROPONIN I, HIGH SENSITIVITY RESULT: 318.1 NG/L — HIGH
TSH SERPL-MCNC: 2.17 UU/ML — SIGNIFICANT CHANGE UP (ref 0.34–4.82)
WBC # BLD: 7.35 K/UL — SIGNIFICANT CHANGE UP (ref 3.8–10.5)
WBC # BLD: 7.39 K/UL — SIGNIFICANT CHANGE UP (ref 3.8–10.5)
WBC # FLD AUTO: 7.35 K/UL — SIGNIFICANT CHANGE UP (ref 3.8–10.5)
WBC # FLD AUTO: 7.39 K/UL — SIGNIFICANT CHANGE UP (ref 3.8–10.5)

## 2021-11-14 PROCEDURE — 99223 1ST HOSP IP/OBS HIGH 75: CPT

## 2021-11-14 PROCEDURE — 93010 ELECTROCARDIOGRAM REPORT: CPT

## 2021-11-14 PROCEDURE — 93306 TTE W/DOPPLER COMPLETE: CPT | Mod: 26

## 2021-11-14 PROCEDURE — 71045 X-RAY EXAM CHEST 1 VIEW: CPT | Mod: 26

## 2021-11-14 RX ORDER — HEPARIN SODIUM 5000 [USP'U]/ML
5000 INJECTION INTRAVENOUS; SUBCUTANEOUS ONCE
Refills: 0 | Status: COMPLETED | OUTPATIENT
Start: 2021-11-14 | End: 2021-11-14

## 2021-11-14 RX ORDER — HEPARIN SODIUM 5000 [USP'U]/ML
5000 INJECTION INTRAVENOUS; SUBCUTANEOUS EVERY 6 HOURS
Refills: 0 | Status: DISCONTINUED | OUTPATIENT
Start: 2021-11-14 | End: 2021-11-14

## 2021-11-14 RX ORDER — ATORVASTATIN CALCIUM 80 MG/1
0 TABLET, FILM COATED ORAL
Qty: 0 | Refills: 0 | DISCHARGE

## 2021-11-14 RX ORDER — CLOPIDOGREL BISULFATE 75 MG/1
75 TABLET, FILM COATED ORAL DAILY
Refills: 0 | Status: DISCONTINUED | OUTPATIENT
Start: 2021-11-14 | End: 2021-11-15

## 2021-11-14 RX ORDER — CLOPIDOGREL BISULFATE 75 MG/1
300 TABLET, FILM COATED ORAL ONCE
Refills: 0 | Status: COMPLETED | OUTPATIENT
Start: 2021-11-14 | End: 2021-11-14

## 2021-11-14 RX ORDER — CLONAZEPAM 1 MG
1 TABLET ORAL DAILY
Refills: 0 | Status: DISCONTINUED | OUTPATIENT
Start: 2021-11-14 | End: 2021-11-15

## 2021-11-14 RX ORDER — HEPARIN SODIUM 5000 [USP'U]/ML
INJECTION INTRAVENOUS; SUBCUTANEOUS
Qty: 25000 | Refills: 0 | Status: DISCONTINUED | OUTPATIENT
Start: 2021-11-14 | End: 2021-11-14

## 2021-11-14 RX ORDER — INSULIN LISPRO 100/ML
VIAL (ML) SUBCUTANEOUS
Refills: 0 | Status: DISCONTINUED | OUTPATIENT
Start: 2021-11-14 | End: 2021-11-15

## 2021-11-14 RX ORDER — ACETAMINOPHEN 500 MG
650 TABLET ORAL EVERY 6 HOURS
Refills: 0 | Status: DISCONTINUED | OUTPATIENT
Start: 2021-11-14 | End: 2021-11-15

## 2021-11-14 RX ORDER — METOPROLOL TARTRATE 50 MG
200 TABLET ORAL DAILY
Refills: 0 | Status: DISCONTINUED | OUTPATIENT
Start: 2021-11-14 | End: 2021-11-15

## 2021-11-14 RX ORDER — LEVOTHYROXINE SODIUM 125 MCG
0 TABLET ORAL
Qty: 0 | Refills: 0 | DISCHARGE

## 2021-11-14 RX ORDER — LOSARTAN POTASSIUM 100 MG/1
0 TABLET, FILM COATED ORAL
Qty: 0 | Refills: 0 | DISCHARGE

## 2021-11-14 RX ORDER — LOSARTAN POTASSIUM 100 MG/1
25 TABLET, FILM COATED ORAL DAILY
Refills: 0 | Status: DISCONTINUED | OUTPATIENT
Start: 2021-11-14 | End: 2021-11-15

## 2021-11-14 RX ORDER — CLOPIDOGREL BISULFATE 75 MG/1
0 TABLET, FILM COATED ORAL
Qty: 0 | Refills: 0 | DISCHARGE

## 2021-11-14 RX ORDER — FUROSEMIDE 40 MG
40 TABLET ORAL
Refills: 0 | Status: DISCONTINUED | OUTPATIENT
Start: 2021-11-14 | End: 2021-11-15

## 2021-11-14 RX ORDER — FUROSEMIDE 40 MG
40 TABLET ORAL EVERY 12 HOURS
Refills: 0 | Status: DISCONTINUED | OUTPATIENT
Start: 2021-11-14 | End: 2021-11-14

## 2021-11-14 RX ORDER — ENOXAPARIN SODIUM 100 MG/ML
40 INJECTION SUBCUTANEOUS DAILY
Refills: 0 | Status: DISCONTINUED | OUTPATIENT
Start: 2021-11-14 | End: 2021-11-15

## 2021-11-14 RX ORDER — FUROSEMIDE 40 MG
80 TABLET ORAL ONCE
Refills: 0 | Status: COMPLETED | OUTPATIENT
Start: 2021-11-14 | End: 2021-11-14

## 2021-11-14 RX ORDER — LEVOTHYROXINE SODIUM 125 MCG
125 TABLET ORAL DAILY
Refills: 0 | Status: DISCONTINUED | OUTPATIENT
Start: 2021-11-14 | End: 2021-11-15

## 2021-11-14 RX ORDER — FLUOXETINE HCL 10 MG
20 CAPSULE ORAL DAILY
Refills: 0 | Status: DISCONTINUED | OUTPATIENT
Start: 2021-11-14 | End: 2021-11-15

## 2021-11-14 RX ORDER — PIOGLITAZONE HYDROCHLORIDE 15 MG/1
0 TABLET ORAL
Qty: 0 | Refills: 0 | DISCHARGE

## 2021-11-14 RX ORDER — POTASSIUM CHLORIDE 20 MEQ
40 PACKET (EA) ORAL
Refills: 0 | Status: DISCONTINUED | OUTPATIENT
Start: 2021-11-14 | End: 2021-11-15

## 2021-11-14 RX ORDER — ASPIRIN/CALCIUM CARB/MAGNESIUM 324 MG
324 TABLET ORAL ONCE
Refills: 0 | Status: COMPLETED | OUTPATIENT
Start: 2021-11-14 | End: 2021-11-14

## 2021-11-14 RX ORDER — ASPIRIN/CALCIUM CARB/MAGNESIUM 324 MG
81 TABLET ORAL DAILY
Refills: 0 | Status: DISCONTINUED | OUTPATIENT
Start: 2021-11-14 | End: 2021-11-15

## 2021-11-14 RX ORDER — ATORVASTATIN CALCIUM 80 MG/1
80 TABLET, FILM COATED ORAL AT BEDTIME
Refills: 0 | Status: DISCONTINUED | OUTPATIENT
Start: 2021-11-14 | End: 2021-11-15

## 2021-11-14 RX ADMIN — SODIUM CHLORIDE 3 MILLILITER(S): 9 INJECTION INTRAMUSCULAR; INTRAVENOUS; SUBCUTANEOUS at 13:43

## 2021-11-14 RX ADMIN — Medication 1 MILLIGRAM(S): at 12:40

## 2021-11-14 RX ADMIN — Medication 40 MILLIEQUIVALENT(S): at 17:29

## 2021-11-14 RX ADMIN — Medication 650 MILLIGRAM(S): at 04:27

## 2021-11-14 RX ADMIN — HEPARIN SODIUM 1000 UNIT(S)/HR: 5000 INJECTION INTRAVENOUS; SUBCUTANEOUS at 03:44

## 2021-11-14 RX ADMIN — Medication 650 MILLIGRAM(S): at 22:52

## 2021-11-14 RX ADMIN — SODIUM CHLORIDE 3 MILLILITER(S): 9 INJECTION INTRAMUSCULAR; INTRAVENOUS; SUBCUTANEOUS at 06:16

## 2021-11-14 RX ADMIN — HEPARIN SODIUM 5000 UNIT(S): 5000 INJECTION INTRAVENOUS; SUBCUTANEOUS at 03:40

## 2021-11-14 RX ADMIN — Medication 81 MILLIGRAM(S): at 12:40

## 2021-11-14 RX ADMIN — ATORVASTATIN CALCIUM 80 MILLIGRAM(S): 80 TABLET, FILM COATED ORAL at 21:28

## 2021-11-14 RX ADMIN — Medication 324 MILLIGRAM(S): at 03:40

## 2021-11-14 RX ADMIN — ENOXAPARIN SODIUM 40 MILLIGRAM(S): 100 INJECTION SUBCUTANEOUS at 12:39

## 2021-11-14 RX ADMIN — CLOPIDOGREL BISULFATE 75 MILLIGRAM(S): 75 TABLET, FILM COATED ORAL at 12:40

## 2021-11-14 RX ADMIN — Medication 650 MILLIGRAM(S): at 02:01

## 2021-11-14 RX ADMIN — Medication 200 MILLIGRAM(S): at 08:42

## 2021-11-14 RX ADMIN — Medication 20 MILLIGRAM(S): at 12:40

## 2021-11-14 RX ADMIN — CLOPIDOGREL BISULFATE 300 MILLIGRAM(S): 75 TABLET, FILM COATED ORAL at 03:41

## 2021-11-14 RX ADMIN — Medication 80 MILLIGRAM(S): at 01:59

## 2021-11-14 RX ADMIN — Medication 125 MICROGRAM(S): at 06:35

## 2021-11-14 RX ADMIN — Medication 650 MILLIGRAM(S): at 22:00

## 2021-11-14 RX ADMIN — SODIUM CHLORIDE 3 MILLILITER(S): 9 INJECTION INTRAMUSCULAR; INTRAVENOUS; SUBCUTANEOUS at 21:30

## 2021-11-14 RX ADMIN — Medication 40 MILLIGRAM(S): at 17:29

## 2021-11-14 RX ADMIN — LOSARTAN POTASSIUM 25 MILLIGRAM(S): 100 TABLET, FILM COATED ORAL at 08:42

## 2021-11-14 NOTE — ED PROVIDER NOTE - NSICDXPASTSURGICALHX_GEN_ALL_CORE_FT
PAST SURGICAL HISTORY:  H/O bilateral inguinal hernia repair     H/O carotid endarterectomy bilateral    History of umbilical hernia repair     S/P cataract surgery b/l    S/P ICD (internal cardiac defibrillator) procedure [employer] [id number], last interrogation 11/2019    S/P TAVR (transcatheter aortic valve replacement) 10/16 in [place]

## 2021-11-14 NOTE — ED ADULT NURSE REASSESSMENT NOTE - NS ED NURSE REASSESS COMMENT FT1
Pt is A&Ox3, admitting to medicine/tele service, awaiting floor bed. Pt resting in bed, no acute distress noted, denies chest pain, no shortness of breath indicated.

## 2021-11-14 NOTE — H&P ADULT - PROBLEM SELECTOR PLAN 2
Presented with SOB and B/L leg swelling  s/p 80mg IV lasix in ED  Will start on 40mh daily   On metoprolol at home, continue   Continue losartan   Follow echo

## 2021-11-14 NOTE — H&P ADULT - ASSESSMENT
76 M hx of  PMH of MI s/p PPM, AS s/p TAVR 2016, CHF w/ BiV AICD, cholelithiasis, anxiety, hypothyroidism, DM, Hodgkin lymphoma s/p radiation and chemo now in remission with left lung base radiation fibrosis, b/l carotid stenosis s/p carotid endarterectomy presenting with the complaint of shortness of breath and increased bilateral ankle swelling since this morning

## 2021-11-14 NOTE — H&P ADULT - PROBLEM SELECTOR PLAN 1
Presented with shortness of breath   No angina  EKG: Atrial and ventricular paced, ST depressions in V1 & V2  T1: 316.8, T2: 318.1  Follow T3  Started on heparin drip in Ed  Patient is on Presented with shortness of breath   No angina  EKG: Atrial and ventricular paced, ST depressions in V1 & V2  T1: 316.8, T2: 318.1  Follow T3  Started on heparin drip in Ed  Patient is on Aspirin and plavix at home, will resume   Will continue metoprolol and lipitor  Dr. Guillen consulted Presented with shortness of breath   No angina  EKG: Atrial and ventricular paced, ST depressions in V1 & V2  T1: 316.8, T2: 318.1  Follow T3  Started on heparin drip in Ed  Patient is on Aspirin and plavix at home, will resume   Will continue metoprolol and lipitor  Dr. Hinton consulted Presented with shortness of breath   No angina  EKG: Atrial and ventricular paced, ST depressions in V1 & V2  T1: 316.8, T2: 318.1  Follow T3  Started on heparin drip in Ed, will hold as ST depressions not significant  Patient is on Aspirin and plavix at home, will resume   Will continue metoprolol and lipitor  Dr. Hinton consulted

## 2021-11-14 NOTE — H&P ADULT - NSICDXPASTMEDICALHX_GEN_ALL_CORE_FT
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

## 2021-11-14 NOTE — ED PROVIDER NOTE - CLINICAL SUMMARY MEDICAL DECISION MAKING FREE TEXT BOX
Patient presenting to the ED with sign symptoms of CHF fluid overload. Giving Lasix. EKG showing paced rhythm with no obvious ischemia. Patient is currently stable. Will reassess. Patient presenting to the ED with sign symptoms of CHF fluid overload. Giving Lasix. EKG showing paced rhythm with no obvious ischemia. Patient is currently stable. Will reassess.    Trop 319 and BNP 9K. On reassessment pt continues to deny chest pain or other symptoms in the ED stating that he only has TALBERT. Treating for NSTEMI and pt placed on monitor. Pt stable and endorsed to inpatient team. Ordering second trop per MAR request and they will f/u on this test.

## 2021-11-14 NOTE — H&P ADULT - NSICDXPASTSURGICALHX_GEN_ALL_CORE_FT
PAST SURGICAL HISTORY:  H/O bilateral inguinal hernia repair     H/O carotid endarterectomy bilateral    History of umbilical hernia repair     S/P cataract surgery b/l    S/P ICD (internal cardiac defibrillator) procedure [employer] [id number], last interrogation 11/2019    S/P TAVR (transcatheter aortic valve replacement) 10/16 in [place]

## 2021-11-14 NOTE — H&P ADULT - ATTENDING COMMENTS
Pt seen at bedside  Case discussed with MAR.  76 year old man with PMH of DM2, HTN, obstructive cardiomyopathy ( HFrEF) with PPM /AICD, AS s/p TAVR, hypothyroidism with 1 day of progressive SOB and B/L leg swelling.    Vital Signs Last 24 Hrs  T(C): 36.6 (14 Nov 2021 02:40), Max: 36.7 (13 Nov 2021 22:33)  T(F): 97.8 (14 Nov 2021 02:40), Max: 98 (13 Nov 2021 22:33)  HR: 82 (14 Nov 2021 02:40) (82 - 82)  BP: 146/65 (14 Nov 2021 02:40) (146/65 - 176/83)  RR: 16 (14 Nov 2021 02:40) (16 - 16)  SpO2: 95% (14 Nov 2021 02:40) (95% - 96%)                          10.8   7.39  )-----------( 225      ( 14 Nov 2021 02:03 )             32.4     11-14    140  |  102  |  31<H>  ----------------------------<  134<H>  3.9   |  31  |  1.40<H>    Ca    9.3      14 Nov 2021 02:03    TPro  7.5  /  Alb  4.0  /  TBili  0.8  /  DBili  x   /  AST  15  /  ALT  16  /  AlkPhos  61  11-14    HSTI - 316.8  Pro BNP - 9945    CXR   - Independent assessment  B/L interstitial infiltrates  Left sided ICD    review of old chart  Cardiac cath - 100% RCA with collateral and 80% D1     Impression   - Acute CHF exacerbation with hx of ischemic cardiomyopathy   - Elevated troponin - demand ischemia vs. NSTEMI  - CAD  - DM2  - HTN  - CKD 3    Plan   - Admit to telemetry   - Serial troponin and EKG   - Resume BB, ASA, Statin  - repeat ECHO, A1c, lipid panel  - NSTEMI protocol  -- Cardiology consult Pt seen at bedside  Case discussed with MAR.  76 year old man with PMH of DM2, HTN, obstructive cardiomyopathy ( HFrEF) with PPM /AICD, AS s/p TAVR, hypothyroidism with 1 day of progressive SOB and B/L leg swelling.  At baseline he has moderate exercise intolerance with about 1 block walk before resting. Same limitation with stair climbing.    Vital Signs Last 24 Hrs  T(C): 36.6 (14 Nov 2021 02:40), Max: 36.7 (13 Nov 2021 22:33)  T(F): 97.8 (14 Nov 2021 02:40), Max: 98 (13 Nov 2021 22:33)  HR: 82 (14 Nov 2021 02:40) (82 - 82)  BP: 146/65 (14 Nov 2021 02:40) (146/65 - 176/83)  RR: 16 (14 Nov 2021 02:40) (16 - 16)  SpO2: 95% (14 Nov 2021 02:40) (95% - 96%)    Elderly man ,lying in bed, NAD, speaking in full sentences  Appreciable air entry with scattered crackles in both bases  RRR S1S2 only  full abdomen, tympanitic+, no edema of abdominal wall.  1-2 + pitting edema to the knee bilaterally  No focal deficits                       10.8   7.39  )-----------( 225      ( 14 Nov 2021 02:03 )             32.4     11-14    140  |  102  |  31<H>  ----------------------------<  134<H>  3.9   |  31  |  1.40<H>    Ca    9.3      14 Nov 2021 02:03    TPro  7.5  /  Alb  4.0  /  TBili  0.8  /  DBili  x   /  AST  15  /  ALT  16  /  AlkPhos  61  11-14    HSTI - 316.8  Pro BNP - 9945    CXR   - Independent assessment  B/L interstitial infiltrates  Left sided ICD    review of old chart  Cardiac cath - 100% RCA with collateral and 80% D1     Impression   - Acute CHF exacerbation with hx of ischemic cardiomyopathy   - Elevated troponin - demand ischemia vs. NSTEMI  - CAD with chronic ischemia  - DM2 on pioglitazone  - HTN -  - CKD 3    Plan   - Admit to telemetry   - Serial troponin and EKG -not impressive-. d/c heparin drip  - Resume BB, ASA, Plavix, statin and losartan  - IV Lasix 40mg daily   - Monitor daily weight; I/O, strict fluid intake and CHF diet.  - repeat ECHO, A1c, lipid panel  - Cardiology consult - Dr Hinton.  - Pt on Actos despite hx of CHF; discussed with patient this side effect and the need to switch. He has no DM or endocrine physician outpatient   - Would place endocrinology consult- Dr Mariscal.  - Renal function - stable - no active issues  - Transient elevation of BP on arrival most likely reactive - back to normal range

## 2021-11-14 NOTE — H&P ADULT - NSHPPHYSICALEXAM_GEN_ALL_CORE
LOS:     VITALS:   T(C): 36.6 (11-14-21 @ 02:40), Max: 36.7 (11-13-21 @ 22:33)  HR: 82 (11-14-21 @ 02:40) (82 - 82)  BP: 146/65 (11-14-21 @ 02:40) (146/65 - 176/83)  RR: 16 (11-14-21 @ 02:40) (16 - 16)  SpO2: 95% (11-14-21 @ 02:40) (95% - 96%)    GENERAL: NAD, lying in bed comfortably  HEAD:  Atraumatic, Normocephalic  EYES: EOMI, PERRLA, conjunctiva and sclera clear  ENT: Moist mucous membranes  NECK: Supple, No JVD  CHEST/LUNG: Clear to auscultation bilaterally; No rales, rhonchi, wheezing, or rubs. Unlabored respirations  HEART: Regular rate and rhythm; No murmurs, rubs, or gallops  ABDOMEN: BSx4; Soft, nontender, nondistended  EXTREMITIES:  2+ Peripheral Pulses, brisk capillary refill. No clubbing, cyanosis, or edema  NERVOUS SYSTEM:  A&Ox3, no focal deficits   SKIN: No rashes or lesions

## 2021-11-14 NOTE — H&P ADULT - HISTORY OF PRESENT ILLNESS
76 M hx of  PMH of MI s/p PPM, AS s/p TAVR 2016, CHF w/ BiV AICD, cholelithiasis, anxiety, hypothyroidism, DM, Hodgkin lymphoma s/p radiation and chemo now in remission with left lung base radiation fibrosis, b/l carotid stenosis s/p carotid endarterectomy presenting with the complaint of shortness of breath and increased bilateral ankle swelling since this morning. Pt states he has an occasional cough and mild wheezing due to radiation fibrosis. Patient states he uses 1 pillow at night, denies orthopnea and paroxysmal nocturnal dyspnea. Denies chest pain, palpitations, fever, chills, abdominal pain, nausea and vomiting.

## 2021-11-14 NOTE — ED PROVIDER NOTE - NSICDXPASTMEDICALHX_GEN_ALL_CORE_FT
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

## 2021-11-14 NOTE — CHART NOTE - NSCHARTNOTEFT_GEN_A_CORE
HPI:  76 M hx of  PMH of MI s/p PPM, AS s/p TAVR 2016, CHF w/ BiV AICD, cholelithiasis, anxiety, hypothyroidism, DM, Hodgkin lymphoma s/p radiation and chemo now in remission with left lung base radiation fibrosis, b/l carotid stenosis s/p carotid endarterectomy presenting with the complaint of shortness of breath and increased bilateral ankle swelling since this morning. Pt states he has an occasional cough and mild wheezing due to radiation fibrosis. Patient states he uses 1 pillow at night, denies orthopnea and paroxysmal nocturnal dyspnea. Denies chest pain, palpitations, fever, chills, abdominal pain, nausea and vomiting.  (14 Nov 2021 03:53)      Patient is a 76y old  Male who presents with a chief complaint of ACD (14 Nov 2021 11:56)      INTERVAL HPI/OVERNIGHT EVENTS:  T(C): 36.7 (11-14-21 @ 16:26), Max: 37.1 (11-14-21 @ 09:52)  HR: 76 (11-14-21 @ 16:26) (75 - 82)  BP: 128/55 (11-14-21 @ 16:26) (115/65 - 176/83)  RR: 18 (11-14-21 @ 16:26) (16 - 18)  SpO2: 97% (11-14-21 @ 16:26) (95% - 97%)  Wt(kg): --  I&O's Summary    14 Nov 2021 07:01  -  14 Nov 2021 19:36  --------------------------------------------------------  IN: 230 mL / OUT: 0 mL / NET: 230 mL        REVIEW OF SYSTEMS: denies fever, chills, SOB, palpitations, chest pain, abdominal pain, nausea, vomitting, diarrhea, constipation, dizziness    MEDICATIONS  (STANDING):  aspirin  chewable 81 milliGRAM(s) Oral daily  atorvastatin 80 milliGRAM(s) Oral at bedtime  clonazePAM  Tablet 1 milliGRAM(s) Oral daily  clopidogrel Tablet 75 milliGRAM(s) Oral daily  enoxaparin Injectable 40 milliGRAM(s) SubCutaneous daily  FLUoxetine 20 milliGRAM(s) Oral daily  furosemide   Injectable 40 milliGRAM(s) IV Push two times a day  insulin lispro (ADMELOG) corrective regimen sliding scale   SubCutaneous three times a day before meals  levothyroxine 125 MICROGram(s) Oral daily  losartan 25 milliGRAM(s) Oral daily  metoprolol succinate  milliGRAM(s) Oral daily  potassium chloride    Tablet ER 40 milliEquivalent(s) Oral two times a day  sodium chloride 0.9% lock flush 3 milliLiter(s) IV Push every 8 hours    MEDICATIONS  (PRN):      PHYSICAL EXAM:  GENERAL: NAD, well-groomed, well-developed  HEAD:  Atraumatic, Normocephalic  EYES: EOMI, PERRLA, conjunctiva and sclera clear  ENMT: No tonsillar erythema, exudates, or enlargement; Moist mucous membranes, Good dentition, No lesions  NECK: Supple, No JVD, Normal thyroid  NERVOUS SYSTEM:  Alert & Oriented X3, Good concentration; Motor Strength 5/5 B/L upper and lower extremities; DTRs 2+ intact and symmetric  CHEST/LUNG: Clear to percussion bilaterally; No rales, rhonchi, wheezing, or rubs  HEART: Regular rate and rhythm; No murmurs, rubs, or gallops  ABDOMEN: Soft, Nontender, Nondistended; Bowel sounds present  EXTREMITIES:  2+ Peripheral Pulses, No clubbing, cyanosis, or edema  LYMPH: No lymphadenopathy noted  SKIN: No rashes or lesions  LABS:                        10.7   7.35  )-----------( 225      ( 14 Nov 2021 05:23 )             32.1     11-14    138  |  100  |  31<H>  ----------------------------<  136<H>  3.7   |  31  |  1.48<H>    Ca    8.9      14 Nov 2021 05:23  Phos  3.3     11-14  Mg     2.1     11-14    TPro  7.5  /  Alb  4.0  /  TBili  0.8  /  DBili  x   /  AST  15  /  ALT  16  /  AlkPhos  61  11-14    PT/INR - ( 14 Nov 2021 02:03 )   PT: 13.4 sec;   INR: 1.13 ratio         PTT - ( 14 Nov 2021 02:03 )  PTT:36.5 sec    CAPILLARY BLOOD GLUCOSE      POCT Blood Glucose.: 142 mg/dL (14 Nov 2021 16:45)  POCT Blood Glucose.: 142 mg/dL (14 Nov 2021 11:21)  POCT Blood Glucose.: 118 mg/dL (14 Nov 2021 08:19) HPI:  76 M hx of  PMH of MI s/p PPM, AS s/p TAVR 2016, CHF w/ BiV AICD, cholelithiasis, anxiety, hypothyroidism, DM, Hodgkin lymphoma s/p radiation and chemo now in remission with left lung base radiation fibrosis, b/l carotid stenosis s/p carotid endarterectomy presenting with the complaint of shortness of breath and increased bilateral ankle swelling since this morning. Pt states he has an occasional cough and mild wheezing due to radiation fibrosis. Patient states he uses 1 pillow at night, denies orthopnea and paroxysmal nocturnal dyspnea. Denies chest pain, palpitations, fever, chills, abdominal pain, nausea and vomiting.  (14 Nov 2021 03:53)      Patient is a 76y old  Male who presents with a chief complaint of ACD (14 Nov 2021 11:56)      INTERVAL HPI/OVERNIGHT EVENTS: Patient seen and examined at bedside, denies chest pain.    T(C): 36.7 (11-14-21 @ 16:26), Max: 37.1 (11-14-21 @ 09:52)  HR: 76 (11-14-21 @ 16:26) (75 - 82)  BP: 128/55 (11-14-21 @ 16:26) (115/65 - 176/83)  RR: 18 (11-14-21 @ 16:26) (16 - 18)  SpO2: 97% (11-14-21 @ 16:26) (95% - 97%)  Wt(kg): --  I&O's Summary    14 Nov 2021 07:01  -  14 Nov 2021 19:36  --------------------------------------------------------  IN: 230 mL / OUT: 0 mL / NET: 230 mL        REVIEW OF SYSTEMS: denies fever, chills, SOB, palpitations, chest pain, abdominal pain, nausea, vomitting, diarrhea, constipation, dizziness    MEDICATIONS  (STANDING):  aspirin  chewable 81 milliGRAM(s) Oral daily  atorvastatin 80 milliGRAM(s) Oral at bedtime  clonazePAM  Tablet 1 milliGRAM(s) Oral daily  clopidogrel Tablet 75 milliGRAM(s) Oral daily  enoxaparin Injectable 40 milliGRAM(s) SubCutaneous daily  FLUoxetine 20 milliGRAM(s) Oral daily  furosemide   Injectable 40 milliGRAM(s) IV Push two times a day  insulin lispro (ADMELOG) corrective regimen sliding scale   SubCutaneous three times a day before meals  levothyroxine 125 MICROGram(s) Oral daily  losartan 25 milliGRAM(s) Oral daily  metoprolol succinate  milliGRAM(s) Oral daily  potassium chloride    Tablet ER 40 milliEquivalent(s) Oral two times a day  sodium chloride 0.9% lock flush 3 milliLiter(s) IV Push every 8 hours    MEDICATIONS  (PRN):      PHYSICAL EXAM:  GENERAL: NAD, well-groomed, well-developed  HEAD:  Atraumatic, Normocephalic  EYES: EOMI, PERRLA, conjunctiva and sclera clear  ENMT: No tonsillar erythema, exudates, or enlargement; Moist mucous membranes, Good dentition, No lesions  NECK: Supple, No JVD, Normal thyroid  NERVOUS SYSTEM:  Alert & Oriented X3, Good concentration; Motor Strength 5/5 B/L upper and lower extremities; DTRs 2+ intact and symmetric  CHEST/LUNG: Clear to percussion bilaterally; bilateral ronchi +  HEART: Regular rate and rhythm; No murmurs, rubs, or gallops  ABDOMEN: Soft, Nontender, Nondistended; Bowel sounds present  EXTREMITIES:  2+ Peripheral Pulses, No clubbing, cyanosis, or edema  LYMPH: No lymphadenopathy noted  SKIN: No rashes or lesions  LABS:                        10.7   7.35  )-----------( 225      ( 14 Nov 2021 05:23 )             32.1     11-14    138  |  100  |  31<H>  ----------------------------<  136<H>  3.7   |  31  |  1.48<H>    Ca    8.9      14 Nov 2021 05:23  Phos  3.3     11-14  Mg     2.1     11-14    TPro  7.5  /  Alb  4.0  /  TBili  0.8  /  DBili  x   /  AST  15  /  ALT  16  /  AlkPhos  61  11-14    PT/INR - ( 14 Nov 2021 02:03 )   PT: 13.4 sec;   INR: 1.13 ratio         PTT - ( 14 Nov 2021 02:03 )  PTT:36.5 sec    CAPILLARY BLOOD GLUCOSE      POCT Blood Glucose.: 142 mg/dL (14 Nov 2021 16:45)  POCT Blood Glucose.: 142 mg/dL (14 Nov 2021 11:21)  POCT Blood Glucose.: 118 mg/dL (14 Nov 2021 08:19)      A/P:   Impression   - Acute CHF exacerbation with hx of ischemic cardiomyopathy   - Elevated troponin - demand ischemia vs. NSTEMI  - CAD with chronic ischemia  - DM2 on pioglitazone  - HTN -  - CKD 3    Plan   -  trop trending down. d/c heparin drip d/norah  - Resume BB, ASA, Plavix, statin and losartan  - Cardio Dr. Flores, recs iv Lasix 40 mg bid. continue diuresis  - Monitor daily weight; I/O, strict fluid intake and CHF diet.  - repeat ECHO, A1c, lipid panel  - Pt on Actos despite hx of CHF; discussed with patient this side effect and the need to switch. He has no DM or endocrine physician outpatient   - endocrinology consult- Dr Mariscal.  - Renal function - stable - no active issues

## 2021-11-14 NOTE — H&P ADULT - PROBLEM SELECTOR PLAN 3
On pioglitazone at home  Will start on insulin SS   Finger stick ACHS On pioglitazone at home  Will start on insulin SS   Finger stick ACHS  Will consult Dr. Mariscal as pioglitazone can cause worsening Heart failure

## 2021-11-14 NOTE — ED PROVIDER NOTE - OBJECTIVE STATEMENT
76 year old male with PMHx of CHF with AICD and aortic stenosis so TAVR, diabetes, hypertension and lymphoma with no active disease presents to the ED with complaints of 1 day of worsening shortness of breath and bilateral leg swelling with coughing and episodes of nausea. He denies crushing or severe chest pain, vomiting, diarrhea, dizziness, syncope, and focal numbness or weakness. The patient denies recent changes in medications and other recent illnesses of hospitalizations.   Allergies: Lisinopril -cough 76 year old male with PMHx of CHF with AICD and aortic stenosis s/p TAVR, diabetes, hypertension and lymphoma with no active disease presents to the ED with complaints of 1 day of worsening shortness of breath and bilateral leg swelling with coughing. Pt states that he had some self resolving episodes of nausea with epigastric pain though none currently and states that he is asymptomatic when sitting on the bed. He denies crushing or severe chest pain, vomiting, diarrhea, dizziness, syncope, and focal numbness or weakness. The patient denies recent changes in medications and other recent illnesses of hospitalizations.   Allergies: Lisinopril -cough

## 2021-11-14 NOTE — H&P ADULT - PROBLEM SELECTOR PLAN 8
IMPROVE VTE Individual Risk Assessment  RISK                                                                Points  [  ] Previous VTE                                                  3  [  ] Thrombophilia                                               2  [  ] Lower limb paralysis                                      2        (unable to hold up >15 seconds)    [  ] Current Cancer                                              2         (within 6 months)  [x  ] Immobilization > 24 hrs                                1  [  ] ICU/CCU stay > 24 hours                              1  [x  ] Age > 60                                                      1  IMPROVE VTE Score _________2, -- for DVT proph  on heparin gtt

## 2021-11-14 NOTE — ED PROVIDER NOTE - PHYSICAL EXAMINATION
Vital Signs Reviewed  GEN: Comfortable, NAD, AAOx3  HEENT: Bilateral rales and wheezing with normal air entry and work or breathing,NCAT, MMM, Neck Supple  RESP: CTAB, No rales/rhonchi/wheezing  CV: RRR, S1S2, No murmurs  ABD: No TTP, Soft, ND, No masses, No CVA Tenderness  Extrem/Skin: 1+ pitting edema in bilateral lower legs. Equal pulses bilat, No cyanosis//rashes  Neuro: No focal deficits

## 2021-11-15 ENCOUNTER — TRANSCRIPTION ENCOUNTER (OUTPATIENT)
Age: 76
End: 2021-11-15

## 2021-11-15 VITALS
DIASTOLIC BLOOD PRESSURE: 77 MMHG | OXYGEN SATURATION: 97 % | HEART RATE: 70 BPM | RESPIRATION RATE: 18 BRPM | SYSTOLIC BLOOD PRESSURE: 145 MMHG | TEMPERATURE: 98 F

## 2021-11-15 LAB
ANION GAP SERPL CALC-SCNC: 7 MMOL/L — SIGNIFICANT CHANGE UP (ref 5–17)
BUN SERPL-MCNC: 26 MG/DL — HIGH (ref 7–18)
CALCIUM SERPL-MCNC: 9.3 MG/DL — SIGNIFICANT CHANGE UP (ref 8.4–10.5)
CHLORIDE SERPL-SCNC: 103 MMOL/L — SIGNIFICANT CHANGE UP (ref 96–108)
CO2 SERPL-SCNC: 32 MMOL/L — HIGH (ref 22–31)
COVID-19 NUCLEOCAPSID GAM AB INTERP: NEGATIVE — SIGNIFICANT CHANGE UP
COVID-19 NUCLEOCAPSID TOTAL GAM ANTIBODY RESULT: 0.08 INDEX — SIGNIFICANT CHANGE UP
COVID-19 SPIKE DOMAIN AB INTERP: POSITIVE
COVID-19 SPIKE DOMAIN ANTIBODY RESULT: 49.7 U/ML — HIGH
CREAT SERPL-MCNC: 1.37 MG/DL — HIGH (ref 0.5–1.3)
GLUCOSE BLDC GLUCOMTR-MCNC: 125 MG/DL — HIGH (ref 70–99)
GLUCOSE BLDC GLUCOMTR-MCNC: 131 MG/DL — HIGH (ref 70–99)
GLUCOSE SERPL-MCNC: 115 MG/DL — HIGH (ref 70–99)
HCT VFR BLD CALC: 34.2 % — LOW (ref 39–50)
HGB BLD-MCNC: 11 G/DL — LOW (ref 13–17)
MAGNESIUM SERPL-MCNC: 2.4 MG/DL — SIGNIFICANT CHANGE UP (ref 1.6–2.6)
MCHC RBC-ENTMCNC: 31 PG — SIGNIFICANT CHANGE UP (ref 27–34)
MCHC RBC-ENTMCNC: 32.2 GM/DL — SIGNIFICANT CHANGE UP (ref 32–36)
MCV RBC AUTO: 96.3 FL — SIGNIFICANT CHANGE UP (ref 80–100)
NRBC # BLD: 0 /100 WBCS — SIGNIFICANT CHANGE UP (ref 0–0)
PHOSPHATE SERPL-MCNC: 3.1 MG/DL — SIGNIFICANT CHANGE UP (ref 2.5–4.5)
PLATELET # BLD AUTO: 231 K/UL — SIGNIFICANT CHANGE UP (ref 150–400)
POTASSIUM SERPL-MCNC: 4.1 MMOL/L — SIGNIFICANT CHANGE UP (ref 3.5–5.3)
POTASSIUM SERPL-SCNC: 4.1 MMOL/L — SIGNIFICANT CHANGE UP (ref 3.5–5.3)
RBC # BLD: 3.55 M/UL — LOW (ref 4.2–5.8)
RBC # FLD: 15.4 % — HIGH (ref 10.3–14.5)
SARS-COV-2 IGG+IGM SERPL QL IA: 0.08 INDEX — SIGNIFICANT CHANGE UP
SARS-COV-2 IGG+IGM SERPL QL IA: 49.7 U/ML — HIGH
SARS-COV-2 IGG+IGM SERPL QL IA: NEGATIVE — SIGNIFICANT CHANGE UP
SARS-COV-2 IGG+IGM SERPL QL IA: POSITIVE
SODIUM SERPL-SCNC: 142 MMOL/L — SIGNIFICANT CHANGE UP (ref 135–145)
WBC # BLD: 5.1 K/UL — SIGNIFICANT CHANGE UP (ref 3.8–10.5)
WBC # FLD AUTO: 5.1 K/UL — SIGNIFICANT CHANGE UP (ref 3.8–10.5)

## 2021-11-15 PROCEDURE — 84484 ASSAY OF TROPONIN QUANT: CPT

## 2021-11-15 PROCEDURE — 85025 COMPLETE CBC W/AUTO DIFF WBC: CPT

## 2021-11-15 PROCEDURE — 85730 THROMBOPLASTIN TIME PARTIAL: CPT

## 2021-11-15 PROCEDURE — 85027 COMPLETE CBC AUTOMATED: CPT

## 2021-11-15 PROCEDURE — 83880 ASSAY OF NATRIURETIC PEPTIDE: CPT

## 2021-11-15 PROCEDURE — 93306 TTE W/DOPPLER COMPLETE: CPT

## 2021-11-15 PROCEDURE — 86769 SARS-COV-2 COVID-19 ANTIBODY: CPT

## 2021-11-15 PROCEDURE — 71045 X-RAY EXAM CHEST 1 VIEW: CPT

## 2021-11-15 PROCEDURE — 93005 ELECTROCARDIOGRAM TRACING: CPT

## 2021-11-15 PROCEDURE — 85610 PROTHROMBIN TIME: CPT

## 2021-11-15 PROCEDURE — 99239 HOSP IP/OBS DSCHRG MGMT >30: CPT

## 2021-11-15 PROCEDURE — 80048 BASIC METABOLIC PNL TOTAL CA: CPT

## 2021-11-15 PROCEDURE — 80053 COMPREHEN METABOLIC PANEL: CPT

## 2021-11-15 PROCEDURE — 99285 EMERGENCY DEPT VISIT HI MDM: CPT

## 2021-11-15 PROCEDURE — 36415 COLL VENOUS BLD VENIPUNCTURE: CPT

## 2021-11-15 PROCEDURE — 84443 ASSAY THYROID STIM HORMONE: CPT

## 2021-11-15 PROCEDURE — 84100 ASSAY OF PHOSPHORUS: CPT

## 2021-11-15 PROCEDURE — 80061 LIPID PANEL: CPT

## 2021-11-15 PROCEDURE — 82962 GLUCOSE BLOOD TEST: CPT

## 2021-11-15 PROCEDURE — 83735 ASSAY OF MAGNESIUM: CPT

## 2021-11-15 PROCEDURE — 87635 SARS-COV-2 COVID-19 AMP PRB: CPT

## 2021-11-15 PROCEDURE — 96374 THER/PROPH/DIAG INJ IV PUSH: CPT

## 2021-11-15 RX ORDER — DAPAGLIFLOZIN 10 MG/1
1 TABLET, FILM COATED ORAL
Qty: 30 | Refills: 0
Start: 2021-11-15 | End: 2021-12-14

## 2021-11-15 RX ORDER — PIOGLITAZONE HYDROCHLORIDE 15 MG/1
2 TABLET ORAL
Qty: 0 | Refills: 0 | DISCHARGE

## 2021-11-15 RX ORDER — FUROSEMIDE 40 MG
40 TABLET ORAL DAILY
Refills: 0 | Status: DISCONTINUED | OUTPATIENT
Start: 2021-11-15 | End: 2021-11-15

## 2021-11-15 RX ORDER — EMPAGLIFLOZIN 10 MG/1
1 TABLET, FILM COATED ORAL
Qty: 30 | Refills: 0
Start: 2021-11-15 | End: 2021-12-14

## 2021-11-15 RX ADMIN — Medication 40 MILLIEQUIVALENT(S): at 06:46

## 2021-11-15 RX ADMIN — Medication 125 MICROGRAM(S): at 06:43

## 2021-11-15 RX ADMIN — CLOPIDOGREL BISULFATE 75 MILLIGRAM(S): 75 TABLET, FILM COATED ORAL at 13:09

## 2021-11-15 RX ADMIN — ENOXAPARIN SODIUM 40 MILLIGRAM(S): 100 INJECTION SUBCUTANEOUS at 13:09

## 2021-11-15 RX ADMIN — SODIUM CHLORIDE 3 MILLILITER(S): 9 INJECTION INTRAMUSCULAR; INTRAVENOUS; SUBCUTANEOUS at 06:50

## 2021-11-15 RX ADMIN — Medication 1 MILLIGRAM(S): at 13:09

## 2021-11-15 RX ADMIN — Medication 81 MILLIGRAM(S): at 13:09

## 2021-11-15 RX ADMIN — Medication 200 MILLIGRAM(S): at 06:48

## 2021-11-15 RX ADMIN — Medication 20 MILLIGRAM(S): at 13:09

## 2021-11-15 RX ADMIN — Medication 40 MILLIGRAM(S): at 06:43

## 2021-11-15 RX ADMIN — LOSARTAN POTASSIUM 25 MILLIGRAM(S): 100 TABLET, FILM COATED ORAL at 06:45

## 2021-11-15 NOTE — PROGRESS NOTE ADULT - SUBJECTIVE AND OBJECTIVE BOX
C A R D I O L O G Y  **********************************     DATE OF SERVICE: 11-15-21    Patient denies chest pain or shortness of breath.   Review of systems otherwise (-)  	  MEDICATIONS:  MEDICATIONS  (STANDING):  aspirin  chewable 81 milliGRAM(s) Oral daily  atorvastatin 80 milliGRAM(s) Oral at bedtime  clonazePAM  Tablet 1 milliGRAM(s) Oral daily  clopidogrel Tablet 75 milliGRAM(s) Oral daily  enoxaparin Injectable 40 milliGRAM(s) SubCutaneous daily  FLUoxetine 20 milliGRAM(s) Oral daily  furosemide   Injectable 40 milliGRAM(s) IV Push daily  insulin lispro (ADMELOG) corrective regimen sliding scale   SubCutaneous three times a day before meals  levothyroxine 125 MICROGram(s) Oral daily  losartan 25 milliGRAM(s) Oral daily  metoprolol succinate  milliGRAM(s) Oral daily  sodium chloride 0.9% lock flush 3 milliLiter(s) IV Push every 8 hours      LABS:	 	    CARDIAC MARKERS:                                11.0   5.10  )-----------( 231      ( 15 Nov 2021 07:25 )             34.2     Hemoglobin: 11.0 g/dL (11-15 @ 07:25)  Hemoglobin: 10.7 g/dL (11-14 @ 05:23)  Hemoglobin: 10.8 g/dL (11-14 @ 02:03)      11-15    142  |  103  |  26<H>  ----------------------------<  115<H>  4.1   |  32<H>  |  1.37<H>    Ca    9.3      15 Nov 2021 07:25  Phos  3.1     11-15  Mg     2.4     11-15    TPro  7.5  /  Alb  4.0  /  TBili  0.8  /  DBili  x   /  AST  15  /  ALT  16  /  AlkPhos  61  11-14    Creatinine Trend: 1.37<--, 1.48<--, 1.40<--      PHYSICAL EXAM:  T(C): 36.6 (11-15-21 @ 11:09), Max: 37.1 (11-14-21 @ 21:41)  HR: 70 (11-15-21 @ 11:09) (70 - 76)  BP: 147/65 (11-15-21 @ 11:09) (109/52 - 162/67)  RR: 18 (11-15-21 @ 11:09) (18 - 18)  SpO2: 97% (11-15-21 @ 11:09) (96% - 97%)  Wt(kg): --  I&O's Summary    14 Nov 2021 07:01  -  15 Nov 2021 07:00  --------------------------------------------------------  IN: 230 mL / OUT: 0 mL / NET: 230 mL    Gen: Appears well in NAD  HEENT:  (-)icterus (-)pallor  CV: N S1 S2 1/6 ANGELLA (+)2 Pulses B/l  Resp:  Clear to ausculatation B/L, normal effort  GI: (+) BS Soft, NT, ND  Lymph:  (-)Edema, (-)obvious lymphadenopathy  Skin: Warm to touch, Normal turgor  Psych: Appropriate mood and affect      TELEMETRY: 	  Sinus V-paced         ASSESSMENT/PLAN: 	76y  Male  CAD, PCI, VT, [place]-[employer] BiV-ICD no longer on amio admitted with mild acute on chronic CHF exacerbation.    - appears euvolemic on exam, switch to Lasix po mg PO daily  - Would favor alternative to actos as may worsen fluid retention.  Yonatan f/u  - F/u with Dr. Durham in 1-2 weeks  - ICD interrogation functioning with in normal limits.  Patient had an ICD shock in May.  f/u with Dr. Godinez   - D/W patient  - D/C planning     Lobo Hinton MD, [facility]  BEEPER [phone]

## 2021-11-15 NOTE — PHARMACOTHERAPY INTERVENTION NOTE - COMMENTS
Patient is on STAR SORIN LIST for Acute MI. Medication profile was reviewed.  Recommended to d/c potassium supplement KCL 20meq tabs bid x 8 doses (most recent K=4.1).  Provided discharge counseling to the patient.

## 2021-11-15 NOTE — DISCHARGE NOTE PROVIDER - NSDCMRMEDTOKEN_GEN_ALL_CORE_FT
aspirin 81 mg oral delayed release tablet: 1 tab(s) orally once a day  atorvastatin 80 mg oral tablet: 1 tab(s) orally once a day  CLONAZEPAM 1 MG TABLET:   clopidogrel 75 mg oral tablet: 1 tab(s) orally once a day  FLUOXETINE HCL 20 MG CAPSULE:   FUROSEMIDE 40 MG TABLET:   KETOCONAZOLE 2% CREAM:   KlonoPIN 1 mg oral tablet:  orally once a day  levothyroxine 125 mcg (0.125 mg) oral capsule: 1 cap(s) orally once a day    losartan 25 mg oral tablet: 1 tab(s) orally once a day  METOPROLOL ER SUCCINATE 200MG TABS: TAKE 1 TABLET BY MOUTH DAILY  pioglitazone 30 mg oral tablet: 2 tab(s) orally once a day   aspirin 81 mg oral delayed release tablet: 1 tab(s) orally once a day  atorvastatin 80 mg oral tablet: 1 tab(s) orally once a day  CLONAZEPAM 1 MG TABLET:   clopidogrel 75 mg oral tablet: 1 tab(s) orally once a day  Farxiga 10 mg oral tablet: 1 tab(s) orally once a day   FLUOXETINE HCL 20 MG CAPSULE:   FUROSEMIDE 40 MG TABLET:   KETOCONAZOLE 2% CREAM:   KlonoPIN 1 mg oral tablet:  orally once a day  levothyroxine 125 mcg (0.125 mg) oral capsule: 1 cap(s) orally once a day    losartan 25 mg oral tablet: 1 tab(s) orally once a day  METOPROLOL ER SUCCINATE 200MG TABS: TAKE 1 TABLET BY MOUTH DAILY

## 2021-11-15 NOTE — DISCHARGE NOTE PROVIDER - NSDCCPCAREPLAN_GEN_ALL_CORE_FT
PRINCIPAL DISCHARGE DIAGNOSIS  Diagnosis: Acute on chronic systolic congestive heart failure  Assessment and Plan of Treatment: You came into the hospital complaining about      SECONDARY DISCHARGE DIAGNOSES  Diagnosis: Hypothyroidism  Assessment and Plan of Treatment: You have a history of this. We continued your home medication. Please continue to take your medications as prescribed and follow up with your primary care doctor within 2 weeks of discharge.      Diagnosis: Hypertension  Assessment and Plan of Treatment: You have high blood pressure. Please continue to taking your medications as prescribed. Please measure your blood pressure at least once daily. Maintain a healthy diet which includes incorporating more vegetables and decreasing the amount of salt (low sodium) and sugar in your diet such as rice, bread, and pasta low sugar, low fat, low sodium diet. Exercise frequently if possible.  Please follow up with your primary care provider within one week of your discharge.      Diagnosis: HLD (hyperlipidemia)  Assessment and Plan of Treatment: You have hyperlipidemia. Please continue to take your cholesterol medications. Maintain a healthy diet that consist of low sugar, low fat, low sodium. Decrease the amount of fried foods that you consume. Exercise frequently if possible. Please follow up with  your primary care provider within 1 week of your discharge.  You are recommended a DASH Diet that emphasizes mostly vegetables, fruits, and fat-free or low-fat dairy products. Please limit intake of sodium, sweets, beverages w/ high sugar/carbohydrate content.      Diagnosis: DM (diabetes mellitus)  Assessment and Plan of Treatment: Continue with your blood sugar medication. This is a reflection of your blood sugar level over the last 3 months.  Please check your blood sugar levels twice daily - Morning/Afternoon, and Lunch/Bedtime the following day. Keep a record of your blood sugar readings  You must maintain a healthy diet that consists of low sugar and low fat. Your diet must consist of mostly vegetables. Please limit your intake of high sugar and high carbohydrate foods such as pasta, rice, and bread. Exercise frequently if possible. Follow up with primary care physician within one week of your discharge to further manage your diabetes and monitor your Hemoglobin A1c levels after 3 months to evaluate your diabetes control.       PRINCIPAL DISCHARGE DIAGNOSIS  Diagnosis: Acute on chronic systolic congestive heart failure  Assessment and Plan of Treatment: You came into the hospital complaining about shortness of breath. We noticed you also had swelling of the legs. We started you on your lasix IV which helped bring down the fluid. You improved on it. Please continue to take your medication as prescribed and continue to follow up with [facility] primary care doctor and cardiologist on discharge.      SECONDARY DISCHARGE DIAGNOSES  Diagnosis: Hypothyroidism  Assessment and Plan of Treatment: You have a history of this. We continued your home medication. Please continue to take your medications as prescribed and follow up with your primary care doctor within 2 weeks of discharge.      Diagnosis: Hypertension  Assessment and Plan of Treatment: You have high blood pressure. Please continue to taking your medications as prescribed. Please measure your blood pressure at least once daily. Maintain a healthy diet which includes incorporating more vegetables and decreasing the amount of salt (low sodium) and sugar in your diet such as rice, bread, and pasta low sugar, low fat, low sodium diet. Exercise frequently if possible.  Please follow up with your primary care provider within one week of your discharge.      Diagnosis: HLD (hyperlipidemia)  Assessment and Plan of Treatment: You have hyperlipidemia. Please continue to take your cholesterol medications. Maintain a healthy diet that consist of low sugar, low fat, low sodium. Decrease the amount of fried foods that you consume. Exercise frequently if possible. Please follow up with  your primary care provider within 1 week of your discharge.  You are recommended a DASH Diet that emphasizes mostly vegetables, fruits, and fat-free or low-fat dairy products. Please limit intake of sodium, sweets, beverages w/ high sugar/carbohydrate content.      Diagnosis: DM (diabetes mellitus)  Assessment and Plan of Treatment: Continue with your blood sugar medication. This is a reflection of your blood sugar level over the last 3 months.  Please check your blood sugar levels twice daily - Morning/Afternoon, and Lunch/Bedtime the following day. Keep a record of your blood sugar readings  You must maintain a healthy diet that consists of low sugar and low fat. Your diet must consist of mostly vegetables. Please limit your intake of high sugar and high carbohydrate foods such as pasta, rice, and bread. Exercise frequently if possible. Follow up with primary care physician within one week of your discharge to further manage your diabetes and monitor your Hemoglobin A1c levels after 3 months to evaluate your diabetes control.  - We Stopped your pioglitazone as this could have caused the worsening of your heart failure. We started you on Farxiga as per endocrinologist. Please follow up with your doctor for your sugar.

## 2021-11-15 NOTE — DISCHARGE NOTE PROVIDER - HOSPITAL COURSE
76 M hx of  PMH of MI s/p PPM, AS s/p TAVR 2016, CHF w/ BiV AICD, cholelithiasis, anxiety, hypothyroidism, DM, Hodgkin lymphoma s/p radiation and chemo now in remission with left lung base radiation fibrosis, b/l carotid stenosis s/p carotid endarterectomy presenting with the complaint of shortness of breath and increased bilateral ankle swelling since this morning. Pt states he has an occasional cough and mild wheezing due to radiation fibrosis. Patient states he uses 1 pillow at night, denies orthopnea and paroxysmal nocturnal dyspnea. Denies chest pain, palpitations, fever, chills, abdominal pain, nausea and vomiting. Pt was evaluated by cardiology. Had an echo performed which showed:   1. Moderate posterior mitral annular calcification. Trace  mitral regurgitation. Mild mitral stenosis.  2. A transcatheter aortic valve implant is visualized in  the aortic postion. Normal gradients across aortic  prosthesis.  No paravalvular regurgitation is seen.  3. Normal aortic root.  4. Normal left atrium.  5. Normal left ventricular internal dimensions and wall  thicknesses.  6. Severe segmental left ventricular systolic dysfunction  (EF 33% by biplane). Paradoxical septal motion is seen,  consistent with conduction delay. Flattening of the  interventricular septum consistent with right ventricular  pressure orvolume overload. The mid inferolateral wall,  the basal  inferolateral wall, the mid inferior wall, and  the mid inferoseptum are hypokinetic.  7. Grade II diastolic dysfunction (moderate).  8. Right ventricle not well visualized.  Decreased RV  systolic function (TAPSE 1.5 cm). A device lead is  visualized in the right heart.  9. RV systolic pressure is moderately increased at  46 mm  Hg.  10. Normal tricuspid valve. Moderate tricuspid  regurgitation.  11. No pericardial effusion.  12. Right pleural effusion.  Pt was di       76 M hx of  PMH of MI s/p PPM, AS s/p TAVR 2016, CHF w/ BiV AICD, cholelithiasis, anxiety, hypothyroidism, DM, Hodgkin lymphoma s/p radiation and chemo now in remission with left lung base radiation fibrosis, b/l carotid stenosis s/p carotid endarterectomy presenting with the complaint of shortness of breath and increased bilateral ankle swelling since this morning. Pt states he has an occasional cough and mild wheezing due to radiation fibrosis. Patient states he uses 1 pillow at night, denies orthopnea and paroxysmal nocturnal dyspnea. Denies chest pain, palpitations, fever, chills, abdominal pain, nausea and vomiting. Pt was evaluated by cardiology. Had an echo performed which showed:   1. Moderate posterior mitral annular calcification. Trace  mitral regurgitation. Mild mitral stenosis.  2. A transcatheter aortic valve implant is visualized in  the aortic postion. Normal gradients across aortic  prosthesis.  No paravalvular regurgitation is seen.  3. Normal aortic root.  4. Normal left atrium.  5. Normal left ventricular internal dimensions and wall  thicknesses.  6. Severe segmental left ventricular systolic dysfunction  (EF 33% by biplane). Paradoxical septal motion is seen,  consistent with conduction delay. Flattening of the  interventricular septum consistent with right ventricular  pressure orvolume overload. The mid inferolateral wall,  the basal  inferolateral wall, the mid inferior wall, and  the mid inferoseptum are hypokinetic.  7. Grade II diastolic dysfunction (moderate).  8. Right ventricle not well visualized.  Decreased RV  systolic function (TAPSE 1.5 cm). A device lead is  visualized in the right heart.  9. RV systolic pressure is moderately increased at  46 mm  Hg.  10. Normal tricuspid valve. Moderate tricuspid  regurgitation.  11. No pericardial effusion.  12. Right pleural effusion.  Pt was diuresed using Furosamide IV and then switched over to oral. Pt tolerated it well. Patient is stable for discharge. Patient has been advised to follow up as outpatient. Case has been discussed with the attending. This is just a summary of the case. For further information, please refer to patient chart document.

## 2021-11-15 NOTE — DISCHARGE NOTE PROVIDER - CARE PROVIDER_API CALL
Lobo Hinton)  Cardiovascular Disease  [address] [facility] [address]  [place] [address]  Phone: [phone]  Fax: [phone]  Follow Up Time:     Michelle Mariscal)  EndocrinologyMetabDiabetes  [address] [place] [address]  Phone: [phone]  Fax: [phone]  Follow Up Time:

## 2021-11-15 NOTE — DISCHARGE NOTE NURSING/CASE MANAGEMENT/SOCIAL WORK - NSDCVIVACCINE_GEN_ALL_CORE_FT
influenza, injectable, quadrivalent, preservative free; 30-Oct-2016 12:07; Dinah Brumfield (RN); [employer]; [id number]; IntraMuscular; Deltoid Left.; 0.5 milliLiter(s); VIS (VIS Published: 07-Aug-2015, VIS Presented: 30-Oct-2016);

## 2021-11-15 NOTE — CONSULT NOTE ADULT - PROBLEM SELECTOR RECOMMENDATION 9
good control on actos 60mg( very high dose )  rec d/c actos in lieu of chf  check a1c level  consider farxiga or jardiance upon d/c - as pt with CHF  fsg ac and hs   ademlog prn for now

## 2021-11-15 NOTE — CONSULT NOTE ADULT - ASSESSMENT
76 M hx of  PMH of MI s/p PPM, AS s/p TAVR 2016, CHF w/ BiV AICD, cholelithiasis, anxiety, hypothyroidism, DM, Hodgkin lymphoma s/p radiation and chemo now in remission with left lung base radiation fibrosis, b/l carotid stenosis s/p carotid endarterectomy presenting with the complaint of shortness of breath and increased bilateral ankle swelling since this morning. )  Pt takes actos 60mg daily for dm with good control. Has been on it for many yrs.

## 2021-11-15 NOTE — CONSULT NOTE ADULT - SUBJECTIVE AND OBJECTIVE BOX
HISTORY OF PRESENT ILLNESS: HPI:  76 M hx of  PMH of MI s/p PPM, AS s/p TAVR 2016, CHF w/ BiV AICD, cholelithiasis, anxiety, hypothyroidism, DM, Hodgkin lymphoma s/p radiation and chemo now in remission with left lung base radiation fibrosis, b/l carotid stenosis s/p carotid endarterectomy presenting with the complaint of shortness of breath and increased bilateral ankle swelling since this morning. Pt states he has an occasional cough and mild wheezing due to radiation fibrosis. Patient states he uses 1 pillow at night, denies orthopnea and paroxysmal nocturnal dyspnea. Denies chest pain, palpitations, fever, chills, abdominal pain, nausea and vomiting.  (14 Nov 2021 03:53)    Date of Service 11/14:  States he has gained ~5 lbs, has ankle swelling, cough, orthopnea, worsening appetite.  A 10 pt ROS is otherwise negative.  Last visit w/ Dr Durham was 6/2021.  Office HPI:  ...history of Hodgkin’s lymphoma, currently in remission, remote MI in 1994, TAVR with an ischemic cardiomyopathy, whose ejection fraction was previously 45%, and he also has a history  of cardiac tamponade with drainage of prior pericardial effusion, and who more recently was hospitalized for ventricular tachycardia leading to AICD shock. The patient was  found to have an ejection fraction of 35% in the hospital with a significant 80% first diagonal lesion which was successfully stented. The patient has residual 50% LAD and  60% left circumflex artery stenosis. The patient was started on amiodarone in the hospital but discontinued it yesterday due to GI side effects. His metoprolol was also  uptitrated in the hospital as was his furosemide. The patient reportedly had evidence of renal insufficiency on basic metabolic profile performed in the hospital.    Aspirin 81 mg daily, atorvastatin 80 mg QHS, metoprolol succinate 150 mg daily, losartan 25 mg daily, furosemide 40 mg daily, Actos, Klonopin,  Prozac, as well as Synthroid.    Dr Sotero Godinez at [facility] manages his [employer] BiV AICD (2016).  Up-titrated ToprolXL from 150-->200mg daily in July.  VT occurred in May 2021, and he was started on Amiodarone at that time but discontinued it a month later per Dr Durham' note.    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&#x27;s    Pericardial effusion  drained in [place] in 4/19    Presence of biventricular AICD    Cholelithiases    H/O carotid endarterectomy  bilateral    H/O bilateral inguinal hernia repair    History of umbilical hernia repair    S/P TAVR (transcatheter aortic valve replacement)  10/16 in [place]    S/P ICD (internal cardiac defibrillator) procedure  [employer] [id number], last interrogation 11/2019    S/P cataract surgery  b/l      MEDICATIONS  (STANDING):  aspirin  chewable 81 milliGRAM(s) Oral daily  atorvastatin 80 milliGRAM(s) Oral at bedtime  clonazePAM  Tablet 1 milliGRAM(s) Oral daily  clopidogrel Tablet 75 milliGRAM(s) Oral daily  enoxaparin Injectable 40 milliGRAM(s) SubCutaneous daily  FLUoxetine 20 milliGRAM(s) Oral daily  insulin lispro (ADMELOG) corrective regimen sliding scale   SubCutaneous three times a day before meals  levothyroxine 125 MICROGram(s) Oral daily  losartan 25 milliGRAM(s) Oral daily  metoprolol succinate  milliGRAM(s) Oral daily  sodium chloride 0.9% lock flush 3 milliLiter(s) IV Push every 8 hours    Allergies    No Known Allergies    Intolerances    lisinop    FAMILY HISTORY:  No pertinent family history (Father, Mother)      Non-contributary for premature coronary disease or sudden cardiac death    SOCIAL HISTORY:    [ x] Non-smoker  [ ] Smoker  [ ] Alcohol      PHYSICAL EXAM:  T(C): 37.1 (11-14-21 @ 09:52), Max: 37.1 (11-14-21 @ 09:52)  HR: 75 (11-14-21 @ 09:52) (75 - 82)  BP: 115/65 (11-14-21 @ 09:52) (115/65 - 176/83)  RR: 18 (11-14-21 @ 09:52) (16 - 18)  SpO2: 97% (11-14-21 @ 09:52) (95% - 97%)  Wt(kg): --    Appearance: Normal appearing adult male in no acute distress, cooperative/	  HEENT:   Normal oral mucosa, PERRL, EOMI	  Lymphatic: No lymphadenopathy , + ankle edema  Cardiovascular: Normal S1 S2, No JVD, No murmurs , Peripheral pulses palpable 2+ bilaterally, left chest ICD, incision intact.  Respiratory: Soft rales BL, normal effort 	  Gastrointestinal:  Soft, Non-tender, + BS, mild hepatomegaly.	  Skin: No rashes, No ecchymoses, No cyanosis, warm to touch  Musculoskeletal: Normal range of motion, normal strength  Psychiatry:  Mood & affect appropriate    TELEMETRY: NSR, BiV pacing	    ECG: same	  	  LABS:	 	                          10.7   7.35  )-----------( 225      ( 14 Nov 2021 05:23 )             32.1     11-14    138  |  100  |  31<H>  ----------------------------<  136<H>  3.7   |  31  |  1.48<H>    Ca    8.9      14 Nov 2021 05:23  Phos  3.3     11-14  Mg     2.1     11-14    TPro  7.5  /  Alb  4.0  /  TBili  0.8  /  DBili  x   /  AST  15  /  ALT  16  /  AlkPhos  61  11-14    proBNP: Serum Pro-Brain Natriuretic Peptide: 9945 pg/mL (11-14 @ 02:03)  TSH: Thyroid Stimulating Hormone, Serum: 2.17 uU/mL (11-14 @ 05:23)      ASSESSMENT/PLAN: 	76y Male with acute-on-chronic systolic CHF.  His baseline creatinine is between 1-1.3, so he has a mild RIANNA on top of baseline CKD.    Will start IV Lasix 40mg BID, and oral potassium replacement.  He looks warm and well perfused, but volume-overloaded.  Echo performed and pending reading.  Will request [employer] ICD check- he was on Amiodarone for VT but stopped it after 1 month of therapy over the summer.  He doesnt report ICD shocks.      Moises Flores M.D.  Cardiac Electrophysiology    office [phone]  pager [phone]
Patient is a 76y old  Male who presents with a chief complaint of ACD (14 Nov 2021 11:56)      HPI:  76 M hx of  PMH of MI s/p PPM, AS s/p TAVR 2016, CHF w/ BiV AICD, cholelithiasis, anxiety, hypothyroidism, DM, Hodgkin lymphoma s/p radiation and chemo now in remission with left lung base radiation fibrosis, b/l carotid stenosis s/p carotid endarterectomy presenting with the complaint of shortness of breath and increased bilateral ankle swelling since this morning. Pt states he has an occasional cough and mild wheezing due to radiation fibrosis. Patient states he uses 1 pillow at night, denies orthopnea and paroxysmal nocturnal dyspnea. Denies chest pain, palpitations, fever, chills, abdominal pain, nausea and vomiting.  (14 Nov 2021 03:53)  Pt takes actos 60mg daily for dm with good control. Has been on it for many yrs.     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;s    Pericardial effusion  drained in [place] in 4/19    Presence of biventricular AICD    Cholelithiases    H/O carotid endarterectomy  bilateral    H/O bilateral inguinal hernia repair    History of umbilical hernia repair    S/P TAVR (transcatheter aortic valve replacement)  10/16 in [place]    S/P ICD (internal cardiac defibrillator) procedure  [employer] [id number], last interrogation 11/2019    S/P cataract surgery  b/l           MEDICATIONS  (STANDING):  aspirin  chewable 81 milliGRAM(s) Oral daily  atorvastatin 80 milliGRAM(s) Oral at bedtime  clonazePAM  Tablet 1 milliGRAM(s) Oral daily  clopidogrel Tablet 75 milliGRAM(s) Oral daily  enoxaparin Injectable 40 milliGRAM(s) SubCutaneous daily  FLUoxetine 20 milliGRAM(s) Oral daily  furosemide   Injectable 40 milliGRAM(s) IV Push daily  insulin lispro (ADMELOG) corrective regimen sliding scale   SubCutaneous three times a day before meals  levothyroxine 125 MICROGram(s) Oral daily  losartan 25 milliGRAM(s) Oral daily  metoprolol succinate  milliGRAM(s) Oral daily  potassium chloride    Tablet ER 40 milliEquivalent(s) Oral two times a day  sodium chloride 0.9% lock flush 3 milliLiter(s) IV Push every 8 hours    MEDICATIONS  (PRN):  acetaminophen     Tablet .. 650 milliGRAM(s) Oral every 6 hours PRN Mild Pain (1 - 3)      FAMILY HISTORY:  No pertinent family history (Father, Mother)        SOCIAL HISTORY:      REVIEW OF SYSTEMS:  CONSTITUTIONAL: No fever, weight loss, or fatigue  EYES: No eye pain, visual disturbances, or discharge  ENT:  No difficulty hearing, tinnitus, vertigo; No sinus or throat pain  NECK: No pain or stiffness  RESPIRATORY: No cough, wheezing, chills or hemoptysis; No Shortness of Breath  CARDIOVASCULAR: No chest pain, palpitations, passing out, dizziness, or leg swelling  GASTROINTESTINAL: No abdominal or epigastric pain. No nausea, vomiting, or hematemesis; No diarrhea or constipation. No melena or hematochezia.  GENITOURINARY: No dysuria, frequency, hematuria, or incontinence  NEUROLOGICAL: No headaches, memory loss, loss of strength, numbness, or tremors  SKIN: No itching, burning, rashes, or lesions   LYMPH Nodes: No enlarged glands  ENDOCRINE: No heat or cold intolerance; No hair loss  MUSCULOSKELETAL: No joint pain or swelling; No muscle, back, or extremity pain  PSYCHIATRIC: No depression, anxiety, mood swings, or difficulty sleeping  HEME/LYMPH: No easy bruising, or bleeding gums  ALLERGY AND IMMUNOLOGIC: No hives or eczema	        Vital Signs Last 24 Hrs  T(C): 36.9 (15 Nov 2021 07:31), Max: 37.1 (14 Nov 2021 21:41)  T(F): 98.5 (15 Nov 2021 07:31), Max: 98.7 (14 Nov 2021 21:41)  HR: 75 (15 Nov 2021 07:31) (70 - 76)  BP: 162/67 (15 Nov 2021 07:31) (109/52 - 162/67)  BP(mean): --  RR: 18 (15 Nov 2021 07:31) (18 - 18)  SpO2: 97% (15 Nov 2021 07:31) (96% - 97%)      Constitutional:    HEENT: nad    Neck:  No JVD, bruits or thyromegaly    Respiratory:  Clear without rales or rhonchi    Cardiovascular:  RR without murmur, rub or gallop.    Gastrointestinal: Soft without hepatosplenomegaly.    Extremities: without cyanosis, clubbing or edema.    Neurological:  Oriented   x    3  . No gross sensory or motor defects.        LABS:                        11.0   5.10  )-----------( 231      ( 15 Nov 2021 07:25 )             34.2     11-15    142  |  103  |  26<H>  ----------------------------<  115<H>  4.1   |  32<H>  |  1.37<H>    Ca    9.3      15 Nov 2021 07:25  Phos  3.1     11-15  Mg     2.4     11-15    TPro  7.5  /  Alb  4.0  /  TBili  0.8  /  DBili  x   /  AST  15  /  ALT  16  /  AlkPhos  61  11-14        PT/INR - ( 14 Nov 2021 02:03 )   PT: 13.4 sec;   INR: 1.13 ratio         PTT - ( 14 Nov 2021 02:03 )  PTT:36.5 sec    CAPILLARY BLOOD GLUCOSE      POCT Blood Glucose.: 125 mg/dL (15 Nov 2021 07:40)  POCT Blood Glucose.: 123 mg/dL (14 Nov 2021 20:56)  POCT Blood Glucose.: 142 mg/dL (14 Nov 2021 16:45)  POCT Blood Glucose.: 142 mg/dL (14 Nov 2021 11:21)      RADIOLOGY & ADDITIONAL STUDIES:

## 2021-11-15 NOTE — DISCHARGE NOTE PROVIDER - ATTENDING COMMENTS
76 M hx of  PMH of MI s/p PPM, AS s/p TAVR 2016, CHF w/ BiV AICD, cholelithiasis, anxiety, hypothyroidism, DM, Hodgkin lymphoma s/p radiation and chemo now in remission with left lung base radiation fibrosis, b/l carotid stenosis s/p carotid endarterectomy presenting with the complaint of shortness of breath and increased bilateral ankle swelling since this morning. Admitted for acute on chronic HFrEF. Treated with IV Lasix and patient with appropriate diuresis and improvement of symptoms. ICD interrogated. Patient also on pioglitazone in setting of CHF. Will DC pioglitazone and switch to SGLT2 inhibitor. Stable fo discharge. Patient to follow up with Dr. Durham in 1 week.

## 2021-11-15 NOTE — DISCHARGE NOTE NURSING/CASE MANAGEMENT/SOCIAL WORK - PATIENT PORTAL LINK FT
You can access the FollowMyHealth Patient Portal offered by [facility] by registering at the following website: [email or web address]. By joining [facility]’s FollowMyHealth portal, you will also be able to view your health information using other applications (apps) compatible with our system.

## 2021-11-16 RX ORDER — DAPAGLIFLOZIN 10 MG/1
10 TABLET, FILM COATED ORAL DAILY
Refills: 0 | Status: ACTIVE | COMMUNITY
Start: 2021-11-16

## 2021-11-18 ENCOUNTER — APPOINTMENT (OUTPATIENT)
Dept: ELECTROPHYSIOLOGY | Facility: CLINIC | Age: 76
End: 2021-11-18
Payer: MEDICARE

## 2021-11-18 ENCOUNTER — NON-APPOINTMENT (OUTPATIENT)
Age: 76
End: 2021-11-18

## 2021-11-18 PROCEDURE — 93296 REM INTERROG EVL PM/IDS: CPT

## 2021-11-18 PROCEDURE — 93295 DEV INTERROG REMOTE 1/2/MLT: CPT

## 2021-11-19 NOTE — PROGRESS NOTE ADULT - PROVIDER SPECIALTY LIST ADULT
Cardiology Quality 431: Preventive Care And Screening: Unhealthy Alcohol Use - Screening: Patient not identified as an unhealthy alcohol user when screened for unhealthy alcohol use using a systematic screening method Quality 110: Preventive Care And Screening: Influenza Immunization: Influenza immunization was not ordered or administered, reason not given Detail Level: Detailed Quality 226: Preventive Care And Screening: Tobacco Use: Screening And Cessation Intervention: Patient screened for tobacco use and is an ex/non-smoker

## 2021-11-23 ENCOUNTER — RX RENEWAL (OUTPATIENT)
Age: 76
End: 2021-11-23

## 2021-12-03 ENCOUNTER — RX RENEWAL (OUTPATIENT)
Age: 76
End: 2021-12-03

## 2022-01-07 ENCOUNTER — APPOINTMENT (OUTPATIENT)
Dept: ELECTROPHYSIOLOGY | Facility: CLINIC | Age: 77
End: 2022-01-07

## 2022-01-09 NOTE — DISCUSSION/SUMMARY
[FreeTextEntry1] : Al Hutton is a 74y/o man with Hx of Hodgkins lymphoma, HTN, DM, HLD, severe AS s/p TAVR (2016), chronic systolic CHF with EF 34%, LBBB (EF improved to 40-45% with CRT-D and AVR), and MI s/p BiV ICD and paroxysmal afib (very brief, not on AC) who presents today for routine device check and follow up. \par \par Impression:\par \par 1. VT: recent VT with ICD shock. On metoprolol 75mg daily with no missed dosing. Last ECHO last month as per patient with LVEF 40% and believes recent stress test. Blood work today to assess electrolytes, proBNP. Will increase metoprolol to 100mg daily.  Discussed NST and possible future ablation. \par \par 2. Chronic systolic CHF: s/p BiV ICD. Device checked today and revealed device in good working condition with adequate pacing/sensing thresholds. No further VT. Resume routine f/u in device as scheduled. Resume OMT as prescribed, encouraged heart healthy diet, daily weight, and regular f/u with Cardiology/Heart failure team as scheduled.\par \par 3. Paroxysmal afib: EKG performed today to assess for presence of afib and reveals sinus with ventricular pacing. No recent afib noted on ICD. Remains off AC. \par \par 4. HTN: resume oral antihypertensives as prescribed. Encouraged heart healthy diet, sodium restriction, and weight loss. Continue regular f/u with Cardiologist for further HTN management.\par \par 5. HLD: resume statin therapy as prescribed and regular f/u with Cardiologist for routine lipid monitoring and management.\par \par Sincerely,\par \par Sotero Godinez MD
abdomen soft, non-tender, and non-distended. Bowel sounds present.

## 2022-01-24 NOTE — ED PROVIDER NOTE - CPE EDP NEURO NORM
[FreeTextEntry1] : Alert and oriented x 3, speech fluent, names easily, follows requests, good recall for recent and remote events.\par EOM full without sustained nystagmus, PERRL, face symmetrical, no dysarthria\par Motor - symmetric strength. normal rapid-alternating movements.\par Sensory - intact LT bilaterally\par Coord - no tremor, ataxia\par Gait - stands without difficulty, normal gait. 
normal...

## 2022-02-01 ENCOUNTER — APPOINTMENT (OUTPATIENT)
Dept: ELECTROPHYSIOLOGY | Facility: CLINIC | Age: 77
End: 2022-02-01

## 2022-02-15 ENCOUNTER — APPOINTMENT (OUTPATIENT)
Dept: ELECTROPHYSIOLOGY | Facility: CLINIC | Age: 77
End: 2022-02-15

## 2022-02-18 ENCOUNTER — NON-APPOINTMENT (OUTPATIENT)
Age: 77
End: 2022-02-18

## 2022-02-18 ENCOUNTER — APPOINTMENT (OUTPATIENT)
Dept: ELECTROPHYSIOLOGY | Facility: CLINIC | Age: 77
End: 2022-02-18
Payer: MEDICARE

## 2022-02-18 PROCEDURE — 93295 DEV INTERROG REMOTE 1/2/MLT: CPT

## 2022-02-18 PROCEDURE — 93296 REM INTERROG EVL PM/IDS: CPT
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
## 2022-03-04 ENCOUNTER — APPOINTMENT (OUTPATIENT)
Dept: ELECTROPHYSIOLOGY | Facility: CLINIC | Age: 77
End: 2022-03-04
Payer: MEDICARE

## 2022-03-04 VITALS — SYSTOLIC BLOOD PRESSURE: 97 MMHG | HEART RATE: 70 BPM | DIASTOLIC BLOOD PRESSURE: 53 MMHG

## 2022-03-04 DIAGNOSIS — I47.2 VENTRICULAR TACHYCARDIA: ICD-10-CM

## 2022-03-04 DIAGNOSIS — I50.22 CHRONIC SYSTOLIC (CONGESTIVE) HEART FAILURE: ICD-10-CM

## 2022-03-04 PROCEDURE — 93284 PRGRMG EVAL IMPLANTABLE DFB: CPT

## 2022-03-04 RX ORDER — ASPIRIN 81 MG/1
81 TABLET, DELAYED RELEASE ORAL DAILY
Refills: 0 | Status: ACTIVE | COMMUNITY
Start: 2019-05-14

## 2022-03-31 ENCOUNTER — RX RENEWAL (OUTPATIENT)
Age: 77
End: 2022-03-31

## 2022-05-13 ENCOUNTER — APPOINTMENT (OUTPATIENT)
Dept: ELECTROPHYSIOLOGY | Facility: CLINIC | Age: 77
End: 2022-05-13

## 2022-05-23 NOTE — ED PROVIDER NOTE - INPATIENT RESIDENT/ACP NOTIFIED
mar A-T Advancement Flap Text: The defect edges were debeveled with a #15 scalpel blade.  Given the location of the defect, shape of the defect and the proximity to free margins an A-T advancement flap was deemed most appropriate.  Using a sterile surgical marker, an appropriate advancement flap was drawn incorporating the defect and placing the expected incisions within the relaxed skin tension lines where possible.    The area thus outlined was incised deep to adipose tissue with a #15 scalpel blade.  The skin margins were undermined to an appropriate distance in all directions utilizing iris scissors.

## 2022-06-07 ENCOUNTER — APPOINTMENT (OUTPATIENT)
Dept: ELECTROPHYSIOLOGY | Facility: CLINIC | Age: 77
End: 2022-06-07
Payer: MEDICARE

## 2022-06-07 ENCOUNTER — NON-APPOINTMENT (OUTPATIENT)
Age: 77
End: 2022-06-07

## 2022-06-07 PROCEDURE — 93296 REM INTERROG EVL PM/IDS: CPT

## 2022-06-07 PROCEDURE — 93295 DEV INTERROG REMOTE 1/2/MLT: CPT

## 2022-06-17 ENCOUNTER — NON-APPOINTMENT (OUTPATIENT)
Age: 77
End: 2022-06-17

## 2022-06-27 NOTE — DISCHARGE NOTE NURSING/CASE MANAGEMENT/SOCIAL WORK - CAREGIVER PHONE NUMBER
No new care gaps identified.  [facility] Embedded Care Gaps. Reference number: [id number]. 6/27/2022   2:26:45 AM ARTUROT  
Refill Decision Note   Melvin Selby  is requesting a refill authorization.  Brief Assessment and Rationale for Refill:  Approve     Medication Therapy Plan:  Zyloprim - Cr has improved since 09/08/2021.    Medication Reconciliation Completed: No   Comments:     No Care Gaps recommended.     Note composed:3:47 PM 06/27/2022            
[phone]

## 2022-07-05 ENCOUNTER — NON-APPOINTMENT (OUTPATIENT)
Age: 77
End: 2022-07-05

## 2022-09-08 ENCOUNTER — APPOINTMENT (OUTPATIENT)
Dept: ELECTROPHYSIOLOGY | Facility: CLINIC | Age: 77
End: 2022-09-08

## 2022-09-14 ENCOUNTER — NON-APPOINTMENT (OUTPATIENT)
Age: 77
End: 2022-09-14

## 2022-10-09 ENCOUNTER — RX RENEWAL (OUTPATIENT)
Age: 77
End: 2022-10-09

## 2022-10-21 NOTE — H&P ADULT - GASTROINTESTINAL DETAILS
[FreeTextEntry1] : Hand Dermatitis; \par \par Therapeutic options and their risks and benefits; along with multiple diagnostic possibilities were discussed at length; risks and benefits of further study were discussed;\par \par has Am Lactin for body; \par use Cerave hand cream liberally for hands; \par mometasone ointment BID prn for flares; \par Continue regular exams;  normal/soft/nontender/no distention/no masses palpable/bowel sounds normal

## 2022-11-08 NOTE — ED PROVIDER NOTE - ATTESTATION, MLM
Pt presents to ED ambulatory with steady gait c/o midsternal/epigastric pain that occurs when eating. States it feels difficult to get food down and pain is "driving him crazy." Pt has hx if anxiety/depression and has been compliant with meds, but recently due to the pain he is experiencing he has been having self-harming thoughts, including a plan to stop eating and let himself pass away. Pt states this thoughts are fleeting and are not present currently. Denies hallucinations or H/I. Pt does have hx of esophageal stricture and states the pain is similar. Denies any other medical complaints. call bell within reach. Education provided to pt on how to and when to use call bell for assistance. Will continue to monitor. XUAN Arias
I have reviewed and confirmed nurses' notes for patient's medications, allergies, medical history, and surgical history.

## 2022-11-10 NOTE — ED ADULT NURSE NOTE - NSFALLRSKUNASSIST_ED_ALL_ED
Preop instructions: NPO solids/ milk products after midnight and small sip , shower instructions, directions, leave all valuables at home, wear comfortable clothes (something that buttons up the front is best), medication instructions explained. Wife stated an understanding.     Wife denies any side effects or issues with anesthesia or sedation.   no

## 2022-12-21 NOTE — ED ADULT TRIAGE NOTE - HEART RATE (BEATS/MIN)
Group Psychotherapy (PhD/LCSW)    Site: [facility] ([place], [place])    Clinical status of patient: Intensive Outpatient Program (IOP)    Date: 12/21/2022     Group Focus: Distress Tolerance    Length of service: 81575 - 45-50 minutes    Number of patients in attendance: 9    Referred by: [facility] Treatment Team    Target symptoms: Depression, anxiety    Patient's response to treatment: Active Listening and Self-disclosure    Progress toward goals: Progressing appropriately     Interval History: Session focus was Distress Tolerance:  TIP skill (Part 2).  Patients were encouraged to use temperature, intense exercise, paced breathing, or paired muscle relaxation to reduce intensity of distress.    Risk parameters:  Patient reports no suicidal ideation  Patient reports no homicidal ideation  Patient reports no self-injurious behavior  Patient reports no violent behavior    Diagnosis:     ICD-10-CM ICD-9-CM   1. PTSD (post-traumatic stress disorder)  F43.10 309.81   2. Anxiety disorder, unspecified type  F41.9 300.00          Plan: Continue treatment on [facility]   83

## 2023-01-20 NOTE — PHYSICAL THERAPY INITIAL EVALUATION ADULT - GENERAL OBSERVATIONS, REHAB EVAL
Debridement Performed for Assessment: Wound# 1  Performed By: Provider: Yelitza Alas NP  Assistant:    Debridement: Surgical    Photo taken post procedure:    Time-Out Taken: Yes  Level: Skin/Subcutaneous Tissue/ Muscle  Post Debridement Measurements  Length: (cm) 13.1  Width: (cm) 9.2  Depth: (cm) 0.9      Area: (cm²) 121.52  Percent Debrided: 100%  Total Area Debrided: (sq cm)     Tissue and other material debrided:  Adipose, Dermis, Epidermis, Subcutaneous, Muscle  Devitalized Tissue Debrided:Biofilm, Slough  Instrument: Curette  Bleeding: Moderate  Hemostasis Achieved: Pressure  Procedural Pain: Insensate  Post Procedural Pain: Insensate  Response to Treatment: Procedure was tolerated well    Devitalized materials/tissue Removed  the following was removed during debridement  subcutaneous, muscle      Post Debridement Diagnosis  chronic right diabetic foot ulcer  Post debridement diagnosis  Same as Pre-operative debridement diagnosis, No Complications noted.      Grafts or implants applied  Was a graft or implant applied?  No      Procedure assistant  Procedure assisted by:  Assistant is the same as nurse listed above      Complications related to procedure  Did any complication occure during procedure?  No complications noted during or after procedure.      Specimen  Specimen collect during procedure?  No specimen collected      Anaesthesia:  Anesthesia used  None      Blood Loss:  Blood loss during procedure  less than 5 cc       Patient was received and left supine in bed with call bell in reach on the left.

## 2023-01-22 ENCOUNTER — RX RENEWAL (OUTPATIENT)
Age: 78
End: 2023-01-22

## 2023-04-16 NOTE — PRE-ANESTHESIA EVALUATION ADULT - LAST CARDIAC ANGIOGRAM/IMAGING
"Chief Complaint   Patient presents with    Lightheadedness     Pt reports her BP was really low last time she was here. Reports lightheadedness.    Psych Eval     Pt exhibiting flight of ideas in triage       Pt to triage with steady gait for above complaint. Presents with flight of ideas in triage, pt talking to self and yelling in room to \"don't charge him\". Pt reports lightheadedness due to low BP last time she was here.    Pt back to lobby, educated on triage process and encourage to alert staff of any changes.     Vitals:    04/15/23 2149   BP: (!) 134/91   Pulse: (!) 109   Resp: 12   Temp: 36.3 °C (97.4 °F)   SpO2: 96%           "
2017 no stents

## 2023-06-23 ENCOUNTER — APPOINTMENT (OUTPATIENT)
Dept: ELECTROPHYSIOLOGY | Facility: CLINIC | Age: 78
End: 2023-06-23

## 2023-06-30 ENCOUNTER — NON-APPOINTMENT (OUTPATIENT)
Age: 78
End: 2023-06-30

## 2023-06-30 ENCOUNTER — APPOINTMENT (OUTPATIENT)
Dept: ELECTROPHYSIOLOGY | Facility: CLINIC | Age: 78
End: 2023-06-30
Payer: MEDICARE

## 2023-06-30 VITALS
OXYGEN SATURATION: 98 % | HEIGHT: 68 IN | DIASTOLIC BLOOD PRESSURE: 76 MMHG | SYSTOLIC BLOOD PRESSURE: 143 MMHG | BODY MASS INDEX: 25.09 KG/M2 | HEART RATE: 74 BPM

## 2023-06-30 VITALS — BODY MASS INDEX: 25.09 KG/M2 | WEIGHT: 165 LBS

## 2023-06-30 PROCEDURE — 93000 ELECTROCARDIOGRAM COMPLETE: CPT

## 2023-06-30 PROCEDURE — 99213 OFFICE O/P EST LOW 20 MIN: CPT

## 2023-06-30 RX ORDER — METOPROLOL SUCCINATE 100 MG/1
100 TABLET, EXTENDED RELEASE ORAL
Qty: 90 | Refills: 3 | Status: DISCONTINUED | COMMUNITY
Start: 2021-11-23 | End: 2023-06-30

## 2023-07-09 ENCOUNTER — INPATIENT (INPATIENT)
Facility: HOSPITAL | Age: 78
LOS: 3 days | Discharge: HOME HEALTH | DRG: 308 | End: 2023-07-13
Attending: INTERNAL MEDICINE | Admitting: STUDENT IN AN ORGANIZED HEALTH CARE EDUCATION/TRAINING PROGRAM
Payer: MEDICARE

## 2023-07-09 ENCOUNTER — EMERGENCY (EMERGENCY)
Facility: HOSPITAL | Age: 78
LOS: 1 days | Discharge: ANOTHER HOSPITAL | End: 2023-07-09
Attending: EMERGENCY MEDICINE
Payer: MEDICARE

## 2023-07-09 VITALS
RESPIRATION RATE: 17 BRPM | SYSTOLIC BLOOD PRESSURE: 120 MMHG | HEART RATE: 69 BPM | OXYGEN SATURATION: 100 % | DIASTOLIC BLOOD PRESSURE: 64 MMHG

## 2023-07-09 VITALS
SYSTOLIC BLOOD PRESSURE: 80 MMHG | OXYGEN SATURATION: 97 % | HEART RATE: 78 BPM | DIASTOLIC BLOOD PRESSURE: 40 MMHG | RESPIRATION RATE: 18 BRPM | WEIGHT: 163.14 LBS

## 2023-07-09 VITALS — OXYGEN SATURATION: 98 % | HEART RATE: 87 BPM | RESPIRATION RATE: 37 BRPM | TEMPERATURE: 98 F

## 2023-07-09 DIAGNOSIS — I47.20 VENTRICULAR TACHYCARDIA, UNSPECIFIED: ICD-10-CM

## 2023-07-09 DIAGNOSIS — Z98.89 OTHER SPECIFIED POSTPROCEDURAL STATES: Chronic | ICD-10-CM

## 2023-07-09 DIAGNOSIS — Z98.49 CATARACT EXTRACTION STATUS, UNSPECIFIED EYE: Chronic | ICD-10-CM

## 2023-07-09 DIAGNOSIS — Z95.810 PRESENCE OF AUTOMATIC (IMPLANTABLE) CARDIAC DEFIBRILLATOR: Chronic | ICD-10-CM

## 2023-07-09 DIAGNOSIS — Z98.890 OTHER SPECIFIED POSTPROCEDURAL STATES: Chronic | ICD-10-CM

## 2023-07-09 DIAGNOSIS — Z95.2 PRESENCE OF PROSTHETIC HEART VALVE: Chronic | ICD-10-CM

## 2023-07-09 LAB
ACETONE SERPL-MCNC: NEGATIVE — SIGNIFICANT CHANGE UP
ALBUMIN SERPL ELPH-MCNC: 2.5 G/DL — LOW (ref 3.5–5)
ALBUMIN SERPL ELPH-MCNC: 3.6 G/DL — SIGNIFICANT CHANGE UP (ref 3.3–5)
ALP SERPL-CCNC: 47 U/L — SIGNIFICANT CHANGE UP (ref 40–120)
ALP SERPL-CCNC: 51 U/L — SIGNIFICANT CHANGE UP (ref 40–120)
ALT FLD-CCNC: 21 U/L — SIGNIFICANT CHANGE UP (ref 10–45)
ALT FLD-CCNC: 27 U/L DA — SIGNIFICANT CHANGE UP (ref 10–60)
ANION GAP SERPL CALC-SCNC: 14 MMOL/L — SIGNIFICANT CHANGE UP (ref 5–17)
ANION GAP SERPL CALC-SCNC: 6 MMOL/L — SIGNIFICANT CHANGE UP (ref 5–17)
APTT BLD: 24 SEC — LOW (ref 27.5–35.5)
AST SERPL-CCNC: 24 U/L — SIGNIFICANT CHANGE UP (ref 10–40)
AST SERPL-CCNC: 38 U/L — SIGNIFICANT CHANGE UP (ref 10–40)
BASE EXCESS BLDV CALC-SCNC: 0.1 MMOL/L — SIGNIFICANT CHANGE UP
BASOPHILS # BLD AUTO: 0.03 K/UL — SIGNIFICANT CHANGE UP (ref 0–0.2)
BASOPHILS NFR BLD AUTO: 0.2 % — SIGNIFICANT CHANGE UP (ref 0–2)
BILIRUB SERPL-MCNC: 0.4 MG/DL — SIGNIFICANT CHANGE UP (ref 0.2–1.2)
BILIRUB SERPL-MCNC: 0.4 MG/DL — SIGNIFICANT CHANGE UP (ref 0.2–1.2)
BLD GP AB SCN SERPL QL: NEGATIVE — SIGNIFICANT CHANGE UP
BLOOD GAS COMMENTS, VENOUS: SIGNIFICANT CHANGE UP
BUN SERPL-MCNC: 38 MG/DL — HIGH (ref 7–23)
BUN SERPL-MCNC: 43 MG/DL — HIGH (ref 7–18)
CALCIUM SERPL-MCNC: 7.9 MG/DL — LOW (ref 8.4–10.5)
CALCIUM SERPL-MCNC: 8.3 MG/DL — LOW (ref 8.4–10.5)
CHLORIDE SERPL-SCNC: 99 MMOL/L — SIGNIFICANT CHANGE UP (ref 96–108)
CHLORIDE SERPL-SCNC: 99 MMOL/L — SIGNIFICANT CHANGE UP (ref 96–108)
CO2 SERPL-SCNC: 21 MMOL/L — LOW (ref 22–31)
CO2 SERPL-SCNC: 26 MMOL/L — SIGNIFICANT CHANGE UP (ref 22–31)
CREAT SERPL-MCNC: 1.57 MG/DL — HIGH (ref 0.5–1.3)
CREAT SERPL-MCNC: 2.16 MG/DL — HIGH (ref 0.5–1.3)
EGFR: 30 ML/MIN/1.73M2 — LOW
EGFR: 45 ML/MIN/1.73M2 — LOW
EOSINOPHIL # BLD AUTO: 0.12 K/UL — SIGNIFICANT CHANGE UP (ref 0–0.5)
EOSINOPHIL NFR BLD AUTO: 1 % — SIGNIFICANT CHANGE UP (ref 0–6)
GAS PNL BLDA: SIGNIFICANT CHANGE UP
GLUCOSE BLDC GLUCOMTR-MCNC: 318 MG/DL — HIGH (ref 70–99)
GLUCOSE BLDC GLUCOMTR-MCNC: 367 MG/DL — HIGH (ref 70–99)
GLUCOSE BLDC GLUCOMTR-MCNC: 441 MG/DL — HIGH (ref 70–99)
GLUCOSE SERPL-MCNC: 450 MG/DL — CRITICAL HIGH (ref 70–99)
GLUCOSE SERPL-MCNC: 556 MG/DL — CRITICAL HIGH (ref 70–99)
HCO3 BLDV-SCNC: 28 MMOL/L — SIGNIFICANT CHANGE UP (ref 22–29)
HCT VFR BLD CALC: 32 % — LOW (ref 39–50)
HCT VFR BLD CALC: 36 % — LOW (ref 39–50)
HGB BLD-MCNC: 10.9 G/DL — LOW (ref 13–17)
HGB BLD-MCNC: 12.1 G/DL — LOW (ref 13–17)
IMM GRANULOCYTES NFR BLD AUTO: 1.1 % — HIGH (ref 0–0.9)
INR BLD: 0.98 RATIO — SIGNIFICANT CHANGE UP (ref 0.88–1.16)
LACTATE SERPL-SCNC: 3.2 MMOL/L — HIGH (ref 0.7–2)
LYMPHOCYTES # BLD AUTO: 1.38 K/UL — SIGNIFICANT CHANGE UP (ref 1–3.3)
LYMPHOCYTES # BLD AUTO: 11.1 % — LOW (ref 13–44)
MAGNESIUM SERPL-MCNC: 2.2 MG/DL — SIGNIFICANT CHANGE UP (ref 1.6–2.6)
MCHC RBC-ENTMCNC: 31.9 PG — SIGNIFICANT CHANGE UP (ref 27–34)
MCHC RBC-ENTMCNC: 33 PG — SIGNIFICANT CHANGE UP (ref 27–34)
MCHC RBC-ENTMCNC: 33.6 GM/DL — SIGNIFICANT CHANGE UP (ref 32–36)
MCHC RBC-ENTMCNC: 34.1 GM/DL — SIGNIFICANT CHANGE UP (ref 32–36)
MCV RBC AUTO: 95 FL — SIGNIFICANT CHANGE UP (ref 80–100)
MCV RBC AUTO: 97 FL — SIGNIFICANT CHANGE UP (ref 80–100)
MONOCYTES # BLD AUTO: 1.31 K/UL — HIGH (ref 0–0.9)
MONOCYTES NFR BLD AUTO: 10.5 % — SIGNIFICANT CHANGE UP (ref 2–14)
NEUTROPHILS # BLD AUTO: 9.48 K/UL — HIGH (ref 1.8–7.4)
NEUTROPHILS NFR BLD AUTO: 76.1 % — SIGNIFICANT CHANGE UP (ref 43–77)
NRBC # BLD: 0 /100 WBCS — SIGNIFICANT CHANGE UP (ref 0–0)
NRBC # BLD: 0 /100 WBCS — SIGNIFICANT CHANGE UP (ref 0–0)
PCO2 BLDV: 56 MMHG — HIGH (ref 42–55)
PH BLDV: 7.3 — LOW (ref 7.32–7.43)
PHOSPHATE SERPL-MCNC: 3.8 MG/DL — SIGNIFICANT CHANGE UP (ref 2.5–4.5)
PLATELET # BLD AUTO: 166 K/UL — SIGNIFICANT CHANGE UP (ref 150–400)
PLATELET # BLD AUTO: 170 K/UL — SIGNIFICANT CHANGE UP (ref 150–400)
PO2 BLDV: 16 MMHG — SIGNIFICANT CHANGE UP
POTASSIUM SERPL-MCNC: 3.9 MMOL/L — SIGNIFICANT CHANGE UP (ref 3.5–5.3)
POTASSIUM SERPL-MCNC: 4.1 MMOL/L — SIGNIFICANT CHANGE UP (ref 3.5–5.3)
POTASSIUM SERPL-SCNC: 3.9 MMOL/L — SIGNIFICANT CHANGE UP (ref 3.5–5.3)
POTASSIUM SERPL-SCNC: 4.1 MMOL/L — SIGNIFICANT CHANGE UP (ref 3.5–5.3)
PROT SERPL-MCNC: 5.3 G/DL — LOW (ref 6–8.3)
PROT SERPL-MCNC: 5.7 G/DL — LOW (ref 6–8.3)
PROTHROM AB SERPL-ACNC: 11.6 SEC — SIGNIFICANT CHANGE UP (ref 10.5–13.4)
RAPID RVP RESULT: SIGNIFICANT CHANGE UP
RBC # BLD: 3.3 M/UL — LOW (ref 4.2–5.8)
RBC # BLD: 3.79 M/UL — LOW (ref 4.2–5.8)
RBC # FLD: 13.8 % — SIGNIFICANT CHANGE UP (ref 10.3–14.5)
RBC # FLD: 14.1 % — SIGNIFICANT CHANGE UP (ref 10.3–14.5)
RH IG SCN BLD-IMP: POSITIVE — SIGNIFICANT CHANGE UP
SAO2 % BLDV: 23.5 % — SIGNIFICANT CHANGE UP
SARS-COV-2 RNA SPEC QL NAA+PROBE: SIGNIFICANT CHANGE UP
SODIUM SERPL-SCNC: 131 MMOL/L — LOW (ref 135–145)
SODIUM SERPL-SCNC: 134 MMOL/L — LOW (ref 135–145)
TROPONIN I, HIGH SENSITIVITY RESULT: 417.2 NG/L — HIGH
WBC # BLD: 11.42 K/UL — HIGH (ref 3.8–10.5)
WBC # BLD: 12.46 K/UL — HIGH (ref 3.8–10.5)
WBC # FLD AUTO: 11.42 K/UL — HIGH (ref 3.8–10.5)
WBC # FLD AUTO: 12.46 K/UL — HIGH (ref 3.8–10.5)

## 2023-07-09 PROCEDURE — 36620 INSERTION CATHETER ARTERY: CPT

## 2023-07-09 PROCEDURE — 36415 COLL VENOUS BLD VENIPUNCTURE: CPT

## 2023-07-09 PROCEDURE — 82962 GLUCOSE BLOOD TEST: CPT

## 2023-07-09 PROCEDURE — 93005 ELECTROCARDIOGRAM TRACING: CPT

## 2023-07-09 PROCEDURE — 99291 CRITICAL CARE FIRST HOUR: CPT | Mod: FS,25

## 2023-07-09 PROCEDURE — 85025 COMPLETE CBC W/AUTO DIFF WBC: CPT

## 2023-07-09 PROCEDURE — 87040 BLOOD CULTURE FOR BACTERIA: CPT

## 2023-07-09 PROCEDURE — 85610 PROTHROMBIN TIME: CPT

## 2023-07-09 PROCEDURE — 71045 X-RAY EXAM CHEST 1 VIEW: CPT | Mod: 26

## 2023-07-09 PROCEDURE — 96374 THER/PROPH/DIAG INJ IV PUSH: CPT

## 2023-07-09 PROCEDURE — 82009 KETONE BODYS QUAL: CPT

## 2023-07-09 PROCEDURE — 84484 ASSAY OF TROPONIN QUANT: CPT

## 2023-07-09 PROCEDURE — 96375 TX/PRO/DX INJ NEW DRUG ADDON: CPT

## 2023-07-09 PROCEDURE — 93010 ELECTROCARDIOGRAM REPORT: CPT

## 2023-07-09 PROCEDURE — 85730 THROMBOPLASTIN TIME PARTIAL: CPT

## 2023-07-09 PROCEDURE — 99291 CRITICAL CARE FIRST HOUR: CPT | Mod: 25

## 2023-07-09 PROCEDURE — 99291 CRITICAL CARE FIRST HOUR: CPT

## 2023-07-09 PROCEDURE — 80053 COMPREHEN METABOLIC PANEL: CPT

## 2023-07-09 PROCEDURE — 82803 BLOOD GASES ANY COMBINATION: CPT

## 2023-07-09 PROCEDURE — 0225U NFCT DS DNA&RNA 21 SARSCOV2: CPT

## 2023-07-09 PROCEDURE — 83605 ASSAY OF LACTIC ACID: CPT

## 2023-07-09 PROCEDURE — 71045 X-RAY EXAM CHEST 1 VIEW: CPT

## 2023-07-09 RX ORDER — SODIUM CHLORIDE 9 MG/ML
250 INJECTION INTRAMUSCULAR; INTRAVENOUS; SUBCUTANEOUS ONCE
Refills: 0 | Status: COMPLETED | OUTPATIENT
Start: 2023-07-09 | End: 2023-07-09

## 2023-07-09 RX ORDER — NOREPINEPHRINE BITARTRATE/D5W 8 MG/250ML
0.04 PLASTIC BAG, INJECTION (ML) INTRAVENOUS
Qty: 8 | Refills: 0 | Status: DISCONTINUED | OUTPATIENT
Start: 2023-07-09 | End: 2023-07-09

## 2023-07-09 RX ORDER — CHLORHEXIDINE GLUCONATE 213 G/1000ML
1 SOLUTION TOPICAL AT BEDTIME
Refills: 0 | Status: DISCONTINUED | OUTPATIENT
Start: 2023-07-09 | End: 2023-07-13

## 2023-07-09 RX ORDER — ASPIRIN/CALCIUM CARB/MAGNESIUM 324 MG
81 TABLET ORAL DAILY
Refills: 0 | Status: DISCONTINUED | OUTPATIENT
Start: 2023-07-09 | End: 2023-07-13

## 2023-07-09 RX ORDER — MIDAZOLAM HYDROCHLORIDE 1 MG/ML
5 INJECTION, SOLUTION INTRAMUSCULAR; INTRAVENOUS ONCE
Refills: 0 | Status: DISCONTINUED | OUTPATIENT
Start: 2023-07-09 | End: 2023-07-09

## 2023-07-09 RX ORDER — LIDOCAINE HCL 20 MG/ML
1 VIAL (ML) INJECTION
Qty: 2 | Refills: 0 | Status: DISCONTINUED | OUTPATIENT
Start: 2023-07-09 | End: 2023-07-10

## 2023-07-09 RX ORDER — INSULIN HUMAN 100 [IU]/ML
5 INJECTION, SOLUTION SUBCUTANEOUS ONCE
Refills: 0 | Status: COMPLETED | OUTPATIENT
Start: 2023-07-09 | End: 2023-07-09

## 2023-07-09 RX ORDER — SODIUM CHLORIDE 9 MG/ML
1000 INJECTION INTRAMUSCULAR; INTRAVENOUS; SUBCUTANEOUS ONCE
Refills: 0 | Status: COMPLETED | OUTPATIENT
Start: 2023-07-09 | End: 2023-07-09

## 2023-07-09 RX ORDER — DEXTROSE 50 % IN WATER 50 %
50 SYRINGE (ML) INTRAVENOUS
Refills: 0 | Status: DISCONTINUED | OUTPATIENT
Start: 2023-07-09 | End: 2023-07-13

## 2023-07-09 RX ORDER — SODIUM CHLORIDE 9 MG/ML
2000 INJECTION INTRAMUSCULAR; INTRAVENOUS; SUBCUTANEOUS ONCE
Refills: 0 | Status: COMPLETED | OUTPATIENT
Start: 2023-07-09 | End: 2023-07-09

## 2023-07-09 RX ORDER — NOREPINEPHRINE BITARTRATE/D5W 8 MG/250ML
0.04 PLASTIC BAG, INJECTION (ML) INTRAVENOUS
Qty: 8 | Refills: 0 | Status: DISCONTINUED | OUTPATIENT
Start: 2023-07-09 | End: 2023-07-10

## 2023-07-09 RX ORDER — NOREPINEPHRINE BITARTRATE/D5W 8 MG/250ML
0.05 PLASTIC BAG, INJECTION (ML) INTRAVENOUS
Qty: 8 | Refills: 0 | Status: DISCONTINUED | OUTPATIENT
Start: 2023-07-09 | End: 2023-07-13

## 2023-07-09 RX ORDER — INSULIN HUMAN 100 [IU]/ML
5 INJECTION, SOLUTION SUBCUTANEOUS
Qty: 100 | Refills: 0 | Status: DISCONTINUED | OUTPATIENT
Start: 2023-07-09 | End: 2023-07-10

## 2023-07-09 RX ORDER — ATORVASTATIN CALCIUM 80 MG/1
80 TABLET, FILM COATED ORAL AT BEDTIME
Refills: 0 | Status: DISCONTINUED | OUTPATIENT
Start: 2023-07-09 | End: 2023-07-13

## 2023-07-09 RX ORDER — LEVOTHYROXINE SODIUM 125 MCG
125 TABLET ORAL DAILY
Refills: 0 | Status: DISCONTINUED | OUTPATIENT
Start: 2023-07-09 | End: 2023-07-13

## 2023-07-09 RX ORDER — HEPARIN SODIUM 5000 [USP'U]/ML
5000 INJECTION INTRAVENOUS; SUBCUTANEOUS EVERY 8 HOURS
Refills: 0 | Status: DISCONTINUED | OUTPATIENT
Start: 2023-07-09 | End: 2023-07-13

## 2023-07-09 RX ORDER — METOPROLOL TARTRATE 50 MG
12.5 TABLET ORAL
Refills: 0 | Status: DISCONTINUED | OUTPATIENT
Start: 2023-07-09 | End: 2023-07-09

## 2023-07-09 RX ORDER — LIDOCAINE HCL 20 MG/ML
100 VIAL (ML) INJECTION ONCE
Refills: 0 | Status: COMPLETED | OUTPATIENT
Start: 2023-07-09 | End: 2023-07-09

## 2023-07-09 RX ADMIN — MIDAZOLAM HYDROCHLORIDE 5 MILLIGRAM(S): 1 INJECTION, SOLUTION INTRAMUSCULAR; INTRAVENOUS at 16:54

## 2023-07-09 RX ADMIN — SODIUM CHLORIDE 2000 MILLILITER(S): 9 INJECTION INTRAMUSCULAR; INTRAVENOUS; SUBCUTANEOUS at 17:03

## 2023-07-09 RX ADMIN — CHLORHEXIDINE GLUCONATE 1 APPLICATION(S): 213 SOLUTION TOPICAL at 22:40

## 2023-07-09 RX ADMIN — Medication 100 MILLIGRAM(S): at 17:00

## 2023-07-09 RX ADMIN — SODIUM CHLORIDE 500 MILLILITER(S): 9 INJECTION INTRAMUSCULAR; INTRAVENOUS; SUBCUTANEOUS at 23:14

## 2023-07-09 RX ADMIN — ATORVASTATIN CALCIUM 80 MILLIGRAM(S): 80 TABLET, FILM COATED ORAL at 22:41

## 2023-07-09 RX ADMIN — INSULIN HUMAN 5 UNIT(S)/HR: 100 INJECTION, SOLUTION SUBCUTANEOUS at 22:35

## 2023-07-09 RX ADMIN — Medication 6.94 MICROGRAM(S)/KG/MIN: at 17:52

## 2023-07-09 RX ADMIN — INSULIN HUMAN 5 UNIT(S): 100 INJECTION, SOLUTION SUBCUTANEOUS at 22:32

## 2023-07-09 RX ADMIN — Medication 7.5 MG/MIN: at 22:32

## 2023-07-09 RX ADMIN — SODIUM CHLORIDE 1000 MILLILITER(S): 9 INJECTION INTRAMUSCULAR; INTRAVENOUS; SUBCUTANEOUS at 17:53

## 2023-07-09 NOTE — ED PROVIDER NOTE - PHYSICAL EXAMINATION
Patient is awake paced rhythm palpable pulses no evidence of trauma.Repeat physical exam V. tach 150 patient is awake palpable pulse abdomen soft nontender moving all extremities

## 2023-07-09 NOTE — PATIENT PROFILE ADULT - FALL HARM RISK - HARM RISK INTERVENTIONS

## 2023-07-09 NOTE — H&P ADULT - HISTORY OF PRESENT ILLNESS
78M w/ PMH HTN, DM, TAVR, CAD s/p PCI, and PPM presented to OSH complaining of generalized weakness and stating that he was not feeling well today. He was in the bathroom having a BM when this began, his wife called 911. EMS arrive and found the patient in VT with pulses awake. He was given 150 amio without improvement. BP at the time was in the low 60s systolic. Given 5mg versed and shocked x2 with successful conversion to paced rhythm. Patient transferred to [facility] CICU for further management.  78M w/ PMH HTN, DM, TAVR (@ [facility] ~10years ago), CAD s/p PCI (on ASA, no longer takes Plavix), and VT s/p ablation (2021 in [place]) and CRT-D presented to OSH complaining of generalized weakness and stating that he was not feeling well today. He was in the bathroom having a BM when this began, his wife states he was diaphoretic and feeling weak and called 911. EMS arrived and found the patient in VT with pulses/awake & alert. He was given 150 amio without improvement. BP at the time was in the low 60s systolic. Given 5mg versed and shocked x2 with successful conversion to paced rhythm. Patient transferred to [facility] CICU for further management.     Patient states he saw Dr. Godinez for EP appt ~1 week ago and had his device interrogated. S/p TTE with no acute changes.   78M w/ PMH HTN, DM, Anxiety/depression, Hodgkin's Lymphoma, TAVR (@ [facility] w/ Dr. Padron in 2016), CAD s/p PCI (on ASA, no longer takes Plavix), and VT s/p ablation (2021 in [place]) and CRT-D presented to OSH complaining of generalized weakness and stating that he was not feeling well today. He was in the bathroom having a BM when this began, his wife states he was diaphoretic and feeling weak and called 911. EMS arrived and found the patient in VT with pulses/awake & alert. He was given 150 amio without improvement. BP at the time was in the low 60s systolic. Given 5mg versed and shocked x2 with successful conversion to paced rhythm. Patient transferred to [facility] CICU for further management.     Patient states he saw Dr. Godinez for EP appt ~1 week ago and had his device interrogated. S/p TTE with no acute changes.

## 2023-07-09 NOTE — ED ADULT NURSE NOTE - NSFALLRISKINTERV_ED_ALL_ED
Assistance OOB with selected safe patient handling equipment if applicable/Assistance with ambulation/Communicate fall risk and risk factors to all staff, patient, and family/Encourage patient to sit up slowly, dangle for a short time, stand at bedside before walking/Monitor gait and stability/Orthostatic vital signs/Provide patient with walking aids/Provide visual cue: yellow wristband, yellow gown, etc/Reinforce activity limits and safety measures with patient and family/Call bell, personal items and telephone in reach/Instruct patient to call for assistance before getting out of bed/chair/stretcher/Non-slip footwear applied when patient is off stretcher/[place] to call system/Physically safe environment - no spills, clutter or unnecessary equipment/Purposeful Proactive Rounding/Room/bathroom lighting operational, light cord in reach

## 2023-07-09 NOTE — ED ADULT NURSE NOTE - OBJECTIVE STATEMENT
bib/ems notification for chest pain . dizziness hypotension , found by ems on V tach with a pulse/ Patient was in the bathroom and started feeling unwell and then he called his wife to call 911.  EMS arrived and found the patient in V. tach with pulses awake.  Given 150 and amnio with no improvement.  Blood pressure at the time was low in the 60s then they administered 5 mg of Versed and shocked him twice after which point the rhythm reverted to paced rhythm.  They report that during the V. tach the pacemaker was presumably working.  Patient is now awake/medicated as ordered /safety maintained /pt on cardiac monitor /paced rhythm  noted on cardiac monitor /pt transfer to [place] /report given to rn/

## 2023-07-09 NOTE — H&P ADULT - CRITICAL CARE ATTENDING COMMENT
ICM s/p PCI, h/o VT with prior VT ablation presented with OHS vt arrest   s/p shocks x 2  on lidocaine/amiodarone  may need a repeat ischemic evaluation  on pressor support - will wean off to maintain MAP > 65  TTE penodng  trend CE and lactate  dante 7/9    Patient is critically ill, requiring critical care services. Despite the current condition, the patient is at a high risk of clinical and hemodynamic demise

## 2023-07-09 NOTE — PROVIDER CONTACT NOTE (CRITICAL VALUE NOTIFICATION) - ACTION/TREATMENT ORDERED:
As per MD Butler to evaluate pt. Primary RN giving transfer report at this time. FS repeated. Pt maintained on cardiac monitor.

## 2023-07-09 NOTE — H&P ADULT - NSHPPHYSICALEXAM_GEN_ALL_CORE
General: Well nourished, well developed, NAD  Neurology/Psychiatric: A&Ox3  Respiratory: Breath sounds CTA in all lung fields b/l.   CV: RRR, S1, S2. No murmurs, rubs or gallops.  Abdominal: Soft, non-tender, non-distended. normoactive BS.   Extremities: No peripheral edema, 2+ peripheral pulses in UE/LE b/l  Skin: No rashes, lesions, ulcers or discoloration General: Well nourished, well developed, NAD  Neurology/Psychiatric: A&Ox3  Respiratory: Breath sounds CTA in all lung fields b/l.   CV: RRR, S1, S2. No murmurs, rubs or gallops.  Abdominal: Soft, non-tender, non-distended. normoactive BS.   Extremities: No peripheral edema, 2+ peripheral pulses in UE/LE b/l

## 2023-07-09 NOTE — H&P ADULT - NSHPLABSRESULTS_GEN_ALL_CORE
10.9   12.46 )-----------( 166      ( 09 Jul 2023 17:27 )             32.0       07-09    131<L>  |  99  |  43<H>  ----------------------------<  556<HH>  3.9   |  26  |  2.16<H>    Ca    7.9<L>      09 Jul 2023 17:27    TPro  5.3<L>  /  Alb  2.5<L>  /  TBili  0.4  /  DBili  x   /  AST  24  /  ALT  27  /  AlkPhos  47  07-09

## 2023-07-09 NOTE — H&P ADULT - ASSESSMENT
78M w/ PMH HTN, DM, TAVR, CAD s/p PCI, and PPM presented with generalized weakness, found to be in sustained VT  s/p amio/lido, shock x2 with return to paced rhythm. [facility]    Plan:  ====================== NEUROLOGY=====================  A&Ox3, no acute issues  - continue to monitor mental status as per protocol     ==================== RESPIRATORY======================  Stable on room air  - continue to monitor SpO2 with goal >94%     ====================CARDIOVASCULAR==================  SVT  - s/p lido/amio in field and shock x2  - lido gtt    Hypotension  - levo @ .04    Hx CAD with SADE  - C/ w ASA, lipitor     ===================HEMATOLOGIC/ONC ===================  F/u CBC  - Monitor H/H and plts  - DVT PPX: SQH    ===================== RENAL =========================  F/u CMP  - Continue monitoring urine output, lytes, SCr/ BUN  - replete lytes prn with goal K >4 and Mg >2    ==================== GASTROINTESTINAL===================  DASH diet    =======================    ENDOCRINE  =====================  DM  - holding home Farxiga  - ISS  - f/u A1c    Hypothyroidism  - c/w levothyroxine    ========================INFECTIOUS DISEASE================  Afebrile  - monitor and trend WBC and temperature curve         Patient requires continuous monitoring with bedside rhythm monitoring, arterial line, pulse oximetry, ventilator monitoring and intermittent blood gas analysis.  Care plan discussed with ICU care team and attending Dr. Redman.         78M w/ PMH HTN, DM, TAVR, CAD s/p PCI, and VT s/p ablation and CRT-D presented with generalized weakness, found to be in sustained VT  s/p amio/lido, shock x2 with return to paced rhythm. [facility]    Plan:  ====================== NEUROLOGY=====================  A&Ox3, no acute issues  - continue to monitor mental status as per protocol     ==================== RESPIRATORY======================  Stable on room air  - continue to monitor SpO2 with goal >94%     ====================CARDIOVASCULAR==================  SVT  - pt has hx of VT, s/p ablation (2021 in [place] as well as CRT-D)  - CRT-D was interrogated last week at EP appointment   - s/p lido/amio in field and shock x2  - started on lido gtt @ 1  - outpatient EP: [facility]  - CRT-D interrogation  - EP consult    Hypotension  - patient arrived on levo .04, weaned off  - Dex placed for monitoring     Hx CAD with SADE   - C/ w ASA, lipitor   - no longer taking Plavix    Hx TAVR    ===================HEMATOLOGIC/ONC ===================  F/u CBC  - Monitor H/H and plts  - DVT PPX: SQH    ===================== RENAL =========================  F/u CMP  - Continue monitoring urine output, lytes, SCr/ BUN  - replete lytes prn with goal K >4 and Mg >2    ==================== GASTROINTESTINAL===================  DASH diet    =======================    ENDOCRINE  =====================  DM  - holding home Farxiga  - FS in 400s, initiated on insulin gtt at 5u/hr s/p insulin IVP 5  - anion gap WNL  - f/u A1c    Hypothyroidism  - c/w levothyroxine    ========================INFECTIOUS DISEASE================  Afebrile  - monitor and trend WBC and temperature curve         Patient requires continuous monitoring with bedside rhythm monitoring, arterial line, pulse oximetry, ventilator monitoring and intermittent blood gas analysis.  Care plan discussed with ICU care team and attending Dr. Redman.         78M w/ PMH HTN, DM, TAVR, CAD s/p PCI, and VT s/p ablation and CRT-D presented with generalized weakness, found to be in sustained VT  s/p amio/lido, shock x2 with return to paced rhythm. [facility]    Plan:  ====================== NEUROLOGY=====================  A&Ox3, no acute issues  - continue to monitor mental status as per protocol     Anxiety/depression  - home meds: Klonopin 1mg, fluoxetine 20mg    ==================== RESPIRATORY======================  Stable on room air  - continue to monitor SpO2 with goal >94%     ====================CARDIOVASCULAR==================  SVT  - pt has hx of VT, s/p ablation (2021 in [place] as well as CRT-D)  - CRT-D was interrogated last week at EP appointment   - s/p lido/amio in field and shock x2  - started on lido gtt @ 1  - outpatient EP: Herbert; outpatient Cards: Joe  - EP consult    Hypotension  - patient arrived on levo .04, weaned off  - Dex placed for monitoring     Hx CAD with SADE   - C/ w ASA, lipitor   - no longer taking Plavix    Hx TAVR in 2016    ===================HEMATOLOGIC/ONC ===================  H/H & plts stable  - Monitor H/H and plts  - DVT PPX: SQH    Hx Hodgkin's lymphoma    ===================== RENAL =========================  F/u CMP  - Continue monitoring urine output, lytes, SCr/ BUN  - replete lytes prn with goal K >4 and Mg >2    ==================== GASTROINTESTINAL===================  DASH diet    =======================    ENDOCRINE  =====================  DM  - holding home Farxiga  - FS in 400s, initiated on insulin gtt at 5u/hr s/p insulin IVP 5  - anion gap WNL  - f/u A1c    Hypothyroidism  - c/w levothyroxine    ========================INFECTIOUS DISEASE================  Afebrile  - monitor and trend WBC and temperature curve       Patient requires continuous monitoring with bedside rhythm monitoring, arterial line, pulse oximetry, ventilator monitoring and intermittent blood gas analysis.  Care plan discussed with ICU care team and attending Dr. Redman.

## 2023-07-09 NOTE — PROVIDER CONTACT NOTE (CRITICAL VALUE NOTIFICATION) - RECOMMENDATIONS
As per MD Butler to evaluate pt. Primary RN giving transfer report at this time. FS repeated. Pt maintained on cardiac monitor. Yes... Yes... Yes... Yes... Yes... Yes... Yes... Yes... Yes... Yes... Yes... Yes... Yes... Yes... Yes... Yes... Yes... Yes...

## 2023-07-09 NOTE — ED PROVIDER NOTE - OBJECTIVE STATEMENT
78-year-old male with pacemaker high blood pressure diabetes TAVR cardiac stent not on Plavix presents with not feeling well today.  Patient was in the bathroom and started feeling unwell and then he called his wife to call 911.  EMS arrived and found the patient in V. tach with pulses awake.  Given 150 and amnio with no improvement.  Blood pressure at the time was low in the 60s then they administered 5 mg of Versed and shocked him twice after which point the rhythm reverted to paced rhythm.  They report that during the V. tach the pacemaker was presumably working.  Patient is now awak

## 2023-07-09 NOTE — ED PROVIDER NOTE - CLINICAL SUMMARY MEDICAL DECISION MAKING FREE TEXT BOX
Patient with V. tach with low blood pressure in the field with resolution of the tach after defibrillation in the field with recurrent V. tach in the ER.  I was planning on shocking the patient and administered Versed in anticipation.  I then administered lidocaine 100 mg with a slow push over 1 to 2 minutes.  2 minutes later the V. tach resolved and patient reverted back to paced rhythm.  Paced rhythm reveals ST depressions in V to V3.  Patient denies chest pain.  Blood pressure still low.  Patient received 2 boluses of saline.  Total of 2 L.  Blood pressure still low patient started on Levophed peripherally.  Case discussed with transfer center interventional cardiology.  Plan to transfer to [place] for CCU.  Noted critically elevated glucose.  Patient denies recent illness or recent vomiting diarrhea etc.  Patient notes increased thirst. Patient notes his pacemaker is also a defibrillator and he was evaluated by his cardiologist 2 weeks ago and everything was normal and functioning properly

## 2023-07-10 ENCOUNTER — NON-APPOINTMENT (OUTPATIENT)
Age: 78
End: 2023-07-10

## 2023-07-10 LAB
A1C WITH ESTIMATED AVERAGE GLUCOSE RESULT: 11.8 % — HIGH (ref 4–5.6)
ALBUMIN SERPL ELPH-MCNC: 3.6 G/DL — SIGNIFICANT CHANGE UP (ref 3.3–5)
ALP SERPL-CCNC: 47 U/L — SIGNIFICANT CHANGE UP (ref 40–120)
ALT FLD-CCNC: 20 U/L — SIGNIFICANT CHANGE UP (ref 10–45)
ANION GAP SERPL CALC-SCNC: 11 MMOL/L — SIGNIFICANT CHANGE UP (ref 5–17)
AST SERPL-CCNC: 41 U/L — HIGH (ref 10–40)
BILIRUB SERPL-MCNC: 0.4 MG/DL — SIGNIFICANT CHANGE UP (ref 0.2–1.2)
BUN SERPL-MCNC: 31 MG/DL — HIGH (ref 7–23)
CALCIUM SERPL-MCNC: 8.5 MG/DL — SIGNIFICANT CHANGE UP (ref 8.4–10.5)
CHLORIDE SERPL-SCNC: 105 MMOL/L — SIGNIFICANT CHANGE UP (ref 96–108)
CHOLEST SERPL-MCNC: 199 MG/DL — SIGNIFICANT CHANGE UP
CO2 SERPL-SCNC: 25 MMOL/L — SIGNIFICANT CHANGE UP (ref 22–31)
CREAT SERPL-MCNC: 1.31 MG/DL — HIGH (ref 0.5–1.3)
EGFR: 55 ML/MIN/1.73M2 — LOW
ESTIMATED AVERAGE GLUCOSE: 292 MG/DL — HIGH (ref 68–114)
GLUCOSE BLDC GLUCOMTR-MCNC: 112 MG/DL — HIGH (ref 70–99)
GLUCOSE BLDC GLUCOMTR-MCNC: 113 MG/DL — HIGH (ref 70–99)
GLUCOSE BLDC GLUCOMTR-MCNC: 116 MG/DL — HIGH (ref 70–99)
GLUCOSE BLDC GLUCOMTR-MCNC: 123 MG/DL — HIGH (ref 70–99)
GLUCOSE BLDC GLUCOMTR-MCNC: 134 MG/DL — HIGH (ref 70–99)
GLUCOSE BLDC GLUCOMTR-MCNC: 137 MG/DL — HIGH (ref 70–99)
GLUCOSE BLDC GLUCOMTR-MCNC: 139 MG/DL — HIGH (ref 70–99)
GLUCOSE BLDC GLUCOMTR-MCNC: 146 MG/DL — HIGH (ref 70–99)
GLUCOSE BLDC GLUCOMTR-MCNC: 183 MG/DL — HIGH (ref 70–99)
GLUCOSE BLDC GLUCOMTR-MCNC: 187 MG/DL — HIGH (ref 70–99)
GLUCOSE BLDC GLUCOMTR-MCNC: 200 MG/DL — HIGH (ref 70–99)
GLUCOSE BLDC GLUCOMTR-MCNC: 207 MG/DL — HIGH (ref 70–99)
GLUCOSE BLDC GLUCOMTR-MCNC: 231 MG/DL — HIGH (ref 70–99)
GLUCOSE BLDC GLUCOMTR-MCNC: 99 MG/DL — SIGNIFICANT CHANGE UP (ref 70–99)
GLUCOSE SERPL-MCNC: 112 MG/DL — HIGH (ref 70–99)
HCT VFR BLD CALC: 35.1 % — LOW (ref 39–50)
HDLC SERPL-MCNC: 36 MG/DL — LOW
HGB BLD-MCNC: 11.9 G/DL — LOW (ref 13–17)
LIPID PNL WITH DIRECT LDL SERPL: 94 MG/DL — SIGNIFICANT CHANGE UP
MAGNESIUM SERPL-MCNC: 2.3 MG/DL — SIGNIFICANT CHANGE UP (ref 1.6–2.6)
MCHC RBC-ENTMCNC: 32.4 PG — SIGNIFICANT CHANGE UP (ref 27–34)
MCHC RBC-ENTMCNC: 33.9 GM/DL — SIGNIFICANT CHANGE UP (ref 32–36)
MCV RBC AUTO: 95.6 FL — SIGNIFICANT CHANGE UP (ref 80–100)
NON HDL CHOLESTEROL: 163 MG/DL — HIGH
NRBC # BLD: 0 /100 WBCS — SIGNIFICANT CHANGE UP (ref 0–0)
PHOSPHATE SERPL-MCNC: 3.3 MG/DL — SIGNIFICANT CHANGE UP (ref 2.5–4.5)
PLATELET # BLD AUTO: 176 K/UL — SIGNIFICANT CHANGE UP (ref 150–400)
POTASSIUM SERPL-MCNC: 3.8 MMOL/L — SIGNIFICANT CHANGE UP (ref 3.5–5.3)
POTASSIUM SERPL-SCNC: 3.8 MMOL/L — SIGNIFICANT CHANGE UP (ref 3.5–5.3)
PROT SERPL-MCNC: 5.7 G/DL — LOW (ref 6–8.3)
RBC # BLD: 3.67 M/UL — LOW (ref 4.2–5.8)
RBC # FLD: 14.2 % — SIGNIFICANT CHANGE UP (ref 10.3–14.5)
SODIUM SERPL-SCNC: 141 MMOL/L — SIGNIFICANT CHANGE UP (ref 135–145)
TRIGL SERPL-MCNC: 348 MG/DL — HIGH
TSH SERPL-MCNC: 2.46 UIU/ML — SIGNIFICANT CHANGE UP (ref 0.27–4.2)
WBC # BLD: 8.92 K/UL — SIGNIFICANT CHANGE UP (ref 3.8–10.5)
WBC # FLD AUTO: 8.92 K/UL — SIGNIFICANT CHANGE UP (ref 3.8–10.5)

## 2023-07-10 PROCEDURE — 99292 CRITICAL CARE ADDL 30 MIN: CPT | Mod: FS

## 2023-07-10 PROCEDURE — 93306 TTE W/DOPPLER COMPLETE: CPT | Mod: 26

## 2023-07-10 PROCEDURE — 93284 PRGRMG EVAL IMPLANTABLE DFB: CPT | Mod: 26

## 2023-07-10 PROCEDURE — 99291 CRITICAL CARE FIRST HOUR: CPT

## 2023-07-10 RX ORDER — INSULIN LISPRO 100/ML
VIAL (ML) SUBCUTANEOUS
Refills: 0 | Status: DISCONTINUED | OUTPATIENT
Start: 2023-07-10 | End: 2023-07-13

## 2023-07-10 RX ORDER — CLONAZEPAM 1 MG
1 TABLET ORAL DAILY
Refills: 0 | Status: DISCONTINUED | OUTPATIENT
Start: 2023-07-10 | End: 2023-07-13

## 2023-07-10 RX ORDER — INSULIN LISPRO 100/ML
VIAL (ML) SUBCUTANEOUS AT BEDTIME
Refills: 0 | Status: DISCONTINUED | OUTPATIENT
Start: 2023-07-10 | End: 2023-07-13

## 2023-07-10 RX ORDER — FLUOXETINE HCL 10 MG
20 CAPSULE ORAL DAILY
Refills: 0 | Status: DISCONTINUED | OUTPATIENT
Start: 2023-07-10 | End: 2023-07-13

## 2023-07-10 RX ORDER — SOTALOL HCL 120 MG
80 TABLET ORAL EVERY 12 HOURS
Refills: 0 | Status: DISCONTINUED | OUTPATIENT
Start: 2023-07-10 | End: 2023-07-13

## 2023-07-10 RX ORDER — INSULIN GLARGINE 100 [IU]/ML
25 INJECTION, SOLUTION SUBCUTANEOUS EVERY MORNING
Refills: 0 | Status: DISCONTINUED | OUTPATIENT
Start: 2023-07-10 | End: 2023-07-12

## 2023-07-10 RX ORDER — LIDOCAINE HCL 20 MG/ML
0.5 VIAL (ML) INJECTION
Qty: 2 | Refills: 0 | Status: DISCONTINUED | OUTPATIENT
Start: 2023-07-10 | End: 2023-07-11

## 2023-07-10 RX ADMIN — INSULIN GLARGINE 25 UNIT(S): 100 INJECTION, SOLUTION SUBCUTANEOUS at 09:04

## 2023-07-10 RX ADMIN — CHLORHEXIDINE GLUCONATE 1 APPLICATION(S): 213 SOLUTION TOPICAL at 22:24

## 2023-07-10 RX ADMIN — ATORVASTATIN CALCIUM 80 MILLIGRAM(S): 80 TABLET, FILM COATED ORAL at 22:25

## 2023-07-10 RX ADMIN — Medication 1 MILLIGRAM(S): at 11:33

## 2023-07-10 RX ADMIN — Medication 3.75 MG/MIN: at 11:22

## 2023-07-10 RX ADMIN — Medication 4: at 17:19

## 2023-07-10 RX ADMIN — HEPARIN SODIUM 5000 UNIT(S): 5000 INJECTION INTRAVENOUS; SUBCUTANEOUS at 22:25

## 2023-07-10 RX ADMIN — HEPARIN SODIUM 5000 UNIT(S): 5000 INJECTION INTRAVENOUS; SUBCUTANEOUS at 14:29

## 2023-07-10 RX ADMIN — Medication 20 MILLIGRAM(S): at 11:34

## 2023-07-10 RX ADMIN — Medication 2: at 11:32

## 2023-07-10 RX ADMIN — Medication 80 MILLIGRAM(S): at 22:23

## 2023-07-10 RX ADMIN — HEPARIN SODIUM 5000 UNIT(S): 5000 INJECTION INTRAVENOUS; SUBCUTANEOUS at 05:23

## 2023-07-10 RX ADMIN — Medication 3.75 MG/MIN: at 22:26

## 2023-07-10 RX ADMIN — Medication 125 MICROGRAM(S): at 05:23

## 2023-07-10 RX ADMIN — Medication 81 MILLIGRAM(S): at 11:34

## 2023-07-10 NOTE — PROGRESS NOTE ADULT - SUBJECTIVE AND OBJECTIVE BOX
HPI: 78M w/ PMH HTN, DM, Anxiety/depression, Hodgkin's Lymphoma, TAVR (@ [facility] w/ Dr. Padron in 2016), CAD s/p PCI (on ASA, no longer takes Plavix), and VT s/p ablation ( in [place]) and CRT-D presented to OSH complaining of generalized weakness and stating that he was not feeling well today. He was in the bathroom having a BM when this began, his wife states he was diaphoretic and feeling weak and called 911. EMS arrived and found the patient in VT with pulses/awake & alert. He was given 150 amio without improvement. BP at the time was in the low 60s systolic. Given 5mg versed and shocked x2 with successful conversion to paced rhythm. Patient transferred to [facility] CICU for further management.  Patient states he saw Dr. Godinez for EP appt ~1 week ago and had his device interrogated. S/p TTE with no acute changes (2023 20:22)     ICU Vital Signs Last 24 Hrs  T(C): 36.6 (10 Jul 2023 04:00), Max: 36.9 (2023 17:07)  T(F): 97.9 (10 Jul 2023 04:00), Max: 98.5 (2023 17:07)  HR: 67 (10 Jul 2023 07:00) (63 - 155)  BP: 85/48 (2023 23:00) (61/31 - 137/64)  BP(mean): 61 (2023 23:00) (61 - 92)  ABP: 119/44 (10 Jul 2023 07:00) (86/33 - 134/46)  ABP(mean): 70 (10 Jul 2023 07:00) (51 - 82)  RR: 15 (10 Jul 2023 07:00) (12 - 37)  SpO2: 99% (10 Jul 2023 07:00) (95% - 100%)    O2 Parameters below as of 10 Jul 2023 07:00  Patient On (Oxygen Delivery Method): room air      I&O's Summary    2023 07:01  -  10 Jul 2023 07:00  --------------------------------------------------------  IN: 412.5 mL / OUT: 500 mL / NET: -87.5 mL    Daily Height in cm: 172.72 (2023 20:15)    Daily Weight in k.7 (2023 20:15)    MEDICATIONS  (STANDING):  aspirin enteric coated 81 milliGRAM(s) Oral daily  atorvastatin 80 milliGRAM(s) Oral at bedtime  chlorhexidine 2% Cloths 1 Application(s) Topical at bedtime  clonazePAM  Tablet 1 milliGRAM(s) Oral daily  dextrose 50% Injectable 50 milliLiter(s) IV Push every 15 minutes  FLUoxetine 20 milliGRAM(s) Oral daily  heparin   Injectable 5000 Unit(s) SubCutaneous every 8 hours  insulin glargine Injectable (LANTUS) 25 Unit(s) SubCutaneous every morning  insulin lispro (ADMELOG) corrective regimen sliding scale   SubCutaneous three times a day before meals  insulin lispro (ADMELOG) corrective regimen sliding scale   SubCutaneous at bedtime  insulin regular Infusion 5 Unit(s)/Hr (5 mL/Hr) IV Continuous <Continuous>  levothyroxine 125 MICROGram(s) Oral daily  lidocaine   Infusion 1 mG/Min (7.5 mL/Hr) IV Continuous <Continuous>  norepinephrine Infusion 0.04 MICROgram(s)/kG/Min (5.6 mL/Hr) IV Continuous <Continuous>    PHYSICAL EXAM:  GENERAL:   HEAD:  Atraumatic, Normocephalic  EYES: EOMI, PERRLA, conjunctiva and sclera clear  NECK: Supple  NERVOUS SYSTEM:  Alert & Awake.   CHEST/LUNG: B/L good air entry; No rales, rhonchi, or wheezing  HEART: S1S2 normal, no S3, Regular rate and rhythm; No murmurs  ABDOMEN: Soft, Nontender; Bowel sounds present  EXTREMITIES:  2+ Peripheral Pulses, No clubbing, cyanosis, or edema  SKIN: No rashes or lesions    LABS:                        12.1   11.42 )-----------( 170      ( 2023 21:35 )             36.0     07-09    134<L>  |  99  |  38<H>  ----------------------------<  450<HH>  4.1   |  21<L>  |  1.57<H>    Ca    8.3<L>      2023 21:35  Phos  3.8     07-  Mg     2.2     07-09    TPro  5.7<L>  /  Alb  3.6  /  TBili  0.4  /  DBili  x   /  AST  38  /  ALT  21  /  AlkPhos  51  07-09    LIVER FUNCTIONS - ( 2023 21:35 )  Alb: 3.6 g/dL / Pro: 5.7 g/dL / ALK PHOS: 51 U/L / ALT: 21 U/L / AST: 38 U/L / GGT: x           PT/INR - ( 2023 17:27 )   PT: 11.6 sec;   INR: 0.98 ratio         PTT - ( 2023 17:27 )  PTT:24.0 sec  CAPILLARY BLOOD GLUCOSE    POCT Blood Glucose.: 113 mg/dL (10 Jul 2023 07:14)  POCT Blood Glucose.: 112 mg/dL (10 Jul 2023 06:19)  POCT Blood Glucose.: 116 mg/dL (10 Jul 2023 05:07)  POCT Blood Glucose.: 123 mg/dL (10 Jul 2023 04:11)  POCT Blood Glucose.: 137 mg/dL (10 Jul 2023 03:07)  POCT Blood Glucose.: 146 mg/dL (10 Jul 2023 02:17)  POCT Blood Glucose.: 187 mg/dL (10 Jul 2023 01:20)  POCT Blood Glucose.: 231 mg/dL (10 Jul 2023 00:18)  POCT Blood Glucose.: 318 mg/dL (2023 23:03)  POCT Blood Glucose.: 367 mg/dL (2023 22:26)  POCT Blood Glucose.: 441 mg/dL (2023 21:06)  POCT Blood Glucose.: 496 mg/dL (2023 18:37)  POCT Blood Glucose.: 561 mg/dL (2023 16:41)  POCT Blood Glucose.: 578 mg/dL (2023 16:40)    ABG - ( 2023 22:46 )  pH, Arterial: 7.40  pH, Blood: x     /  pCO2: 43    /  pO2: 90    / HCO3: 27    / Base Excess: 1.5   /  SaO2: 98.2      Urinalysis Basic - ( 2023 21:35 )    Color: x / Appearance: x / SG: x / pH: x  Gluc: 450 mg/dL / Ketone: x  / Bili: x / Urobili: x   Blood: x / Protein: x / Nitrite: x   Leuk Esterase: x / RBC: x / WBC x   Sq Epi: x / Non Sq Epi: x / Bacteria: x    A/P: 78M w/ PMH HTN, DM, TAVR, CAD s/p PCI, and VT s/p ablation and CRT-D presented with generalized weakness, found to be in sustained VT  s/p amio/lido, shock x2 with return to paced rhythm. [facility]    ====================== NEUROLOGY=====================  A&Ox3, no acute issues  - continue to monitor mental status as per protocol     Anxiety/depression  - home meds: Klonopin 1mg, fluoxetine 20mg    ==================== RESPIRATORY======================  Stable on room air  - continue to monitor SpO2 with goal >94%     ====================CARDIOVASCULAR==================  SVT  - pt has hx of VT, s/p ablation ( in [place] as well as CRT-D)  - CRT-D was interrogated last week at EP appointment   - s/p lido/amio in field and shock x2  - Lido gtt decreased to 0.5  - outpatient EP: Herbert; outpatient Cards: Joe  - EP consult, will appreciate recs    Hypotension  - patient arrived on levo .04, weaned off  - Dex placed for monitoring     Hx CAD with SADE   - C/ w ASA, lipitor   - no longer taking Plavix    Hx TAVR in     ===================HEMATOLOGIC/ONC ===================  H/H & plts stable  - Monitor H/H and plts  - DVT PPX: SQH    Hx Hodgkin's lymphoma    ===================== RENAL =========================  F/u CMP  - Continue monitoring urine output, lytes, SCr/ BUN  - replete lytes prn with goal K >4 and Mg >2    ==================== GASTROINTESTINAL===================  DASH diet    =======================    ENDOCRINE  =====================  DM  - holding home Farxiga  - HbA1c 11.8%  - FS initially in 400s, s/p insulin gtt at 5u/hr, s/p insulin IVP 5  - anion gap WNL  - Consider endocrine f/u outpatient for glycemic control    Hypothyroidism  - TSH 2.46  - c/w levothyroxine    ========================INFECTIOUS DISEASE================  Afebrile  - monitor and trend WBC and temperature curve    HPI: 78M w/ PMH HTN, DM, Anxiety/depression, Hodgkin's Lymphoma, TAVR (@ [facility] w/ Dr. Padron in 2016), CAD s/p PCI (on ASA, no longer takes Plavix), and VT s/p ablation ( in [place]) and CRT-D presented to OSH complaining of generalized weakness and stating that he was not feeling well today. He was in the bathroom having a BM when this began, his wife states he was diaphoretic and feeling weak and called 911. EMS arrived and found the patient in VT with pulses/awake & alert. He was given 150 amio without improvement. BP at the time was in the low 60s systolic. Given 5mg versed and shocked x2 with successful conversion to paced rhythm. Patient transferred to [facility] CICU for further management.  Patient states he saw Dr. Godinez for EP appt ~1 week ago and had his device interrogated. S/p TTE with no acute changes (2023 20:22)     ICU Vital Signs Last 24 Hrs  T(C): 36.6 (10 Jul 2023 04:00), Max: 36.9 (2023 17:07)  T(F): 97.9 (10 Jul 2023 04:00), Max: 98.5 (2023 17:07)  HR: 67 (10 Jul 2023 07:00) (63 - 155)  BP: 85/48 (2023 23:00) (61/31 - 137/64)  BP(mean): 61 (2023 23:00) (61 - 92)  ABP: 119/44 (10 Jul 2023 07:00) (86/33 - 134/46)  ABP(mean): 70 (10 Jul 2023 07:00) (51 - 82)  RR: 15 (10 Jul 2023 07:00) (12 - 37)  SpO2: 99% (10 Jul 2023 07:00) (95% - 100%)    O2 Parameters below as of 10 Jul 2023 07:00  Patient On (Oxygen Delivery Method): room air      I&O's Summary    2023 07:01  -  10 Jul 2023 07:00  --------------------------------------------------------  IN: 412.5 mL / OUT: 500 mL / NET: -87.5 mL    Daily Height in cm: 172.72 (2023 20:15)    Daily Weight in k.7 (2023 20:15)    MEDICATIONS  (STANDING):  aspirin enteric coated 81 milliGRAM(s) Oral daily  atorvastatin 80 milliGRAM(s) Oral at bedtime  chlorhexidine 2% Cloths 1 Application(s) Topical at bedtime  clonazePAM  Tablet 1 milliGRAM(s) Oral daily  dextrose 50% Injectable 50 milliLiter(s) IV Push every 15 minutes  FLUoxetine 20 milliGRAM(s) Oral daily  heparin   Injectable 5000 Unit(s) SubCutaneous every 8 hours  insulin glargine Injectable (LANTUS) 25 Unit(s) SubCutaneous every morning  insulin lispro (ADMELOG) corrective regimen sliding scale   SubCutaneous three times a day before meals  insulin lispro (ADMELOG) corrective regimen sliding scale   SubCutaneous at bedtime  insulin regular Infusion 5 Unit(s)/Hr (5 mL/Hr) IV Continuous <Continuous>  levothyroxine 125 MICROGram(s) Oral daily  lidocaine   Infusion 1 mG/Min (7.5 mL/Hr) IV Continuous <Continuous>  norepinephrine Infusion 0.04 MICROgram(s)/kG/Min (5.6 mL/Hr) IV Continuous <Continuous>    PHYSICAL EXAM:  GENERAL:   HEAD:  Atraumatic, Normocephalic  EYES: EOMI, PERRLA, conjunctiva and sclera clear  NECK: Supple  NERVOUS SYSTEM:  Alert & Awake.   CHEST/LUNG: B/L good air entry; No rales, rhonchi, or wheezing  HEART: S1S2 normal, no S3, Regular rate and rhythm; No murmurs  ABDOMEN: Soft, Nontender; Bowel sounds present  EXTREMITIES:  2+ Peripheral Pulses, No clubbing, cyanosis, or edema  SKIN: No rashes or lesions    LABS:                        12.1   11.42 )-----------( 170      ( 2023 21:35 )             36.0     07-09    134<L>  |  99  |  38<H>  ----------------------------<  450<HH>  4.1   |  21<L>  |  1.57<H>    Ca    8.3<L>      2023 21:35  Phos  3.8     07-  Mg     2.2     07-09    TPro  5.7<L>  /  Alb  3.6  /  TBili  0.4  /  DBili  x   /  AST  38  /  ALT  21  /  AlkPhos  51  07-09    LIVER FUNCTIONS - ( 2023 21:35 )  Alb: 3.6 g/dL / Pro: 5.7 g/dL / ALK PHOS: 51 U/L / ALT: 21 U/L / AST: 38 U/L / GGT: x           PT/INR - ( 2023 17:27 )   PT: 11.6 sec;   INR: 0.98 ratio         PTT - ( 2023 17:27 )  PTT:24.0 sec  CAPILLARY BLOOD GLUCOSE    POCT Blood Glucose.: 113 mg/dL (10 Jul 2023 07:14)  POCT Blood Glucose.: 112 mg/dL (10 Jul 2023 06:19)  POCT Blood Glucose.: 116 mg/dL (10 Jul 2023 05:07)  POCT Blood Glucose.: 123 mg/dL (10 Jul 2023 04:11)  POCT Blood Glucose.: 137 mg/dL (10 Jul 2023 03:07)  POCT Blood Glucose.: 146 mg/dL (10 Jul 2023 02:17)  POCT Blood Glucose.: 187 mg/dL (10 Jul 2023 01:20)  POCT Blood Glucose.: 231 mg/dL (10 Jul 2023 00:18)  POCT Blood Glucose.: 318 mg/dL (2023 23:03)  POCT Blood Glucose.: 367 mg/dL (2023 22:26)  POCT Blood Glucose.: 441 mg/dL (2023 21:06)  POCT Blood Glucose.: 496 mg/dL (2023 18:37)  POCT Blood Glucose.: 561 mg/dL (2023 16:41)  POCT Blood Glucose.: 578 mg/dL (2023 16:40)    ABG - ( 2023 22:46 )  pH, Arterial: 7.40  pH, Blood: x     /  pCO2: 43    /  pO2: 90    / HCO3: 27    / Base Excess: 1.5   /  SaO2: 98.2      Urinalysis Basic - ( 2023 21:35 )    Color: x / Appearance: x / SG: x / pH: x  Gluc: 450 mg/dL / Ketone: x  / Bili: x / Urobili: x   Blood: x / Protein: x / Nitrite: x   Leuk Esterase: x / RBC: x / WBC x   Sq Epi: x / Non Sq Epi: x / Bacteria: x    A/P: 78M w/ PMH HTN, DM, TAVR, CAD s/p PCI, and VT s/p ablation and CRT-D presented with generalized weakness, found to be in sustained VT  s/p amio/lido, shock x2 with return to paced rhythm. [facility]    ====================== NEUROLOGY=====================  A&Ox3, no acute issues  - continue to monitor mental status as per protocol     Anxiety/depression  - home meds: Klonopin 1mg, fluoxetine 20mg    ==================== RESPIRATORY======================  Stable on room air  - continue to monitor SpO2 with goal >94%     ====================CARDIOVASCULAR==================  SVT  - pt has hx of VT, s/p ablation ( in [place] as well as CRT-D)  - CRT-D was interrogated last week at EP appointment   - s/p lido/amio in field and shock x2  - Lido gtt decreased to 0.5  - outpatient EP: Herbert; outpatient Cards: Joe  - EP consult, will appreciate recs    Hypotension  - patient arrived on levo .04, weaned off  - Dex placed for monitoring     Hx CAD with SADE   - C/ w ASA, lipitor   - no longer taking Plavix    Hx TAVR in     ===================HEMATOLOGIC/ONC ===================  H/H & plts stable  - Monitor H/H and plts  - DVT PPX: SQH    Hx Hodgkin's lymphoma    ===================== RENAL =========================  F/u CMP  - Continue monitoring urine output, lytes, SCr/ BUN  - replete lytes prn with goal K >4 and Mg >2    ==================== GASTROINTESTINAL===================  DASH diet    =======================    ENDOCRINE  =====================  DM  - holding home Farxiga  - HbA1c 11.8%  - FS initially in 400s, s/p insulin gtt at 5u/hr, s/p insulin IVP 5  - anion gap WNL  - Consider endocrine f/u outpatient for glycemic control    Hypothyroidism  - TSH 2.46  - c/w levothyroxine    ========================INFECTIOUS DISEASE================  Afebrile  - monitor and trend WBC and temperature curve     ======================= DISPOSITION  =====================  [X] Critical   [ ] Guarded    [ ] Stable    [X] Maintain in CICU  [ ] Downgrade to Telemetry  [ ] Discharge Home    Patient requires continuous monitoring with bedside rhythm monitoring, pulse ox monitoring, and intermittent blood gas analysis. Care plan discussed with ICU care team. Patient remained critical and at risk for life threatening decompensation.  Patient seen, examined and plan discussed with CCU team during rounds.     I have personally provided 30 minutes of critical care time excluding time spent on separate procedures, in addition to initial critical care time provided by the CICU Attending, Dr. Conner Warren [facility] [phone] HPI: 78M w/ PMH HTN, DM, Anxiety/depression, Hodgkin's Lymphoma, TAVR (@ [facility] w/ Dr. Padron in 2016), CAD s/p PCI (on ASA, no longer takes Plavix), and VT s/p ablation ( in [place]) and CRT-D presented to OSH complaining of generalized weakness and stating that he was not feeling well today. He was in the bathroom having a BM when this began, his wife states he was diaphoretic and feeling weak and called 911. EMS arrived and found the patient in VT with pulses/awake & alert. He was given 150 amio without improvement. BP at the time was in the low 60s systolic. Given 5mg versed and shocked x2 with successful conversion to paced rhythm. Patient transferred to [facility] CICU for further management.  Patient states he saw Dr. Godinez for EP appt ~1 week ago and had his device interrogated. S/p TTE with no acute changes (2023 20:22)     ICU Vital Signs Last 24 Hrs  T(C): 36.6 (10 Jul 2023 04:00), Max: 36.9 (2023 17:07)  T(F): 97.9 (10 Jul 2023 04:00), Max: 98.5 (2023 17:07)  HR: 67 (10 Jul 2023 07:00) (63 - 155)  BP: 85/48 (2023 23:00) (61/31 - 137/64)  BP(mean): 61 (2023 23:00) (61 - 92)  ABP: 119/44 (10 Jul 2023 07:00) (86/33 - 134/46)  ABP(mean): 70 (10 Jul 2023 07:00) (51 - 82)  RR: 15 (10 Jul 2023 07:00) (12 - 37)  SpO2: 99% (10 Jul 2023 07:00) (95% - 100%)    O2 Parameters below as of 10 Jul 2023 07:00  Patient On (Oxygen Delivery Method): room air      I&O's Summary    2023 07:01  -  10 Jul 2023 07:00  --------------------------------------------------------  IN: 412.5 mL / OUT: 500 mL / NET: -87.5 mL    Daily Height in cm: 172.72 (2023 20:15)    Daily Weight in k.7 (2023 20:15)    MEDICATIONS  (STANDING):  aspirin enteric coated 81 milliGRAM(s) Oral daily  atorvastatin 80 milliGRAM(s) Oral at bedtime  chlorhexidine 2% Cloths 1 Application(s) Topical at bedtime  clonazePAM  Tablet 1 milliGRAM(s) Oral daily  dextrose 50% Injectable 50 milliLiter(s) IV Push every 15 minutes  FLUoxetine 20 milliGRAM(s) Oral daily  heparin   Injectable 5000 Unit(s) SubCutaneous every 8 hours  insulin glargine Injectable (LANTUS) 25 Unit(s) SubCutaneous every morning  insulin lispro (ADMELOG) corrective regimen sliding scale   SubCutaneous three times a day before meals  insulin lispro (ADMELOG) corrective regimen sliding scale   SubCutaneous at bedtime  insulin regular Infusion 5 Unit(s)/Hr (5 mL/Hr) IV Continuous <Continuous>  levothyroxine 125 MICROGram(s) Oral daily  lidocaine   Infusion 1 mG/Min (7.5 mL/Hr) IV Continuous <Continuous>  norepinephrine Infusion 0.04 MICROgram(s)/kG/Min (5.6 mL/Hr) IV Continuous <Continuous>    PHYSICAL EXAM:  GENERAL:   HEAD:  Atraumatic, Normocephalic  EYES: EOMI, PERRLA, conjunctiva and sclera clear  NECK: Supple  NERVOUS SYSTEM:  Alert & Awake.   CHEST/LUNG: B/L good air entry; No rales, rhonchi, or wheezing  HEART: S1S2 normal, no S3, Regular rate and rhythm; No murmurs  ABDOMEN: Soft, Nontender; Bowel sounds present  EXTREMITIES:  2+ Peripheral Pulses, No clubbing, cyanosis, or edema  SKIN: No rashes or lesions    LABS:                        12.1   11.42 )-----------( 170      ( 2023 21:35 )             36.0     07-09    134<L>  |  99  |  38<H>  ----------------------------<  450<HH>  4.1   |  21<L>  |  1.57<H>    Ca    8.3<L>      2023 21:35  Phos  3.8     07-  Mg     2.2     07-09    TPro  5.7<L>  /  Alb  3.6  /  TBili  0.4  /  DBili  x   /  AST  38  /  ALT  21  /  AlkPhos  51  07-09    LIVER FUNCTIONS - ( 2023 21:35 )  Alb: 3.6 g/dL / Pro: 5.7 g/dL / ALK PHOS: 51 U/L / ALT: 21 U/L / AST: 38 U/L / GGT: x           PT/INR - ( 2023 17:27 )   PT: 11.6 sec;   INR: 0.98 ratio         PTT - ( 2023 17:27 )  PTT:24.0 sec  CAPILLARY BLOOD GLUCOSE    POCT Blood Glucose.: 113 mg/dL (10 Jul 2023 07:14)  POCT Blood Glucose.: 112 mg/dL (10 Jul 2023 06:19)  POCT Blood Glucose.: 116 mg/dL (10 Jul 2023 05:07)  POCT Blood Glucose.: 123 mg/dL (10 Jul 2023 04:11)  POCT Blood Glucose.: 137 mg/dL (10 Jul 2023 03:07)  POCT Blood Glucose.: 146 mg/dL (10 Jul 2023 02:17)  POCT Blood Glucose.: 187 mg/dL (10 Jul 2023 01:20)  POCT Blood Glucose.: 231 mg/dL (10 Jul 2023 00:18)  POCT Blood Glucose.: 318 mg/dL (2023 23:03)  POCT Blood Glucose.: 367 mg/dL (2023 22:26)  POCT Blood Glucose.: 441 mg/dL (2023 21:06)  POCT Blood Glucose.: 496 mg/dL (2023 18:37)  POCT Blood Glucose.: 561 mg/dL (2023 16:41)  POCT Blood Glucose.: 578 mg/dL (2023 16:40)    ABG - ( 2023 22:46 )  pH, Arterial: 7.40  pH, Blood: x     /  pCO2: 43    /  pO2: 90    / HCO3: 27    / Base Excess: 1.5   /  SaO2: 98.2      Urinalysis Basic - ( 2023 21:35 )    Color: x / Appearance: x / SG: x / pH: x  Gluc: 450 mg/dL / Ketone: x  / Bili: x / Urobili: x   Blood: x / Protein: x / Nitrite: x   Leuk Esterase: x / RBC: x / WBC x   Sq Epi: x / Non Sq Epi: x / Bacteria: x    A/P: 78M w/ PMH HTN, DM, TAVR, CAD s/p PCI, and VT s/p ablation and CRT-D presented with generalized weakness, found to be in sustained VT  s/p amio/lido, shock x2 with return to paced rhythm. Transferred from OSH to [facility] CICU for further management.    ====================== NEUROLOGY=====================  A&Ox3, no acute issues  - continue to monitor mental status as per protocol     Anxiety/depression  - home meds: Klonopin 1mg, fluoxetine 20mg    ==================== RESPIRATORY======================  Stable on room air  - continue to monitor SpO2 with goal >94%     ====================CARDIOVASCULAR==================  SVT  - pt has hx of VT, s/p ablation ( in [place] as well as CRT-D)  - CRT-D was interrogated last week at EP appointment   - s/p lido/amio in field and shock x2  - started Sotalol 80 BID, EKG post 2hr (per Dr. Calderon)   - c/w Lido gtt at 0.5 - if remains quite on telemetry will DC after 2nd Sotalol dose (per Dr. Mcbride)  - outpatient EP: Herbert; outpatient Cards: Joe  - EP consult, will appreciate recs    Hypotension  - patient arrived on levo .04, weaned off  - Dex placed for monitoring     Hx CAD with SADE   - C/ w ASA, lipitor   - no longer taking Plavix    Hx TAVR in     ===================HEMATOLOGIC/ONC ===================  H/H & plts stable  - Monitor H/H and plts  - DVT PPX: SQH    Hx Hodgkin's lymphoma    ===================== RENAL =========================  F/u CMP  - Continue monitoring urine output, lytes, SCr/ BUN  - replete lytes prn with goal K >4 and Mg >2    ==================== GASTROINTESTINAL===================  DASH diet    =======================    ENDOCRINE  =====================  DM  - holding home Farxiga  - HbA1c 11.8%  - FS initially in 400s, s/p insulin gtt at 5u/hr, s/p insulin IVP 5  - anion gap WNL  - Consider endocrine f/u outpatient for glycemic control    Hypothyroidism  - TSH 2.46  - c/w levothyroxine    ========================INFECTIOUS DISEASE================  Afebrile  - monitor and trend WBC and temperature curve     ======================= DISPOSITION  =====================  [X] Critical   [ ] Guarded    [ ] Stable    [X] Maintain in CICU  [ ] Downgrade to Telemetry  [ ] Discharge Home    Patient requires continuous monitoring with bedside rhythm monitoring, pulse ox monitoring, and intermittent blood gas analysis. Care plan discussed with ICU care team. Patient remained critical and at risk for life threatening decompensation.  Patient seen, examined and plan discussed with CCU team during rounds.     I have personally provided 30 minutes of critical care time excluding time spent on separate procedures, in addition to initial critical care time provided by the CICU Attending, Dr[phone]

## 2023-07-10 NOTE — CONSULT NOTE ADULT - ASSESSMENT
Patient seen and examined, agree with above assessment and plan as transcribed above.    - briefly 79 yo M ICM, closed RCA with collaterals, EF 45%  MAGO BiV ICD admitted with weakness found with slow VT s/p defibrillation by EMS  - Cont Lido Drip  - Interrogate ICD  - EP jason Hinton MD, [facility]  BEEPER [phone]

## 2023-07-10 NOTE — CHART NOTE - NSCHARTNOTEFT_GEN_A_CORE
iSTOP    Search Terms: Fer Hernandez, 1945  Search Date: Jul 10, 2023 1:15:51 AM  States Searched: [place] & [place]    The Drug Utilization Report below displays the controlled substance prescriptions, if any, that were dispensed in the indicated state(s). The information displayed on this report is compiled from requests submitted to other states' PMPs, and accurately reflects the information as returned by them. Blank fields indicate data not provided by other state.    This report was requested by: Sudha Erwin | Reference #: [id number]    Prescriptions Dispensed in [place]  Patient Demographic Information (PDI)       PDI	First Name	Last Name	Birth Date	Gender	Street Address	[facility]	State	Zip Code  A	FER HERNANDEZ	1945	Male	[address]	[facility]	[address]    Prescription Information    PDI Filter:    PDI   Current Rx	Rx Written          Rx Dispensed	Drug	        Strength                Quantity   Days Supply	Prescriber Name	      Payment Method   Dispenser  A	N	05/23/2023	05/23/2023	CLONAZEPAM 1 MG TABLET		90.0	30	MD MONIKA, MY	[facility]  A	N	04/20/2023	04/20/2023	HYDROMET 5 MG-1.5 MG/5 ML SOLN	35.0ml	7	CHIP MADISON	Medicare     [facility]  A	N	02/24/2023	02/24/2023	CLONAZEPAM 1 MG TABLET		90.0	30	MD MONIKA, MY	[facility]  A	N	11/29/2022	11/29/2022	CLONAZEPAM 1 MG TABLET		90.0	30	MD MONIKA, MY	[facility]ic[facility]

## 2023-07-10 NOTE — CONSULT NOTE ADULT - SUBJECTIVE AND OBJECTIVE BOX
CHIEF COMPLAINT:    HISTORY OF PRESENT ILLNESS:  79y/o man with Hx of Anxiety/depression, Hodgkins lymphoma, HTN, DM, HLD, severe AS s/p TAVR (@ [facility] w/ Dr. aPdron in 2016), MI, CAD s/p SADE to D1 on 5/25/2021 (on ASA, no longer takes Plavix), chronic systolic CHF with EF 34%, s/p dual chamber BSC ICD implant 5/2/2012, upgraded to CRT-D 12/14/2016 due to LBBB (EF improved to 40-45% with CRT-D and AVR), VT s/p VT s/p ICD shocks in 5/2021, s/p VT ablation 7/2022 (done in [place]) and paroxysmal Afib (very brief, not on AC) who presented to OSH on 7/9/23 complaining of generalized weakness and stating that he was not feeling well. He was in the bathroom having a BM when this began, his wife states he was diaphoretic and feeling weak and called 911. EMS arrived and found the patient in VT with rates 150-160 bpm with pulses/awake & alert. He was given 150 amio without improvement. BP at the time was in the low 60s systolic. Given 5mg versed and shocked x2 with successful conversion to paced rhythm. Patient transferred to [facility] CICU for further management.    Patient reports he had cardiac cath done 5/2022 in [place] which did not require intervention. He was on amiodarone for few months prior to VT ablation and was stopped shortly after ablation due to GI complaint. His metoprolol succinate was increased to 200mg QD since amiodarone was stopped ~7/2022. CRT-D interrogation showed patient had monomorphic         Allergies    No Known Allergies    Intolerances    	    MEDICATIONS:  aspirin enteric coated 81 milliGRAM(s) Oral daily  heparin   Injectable 5000 Unit(s) SubCutaneous every 8 hours  lidocaine   Infusion 0.5 mG/Min IV Continuous <Continuous>        clonazePAM  Tablet 1 milliGRAM(s) Oral daily  FLUoxetine 20 milliGRAM(s) Oral daily      atorvastatin 80 milliGRAM(s) Oral at bedtime  dextrose 50% Injectable 50 milliLiter(s) IV Push every 15 minutes  insulin glargine Injectable (LANTUS) 25 Unit(s) SubCutaneous every morning  insulin lispro (ADMELOG) corrective regimen sliding scale   SubCutaneous three times a day before meals  insulin lispro (ADMELOG) corrective regimen sliding scale   SubCutaneous at bedtime  levothyroxine 125 MICROGram(s) Oral daily    chlorhexidine 2% Cloths 1 Application(s) Topical at bedtime      PAST MEDICAL & SURGICAL HISTORY:  HTN (hypertension)      HLD (hyperlipidemia)      Acquired hypothyroidism      Hodgkin lymphoma  on remission, chemo and rad [id number]      Type 2 diabetes mellitus without complication      Myocardial infarction  Cardiac Arrest- 1994- no stents      AS (aortic stenosis)  s/p TAVR      Systolic CHF, chronic  s/p ICD, denies any recent exacerbation or intubation hx      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's      Pericardial effusion  drained in [place] in 4/19      Presence of biventricular AICD      Cholelithiases      H/O carotid endarterectomy  bilateral      H/O bilateral inguinal hernia repair      History of umbilical hernia repair      S/P TAVR (transcatheter aortic valve replacement)  10/16 in [place]      S/P ICD (internal cardiac defibrillator) procedure  Nervogrid [id number], last interrogation 11/2019      S/P cataract surgery  b/l          FAMILY HISTORY:  No pertinent family history (Father, Mother)        SOCIAL HISTORY:    [ ] Non-smoker  [ ] Smoker  [ ] Alcohol      REVIEW OF SYSTEMS:  See HPI. Otherwise, 12 point ROS done and otherwise negative.    PHYSICAL EXAM:  T(C): 36.7 (07-10-23 @ 11:00), Max: 36.9 (07-09-23 @ 17:07)  HR: 75 (07-10-23 @ 15:00) (63 - 155)  BP: 101/54 (07-10-23 @ 14:00) (61/31 - 137/64)  RR: 20 (07-10-23 @ 15:00) (12 - 37)  SpO2: 99% (07-10-23 @ 15:00) (95% - 100%)  Wt(kg): --  I&O's Summary    09 Jul 2023 07:01  -  10 Jul 2023 07:00  --------------------------------------------------------  IN: 412.5 mL / OUT: 500 mL / NET: -87.5 mL    10 Jul 2023 07:01  -  10 Jul 2023 15:53  --------------------------------------------------------  IN: 277 mL / OUT: 0 mL / NET: 277 mL        Appearance: Normal	  HEENT: PERRL, EOMI	  Cardiovascular: Normal S1 S2, No JVD, No murmurs, No edema  Respiratory: Lungs clear to auscultation	  Psychiatry: A & O x 3, Mood & affect appropriate  Gastrointestinal:  Soft, Non-tender, + BS	  Skin: No rashes, No ecchymoses, No cyanosis	  Neurologic: Grossly intact  Extremities: No clubbing, cyanosis or edema  Vascular: Peripheral pulses palpable 2+ bilaterally        LABS:	 	    CBC Full  -  ( 10 Jul 2023 07:28 )  WBC Count : 8.92 K/uL  Hemoglobin : 11.9 g/dL  Hematocrit : 35.1 %  Platelet Count - Automated : 176 K/uL  Mean Cell Volume : 95.6 fl  Mean Cell Hemoglobin : 32.4 pg  Mean Cell Hemoglobin Concentration : 33.9 gm/dL  Auto Neutrophil # : x  Auto Lymphocyte # : x  Auto Monocyte # : x  Auto Eosinophil # : x  Auto Basophil # : x  Auto Neutrophil % : x  Auto Lymphocyte % : x  Auto Monocyte % : x  Auto Eosinophil % : x  Auto Basophil % : x    07-10    141  |  105  |  31<H>  ----------------------------<  112<H>  3.8   |  25  |  1.31<H>  07-09    134<L>  |  99  |  38<H>  ----------------------------<  450<HH>  4.1   |  21<L>  |  1.57<H>    Ca    8.5      10 Jul 2023 07:28  Ca    8.3<L>      09 Jul 2023 21:35  Phos  3.3     07-10  Phos  3.8     07-09  Mg     2.3     07-10  Mg     2.2     07-09    TPro  5.7<L>  /  Alb  3.6  /  TBili  0.4  /  DBili  x   /  AST  41<H>  /  ALT  20  /  AlkPhos  47  07-10  TPro  5.7<L>  /  Alb  3.6  /  TBili  0.4  /  DBili  x   /  AST  38  /  ALT  21  /  AlkPhos  51  07-09      proBNP:   Lipid Profile:   HgA1c:   TSH: Thyroid Stimulating Hormone, Serum: 2.46 uIU/mL (07-09 @ 21:34)        CARDIAC MARKERS:                TELEMETRY: 	    ECG:  	  RADIOLOGY:  OTHER: 	    PREVIOUS DIAGNOSTIC TESTING:    [ ] Echocardiogram:  [ ]  Catheterization:  [ ] Stress Test:  	  	  ASSESSMENT/PLAN: 	     CHIEF COMPLAINT:    HISTORY OF PRESENT ILLNESS:  77y/o man with Hx of Anxiety/depression, Hodgkins lymphoma, HTN, DM, HLD, severe AS s/p TAVR (@ [facility] w/ Dr. Padron in 2016), hospitalized in [place] Spring 2019 for pericardial effusion (aspirated 900 cc blood), MI, CAD s/p SADE to D1 on 2021 (on ASA, no longer takes Plavix), chronic systolic CHF with EF 34%, s/p dual chamber BSC ICD implant 2012, upgraded to CRT-D (BS) 2016 due to LBBB (EF improved to 40-45% with CRT-D and AVR), VT s/p ICD shocks in 2021, s/p VT ablation 2022 (done in [place]) and paroxysmal Afib (very brief, not on AC) who presented to [facility] on 23 complaining of generalized weakness and stating that he was not feeling well. He was in the bathroom having a BM when this began, his wife states he was diaphoretic and feeling weak and called 911. EMS arrived and found the patient in VT with rates 150-160 bpm with pulses/awake & alert. He was given 150 amiodarone without improvement. BP at the time was in the low 60s systolic. Given 5mg versed and externally shocked x2 with successful conversion to paced rhythm. Patient transferred to [facility] CICU for further management.    Patient reports he had cardiac cath done 2022 in [place] which did not require intervention. He was on amiodarone for few months prior to VT ablation and was stopped shortly after ablation in 2022 due to GI complaint. His metoprolol succinate was increased to 200mg QD since amiodarone was stopped ~2022. CRT-D interrogation showed patient had sustained monomorphic VT with average  bpm on  treated and terminated with ATP x 1. Also revealed he was in sustained slow monomorphic VT with average -165 bpm for about 1 hour and 36 minutes on 23 for which patient did not receive therapy due to below treatment zone.       Allergies    No Known Allergies    Intolerances    	    MEDICATIONS:  aspirin enteric coated 81 milliGRAM(s) Oral daily  heparin   Injectable 5000 Unit(s) SubCutaneous every 8 hours  lidocaine   Infusion 0.5 mG/Min IV Continuous <Continuous>  clonazePAM  Tablet 1 milliGRAM(s) Oral daily  FLUoxetine 20 milliGRAM(s) Oral daily  atorvastatin 80 milliGRAM(s) Oral at bedtime  dextrose 50% Injectable 50 milliLiter(s) IV Push every 15 minutes  insulin glargine Injectable (LANTUS) 25 Unit(s) SubCutaneous every morning  insulin lispro (ADMELOG) corrective regimen sliding scale   SubCutaneous three times a day before meals  insulin lispro (ADMELOG) corrective regimen sliding scale   SubCutaneous at bedtime  levothyroxine 125 MICROGram(s) Oral daily  chlorhexidine 2% Cloths 1 Application(s) Topical at bedtime      PAST MEDICAL & SURGICAL HISTORY:  HTN (hypertension)  HLD (hyperlipidemia)  Acquired hypothyroidism  Hodgkin lymphoma  on remission, chemo and rad [id number]  Type 2 diabetes mellitus without complication  Myocardial infarction  Cardiac Arrest- - no stents  AS (aortic stenosis)  s/p TAVR  Systolic CHF, chronic  s/p ICD, denies any recent exacerbation or intubation hx  Lung fibrosis  after RT in   Pericardial effusion  drained in [place] in   Presence of biventricular AICD  Cholelithiases      H/O carotid endarterectomy  bilateral      H/O bilateral inguinal hernia repair      History of umbilical hernia repair      S/P TAVR (transcatheter aortic valve replacement)  10/16 in [place]      S/P ICD (internal cardiac defibrillator) procedure  [employer] [id number], last interrogation 2019      S/P cataract surgery  b/l          FAMILY HISTORY:  No pertinent family history (Father, Mother)        SOCIAL HISTORY: , lives with spouse, former smoker (quit >50 yrs ago), denies ETOH, independent with ADL.          REVIEW OF SYSTEMS:  See HPI. Otherwise, 12 point ROS done and otherwise negative.    PHYSICAL EXAM:  T(C): 36.7 (07-10-23 @ 11:00), Max: 36.9 (23 @ 17:07)  HR: 75 (07-10-23 @ 15:00) (63 - 155)  BP: 101/54 (07-10-23 @ 14:00) (61/31 - 137/64)  RR: 20 (07-10-23 @ 15:00) (12 - 37)  SpO2: 99% (07-10-23 @ 15:00) (95% - 100%)  Wt(kg): --  I&O's Summary    2023 07:01  -  10 Jul 2023 07:00  --------------------------------------------------------  IN: 412.5 mL / OUT: 500 mL / NET: -87.5 mL    10 Jul 2023 07:  -  10 Jul 2023 15:53  --------------------------------------------------------  IN: 277 mL / OUT: 0 mL / NET: 277 mL        Appearance: Normal	  HEENT: PERRL, EOMI	  Cardiovascular: Normal S1 S2, No JVD, No murmurs  Respiratory: Lungs clear to auscultation	  Psychiatry: A & O x 3, Mood & affect appropriate  Gastrointestinal: Soft, Non-tender, + BS	  Skin: No rashes, No ecchymoses, No cyanosis	  Neurologic: Grossly intact  Extremities: No clubbing, cyanosis or edema  Vascular: Peripheral pulses palpable 2+ bilaterally        LABS:	 	    CBC Full  -  ( 10 Jul 2023 07:28 )  WBC Count : 8.92 K/uL  Hemoglobin : 11.9 g/dL  Hematocrit : 35.1 %  Platelet Count - Automated : 176 K/uL  Mean Cell Volume : 95.6 fl  Mean Cell Hemoglobin : 32.4 pg  Mean Cell Hemoglobin Concentration : 33.9 gm/dL  Auto Neutrophil # : x  Auto Lymphocyte # : x  Auto Monocyte # : x  Auto Eosinophil # : x  Auto Basophil # : x  Auto Neutrophil % : x  Auto Lymphocyte % : x  Auto Monocyte % : x  Auto Eosinophil % : x  Auto Basophil % : x    07-10    141  |  105  |  31<H>  ----------------------------<  112<H>  3.8   |  25  |  1.31<H>  07    134<L>  |  99  |  38<H>  ----------------------------<  450<HH>  4.1   |  21<L>  |  1.57<H>    Ca    8.5      10 Jul 2023 07:28  Ca    8.3<L>      2023 21:35  Phos  3.3     07-10  Phos  3.8       Mg     2.3     07-10  Mg     2.2         TPro  5.7<L>  /  Alb  3.6  /  TBili  0.4  /  DBili  x   /  AST  41<H>  /  ALT  20  /  AlkPhos  47  07-10  TPro  5.7<L>  /  Alb  3.6  /  TBili  0.4  /  DBili  x   /  AST  38  /  ALT  21  /  AlkPhos  51        TSH: Thyroid Stimulating Hormone, Serum: 2.46 uIU/mL ( @ 21:34)        TELEMETRY: SR with BiV pacing at 60-70's, occasional PVCs   	    EC2023 at 8:07pm: SR with BiV pacing at 78 bpm  2023 at 4:51pm: monomorphic VT at 150 bpm (likely from apical septum)     < from: TTE W or WO Ultrasound Enhancing Agent (07.10.23 @ 06:27) >  CONCLUSIONS:      1. Technically difficult image quality.   2. Moderately dilated left ventricular cavity size. The left ventricular systolic function is mildly decreased with an ejection fraction visually estimated at 45 %. There are regional wall motion abnormalities.   3. Multiple segmental abnormalities exist. See findings.   4. Mildly enlarged right ventricular cavity size and mildly reduced right ventricular systolic function.   5. There is mild to moderate mitral valve stenosis, secondary to dystrophic mitral annular calcification.   6. 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 No intravalvular regurgitation No paravalvular regurgitation.   7. Mild to moderate pulmonary hypertension.    ________________________________________________________________________________________  FINDINGS:     Left Ventricle:  Moderately dilated left ventricular cavity size. The left ventricular wall thickness is normal. Abnormal (paradoxical) septal motion consistent with conduction delay. The left ventricular systolic function is mildly decreased with an ejection fraction visually estimated at 45%. There are regional wall motion abnormalities consistent with ischemic heart disease.  LV Wall Scoring: The entire inferior wall, basal inferoseptal segment, and apex  are akinetic. The basal and mid inferolateral wall and apical anterior segment  are hypokinetic. All remaining scored segments are normal.          Right Ventricle:  Mildly enlarged right ventricular cavity size and mildly reduced right ventricular systolic function. Tricuspid annular plane systolic excursion (TAPSE) is 0.5 cm (normal >=1.7 cm). A device lead is visualized in the right heart.     Left Atrium:  The left atrium is normal with an indexed volume of 30.39 ml/m².     Right Atrium:  The right atrium is normal. There are multiple pacer and/or ICD leads seen in the right atrium.     Aortic Valve:  TAVR valve is noted in the aortic position with normal function. The aortic valve prosthesis is well seated with normal function. There is no intravalvular regurgitation. There is no paravalvular regurgitation.     Mitral Valve:  There is severe calcification and severe anterior calcification of the mitral valve annulus. There is mild to moderate mitral valve stenosis, secondary to dystrophic mitral annular calcification. Unable to calculate valve area due to acoustic shadowing of prosthetic AV, Mean gradient of ~5 at HR of 70. There is mild to moderate mitral regurgitation.     Tricuspid Valve:  Structurally normal tricuspid valve with normal leaflet excursion. There is mild-moderate tricuspid regurgitation. Estimated pulmonary artery systolic pressure is 47 mmHg, consistent with mild to moderate pulmonary hypertension. Two TR jets seen in RV inflow view, one central and one seems to be paravalvular?.     Pulmonic Valve:  Structurally normal pulmonic valve with normal leaflet excursion. There is no evidence of pulmonic regurgitation.     Aorta:  The aortic annulus and aortic root appear normal in size.     Pericardium:  No pericardial effusion seen.     Systemic Veins:  The inferior vena cava is dilated measuring 2.38 cm in diameter, (dilated >2.1cm) with abnormal inspiratory collapse (abnormal <50%) consistent with elevated right atrial pressure (~15, range 10-20mmHg).  ____________________________________________________________________  Quantitative Data:  Left Ventricle Measurements: (Indexed to BSA)     IVSd (2D):   0.6 cm  LVPWd (2D):  0.8 cm  LVIDd (2D):  4.8 cm  LVIDs (2D):  4.0 cm  LV Mass:     106 g  57.2 g/m²  Visualized LV EF%: 45%     MV E Vmax:    1.53 m/s  MV A Vmax:    0.81 m/s  MV E/A:       1.88  e' lateral:   6.16 cm/s  e' medial:    5.22 cm/s  E/e' lateral: 24.84  E/e' medial:  29.31  E/e' Average: 26.89    Aorta Measurements:     Ao Root:       2.7 cm  Ao Root s, 2D: 2.7 cm       Left Atrium Measurements: (Indexed to BSA)  LA Diam 2D: 4.60 cm    Right Ventricle Measurements:     TAPSE: 0.5 cm       LVOT / RVOT/ Qp/Qs Data: (Indexed to BSA)  LVOT Vmax: 0.60 m/s  LVOT VTI:  14.40 cm    Aortic Valve Measurements:  AV Vmax:          96.8 cm/s  AV Peak Gradient: 3.7 mmHg  AV Mean Gradient: 2.0 mmHg  AV VTI:           21.7 cm  AV VTI Ratio:     0.66    Mitral Valve Measurements:     MV Vmax:        1.86 m/s  MV VTI:         43.00 cm  MV Mean Grad:   5.00 mmHg  MV Peak Grad:   13.8 mmHg  MV E Vmax:      1.5 m/s  MV A Vmax:      0.8 m/s  MV E/A:         1.9  MV Gradient HR: 70 bpm  MV P1/2T:       102 msec       Tricuspid Valve Measurements:     TR Vmax:          2.8 m/s  TR Peak Gradient: 32.3 mmHg  RA Pressure:      15 mmHg  PASP:             47 mmHg    < end of copied text >    < from: Cardiac Cath Lab - Adult (21 @ 14:23) >  INDICATIONS: Unstable angina - CCS3. Abnormal echocardiogram. Acute on  chronic systolic congestive heart failure. Pt. referred for cardiac  catheterization for evaluation of new onset angina, acute on chronic  systolic heart failure, and VT requiring AICD shock and amiodarone  therapy.  HISTORY: The patient has a history of coronary artery disease. The patient  has hypertension, renal failure (not requiring dialysis), dyslipidemia,  severe AS s/p TAVR/corevalve, Hodgkin's lymphoma, and severe LV  dysfunction s/p AICD. Health history was acquired from thepatient and the  patient's chart. PRIOR CARDIOVASCULAR PROCEDURES: Bioprosthesis of aortic  valve.  PROCEDURE:  --  Diagnostic IVUS.  --  Pressure Wire procedure.  --  Intervention on D1: drug-eluting stent.  TECHNIQUE: Except as noted, lesion stenosis was estimated visually. The  risks and alternatives of the procedures and conscious sedation were  explained to the patient and informed consent was obtained. Cardiac  catheterization performed.  Local anesthetic given. Right radial artery access. Diagnostic IVUS was  performed. Pressure Wire procedure. RADIATION EXPOSURE: 31.4 min. A  successful drug-eluting stent was performed on the 80 % lesion in the 1st  diagonal. Following intervention there was an excellent angiographic  appearance with a 1 % residual stenosis. According to the ACC/AHA  classification system, this lesion was a type C lesion. There was AVELINA 3  flow before the procedure and AVELINA 3 flow after the procedure. Vessel  setup was performed. A 6 Fr. JL 3.5 Launcher guiding catheter was used to  intubate the vessel. Vessel setup was performed. A 190cm BMW Universal II  wire was used to cross the lesion. Balloon angioplasty was performed,  using a 2.5 X 12 Euphora RX balloon, with 1 inflations and a maximum  inflation pressure of 10 izzy. Balloon angioplasty was performed, using a  3.0 X 12 Euphora RX balloon, with 1 inflations and a maximum inflation  pressure of 12 izzy. Balloon angioplasty was performed, using a 2.5 X 12  Euphora RX balloon, with 1 inflations and a maximum inflation pressure of  12 izzy. Balloon angioplasty was performed, using a 3.0 X 8 Euphora NC  balloon, with 2 inflations and a maximum inflation pressure of 20 izzy.  Balloon angioplasty was performed, using a 3.0 X 6 Euphora NC balloon,  with 1 inflations and a maximum inflation pressure of 20 izzy.  CONTRAST GIVEN: 104 ml Optiray.  MEDICATIONS GIVEN: Midazolam, 1 mg, IV. Fentanyl, 25 mcg, IV. Midazolam, 1  mg, IV. Fentanyl, 25 mcg, IV. Heparin, 3000 units, IV. Heparin, 5500  units, IV. Heparin, 1000 units, IV. Heparin, 1000 units, IV. 2% Lidocaine,  3 ml, subcutaneously. Cardene, 400 mcg. 0.9% Normal saline, 146 ml, IV.  VENTRICLES: No LV gram was performed; however, a recent echocardiogram  demonstrated an EF of 35 %.  CORONARY VESSELS: The coronary circulation is right dominant.  LM:   --  LM: This vessel was not injected, but was visualized during a  prior cardiac catheterization.  LAD:   --  Ostial LAD: There was a discrete 50 % stenosis that was not  significant by IVUS and IFR (IFR 0.92).  --  D1: There was a 80 % stenosis.  CX:   --  Ostial circumflex: There was a discrete 60 % stenosis.  RCA:   --  RCA: This vessel was not injected, but was visualized during a  prior cardiac catheterization.  COMPLICATIONS: There were no complications.  SUMMARY:  Summary: Addendum 2021: Added Diagnostic Cardiologist: "Brian Mo M.D." to Participants and to the Diagnostic Report.  DIAGNOSTIC RECOMMENDATIONS: Coronary angiogram demonstrates a severe lesion  in D1 that is the culprit of the patient's symptoms and clinical  presentation. IVUS imaging and IFR performed on the ostial LAD and  demonstrates a non physiologically significant lesion (IFR 0.92). Will  therefore perform PCI to D1.  INTERVENTIONAL RECOMMENDATIONS: S/p successful SADE to D1. The patient  should continue with ASA and Plavix.  Prepared and signed by  Brian Mo M.D.  Signed 2021 14:19:25    < end of copied text >

## 2023-07-10 NOTE — PROGRESS NOTE ADULT - SUBJECTIVE AND OBJECTIVE BOX
Patient is a 78y old  Male who presents with a chief complaint of VT (2023 20:22)    HPI:  78M w/ PMH HTN, DM, Anxiety/depression, Hodgkin's Lymphoma, TAVR (@ [facility] w/ Dr. Padron in ), CAD s/p PCI (on ASA, no longer takes Plavix), and VT s/p ablation ( in [place]) and CRT-D presented to OSH complaining of generalized weakness and stating that he was not feeling well today. He was in the bathroom having a BM when this began, his wife states he was diaphoretic and feeling weak and called 911. EMS arrived and found the patient in VT with pulses/awake & alert. He was given 150 amio without improvement. BP at the time was in the low 60s systolic. Given 5mg versed and shocked x2 with successful conversion to paced rhythm. Patient transferred to [facility] CICU for further management.     Patient states he saw Dr. Godinez for EP appt ~1 week ago and had his device interrogated. S/p TTE with no acute changes.   (2023 20:22)       INTERVAL HPI/OVERNIGHT EVENTS:   No overnight events   Afebrile, hemodynamically stable     Subjective:    ICU Vital Signs Last 24 Hrs  T(C): 36.6 (10 Jul 2023 04:00), Max: 36.9 (2023 17:07)  T(F): 97.9 (10 Jul 2023 04:00), Max: 98.5 (2023 17:07)  HR: 67 (10 Jul 2023 07:00) (63 - 155)  BP: 85/48 (2023 23:00) (61/31 - 137/64)  BP(mean): 61 (2023 23:00) (61 - 92)  ABP: 119/44 (10 Jul 2023 07:00) (86/33 - 134/46)  ABP(mean): 70 (10 Jul 2023 07:00) (51 - 82)  RR: 15 (10 Jul 2023 07:00) (12 - 37)  SpO2: 99% (10 Jul 2023 07:00) (95% - 100%)    O2 Parameters below as of 10 Jul 2023 07:00  Patient On (Oxygen Delivery Method): room air          I&O's Summary    2023 07:01  -  10 Jul 2023 07:00  --------------------------------------------------------  IN: 412.5 mL / OUT: 500 mL / NET: -87.5 mL          Daily Height in cm: 172.72 (2023 20:15)    Daily Weight in k.7 (2023 20:15)    Adult Advanced Hemodynamics Last 24 Hrs  CVP(mm Hg): --  CVP(cm H2O): --  CO: --  CI: --  PA: --  PA(mean): --  PCWP: --  SVR: --  SVRI: --  PVR: --  PVRI: --    EKG/Telemetry Events:    MEDICATIONS  (STANDING):  aspirin enteric coated 81 milliGRAM(s) Oral daily  atorvastatin 80 milliGRAM(s) Oral at bedtime  chlorhexidine 2% Cloths 1 Application(s) Topical at bedtime  clonazePAM  Tablet 1 milliGRAM(s) Oral daily  dextrose 50% Injectable 50 milliLiter(s) IV Push every 15 minutes  FLUoxetine 20 milliGRAM(s) Oral daily  heparin   Injectable 5000 Unit(s) SubCutaneous every 8 hours  insulin glargine Injectable (LANTUS) 25 Unit(s) SubCutaneous every morning  insulin lispro (ADMELOG) corrective regimen sliding scale   SubCutaneous three times a day before meals  insulin lispro (ADMELOG) corrective regimen sliding scale   SubCutaneous at bedtime  insulin regular Infusion 5 Unit(s)/Hr (5 mL/Hr) IV Continuous <Continuous>  levothyroxine 125 MICROGram(s) Oral daily  lidocaine   Infusion 1 mG/Min (7.5 mL/Hr) IV Continuous <Continuous>  norepinephrine Infusion 0.04 MICROgram(s)/kG/Min (5.6 mL/Hr) IV Continuous <Continuous>    MEDICATIONS  (PRN):      PHYSICAL EXAM:  GENERAL:   HEAD:  Atraumatic, Normocephalic  EYES: EOMI, PERRLA, conjunctiva and sclera clear  NECK: Supple, No JVD, Normal thyroid, no enlarged nodes  NERVOUS SYSTEM:  Alert & Awake.   CHEST/LUNG: B/L good air entry; No rales, rhonchi, or wheezing  HEART: S1S2 normal, no S3, Regular rate and rhythm; No murmurs  ABDOMEN: Soft, Nontender, Nondistended; Bowel sounds present  EXTREMITIES:  2+ Peripheral Pulses, No clubbing, cyanosis, or edema  LYMPH: No lymphadenopathy noted  SKIN: No rashes or lesions    LABS:                        12.1   11.42 )-----------( 170      ( 2023 21:35 )             36.0     07-    134<L>  |  99  |  38<H>  ----------------------------<  450<HH>  4.1   |  21<L>  |  1.57<H>    Ca    8.3<L>      2023 21:35  Phos  3.8     07-  Mg     2.2     -    TPro  5.7<L>  /  Alb  3.6  /  TBili  0.4  /  DBili  x   /  AST  38  /  ALT  21  /  AlkPhos  51  07-09    LIVER FUNCTIONS - ( 2023 21:35 )  Alb: 3.6 g/dL / Pro: 5.7 g/dL / ALK PHOS: 51 U/L / ALT: 21 U/L / AST: 38 U/L / GGT: x           PT/INR - ( 2023 17:27 )   PT: 11.6 sec;   INR: 0.98 ratio         PTT - ( 2023 17:27 )  PTT:24.0 sec  CAPILLARY BLOOD GLUCOSE      POCT Blood Glucose.: 113 mg/dL (10 Jul 2023 07:14)  POCT Blood Glucose.: 112 mg/dL (10 Jul 2023 06:19)  POCT Blood Glucose.: 116 mg/dL (10 Jul 2023 05:07)  POCT Blood Glucose.: 123 mg/dL (10 Jul 2023 04:11)  POCT Blood Glucose.: 137 mg/dL (10 Jul 2023 03:07)  POCT Blood Glucose.: 146 mg/dL (10 Jul 2023 02:17)  POCT Blood Glucose.: 187 mg/dL (10 Jul 2023 01:20)  POCT Blood Glucose.: 231 mg/dL (10 Jul 2023 00:18)  POCT Blood Glucose.: 318 mg/dL (2023 23:03)  POCT Blood Glucose.: 367 mg/dL (2023 22:26)  POCT Blood Glucose.: 441 mg/dL (2023 21:06)  POCT Blood Glucose.: 496 mg/dL (2023 18:37)  POCT Blood Glucose.: 561 mg/dL (2023 16:41)  POCT Blood Glucose.: 578 mg/dL (2023 16:40)    ABG - ( 2023 22:46 )  pH, Arterial: 7.40  pH, Blood: x     /  pCO2: 43    /  pO2: 90    / HCO3: 27    / Base Excess: 1.5   /  SaO2: 98.2                    Urinalysis Basic - ( 2023 21:35 )    Color: x / Appearance: x / SG: x / pH: x  Gluc: 450 mg/dL / Ketone: x  / Bili: x / Urobili: x   Blood: x / Protein: x / Nitrite: x   Leuk Esterase: x / RBC: x / WBC x   Sq Epi: x / Non Sq Epi: x / Bacteria: x          RADIOLOGY & ADDITIONAL TESTS:  CXR:        Care Discussed with Consultants/Other Providers [ x] YES  [ ] NO           Patient is a 78y old  Male who presents with a chief complaint of VT (2023 20:22)    HPI:  78M w/ PMH HTN, DM, Anxiety/depression, Hodgkin's Lymphoma, TAVR (@ [facility] w/ Dr. Padron in ), CAD s/p PCI (on ASA, no longer takes Plavix), and VT s/p ablation ( in [place]) and CRT-D presented to OSH complaining of generalized weakness and stating that he was not feeling well today. He was in the bathroom having a BM when this began, his wife states he was diaphoretic and feeling weak and called 911. EMS arrived and found the patient in VT with pulses/awake & alert. He was given 150 amio without improvement. BP at the time was in the low 60s systolic. Given 5mg versed and shocked x2 with successful conversion to paced rhythm. Patient transferred to [facility] CICU for further management.     Patient states he saw Dr. Godinez for EP appt ~1 week ago and had his device interrogated. S/p TTE with no acute changes.   (2023 20:22)       INTERVAL HPI/OVERNIGHT EVENTS:   No overnight events   Afebrile, hemodynamically stable     Subjective:  No acute overnight events. Pt seen at bedside. Pt reports feeling well but states a strong desire to go home. He is amenable to waiting for EP service to see him. Denies fever, chills, chest pain, SOB. ROS is otherwise negative.      ICU Vital Signs Last 24 Hrs  T(C): 36.6 (10 Jul 2023 04:00), Max: 36.9 (2023 17:07)  T(F): 97.9 (10 Jul 2023 04:00), Max: 98.5 (2023 17:07)  HR: 67 (10 Jul 2023 07:00) (63 - 155)  BP: 85/48 (2023 23:00) (61/31 - 137/64)  BP(mean): 61 (2023 23:00) (61 - 92)  ABP: 119/44 (10 Jul 2023 07:00) (86/33 - 134/46)  ABP(mean): 70 (10 Jul 2023 07:00) (51 - 82)  RR: 15 (10 Jul 2023 07:00) (12 - 37)  SpO2: 99% (10 Jul 2023 07:00) (95% - 100%)    O2 Parameters below as of 10 Jul 2023 07:00  Patient On (Oxygen Delivery Method): room air          I&O's Summary    2023 07:01  -  10 Jul 2023 07:00  --------------------------------------------------------  IN: 412.5 mL / OUT: 500 mL / NET: -87.5 mL          Daily Height in cm: 172.72 (2023 20:15)    Daily Weight in k.7 (2023 20:15)    Adult Advanced Hemodynamics Last 24 Hrs  CVP(mm Hg): --  CVP(cm H2O): --  CO: --  CI: --  PA: --  PA(mean): --  PCWP: --  SVR: --  SVRI: --  PVR: --  PVRI: --    EKG/Telemetry Events:    MEDICATIONS  (STANDING):  aspirin enteric coated 81 milliGRAM(s) Oral daily  atorvastatin 80 milliGRAM(s) Oral at bedtime  chlorhexidine 2% Cloths 1 Application(s) Topical at bedtime  clonazePAM  Tablet 1 milliGRAM(s) Oral daily  dextrose 50% Injectable 50 milliLiter(s) IV Push every 15 minutes  FLUoxetine 20 milliGRAM(s) Oral daily  heparin   Injectable 5000 Unit(s) SubCutaneous every 8 hours  insulin glargine Injectable (LANTUS) 25 Unit(s) SubCutaneous every morning  insulin lispro (ADMELOG) corrective regimen sliding scale   SubCutaneous three times a day before meals  insulin lispro (ADMELOG) corrective regimen sliding scale   SubCutaneous at bedtime  insulin regular Infusion 5 Unit(s)/Hr (5 mL/Hr) IV Continuous <Continuous>  levothyroxine 125 MICROGram(s) Oral daily  lidocaine   Infusion 1 mG/Min (7.5 mL/Hr) IV Continuous <Continuous>  norepinephrine Infusion 0.04 MICROgram(s)/kG/Min (5.6 mL/Hr) IV Continuous <Continuous>    MEDICATIONS  (PRN):      PHYSICAL EXAM:  GENERAL:   HEAD:  Atraumatic, Normocephalic  EYES: EOMI, PERRLA, conjunctiva and sclera clear  NECK: Supple, No JVD, Normal thyroid, no enlarged nodes  NERVOUS SYSTEM:  Alert & Awake.   CHEST/LUNG: B/L good air entry; No rales, rhonchi, or wheezing  HEART: S1S2 normal, no S3, Regular rate and rhythm; No murmurs  ABDOMEN: Soft, Nontender, Nondistended; Bowel sounds present  EXTREMITIES:  2+ Peripheral Pulses, No clubbing, cyanosis, or edema  LYMPH: No lymphadenopathy noted  SKIN: No rashes or lesions    LABS:                        12.1   11.42 )-----------( 170      ( 2023 21:35 )             36.0     07-09    134<L>  |  99  |  38<H>  ----------------------------<  450<HH>  4.1   |  21<L>  |  1.57<H>    Ca    8.3<L>      2023 21:35  Phos  3.8     07-09  Mg     2.2     07-09    TPro  5.7<L>  /  Alb  3.6  /  TBili  0.4  /  DBili  x   /  AST  38  /  ALT  21  /  AlkPhos  51  07-09    LIVER FUNCTIONS - ( 2023 21:35 )  Alb: 3.6 g/dL / Pro: 5.7 g/dL / ALK PHOS: 51 U/L / ALT: 21 U/L / AST: 38 U/L / GGT: x           PT/INR - ( 2023 17:27 )   PT: 11.6 sec;   INR: 0.98 ratio         PTT - ( 2023 17:27 )  PTT:24.0 sec  CAPILLARY BLOOD GLUCOSE      POCT Blood Glucose.: 113 mg/dL (10 Jul 2023 07:14)  POCT Blood Glucose.: 112 mg/dL (10 Jul 2023 06:19)  POCT Blood Glucose.: 116 mg/dL (10 Jul 2023 05:07)  POCT Blood Glucose.: 123 mg/dL (10 Jul 2023 04:11)  POCT Blood Glucose.: 137 mg/dL (10 Jul 2023 03:07)  POCT Blood Glucose.: 146 mg/dL (10 Jul 2023 02:17)  POCT Blood Glucose.: 187 mg/dL (10 Jul 2023 01:20)  POCT Blood Glucose.: 231 mg/dL (10 Jul 2023 00:18)  POCT Blood Glucose.: 318 mg/dL (2023 23:03)  POCT Blood Glucose.: 367 mg/dL (2023 22:26)  POCT Blood Glucose.: 441 mg/dL (2023 21:06)  POCT Blood Glucose.: 496 mg/dL (2023 18:37)  POCT Blood Glucose.: 561 mg/dL (2023 16:41)  POCT Blood Glucose.: 578 mg/dL (2023 16:40)    ABG - ( 2023 22:46 )  pH, Arterial: 7.40  pH, Blood: x     /  pCO2: 43    /  pO2: 90    / HCO3: 27    / Base Excess: 1.5   /  SaO2: 98.2                    Urinalysis Basic - ( 2023 21:35 )    Color: x / Appearance: x / SG: x / pH: x  Gluc: 450 mg/dL / Ketone: x  / Bili: x / Urobili: x   Blood: x / Protein: x / Nitrite: x   Leuk Esterase: x / RBC: x / WBC x   Sq Epi: x / Non Sq Epi: x / Bacteria: x          RADIOLOGY & ADDITIONAL TESTS:  CXR:        Care Discussed with Consultants/Other Providers [ x] YES  [ ] NO           Patient is a 78y old  Male who presents with a chief complaint of VT (2023 20:22)    HPI:  78M w/ PMH HTN, DM, Anxiety/depression, Hodgkin's Lymphoma, TAVR (@ [facility] w/ Dr. Padron in ), CAD s/p PCI (on ASA, no longer takes Plavix), and VT s/p ablation ( in [place]) and CRT-D presented to OSH complaining of generalized weakness and stating that he was not feeling well today. He was in the bathroom having a BM when this began, his wife states he was diaphoretic and feeling weak and called 911. EMS arrived and found the patient in VT with pulses/awake & alert. He was given 150 amio without improvement. BP at the time was in the low 60s systolic. Given 5mg versed and shocked x2 with successful conversion to paced rhythm. Patient transferred to [facility] CICU for further management.     Patient states he saw Dr. Godinez for EP appt ~1 week ago and had his device interrogated. S/p TTE with no acute changes.   (2023 20:22)       INTERVAL HPI/OVERNIGHT EVENTS:   No overnight events   Afebrile, hemodynamically stable     Subjective:  No acute overnight events. Pt seen at bedside. Pt reports feeling well but states a strong desire to go home. He is amenable to waiting for EP service to see him. Denies fever, chills, chest pain, SOB. ROS is otherwise negative.      ICU Vital Signs Last 24 Hrs  T(C): 36.6 (10 Jul 2023 04:00), Max: 36.9 (2023 17:07)  T(F): 97.9 (10 Jul 2023 04:00), Max: 98.5 (2023 17:07)  HR: 67 (10 Jul 2023 07:00) (63 - 155)  BP: 85/48 (2023 23:00) (61/31 - 137/64)  BP(mean): 61 (2023 23:00) (61 - 92)  ABP: 119/44 (10 Jul 2023 07:00) (86/33 - 134/46)  ABP(mean): 70 (10 Jul 2023 07:00) (51 - 82)  RR: 15 (10 Jul 2023 07:00) (12 - 37)  SpO2: 99% (10 Jul 2023 07:00) (95% - 100%)    O2 Parameters below as of 10 Jul 2023 07:00  Patient On (Oxygen Delivery Method): room air          I&O's Summary    2023 07:01  -  10 Jul 2023 07:00  --------------------------------------------------------  IN: 412.5 mL / OUT: 500 mL / NET: -87.5 mL          Daily Height in cm: 172.72 (2023 20:15)    Daily Weight in k.7 (2023 20:15)    Adult Advanced Hemodynamics Last 24 Hrs  CVP(mm Hg): --  CVP(cm H2O): --  CO: --  CI: --  PA: --  PA(mean): --  PCWP: --  SVR: --  SVRI: --  PVR: --  PVRI: --    EKG/Telemetry Events:    MEDICATIONS  (STANDING):  aspirin enteric coated 81 milliGRAM(s) Oral daily  atorvastatin 80 milliGRAM(s) Oral at bedtime  chlorhexidine 2% Cloths 1 Application(s) Topical at bedtime  clonazePAM  Tablet 1 milliGRAM(s) Oral daily  dextrose 50% Injectable 50 milliLiter(s) IV Push every 15 minutes  FLUoxetine 20 milliGRAM(s) Oral daily  heparin   Injectable 5000 Unit(s) SubCutaneous every 8 hours  insulin glargine Injectable (LANTUS) 25 Unit(s) SubCutaneous every morning  insulin lispro (ADMELOG) corrective regimen sliding scale   SubCutaneous three times a day before meals  insulin lispro (ADMELOG) corrective regimen sliding scale   SubCutaneous at bedtime  insulin regular Infusion 5 Unit(s)/Hr (5 mL/Hr) IV Continuous <Continuous>  levothyroxine 125 MICROGram(s) Oral daily  lidocaine   Infusion 1 mG/Min (7.5 mL/Hr) IV Continuous <Continuous>  norepinephrine Infusion 0.04 MICROgram(s)/kG/Min (5.6 mL/Hr) IV Continuous <Continuous>    MEDICATIONS  (PRN):      PHYSICAL EXAM:  GENERAL:   HEAD:  Atraumatic, Normocephalic  EYES: EOMI, PERRLA, conjunctiva and sclera clear  NECK: Supple  NERVOUS SYSTEM:  Alert & Awake.   CHEST/LUNG: B/L good air entry; No rales, rhonchi, or wheezing  HEART: S1S2 normal, no S3, Regular rate and rhythm; No murmurs  ABDOMEN: Soft, Nontender; Bowel sounds present  EXTREMITIES:  2+ Peripheral Pulses, No clubbing, cyanosis, or edema  SKIN: No rashes or lesions    LABS:                        12.1   11.42 )-----------( 170      ( 2023 21:35 )             36.0     07-09    134<L>  |  99  |  38<H>  ----------------------------<  450<HH>  4.1   |  21<L>  |  1.57<H>    Ca    8.3<L>      2023 21:35  Phos  3.8     07-  Mg     2.2     07-09    TPro  5.7<L>  /  Alb  3.6  /  TBili  0.4  /  DBili  x   /  AST  38  /  ALT  21  /  AlkPhos  51  07-09    LIVER FUNCTIONS - ( 2023 21:35 )  Alb: 3.6 g/dL / Pro: 5.7 g/dL / ALK PHOS: 51 U/L / ALT: 21 U/L / AST: 38 U/L / GGT: x           PT/INR - ( 2023 17:27 )   PT: 11.6 sec;   INR: 0.98 ratio         PTT - ( 2023 17:27 )  PTT:24.0 sec  CAPILLARY BLOOD GLUCOSE      POCT Blood Glucose.: 113 mg/dL (10 Jul 2023 07:14)  POCT Blood Glucose.: 112 mg/dL (10 Jul 2023 06:19)  POCT Blood Glucose.: 116 mg/dL (10 Jul 2023 05:07)  POCT Blood Glucose.: 123 mg/dL (10 Jul 2023 04:11)  POCT Blood Glucose.: 137 mg/dL (10 Jul 2023 03:07)  POCT Blood Glucose.: 146 mg/dL (10 Jul 2023 02:17)  POCT Blood Glucose.: 187 mg/dL (10 Jul 2023 01:20)  POCT Blood Glucose.: 231 mg/dL (10 Jul 2023 00:18)  POCT Blood Glucose.: 318 mg/dL (2023 23:03)  POCT Blood Glucose.: 367 mg/dL (2023 22:26)  POCT Blood Glucose.: 441 mg/dL (2023 21:06)  POCT Blood Glucose.: 496 mg/dL (2023 18:37)  POCT Blood Glucose.: 561 mg/dL (2023 16:41)  POCT Blood Glucose.: 578 mg/dL (2023 16:40)    ABG - ( 2023 22:46 )  pH, Arterial: 7.40  pH, Blood: x     /  pCO2: 43    /  pO2: 90    / HCO3: 27    / Base Excess: 1.5   /  SaO2: 98.2                    Urinalysis Basic - ( 2023 21:35 )    Color: x / Appearance: x / SG: x / pH: x  Gluc: 450 mg/dL / Ketone: x  / Bili: x / Urobili: x   Blood: x / Protein: x / Nitrite: x   Leuk Esterase: x / RBC: x / WBC x   Sq Epi: x / Non Sq Epi: x / Bacteria: x          RADIOLOGY & ADDITIONAL TESTS:  CXR:        Care Discussed with Consultants/Other Providers [ x] YES  [ ] NO

## 2023-07-10 NOTE — CONSULT NOTE ADULT - SUBJECTIVE AND OBJECTIVE BOX
C A R D I O L O G Y  *********************    DATE OF SERVICE: 07-10-23    HISTORY OF PRESENT ILLNESS: HPI:  Patient is a 77 y/o Male with PMH of HTN, DM, Anxiety/depression, Hodgkin's Lymphoma, TAVR (@ [facility] w/ Dr. Padron in 2016), CAD s/p PCI, ICM/HFrEF s/p BiV-ICD, and VT s/p ablation (in [place]) who presented to [facility] with generalized weakness admitted with VT requiring shock x2 by EMS now transferred to [facility] CICU for further management. Denies chest pain, SOB, palpitations, dizziness, or syncope.    PAST MEDICAL & SURGICAL HISTORY:  HTN (hypertension)      HLD (hyperlipidemia)      Acquired hypothyroidism      Hodgkin lymphoma  on remission, chemo and rad [id number]      Type 2 diabetes mellitus without complication      Myocardial infarction  Cardiac Arrest- 1994- no stents      AS (aortic stenosis)  s/p TAVR      Systolic CHF, chronic  s/p ICD, denies any recent exacerbation or intubation hx      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's      Pericardial effusion  drained in [place] in 4/19      Presence of biventricular AICD      Cholelithiases      H/O carotid endarterectomy  bilateral      H/O bilateral inguinal hernia repair      History of umbilical hernia repair      S/P TAVR (transcatheter aortic valve replacement)  10/16 in [place]      S/P ICD (internal cardiac defibrillator) procedure  [employer] [id number], last interrogation 11/2019      S/P cataract surgery  b/l              MEDICATIONS:  MEDICATIONS  (STANDING):  aspirin enteric coated 81 milliGRAM(s) Oral daily  atorvastatin 80 milliGRAM(s) Oral at bedtime  chlorhexidine 2% Cloths 1 Application(s) Topical at bedtime  clonazePAM  Tablet 1 milliGRAM(s) Oral daily  dextrose 50% Injectable 50 milliLiter(s) IV Push every 15 minutes  FLUoxetine 20 milliGRAM(s) Oral daily  heparin   Injectable 5000 Unit(s) SubCutaneous every 8 hours  insulin glargine Injectable (LANTUS) 25 Unit(s) SubCutaneous every morning  insulin lispro (ADMELOG) corrective regimen sliding scale   SubCutaneous three times a day before meals  insulin lispro (ADMELOG) corrective regimen sliding scale   SubCutaneous at bedtime  levothyroxine 125 MICROGram(s) Oral daily  lidocaine   Infusion 0.5 mG/Min (3.75 mL/Hr) IV Continuous <Continuous>      Allergies    No Known Allergies    Intolerances        FAMILY HISTORY:  No pertinent family history (Father, Mother)      Non-contributary for premature coronary disease or sudden cardiac death    SOCIAL HISTORY:    [x ] Non-smoker  [ ] Smoker  [ ] Alcohol    FLU VACCINE THIS YEAR STARTS IN AUGUST:  [ ] Yes    [ ] No    IF OVER 65 HAVE YOU EVER HAD A PNA VACCINE:  [ ] Yes    [ ] No       [ ] N/A      REVIEW OF SYSTEMS:  [ ]chest pain  [  ]shortness of breath  [  ]palpitations  [  ]syncope  [ ]near syncope [ ]upper extremity weakness   [ ] lower extremity weakness  [  ]diplopia  [  ]altered mental status   [  ]fevers  [ ]chills [ ]nausea  [ ]vomiting  [  ]dysphagia    [ ]abdominal pain  [ ]melena  [ ]BRBPR    [  ]epistaxis  [  ]rash    [ ]lower extremity edema    +generalized weakness    [X] All others negative	  [ ] Unable to obtain      LABS:	 	    CARDIAC MARKERS:                              11.9   8.92  )-----------( 176      ( 10 Jul 2023 07:28 )             35.1     Hb Trend: 11.9<--, 12.1<--, 10.9<--    07-10    141  |  105  |  31<H>  ----------------------------<  112<H>  3.8   |  25  |  1.31<H>    Ca    8.5      10 Jul 2023 07:28  Phos  3.3     07-10  Mg     2.3     07-10    TPro  5.7<L>  /  Alb  3.6  /  TBili  0.4  /  DBili  x   /  AST  41<H>  /  ALT  20  /  AlkPhos  47  07-10    Creatinine Trend: 1.31<--, 1.57<--, 2.16<--    Coags:      proBNP:   Lipid Profile:   HgA1c:   TSH: Thyroid Stimulating Hormone, Serum: 2.46 uIU/mL (07-09 @ 21:34)          PHYSICAL EXAM:  T(C): 36.7 (07-10-23 @ 11:00), Max: 36.9 (07-09-23 @ 17:07)  HR: 75 (07-10-23 @ 15:00) (63 - 155)  BP: 101/54 (07-10-23 @ 14:00) (61/31 - 137/64)  RR: 20 (07-10-23 @ 15:00) (12 - 37)  SpO2: 99% (07-10-23 @ 15:00) (95% - 100%)  Wt(kg): --   BMI (kg/m2): 25 (07-09-23 @ 20:15)  I&O's Summary    09 Jul 2023 07:01  -  10 Jul 2023 07:00  --------------------------------------------------------  IN: 412.5 mL / OUT: 500 mL / NET: -87.5 mL    10 Jul 2023 07:01  -  10 Jul 2023 16:01  --------------------------------------------------------  IN: 277 mL / OUT: 0 mL / NET: 277 mL        Gen: NAD  HEENT:  (-)icterus (-)pallor  CV: N S1 S2 1/6 ANGELLA (+)2 Pulses B/l  Resp:  Clear to auscultation B/L, normal effort  GI: (+) BS Soft, NT, ND  Lymph:  (-)Edema, (-)obvious lymphadenopathy  Skin: Warm to touch, Normal turgor  Psych: Appropriate mood and affect      TELEMETRY: SR 70s	      ECG: VT      < from: TTE W or WO Ultrasound Enhancing Agent (07.10.23 @ 06:27) >  CONCLUSIONS:      1. Technically difficult image quality.   2. Moderately dilated left ventricular cavity size. The left ventricular systolic function is mildly decreased with an ejection fraction visually estimated at 45 %. There are regional wall motion abnormalities.   3. Multiple segmental abnormalities exist. See findings.   4. Mildly enlarged right ventricular cavity size and mildly reduced right ventricular systolic function.   5. There is mild to moderate mitral valve stenosis, secondary to dystrophic mitral annular calcification.   6. 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 No intravalvular regurgitation No paravalvular regurgitation.   7. Mild to moderate pulmonary hypertension.    < end of copied text >  	    RADIOLOGY:         CXR: < from: Xray Chest 1 View-PORTABLE IMMEDIATE (07.09.23 @ 19:20) >    IMPRESSION: No acute finding at this time.    --- End of Report ---    < end of copied text >      ASSESSMENT/PLAN: Patient is a 77 y/o Male with PMH of HTN, DM, Anxiety/depression, Hodgkin's Lymphoma, TAVR (@ [facility] w/ Dr. Padron in 2016), CAD s/p PCI, ICM/HFrEF s/p BiV-ICD, and VT s/p ablation (in [place]) who presented to [facility] with generalized weakness admitted with VT requiring shock x2 by EMS now transferred to [facility] CICU for further management.    #VT  - Follow up EP consult and ICD interrogation  - Lido per CCU  - TTE noted above, EF 45%  - Supportive care per CCU appreciated    #HFrEF  - HF meds held given hypotension requiring pressors now weaned off    #CAD s/p PCI  - Continue ASA and Lipitor    - Patient to f/u with Dr. Durham after discharge    José Miguel Angulo PA-C  Pager: [phone]   C A R D I O L O G Y  *********************    DATE OF SERVICE: 07-10-23    HISTORY OF PRESENT ILLNESS: HPI:  Patient is a 79 y/o Male with PMH of HTN, DM, Anxiety/depression, Hodgkin's Lymphoma, TAVR (@ [facility] w/ Dr. Padron in 2016), CAD s/p PCI, ICM/HFrEF s/p BiV-ICD, and VT s/p ablation (in [place]) who presented to [facility] with generalized weakness admitted with VT requiring shock x2 by EMS now transferred to [facility] CICU for further management. Denies chest pain, SOB, palpitations, dizziness, or syncope.    PAST MEDICAL & SURGICAL HISTORY:  HTN (hypertension)      HLD (hyperlipidemia)      Acquired hypothyroidism      Hodgkin lymphoma  on remission, chemo and rad [id number]      Type 2 diabetes mellitus without complication      Myocardial infarction  Cardiac Arrest- 1994- no stents      AS (aortic stenosis)  s/p TAVR      Systolic CHF, chronic  s/p ICD, denies any recent exacerbation or intubation hx      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's      Pericardial effusion  drained in [place] in 4/19      Presence of biventricular AICD      Cholelithiases      H/O carotid endarterectomy  bilateral      H/O bilateral inguinal hernia repair      History of umbilical hernia repair      S/P TAVR (transcatheter aortic valve replacement)  10/16 in [place]      S/P ICD (internal cardiac defibrillator) procedure  [employer] [id number], last interrogation 11/2019      S/P cataract surgery  b/l              MEDICATIONS:  MEDICATIONS  (STANDING):  aspirin enteric coated 81 milliGRAM(s) Oral daily  atorvastatin 80 milliGRAM(s) Oral at bedtime  chlorhexidine 2% Cloths 1 Application(s) Topical at bedtime  clonazePAM  Tablet 1 milliGRAM(s) Oral daily  dextrose 50% Injectable 50 milliLiter(s) IV Push every 15 minutes  FLUoxetine 20 milliGRAM(s) Oral daily  heparin   Injectable 5000 Unit(s) SubCutaneous every 8 hours  insulin glargine Injectable (LANTUS) 25 Unit(s) SubCutaneous every morning  insulin lispro (ADMELOG) corrective regimen sliding scale   SubCutaneous three times a day before meals  insulin lispro (ADMELOG) corrective regimen sliding scale   SubCutaneous at bedtime  levothyroxine 125 MICROGram(s) Oral daily  lidocaine   Infusion 0.5 mG/Min (3.75 mL/Hr) IV Continuous <Continuous>      Allergies    No Known Allergies    Intolerances        FAMILY HISTORY:  No pertinent family history (Father, Mother)      Non-contributary for premature coronary disease or sudden cardiac death    SOCIAL HISTORY:    [x ] Non-smoker  [ ] Smoker  [ ] Alcohol    FLU VACCINE THIS YEAR STARTS IN AUGUST:  [ ] Yes    [ ] No    IF OVER 65 HAVE YOU EVER HAD A PNA VACCINE:  [ ] Yes    [ ] No       [ ] N/A      REVIEW OF SYSTEMS:  [ ]chest pain  [  ]shortness of breath  [  ]palpitations  [  ]syncope  [ ]near syncope [ ]upper extremity weakness   [ ] lower extremity weakness  [  ]diplopia  [  ]altered mental status   [  ]fevers  [ ]chills [ ]nausea  [ ]vomiting  [  ]dysphagia    [ ]abdominal pain  [ ]melena  [ ]BRBPR    [  ]epistaxis  [  ]rash    [ ]lower extremity edema    +generalized weakness    [X] All others negative	  [ ] Unable to obtain      LABS:	 	    CARDIAC MARKERS:                              11.9   8.92  )-----------( 176      ( 10 Jul 2023 07:28 )             35.1     Hb Trend: 11.9<--, 12.1<--, 10.9<--    07-10    141  |  105  |  31<H>  ----------------------------<  112<H>  3.8   |  25  |  1.31<H>    Ca    8.5      10 Jul 2023 07:28  Phos  3.3     07-10  Mg     2.3     07-10    TPro  5.7<L>  /  Alb  3.6  /  TBili  0.4  /  DBili  x   /  AST  41<H>  /  ALT  20  /  AlkPhos  47  07-10    Creatinine Trend: 1.31<--, 1.57<--, 2.16<--    Coags:      proBNP:   Lipid Profile:   HgA1c:   TSH: Thyroid Stimulating Hormone, Serum: 2.46 uIU/mL (07-09 @ 21:34)          PHYSICAL EXAM:  T(C): 36.7 (07-10-23 @ 11:00), Max: 36.9 (07-09-23 @ 17:07)  HR: 75 (07-10-23 @ 15:00) (63 - 155)  BP: 101/54 (07-10-23 @ 14:00) (61/31 - 137/64)  RR: 20 (07-10-23 @ 15:00) (12 - 37)  SpO2: 99% (07-10-23 @ 15:00) (95% - 100%)  Wt(kg): --   BMI (kg/m2): 25 (07-09-23 @ 20:15)  I&O's Summary    09 Jul 2023 07:01  -  10 Jul 2023 07:00  --------------------------------------------------------  IN: 412.5 mL / OUT: 500 mL / NET: -87.5 mL    10 Jul 2023 07:01  -  10 Jul 2023 16:01  --------------------------------------------------------  IN: 277 mL / OUT: 0 mL / NET: 277 mL        Gen: NAD  HEENT:  (-)icterus (-)pallor  CV: N S1 S2 1/6 ANGELLA (+)2 Pulses B/l  Resp:  Clear to auscultation B/L, normal effort  GI: (+) BS Soft, NT, ND  Lymph:  (-)Edema, (-)obvious lymphadenopathy  Skin: Warm to touch, Normal turgor  Psych: Appropriate mood and affect      TELEMETRY: SR 70s	      ECG: VT      < from: TTE W or WO Ultrasound Enhancing Agent (07.10.23 @ 06:27) >  CONCLUSIONS:      1. Technically difficult image quality.   2. Moderately dilated left ventricular cavity size. The left ventricular systolic function is mildly decreased with an ejection fraction visually estimated at 45 %. There are regional wall motion abnormalities.   3. Multiple segmental abnormalities exist. See findings.   4. Mildly enlarged right ventricular cavity size and mildly reduced right ventricular systolic function.   5. There is mild to moderate mitral valve stenosis, secondary to dystrophic mitral annular calcification.   6. 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 No intravalvular regurgitation No paravalvular regurgitation.   7. Mild to moderate pulmonary hypertension.    < end of copied text >  	    RADIOLOGY:         CXR: < from: Xray Chest 1 View-PORTABLE IMMEDIATE (07.09.23 @ 19:20) >    IMPRESSION: No acute finding at this time.    --- End of Report ---    < end of copied text >      ASSESSMENT/PLAN: Patient is a 79 y/o Male with PMH of HTN, DM, Anxiety/depression, Hodgkin's Lymphoma, TAVR (@ [facility] w/ Dr. Padron in 2016), CAD s/p PCI, ICM/HFrEF s/p BiV-ICD, and VT s/p ablation (in [place]) who presented to [facility] with generalized weakness admitted with VT requiring shock x2 by EMS now transferred to [facility] CICU for further management.    #VT  - Follow up EP consult and ICD interrogation  - Lido per CCU  - TTE noted above, EF 45%  - Supportive care per CCU appreciated    #HFrEF  - HF meds held given RIANNA/hypotension requiring pressors now weaned off    #CAD s/p PCI  - Continue ASA and Lipitor    - Patient to f/u with Dr. Durham after discharge    José Miguel Angulo PA-C  Pager: [phone]

## 2023-07-10 NOTE — CONSULT NOTE ADULT - ASSESSMENT
77y/o man with Hx of Anxiety/depression, Hodgkins lymphoma, HTN, DM, HLD, severe AS s/p TAVR (@ [facility] w/ Dr. Padron in 2016), hospitalized in [place] Spring 2019 for pericardial effusion (aspirated 900 cc blood), MI, CAD s/p SADE to D1 on 5/25/2021 (on ASA, no longer takes Plavix), chronic systolic CHF with EF 34%, s/p dual chamber BSC ICD implant 5/2/2012, upgraded to CRT-D ([facility]) 12/14/2016 due to LBBB (EF improved to 40-45% with CRT-D and AVR), VT s/p ICD shocks in 5/2021, s/p VT ablation 7/2022 (done in [place]) and paroxysmal Afib (very brief, not on AC) who transferred to [facility] CCU from OSH for slow monomorphic VT at 150's s/p amiodarone 150mg bolus and external shock x 2 for hypotension with SBP in the 60's.     1. MMVT   -He was on amiodarone for few months prior to VT ablation and was stopped shortly after ablation in 7/2022 due to GI complaint.  -CRT-D interrogation showed patient is pacer dependent, had sustained monomorphic VT with average  bpm on 7/7 treated and terminated with ATP x 1. Also revealed he was in sustained slow monomorphic VT with average -165 bpm for about 1 hour and 36 minutes on 7/9/23 for which patient did not receive therapy due to below treatment zone.   -Will d/w EP attending regarding adding additional VT zone at 150 bpm to allow for ATP therapy.  -Will d/w EP attending regarding AAD vs repeat VT ablation.  -Continue tele monitor  -Supplement to maintain K>4.0, Mg>2.0    MICKIE Khan  Can reach me via Teams.  77y/o man with Hx of Anxiety/depression, Hodgkins lymphoma, HTN, DM, HLD, severe AS s/p TAVR (@ [facility] w/ Dr. Padron in 2016), hospitalized in [place] Spring 2019 for pericardial effusion (aspirated 900 cc blood), MI, CAD s/p SADE to D1 on 5/25/2021 (on ASA, no longer takes Plavix), chronic systolic CHF with EF 34%, s/p dual chamber BSC ICD implant 5/2/2012, upgraded to CRT-D ([facility]) 12/14/2016 due to LBBB (EF improved to 40-45% with CRT-D and AVR), VT s/p ICD shocks in 5/2021, s/p VT ablation 7/2022 (done in [place]) and paroxysmal Afib (very brief, not on AC) who transferred to [facility] CCU from OSH for slow monomorphic VT at 150's s/p amiodarone 150mg bolus and external shock x 2 for hypotension with SBP in the 60's.     1. MMVT   -He was on amiodarone for few months prior to VT ablation and was stopped shortly after ablation in 7/2022 due to GI complaint.  -CRT-D interrogation showed patient is pacer dependent, had sustained monomorphic VT with average  bpm on 7/7 treated and terminated with ATP x 1. Also revealed he was in sustained slow monomorphic VT with average -165 bpm for about 1 hour and 36 minutes on 7/9/23 for which patient did not receive therapy due to below treatment zone.   -Continue lido at 0.5mg/min for now.   -Will d/w EP attending regarding adding additional VT zone at 150 bpm to allow for ATP therapy.  -Will d/w EP attending regarding AAD vs repeat VT ablation.  -Continue tele monitor  -Supplement to maintain K>4.0, Mg>2.0    MICKIE Khan  Can reach me via Teams.

## 2023-07-10 NOTE — PROGRESS NOTE ADULT - ASSESSMENT
78M w/ PMH HTN, DM, TAVR, CAD s/p PCI, and VT s/p ablation and CRT-D presented with generalized weakness, found to be in sustained VT  s/p amio/lido, shock x2 with return to paced rhythm. [facility]    Plan:  ====================== NEUROLOGY=====================  A&Ox3, no acute issues  - continue to monitor mental status as per protocol     Anxiety/depression  - home meds: Klonopin 1mg, fluoxetine 20mg    ==================== RESPIRATORY======================  Stable on room air  - continue to monitor SpO2 with goal >94%     ====================CARDIOVASCULAR==================  SVT  - pt has hx of VT, s/p ablation (2021 in [place] as well as CRT-D)  - CRT-D was interrogated last week at EP appointment   - s/p lido/amio in field and shock x2  - started on lido gtt @ 1  - outpatient EP: Herbert; outpatient Cards: Joe  - EP consult    Hypotension  - patient arrived on levo .04, weaned off  - Dex placed for monitoring     Hx CAD with SADE   - C/ w ASA, lipitor   - no longer taking Plavix    Hx TAVR in 2016    ===================HEMATOLOGIC/ONC ===================  H/H & plts stable  - Monitor H/H and plts  - DVT PPX: SQH    Hx Hodgkin's lymphoma    ===================== RENAL =========================  F/u CMP  - Continue monitoring urine output, lytes, SCr/ BUN  - replete lytes prn with goal K >4 and Mg >2    ==================== GASTROINTESTINAL===================  DASH diet    =======================    ENDOCRINE  =====================  DM  - holding home Farxiga  - FS in 400s, initiated on insulin gtt at 5u/hr s/p insulin IVP 5  - anion gap WNL  - f/u A1c    Hypothyroidism  - c/w levothyroxine    ========================INFECTIOUS DISEASE================  Afebrile  - monitor and trend WBC and temperature curve       Patient requires continuous monitoring with bedside rhythm monitoring, arterial line, pulse oximetry, ventilator monitoring and intermittent blood gas analysis.  Care plan discussed with ICU care team and attending Dr. Redman.         78M w/ PMH HTN, DM, TAVR, CAD s/p PCI, and VT s/p ablation and CRT-D presented with generalized weakness, found to be in sustained VT  s/p amio/lido, shock x2 with return to paced rhythm. [facility]    Plan:  ====================== NEUROLOGY=====================  A&Ox3, no acute issues  - continue to monitor mental status as per protocol     Anxiety/depression  - home meds: Klonopin 1mg, fluoxetine 20mg    ==================== RESPIRATORY======================  Stable on room air  - continue to monitor SpO2 with goal >94%     ====================CARDIOVASCULAR==================  SVT  - pt has hx of VT, s/p ablation (2021 in [place] as well as CRT-D)  - CRT-D was interrogated last week at EP appointment   - s/p lido/amio in field and shock x2  - Lido gtt decreased to 0.5  - outpatient EP: Herbert; outpatient Cards: Joe  - EP consult, will appreciate recs    Hypotension  - patient arrived on levo .04, weaned off  - Dex placed for monitoring     Hx CAD with SADE   - C/ w ASA, lipitor   - no longer taking Plavix    Hx TAVR in 2016    ===================HEMATOLOGIC/ONC ===================  H/H & plts stable  - Monitor H/H and plts  - DVT PPX: SQH    Hx Hodgkin's lymphoma    ===================== RENAL =========================  F/u CMP  - Continue monitoring urine output, lytes, SCr/ BUN  - replete lytes prn with goal K >4 and Mg >2    ==================== GASTROINTESTINAL===================  DASH diet    =======================    ENDOCRINE  =====================  DM  - holding home Farxiga  - HbA1c 11.8%  - FS initially in 400s, s/p insulin gtt at 5u/hr, s/p insulin IVP 5  - anion gap WNL  - Consider endocrine f/u outpatient for glycemic control    Hypothyroidism  - TSH 2.46  - c/w levothyroxine    ========================INFECTIOUS DISEASE================  Afebrile  - monitor and trend WBC and temperature curve       Patient requires continuous monitoring with bedside rhythm monitoring, arterial line, pulse oximetry, ventilator monitoring and intermittent blood gas analysis.  Care plan discussed with ICU care team and attending Dr. Redman.         78M w/ PMH HTN, DM, TAVR, CAD s/p PCI, and VT s/p ablation and CRT-D presented with generalized weakness, found to be in sustained VT  s/p amio/lido, shock x2 with return to paced rhythm. [facility]    Plan:  ====================== NEUROLOGY=====================  A&Ox3, no acute issues  - continue to monitor mental status as per protocol     Anxiety/depression  - home meds: Klonopin 1mg, fluoxetine 20mg    ==================== RESPIRATORY======================  Stable on room air  - continue to monitor SpO2 with goal >94%     ====================CARDIOVASCULAR==================  SVT  - pt has hx of VT, s/p ablation (2021 in [place] as well as CRT-D)  - CRT-D was interrogated last week at EP appointment   - s/p lido/amio in field and shock x2  - Lido gtt decreased to 0.5  - outpatient EP: Herbert; outpatient Cards: Joe  - EP consult, will appreciate recs    Hypotension  - patient arrived on levo .04, weaned off  - Dex placed for monitoring     Hx CAD with SADE   - C/ w ASA, lipitor   - no longer taking Plavix    Hx TAVR in 2016    ===================HEMATOLOGIC/ONC ===================  H/H & plts stable  - Monitor H/H and plts  - DVT PPX: SQH    Hx Hodgkin's lymphoma    ===================== RENAL =========================  F/u CMP  - Continue monitoring urine output, lytes, SCr/ BUN  - replete lytes prn with goal K >4 and Mg >2    ==================== GASTROINTESTINAL===================  DASH diet    =======================    ENDOCRINE  =====================  DM  - holding home Farxiga  - HbA1c 11.8%  - FS initially in 400s, s/p insulin gtt at 5u/hr, s/p insulin IVP 5  - anion gap WNL  - Consider endocrine f/u outpatient for glycemic control    Hypothyroidism  - TSH 2.46  - c/w levothyroxine    ========================INFECTIOUS DISEASE================  Afebrile  - monitor and trend WBC and temperature curve       Patient requires continuous monitoring with bedside rhythm monitoring, arterial line, pulse oximetry, ventilator monitoring and intermittent blood gas analysis.  Care plan discussed with ICU care team and attending Dr. Prieto.    Kb Philip MD  Internal Medicine, PGY-1

## 2023-07-10 NOTE — PROGRESS NOTE ADULT - ATTENDING COMMENTS
77 yo man with DM, HTN, p/w VT     A+Ox3  Hemodynamics acceptable, no pressors or inotropes.  Was initially on Levo but was quickly weaned off   cont weaning lido, d/w EP plan of action  O2 sats mid to high 90s on room air  DASH/diabetic diet  Normal renal function  H/H low but acceptable  HSQ for DVT prophylaxis   Afebrile, no antibiotics  Sugars uncontrolled, was on insulin drip, will need transitioning   L rad a line 7/9

## 2023-07-11 ENCOUNTER — NON-APPOINTMENT (OUTPATIENT)
Age: 78
End: 2023-07-11

## 2023-07-11 DIAGNOSIS — I25.10 ATHEROSCLEROTIC HEART DISEASE OF NATIVE CORONARY ARTERY WITHOUT ANGINA PECTORIS: ICD-10-CM

## 2023-07-11 DIAGNOSIS — Z29.9 ENCOUNTER FOR PROPHYLACTIC MEASURES, UNSPECIFIED: ICD-10-CM

## 2023-07-11 DIAGNOSIS — E03.9 HYPOTHYROIDISM, UNSPECIFIED: ICD-10-CM

## 2023-07-11 DIAGNOSIS — I47.29 OTHER VENTRICULAR TACHYCARDIA: ICD-10-CM

## 2023-07-11 DIAGNOSIS — I47.20 VENTRICULAR TACHYCARDIA, UNSPECIFIED: ICD-10-CM

## 2023-07-11 DIAGNOSIS — I95.9 HYPOTENSION, UNSPECIFIED: ICD-10-CM

## 2023-07-11 DIAGNOSIS — E11.9 TYPE 2 DIABETES MELLITUS WITHOUT COMPLICATIONS: ICD-10-CM

## 2023-07-11 DIAGNOSIS — F41.9 ANXIETY DISORDER, UNSPECIFIED: ICD-10-CM

## 2023-07-11 LAB
ALBUMIN SERPL ELPH-MCNC: 3.2 G/DL — LOW (ref 3.3–5)
ALP SERPL-CCNC: 45 U/L — SIGNIFICANT CHANGE UP (ref 40–120)
ALT FLD-CCNC: 18 U/L — SIGNIFICANT CHANGE UP (ref 10–45)
ANION GAP SERPL CALC-SCNC: 9 MMOL/L — SIGNIFICANT CHANGE UP (ref 5–17)
AST SERPL-CCNC: 26 U/L — SIGNIFICANT CHANGE UP (ref 10–40)
BILIRUB SERPL-MCNC: 0.3 MG/DL — SIGNIFICANT CHANGE UP (ref 0.2–1.2)
BUN SERPL-MCNC: 23 MG/DL — SIGNIFICANT CHANGE UP (ref 7–23)
CALCIUM SERPL-MCNC: 8.4 MG/DL — SIGNIFICANT CHANGE UP (ref 8.4–10.5)
CHLORIDE SERPL-SCNC: 105 MMOL/L — SIGNIFICANT CHANGE UP (ref 96–108)
CO2 SERPL-SCNC: 25 MMOL/L — SIGNIFICANT CHANGE UP (ref 22–31)
CREAT SERPL-MCNC: 1.34 MG/DL — HIGH (ref 0.5–1.3)
EGFR: 54 ML/MIN/1.73M2 — LOW
GAS PNL BLDA: SIGNIFICANT CHANGE UP
GLUCOSE BLDC GLUCOMTR-MCNC: 143 MG/DL — HIGH (ref 70–99)
GLUCOSE BLDC GLUCOMTR-MCNC: 167 MG/DL — HIGH (ref 70–99)
GLUCOSE BLDC GLUCOMTR-MCNC: 176 MG/DL — HIGH (ref 70–99)
GLUCOSE BLDC GLUCOMTR-MCNC: 211 MG/DL — HIGH (ref 70–99)
GLUCOSE SERPL-MCNC: 172 MG/DL — HIGH (ref 70–99)
HCT VFR BLD CALC: 33 % — LOW (ref 39–50)
HGB BLD-MCNC: 11.1 G/DL — LOW (ref 13–17)
MAGNESIUM SERPL-MCNC: 2.2 MG/DL — SIGNIFICANT CHANGE UP (ref 1.6–2.6)
MCHC RBC-ENTMCNC: 32.4 PG — SIGNIFICANT CHANGE UP (ref 27–34)
MCHC RBC-ENTMCNC: 33.6 GM/DL — SIGNIFICANT CHANGE UP (ref 32–36)
MCV RBC AUTO: 96.2 FL — SIGNIFICANT CHANGE UP (ref 80–100)
MRSA PCR RESULT.: SIGNIFICANT CHANGE UP
NRBC # BLD: 0 /100 WBCS — SIGNIFICANT CHANGE UP (ref 0–0)
PHOSPHATE SERPL-MCNC: 2.9 MG/DL — SIGNIFICANT CHANGE UP (ref 2.5–4.5)
PLATELET # BLD AUTO: 154 K/UL — SIGNIFICANT CHANGE UP (ref 150–400)
POTASSIUM SERPL-MCNC: 3.8 MMOL/L — SIGNIFICANT CHANGE UP (ref 3.5–5.3)
POTASSIUM SERPL-SCNC: 3.8 MMOL/L — SIGNIFICANT CHANGE UP (ref 3.5–5.3)
PROT SERPL-MCNC: 5.2 G/DL — LOW (ref 6–8.3)
RBC # BLD: 3.43 M/UL — LOW (ref 4.2–5.8)
RBC # FLD: 14.3 % — SIGNIFICANT CHANGE UP (ref 10.3–14.5)
S AUREUS DNA NOSE QL NAA+PROBE: SIGNIFICANT CHANGE UP
SODIUM SERPL-SCNC: 139 MMOL/L — SIGNIFICANT CHANGE UP (ref 135–145)
WBC # BLD: 6.95 K/UL — SIGNIFICANT CHANGE UP (ref 3.8–10.5)
WBC # FLD AUTO: 6.95 K/UL — SIGNIFICANT CHANGE UP (ref 3.8–10.5)

## 2023-07-11 PROCEDURE — 99223 1ST HOSP IP/OBS HIGH 75: CPT

## 2023-07-11 PROCEDURE — 71045 X-RAY EXAM CHEST 1 VIEW: CPT | Mod: 26

## 2023-07-11 PROCEDURE — 93010 ELECTROCARDIOGRAM REPORT: CPT

## 2023-07-11 PROCEDURE — 93284 PRGRMG EVAL IMPLANTABLE DFB: CPT | Mod: 26

## 2023-07-11 PROCEDURE — 93010 ELECTROCARDIOGRAM REPORT: CPT | Mod: 76

## 2023-07-11 RX ORDER — POTASSIUM CHLORIDE 20 MEQ
20 PACKET (EA) ORAL ONCE
Refills: 0 | Status: COMPLETED | OUTPATIENT
Start: 2023-07-11 | End: 2023-07-11

## 2023-07-11 RX ORDER — MEXILETINE HYDROCHLORIDE 150 MG/1
150 CAPSULE ORAL EVERY 8 HOURS
Refills: 0 | Status: DISCONTINUED | OUTPATIENT
Start: 2023-07-11 | End: 2023-07-13

## 2023-07-11 RX ADMIN — Medication 1 MILLIGRAM(S): at 11:19

## 2023-07-11 RX ADMIN — Medication 125 MICROGRAM(S): at 06:30

## 2023-07-11 RX ADMIN — CHLORHEXIDINE GLUCONATE 1 APPLICATION(S): 213 SOLUTION TOPICAL at 22:05

## 2023-07-11 RX ADMIN — Medication 4: at 16:51

## 2023-07-11 RX ADMIN — MEXILETINE HYDROCHLORIDE 150 MILLIGRAM(S): 150 CAPSULE ORAL at 21:51

## 2023-07-11 RX ADMIN — Medication 81 MILLIGRAM(S): at 11:19

## 2023-07-11 RX ADMIN — HEPARIN SODIUM 5000 UNIT(S): 5000 INJECTION INTRAVENOUS; SUBCUTANEOUS at 21:51

## 2023-07-11 RX ADMIN — MEXILETINE HYDROCHLORIDE 150 MILLIGRAM(S): 150 CAPSULE ORAL at 13:22

## 2023-07-11 RX ADMIN — Medication 20 MILLIEQUIVALENT(S): at 06:30

## 2023-07-11 RX ADMIN — ATORVASTATIN CALCIUM 80 MILLIGRAM(S): 80 TABLET, FILM COATED ORAL at 21:51

## 2023-07-11 RX ADMIN — Medication 20 MILLIGRAM(S): at 11:19

## 2023-07-11 RX ADMIN — Medication 80 MILLIGRAM(S): at 10:00

## 2023-07-11 RX ADMIN — Medication 2: at 11:18

## 2023-07-11 RX ADMIN — HEPARIN SODIUM 5000 UNIT(S): 5000 INJECTION INTRAVENOUS; SUBCUTANEOUS at 13:20

## 2023-07-11 RX ADMIN — INSULIN GLARGINE 25 UNIT(S): 100 INJECTION, SOLUTION SUBCUTANEOUS at 07:43

## 2023-07-11 RX ADMIN — MEXILETINE HYDROCHLORIDE 150 MILLIGRAM(S): 150 CAPSULE ORAL at 07:44

## 2023-07-11 RX ADMIN — HEPARIN SODIUM 5000 UNIT(S): 5000 INJECTION INTRAVENOUS; SUBCUTANEOUS at 06:28

## 2023-07-11 NOTE — PROGRESS NOTE ADULT - ASSESSMENT
78M  with  HTN, DMT2, Anxiety/depression, Hodgkin's Lymphoma, TAVR (2016), CAD s/p PCI, ICM/HFrEF s/p BiV-ICD, and VT s/p ablation (in [place]) who presented to OSH with generalized weakness admitted with VT requiring shock x2 by EMS now transferred to [facility] CICU for further management. Pt is s/p lidocaine gtt and started on sotalol. Now deemed hemodynamically stable and transferred to telemetry floor.

## 2023-07-11 NOTE — PROGRESS NOTE ADULT - ASSESSMENT
Agree with above assessment and plan as outlined above.    - f/u cardiac MRI    Lobo Hinton MD, [facility]  BEEPER [phone]

## 2023-07-11 NOTE — PROGRESS NOTE ADULT - SUBJECTIVE AND OBJECTIVE BOX
24H hour events: Pt without complaint, no acute events overnight, Tele: SR with V pacing in the 60's    MEDICATIONS:  aspirin enteric coated 81 milliGRAM(s) Oral daily  heparin   Injectable 5000 Unit(s) SubCutaneous every 8 hours  lidocaine   Infusion 0.5 mG/Min IV Continuous <Continuous>  mexiletine 150 milliGRAM(s) Oral every 8 hours  sotalol. 80 milliGRAM(s) Oral every 12 hours  clonazePAM  Tablet 1 milliGRAM(s) Oral daily  FLUoxetine 20 milliGRAM(s) Oral daily  atorvastatin 80 milliGRAM(s) Oral at bedtime  dextrose 50% Injectable 50 milliLiter(s) IV Push every 15 minutes  insulin glargine Injectable (LANTUS) 25 Unit(s) SubCutaneous every morning  insulin lispro (ADMELOG) corrective regimen sliding scale   SubCutaneous three times a day before meals  insulin lispro (ADMELOG) corrective regimen sliding scale   SubCutaneous at bedtime  levothyroxine 125 MICROGram(s) Oral daily  chlorhexidine 2% Cloths 1 Application(s) Topical at bedtime      REVIEW OF SYSTEMS:  Complete 12 point ROS negative.    PHYSICAL EXAM:  T(C): 36.9 (07-11-23 @ 07:00), Max: 36.9 (07-11-23 @ 07:00)  HR: 72 (07-11-23 @ 09:00) (61 - 75)  BP: 101/54 (07-10-23 @ 14:00) (101/54 - 101/54)  RR: 20 (07-11-23 @ 09:00) (15 - 37)  SpO2: 97% (07-11-23 @ 09:00) (93% - 99%)  Wt(kg): --  I&O's Summary    10 Jul 2023 07:01  -  11 Jul 2023 07:00  --------------------------------------------------------  IN: 946 mL / OUT: 1375 mL / NET: -429 mL    11 Jul 2023 07:01  -  11 Jul 2023 09:55  --------------------------------------------------------  IN: 281.4 mL / OUT: 300 mL / NET: -18.6 mL        Appearance: Normal	  HEENT: PERRL, EOMI	  Cardiovascular: Normal S1 S2, No JVD, No murmurs  Respiratory: Lungs clear to auscultation	  Psychiatry: A & O x 3, Mood & affect appropriate  Gastrointestinal: Soft, Non-tender, + BS	  Skin: No rashes, No ecchymoses, No cyanosis	  Neurologic: Grossly intact  Extremities: No clubbing, cyanosis or edema  Vascular: Peripheral pulses palpable 2+ bilaterally        LABS:	 	    CBC Full  -  ( 11 Jul 2023 00:42 )  WBC Count : 6.95 K/uL  Hemoglobin : 11.1 g/dL  Hematocrit : 33.0 %  Platelet Count - Automated : 154 K/uL  Mean Cell Volume : 96.2 fl  Mean Cell Hemoglobin : 32.4 pg  Mean Cell Hemoglobin Concentration : 33.6 gm/dL  Auto Neutrophil # : x  Auto Lymphocyte # : x  Auto Monocyte # : x  Auto Eosinophil # : x  Auto Basophil # : x  Auto Neutrophil % : x  Auto Lymphocyte % : x  Auto Monocyte % : x  Auto Eosinophil % : x  Auto Basophil % : x    07-11    139  |  105  |  23  ----------------------------<  172<H>  3.8   |  25  |  1.34<H>  07-10    141  |  105  |  31<H>  ----------------------------<  112<H>  3.8   |  25  |  1.31<H>    Ca    8.4      11 Jul 2023 00:42  Ca    8.5      10 Jul 2023 07:28  Phos  2.9     07-11  Phos  3.3     07-10  Mg     2.2     07-11  Mg     2.3     07-10    TPro  5.2<L>  /  Alb  3.2<L>  /  TBili  0.3  /  DBili  x   /  AST  26  /  ALT  18  /  AlkPhos  45  07-11  TPro  5.7<L>  /  Alb  3.6  /  TBili  0.4  /  DBili  x   /  AST  41<H>  /  ALT  20  /  AlkPhos  47  07-10    Thyroid Stimulating Hormone, Serum (07.09.23 @ 21:34)    Thyroid Stimulating Hormone, Serum: 2.46 uIU/mL        TELEMETRY: SR with V pacing in the 60's	      ECG (7/11/23 at 6:45am): SR with V pace at 69 bpm, paced QTc 499ms    < from: TTE W or WO Ultrasound Enhancing Agent (07.10.23 @ 06:27) >  CONCLUSIONS:      1. Technically difficult image quality.   2. Moderately dilated left ventricular cavity size. The left ventricular systolic function is mildly decreased with an ejection fraction visually estimated at 45 %. There are regional wall motion abnormalities.   3. Multiple segmental abnormalities exist. See findings.   4. Mildly enlarged right ventricular cavity size and mildly reduced right ventricular systolic function.   5. There is mild to moderate mitral valve stenosis, secondary to dystrophic mitral annular calcification.   6. 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 No intravalvular regurgitation No paravalvular regurgitation.   7. Mild to moderate pulmonary hypertension.    ________________________________________________________________________________________  FINDINGS:     Left Ventricle:  Moderately dilated left ventricular cavity size. The left ventricular wall thickness is normal. Abnormal (paradoxical) septal motion consistent with conduction delay. The left ventricular systolic function is mildly decreased with an ejection fraction visually estimated at 45%. There are regional wall motion abnormalities consistent with ischemic heart disease.  LV Wall Scoring: The entire inferior wall, basal inferoseptal segment, and apex  are akinetic. The basal and mid inferolateral wall and apical anterior segment  are hypokinetic. All remaining scored segments are normal.          Right Ventricle:  Mildly enlarged right ventricular cavity size and mildly reduced right ventricular systolic function. Tricuspid annular plane systolic excursion (TAPSE) is 0.5 cm (normal >=1.7 cm). A device lead is visualized in the right heart.     Left Atrium:  The left atrium is normal with an indexed volume of 30.39 ml/m².     Right Atrium:  The right atrium is normal. There are multiple pacer and/or ICD leads seen in the right atrium.     Aortic Valve:  TAVR valve is noted in the aortic position with normal function. The aortic valve prosthesis is well seated with normal function. There is no intravalvular regurgitation. There is no paravalvular regurgitation.     Mitral Valve:  There is severe calcification and severe anterior calcification of the mitral valve annulus. There is mild to moderate mitral valve stenosis, secondary to dystrophic mitral annular calcification. Unable to calculate valve area due to acoustic shadowing of prosthetic AV, Mean gradient of ~5 at HR of 70. There is mild to moderate mitral regurgitation.     Tricuspid Valve:  Structurally normal tricuspid valve with normal leaflet excursion. There is mild-moderate tricuspid regurgitation. Estimated pulmonary artery systolic pressure is 47 mmHg, consistent with mild to moderate pulmonary hypertension. Two TR jets seen in RV inflow view, one central and one seems to be paravalvular?.     Pulmonic Valve:  Structurally normal pulmonic valve with normal leaflet excursion. There is no evidence of pulmonic regurgitation.     Aorta:  The aortic annulus and aortic root appear normal in size.     Pericardium:  No pericardial effusion seen.     Systemic Veins:  The inferior vena cava is dilated measuring 2.38 cm in diameter, (dilated >2.1cm) with abnormal inspiratory collapse (abnormal <50%) consistent with elevated right atrial pressure (~15, range 10-20mmHg).  ____________________________________________________________________  Quantitative Data:  Left Ventricle Measurements: (Indexed to BSA)     IVSd (2D):   0.6 cm  LVPWd (2D):  0.8 cm  LVIDd (2D):  4.8 cm  LVIDs (2D):  4.0 cm  LV Mass:     106 g  57.2 g/m²  Visualized LV EF%: 45%     MV E Vmax:    1.53 m/s  MV A Vmax:    0.81 m/s  MV E/A:       1.88  e' lateral:   6.16 cm/s  e' medial:    5.22 cm/s  E/e' lateral: 24.84  E/e' medial:  29.31  E/e' Average: 26.89    Aorta Measurements:     Ao Root:       2.7 cm  Ao Root s, 2D: 2.7 cm       Left Atrium Measurements: (Indexed to BSA)  LA Diam 2D: 4.60 cm    Right Ventricle Measurements:     TAPSE: 0.5 cm       LVOT / RVOT/ Qp/Qs Data: (Indexed to BSA)  LVOT Vmax: 0.60 m/s  LVOT VTI:  14.40 cm    Aortic Valve Measurements:  AV Vmax:          96.8 cm/s  AV Peak Gradient: 3.7 mmHg  AV Mean Gradient: 2.0 mmHg  AV VTI:           21.7 cm  AV VTI Ratio:     0.66    Mitral Valve Measurements:     MV Vmax:        1.86 m/s  MV VTI:         43.00 cm  MV Mean Grad:   5.00 mmHg  MV Peak Grad:   13.8 mmHg  MV E Vmax:      1.5 m/s  MV A Vmax:      0.8 m/s  MV E/A:         1.9  MV Gradient HR: 70 bpm  MV P1/2T:       102 msec       Tricuspid Valve Measurements:     TR Vmax:          2.8 m/s  TR Peak Gradient: 32.3 mmHg  RA Pressure:      15 mmHg  PASP:             47 mmHg    < end of copied text >

## 2023-07-11 NOTE — CHART NOTE - NSCHARTNOTEFT_GEN_A_CORE
CCU Transfer Note    Transfer from: CCU  Transfer to:  (  ) Medicine    (  ) Telemetry    (  ) RCU    (  ) Palliative    (  ) Stroke Unit    (  ) _______________  Accepting physician:    MEDICATIONS:  STANDING MEDICATIONS  aspirin enteric coated 81 milliGRAM(s) Oral daily  atorvastatin 80 milliGRAM(s) Oral at bedtime  chlorhexidine 2% Cloths 1 Application(s) Topical at bedtime  clonazePAM  Tablet 1 milliGRAM(s) Oral daily  dextrose 50% Injectable 50 milliLiter(s) IV Push every 15 minutes  FLUoxetine 20 milliGRAM(s) Oral daily  heparin   Injectable 5000 Unit(s) SubCutaneous every 8 hours  insulin glargine Injectable (LANTUS) 25 Unit(s) SubCutaneous every morning  insulin lispro (ADMELOG) corrective regimen sliding scale   SubCutaneous three times a day before meals  insulin lispro (ADMELOG) corrective regimen sliding scale   SubCutaneous at bedtime  levothyroxine 125 MICROGram(s) Oral daily  lidocaine   Infusion 0.5 mG/Min IV Continuous <Continuous>  mexiletine 150 milliGRAM(s) Oral every 8 hours  sotalol. 80 milliGRAM(s) Oral every 12 hours    PRN MEDICATIONS      VITAL SIGNS: Last 24 Hours  T(C): 36.4 (11 Jul 2023 03:00), Max: 36.8 (10 Jul 2023 15:00)  T(F): 97.6 (11 Jul 2023 03:00), Max: 98.2 (10 Jul 2023 15:00)  HR: 66 (11 Jul 2023 07:00) (61 - 75)  BP: 101/54 (10 Jul 2023 14:00) (101/54 - 101/54)  BP(mean): 75 (10 Jul 2023 14:00) (75 - 75)  RR: 15 (11 Jul 2023 07:00) (15 - 37)  SpO2: 97% (11 Jul 2023 07:00) (93% - 99%)    LABS:                        11.1   6.95  )-----------( 154      ( 11 Jul 2023 00:42 )             33.0     07-11    139  |  105  |  23  ----------------------------<  172<H>  3.8   |  25  |  1.34<H>    Ca    8.4      11 Jul 2023 00:42  Phos  2.9     07-11  Mg     2.2     07-11    TPro  5.2<L>  /  Alb  3.2<L>  /  TBili  0.3  /  DBili  x   /  AST  26  /  ALT  18  /  AlkPhos  45  07-11    PT/INR - ( 09 Jul 2023 17:27 )   PT: 11.6 sec;   INR: 0.98 ratio         PTT - ( 09 Jul 2023 17:27 )  PTT:24.0 sec  Urinalysis Basic - ( 11 Jul 2023 00:42 )    Color: x / Appearance: x / SG: x / pH: x  Gluc: 172 mg/dL / Ketone: x  / Bili: x / Urobili: x   Blood: x / Protein: x / Nitrite: x   Leuk Esterase: x / RBC: x / WBC x   Sq Epi: x / Non Sq Epi: x / Bacteria: x      ABG - ( 11 Jul 2023 00:23 )  pH, Arterial: 7.43  pH, Blood: x     /  pCO2: 43    /  pO2: 93    / HCO3: 28    / Base Excess: 3.7   /  SaO2: 98.6                Culture - Blood (collected 09 Jul 2023 17:35)  Source: .Blood Blood-Peripheral  Preliminary Report (10 Jul 2023 23:02):    No growth at 24 hours    Culture - Blood (collected 09 Jul 2023 17:30)  Source: .Blood Blood-Peripheral  Preliminary Report (10 Jul 2023 23:02):    No growth at 24 hours          RADIOLOGY:    CCU COURSE:    Pt arrived from OSH to CICU for further management of VT. Started on lidocaine gtt @ 1, TTE revealed EF 45%, mild-moderate mitral valve stenosis. EP consulted, recommended ____. Pt weaned off lidocaine and started on sotalol. Pt deemed hemodynamically stable and ready for transfer.      ASSESSMENT & PLAN:         For Follow-Up: CCU Transfer Note    Transfer from: CCU  Transfer to:  (  ) Medicine    (  ) Telemetry    (  ) RCU    (  ) Palliative    (  ) Stroke Unit    (  ) _______________  Accepting physician:    MEDICATIONS:  STANDING MEDICATIONS  aspirin enteric coated 81 milliGRAM(s) Oral daily  atorvastatin 80 milliGRAM(s) Oral at bedtime  chlorhexidine 2% Cloths 1 Application(s) Topical at bedtime  clonazePAM  Tablet 1 milliGRAM(s) Oral daily  dextrose 50% Injectable 50 milliLiter(s) IV Push every 15 minutes  FLUoxetine 20 milliGRAM(s) Oral daily  heparin   Injectable 5000 Unit(s) SubCutaneous every 8 hours  insulin glargine Injectable (LANTUS) 25 Unit(s) SubCutaneous every morning  insulin lispro (ADMELOG) corrective regimen sliding scale   SubCutaneous three times a day before meals  insulin lispro (ADMELOG) corrective regimen sliding scale   SubCutaneous at bedtime  levothyroxine 125 MICROGram(s) Oral daily  lidocaine   Infusion 0.5 mG/Min IV Continuous <Continuous>  mexiletine 150 milliGRAM(s) Oral every 8 hours  sotalol. 80 milliGRAM(s) Oral every 12 hours    PRN MEDICATIONS      VITAL SIGNS: Last 24 Hours  T(C): 36.4 (11 Jul 2023 03:00), Max: 36.8 (10 Jul 2023 15:00)  T(F): 97.6 (11 Jul 2023 03:00), Max: 98.2 (10 Jul 2023 15:00)  HR: 66 (11 Jul 2023 07:00) (61 - 75)  BP: 101/54 (10 Jul 2023 14:00) (101/54 - 101/54)  BP(mean): 75 (10 Jul 2023 14:00) (75 - 75)  RR: 15 (11 Jul 2023 07:00) (15 - 37)  SpO2: 97% (11 Jul 2023 07:00) (93% - 99%)    LABS:                        11.1   6.95  )-----------( 154      ( 11 Jul 2023 00:42 )             33.0     07-11    139  |  105  |  23  ----------------------------<  172<H>  3.8   |  25  |  1.34<H>    Ca    8.4      11 Jul 2023 00:42  Phos  2.9     07-11  Mg     2.2     07-11    TPro  5.2<L>  /  Alb  3.2<L>  /  TBili  0.3  /  DBili  x   /  AST  26  /  ALT  18  /  AlkPhos  45  07-11    PT/INR - ( 09 Jul 2023 17:27 )   PT: 11.6 sec;   INR: 0.98 ratio         PTT - ( 09 Jul 2023 17:27 )  PTT:24.0 sec  Urinalysis Basic - ( 11 Jul 2023 00:42 )    Color: x / Appearance: x / SG: x / pH: x  Gluc: 172 mg/dL / Ketone: x  / Bili: x / Urobili: x   Blood: x / Protein: x / Nitrite: x   Leuk Esterase: x / RBC: x / WBC x   Sq Epi: x / Non Sq Epi: x / Bacteria: x      ABG - ( 11 Jul 2023 00:23 )  pH, Arterial: 7.43  pH, Blood: x     /  pCO2: 43    /  pO2: 93    / HCO3: 28    / Base Excess: 3.7   /  SaO2: 98.6                Culture - Blood (collected 09 Jul 2023 17:35)  Source: .Blood Blood-Peripheral  Preliminary Report (10 Jul 2023 23:02):    No growth at 24 hours    Culture - Blood (collected 09 Jul 2023 17:30)  Source: .Blood Blood-Peripheral  Preliminary Report (10 Jul 2023 23:02):    No growth at 24 hours          RADIOLOGY:    CCU COURSE:    Pt arrived from OSH to CICU for further management of VT. Started on lidocaine gtt @ 1, TTE revealed EF 45%, mild-moderate mitral valve stenosis. EP consulted, recommended ____. Pt weaned off lidocaine and started on sotalol. Pt deemed hemodynamically stable and ready for transfer.      ASSESSMENT & PLAN:   /P: 78M w/ PMH HTN, DM, TAVR, CAD s/p PCI, and VT s/p ablation and CRT-D presented with generalized weakness, found to be in sustained VT  s/p amio/lido, shock x2 with return to paced rhythm. Transferred from OSH to [facility] CICU for further management.    ====================== NEUROLOGY=====================  A&Ox3, no acute issues  - continue to monitor mental status as per protocol     Anxiety/depression  - home meds: Klonopin 1mg, fluoxetine 20mg    ==================== RESPIRATORY======================  Stable on room air  - continue to monitor SpO2 with goal >94%     ====================CARDIOVASCULAR==================  SVT  - pt has hx of VT, s/p ablation (2021 in [place] as well as CRT-D)  - CRT-D was interrogated last week at EP appointment   - s/p lido/amio in field and shock x2  - started Sotalol 80 BID, EKG post 2hr (per Dr. Calderon)   - c/w Lido gtt at 0.5 - if remains quiet on telemetry will DC after 2nd Sotalol dose (per Dr. Mcbride)  - outpatient EP: Herbert; outpatient Cards: Joe  - EP consult, will appreciate recs    Hypotension  - patient arrived on levo .04, weaned off  - Dex placed for monitoring     Hx CAD with SADE   - C/ w ASA, lipitor   - no longer taking Plavix    Hx TAVR in 2016    ===================HEMATOLOGIC/ONC ===================  H/H & plts stable  - Monitor H/H and plts  - DVT PPX: SQH    Hx Hodgkin's lymphoma    ===================== RENAL =========================  F/u CMP  - Continue monitoring urine output, lytes, SCr/ BUN  - replete lytes prn with goal K >4 and Mg >2    ==================== GASTROINTESTINAL===================  DASH diet    =======================    ENDOCRINE  =====================  DM  - holding home Farxiga  - HbA1c 11.8%  - FS initially in 400s, s/p insulin gtt at 5u/hr, s/p insulin IVP 5  - anion gap WNL  - Consider endocrine f/u outpatient for glycemic control    Hypothyroidism  - TSH 2.46  - c/w levothyroxine    ========================INFECTIOUS DISEASE================  Afebrile  - monitor and trend WBC and temperature curve     For Follow-Up: CCU Transfer Note    Transfer from: CCU  Transfer to:  (  ) Medicine    (  ) Telemetry    (  ) RCU    (  ) Palliative    (  ) Stroke Unit    (  ) _______________  Accepting physician:    MEDICATIONS:  STANDING MEDICATIONS  aspirin enteric coated 81 milliGRAM(s) Oral daily  atorvastatin 80 milliGRAM(s) Oral at bedtime  chlorhexidine 2% Cloths 1 Application(s) Topical at bedtime  clonazePAM  Tablet 1 milliGRAM(s) Oral daily  dextrose 50% Injectable 50 milliLiter(s) IV Push every 15 minutes  FLUoxetine 20 milliGRAM(s) Oral daily  heparin   Injectable 5000 Unit(s) SubCutaneous every 8 hours  insulin glargine Injectable (LANTUS) 25 Unit(s) SubCutaneous every morning  insulin lispro (ADMELOG) corrective regimen sliding scale   SubCutaneous three times a day before meals  insulin lispro (ADMELOG) corrective regimen sliding scale   SubCutaneous at bedtime  levothyroxine 125 MICROGram(s) Oral daily  lidocaine   Infusion 0.5 mG/Min IV Continuous <Continuous>  mexiletine 150 milliGRAM(s) Oral every 8 hours  sotalol. 80 milliGRAM(s) Oral every 12 hours    PRN MEDICATIONS      VITAL SIGNS: Last 24 Hours  T(C): 36.4 (11 Jul 2023 03:00), Max: 36.8 (10 Jul 2023 15:00)  T(F): 97.6 (11 Jul 2023 03:00), Max: 98.2 (10 Jul 2023 15:00)  HR: 66 (11 Jul 2023 07:00) (61 - 75)  BP: 101/54 (10 Jul 2023 14:00) (101/54 - 101/54)  BP(mean): 75 (10 Jul 2023 14:00) (75 - 75)  RR: 15 (11 Jul 2023 07:00) (15 - 37)  SpO2: 97% (11 Jul 2023 07:00) (93% - 99%)    LABS:                        11.1   6.95  )-----------( 154      ( 11 Jul 2023 00:42 )             33.0     07-11    139  |  105  |  23  ----------------------------<  172<H>  3.8   |  25  |  1.34<H>    Ca    8.4      11 Jul 2023 00:42  Phos  2.9     07-11  Mg     2.2     07-11    TPro  5.2<L>  /  Alb  3.2<L>  /  TBili  0.3  /  DBili  x   /  AST  26  /  ALT  18  /  AlkPhos  45  07-11    PT/INR - ( 09 Jul 2023 17:27 )   PT: 11.6 sec;   INR: 0.98 ratio         PTT - ( 09 Jul 2023 17:27 )  PTT:24.0 sec  Urinalysis Basic - ( 11 Jul 2023 00:42 )    Color: x / Appearance: x / SG: x / pH: x  Gluc: 172 mg/dL / Ketone: x  / Bili: x / Urobili: x   Blood: x / Protein: x / Nitrite: x   Leuk Esterase: x / RBC: x / WBC x   Sq Epi: x / Non Sq Epi: x / Bacteria: x      ABG - ( 11 Jul 2023 00:23 )  pH, Arterial: 7.43  pH, Blood: x     /  pCO2: 43    /  pO2: 93    / HCO3: 28    / Base Excess: 3.7   /  SaO2: 98.6                Culture - Blood (collected 09 Jul 2023 17:35)  Source: .Blood Blood-Peripheral  Preliminary Report (10 Jul 2023 23:02):    No growth at 24 hours    Culture - Blood (collected 09 Jul 2023 17:30)  Source: .Blood Blood-Peripheral  Preliminary Report (10 Jul 2023 23:02):    No growth at 24 hours          RADIOLOGY:    CCU COURSE:    Pt arrived from OSH to CICU for further management of VT. Started on lidocaine gtt @ 1, TTE revealed EF 45%, mild-moderate mitral valve stenosis. EP consulted, recommended . Pt weaned off lidocaine and started on sotalol. Pt deemed hemodynamically stable and ready for transfer.      ASSESSMENT & PLAN:   /P: 78M w/ PMH HTN, DM, TAVR, CAD s/p PCI, and VT s/p ablation and CRT-D presented with generalized weakness, found to be in sustained VT  s/p amio/lido, shock x2 with return to paced rhythm. Transferred from OSH to [facility] CICU for further management.    ====================== NEUROLOGY=====================  A&Ox3, no acute issues  - continue to monitor mental status as per protocol     Anxiety/depression  - home meds: Klonopin 1mg, fluoxetine 20mg    ==================== RESPIRATORY======================  Stable on room air  - continue to monitor SpO2 with goal >94%     ====================CARDIOVASCULAR==================  SVT  - pt has hx of VT, s/p ablation (2021 in [place] as well as CRT-D)  - CRT-D was interrogated last week at EP appointment   - s/p lido/amio in field and shock x2  - started Sotalol 80 BID, EKG post 2hr (per Dr. Calderon)   - c/w Lido gtt at 0.5 - if remains quiet on telemetry will DC after 2nd Sotalol dose (per Dr. Mcbride)  - outpatient EP: Herbert; outpatient Cards: Joe  - EP consult, will appreciate recs    Hypotension  - patient arrived on levo .04, weaned off  - Dex placed for monitoring     Hx CAD with SADE   - C/ w ASA, lipitor   - no longer taking Plavix    Hx TAVR in 2016    ===================HEMATOLOGIC/ONC ===================  H/H & plts stable  - Monitor H/H and plts  - DVT PPX: SQH    Hx Hodgkin's lymphoma    ===================== RENAL =========================  F/u CMP  - Continue monitoring urine output, lytes, SCr/ BUN  - replete lytes prn with goal K >4 and Mg >2    ==================== GASTROINTESTINAL===================  DASH diet    =======================    ENDOCRINE  =====================  DM  - holding home Farxiga  - HbA1c 11.8%  - FS initially in 400s, s/p insulin gtt at 5u/hr, s/p insulin IVP 5  - anion gap WNL  - Consider endocrine f/u outpatient for glycemic control    Hypothyroidism  - TSH 2.46  - c/w levothyroxine    ========================INFECTIOUS DISEASE================  Afebrile  - monitor and trend WBC and temperature curve     For Follow-Up: CCU Transfer Note    Transfer from: CCU  Transfer to:  (  ) Medicine    (  ) Telemetry    (  ) RCU    (  ) Palliative    (  ) Stroke Unit    (  ) _______________  Accepting physician:    MEDICATIONS:  STANDING MEDICATIONS  aspirin enteric coated 81 milliGRAM(s) Oral daily  atorvastatin 80 milliGRAM(s) Oral at bedtime  chlorhexidine 2% Cloths 1 Application(s) Topical at bedtime  clonazePAM  Tablet 1 milliGRAM(s) Oral daily  dextrose 50% Injectable 50 milliLiter(s) IV Push every 15 minutes  FLUoxetine 20 milliGRAM(s) Oral daily  heparin   Injectable 5000 Unit(s) SubCutaneous every 8 hours  insulin glargine Injectable (LANTUS) 25 Unit(s) SubCutaneous every morning  insulin lispro (ADMELOG) corrective regimen sliding scale   SubCutaneous three times a day before meals  insulin lispro (ADMELOG) corrective regimen sliding scale   SubCutaneous at bedtime  levothyroxine 125 MICROGram(s) Oral daily  lidocaine   Infusion 0.5 mG/Min IV Continuous <Continuous>  mexiletine 150 milliGRAM(s) Oral every 8 hours  sotalol. 80 milliGRAM(s) Oral every 12 hours    PRN MEDICATIONS      VITAL SIGNS: Last 24 Hours  T(C): 36.4 (11 Jul 2023 03:00), Max: 36.8 (10 Jul 2023 15:00)  T(F): 97.6 (11 Jul 2023 03:00), Max: 98.2 (10 Jul 2023 15:00)  HR: 66 (11 Jul 2023 07:00) (61 - 75)  BP: 101/54 (10 Jul 2023 14:00) (101/54 - 101/54)  BP(mean): 75 (10 Jul 2023 14:00) (75 - 75)  RR: 15 (11 Jul 2023 07:00) (15 - 37)  SpO2: 97% (11 Jul 2023 07:00) (93% - 99%)    LABS:                        11.1   6.95  )-----------( 154      ( 11 Jul 2023 00:42 )             33.0     07-11    139  |  105  |  23  ----------------------------<  172<H>  3.8   |  25  |  1.34<H>    Ca    8.4      11 Jul 2023 00:42  Phos  2.9     07-11  Mg     2.2     07-11    TPro  5.2<L>  /  Alb  3.2<L>  /  TBili  0.3  /  DBili  x   /  AST  26  /  ALT  18  /  AlkPhos  45  07-11    PT/INR - ( 09 Jul 2023 17:27 )   PT: 11.6 sec;   INR: 0.98 ratio         PTT - ( 09 Jul 2023 17:27 )  PTT:24.0 sec  Urinalysis Basic - ( 11 Jul 2023 00:42 )    Color: x / Appearance: x / SG: x / pH: x  Gluc: 172 mg/dL / Ketone: x  / Bili: x / Urobili: x   Blood: x / Protein: x / Nitrite: x   Leuk Esterase: x / RBC: x / WBC x   Sq Epi: x / Non Sq Epi: x / Bacteria: x      ABG - ( 11 Jul 2023 00:23 )  pH, Arterial: 7.43  pH, Blood: x     /  pCO2: 43    /  pO2: 93    / HCO3: 28    / Base Excess: 3.7   /  SaO2: 98.6                Culture - Blood (collected 09 Jul 2023 17:35)  Source: .Blood Blood-Peripheral  Preliminary Report (10 Jul 2023 23:02):    No growth at 24 hours    Culture - Blood (collected 09 Jul 2023 17:30)  Source: .Blood Blood-Peripheral  Preliminary Report (10 Jul 2023 23:02):    No growth at 24 hours          RADIOLOGY:    CCU COURSE:    Pt arrived from OSH to CICU for further management of VT. Started on lidocaine gtt @ 1, TTE revealed EF 45%, mild-moderate mitral valve stenosis. EP consulted, recommended . Pt weaned off lidocaine and started on sotalol. Pt deemed hemodynamically stable and ready for transfer.      ASSESSMENT & PLAN:   /P: 78M w/ PMH HTN, DM, TAVR, CAD s/p PCI, and VT s/p ablation and CRT-D presented with generalized weakness, found to be in sustained VT  s/p amio/lido, shock x2 with return to paced rhythm. Transferred from OSH to [facility] CICU for further management.    ====================== NEUROLOGY=====================  A&Ox3, no acute issues  - continue to monitor mental status as per protocol     Anxiety/depression  - home meds: Klonopin 1mg, fluoxetine 20mg    ==================== RESPIRATORY======================  Stable on room air  - continue to monitor SpO2 with goal >94%     ====================CARDIOVASCULAR==================  SVT  - pt has hx of VT, s/p ablation (2021 in [place] as well as CRT-D)  - CRT-D was interrogated last week at EP appointment   - s/p lido/amio in field and shock x2  - started Sotalol 80 BID, EKG post 2hr (per Dr. Calderon)   - c/w Lido gtt at 0.5 - if remains quiet on telemetry will DC after 2nd Sotalol dose (per Dr. Mcbride)  - outpatient EP: Herbert; outpatient Cards: Joe  - EP consult, will appreciate recs    Hypotension  - patient arrived on levo .04, weaned off  - Dex placed for monitoring     Hx CAD with SADE   - C/ w ASA, lipitor   - no longer taking Plavix    Hx TAVR in 2016    ===================HEMATOLOGIC/ONC ===================  H/H & plts stable  - Monitor H/H and plts  - DVT PPX: SQH    Hx Hodgkin's lymphoma    ===================== RENAL =========================  F/u CMP  - Continue monitoring urine output, lytes, SCr/ BUN  - replete lytes prn with goal K >4 and Mg >2    ==================== GASTROINTESTINAL===================  DASH diet    =======================    ENDOCRINE  =====================  DM  - holding home Farxiga  - HbA1c 11.8%  - FS initially in 400s, s/p insulin gtt at 5u/hr, s/p insulin IVP 5  - anion gap WNL  - Consider endocrine f/u outpatient for glycemic control    Hypothyroidism  - TSH 2.46  - c/w levothyroxine    ========================INFECTIOUS DISEASE================  Afebrile  - monitor and trend WBC and temperature curve     For Follow-Up:  [ ] D/c lidocaine if tele quiet following second dose of sotalol      Kb Philip MD  Internal Medicine, PGY-1

## 2023-07-11 NOTE — PROGRESS NOTE ADULT - ASSESSMENT
79y/o man with Hx of Anxiety/depression, Hodgkins lymphoma, HTN, DM, HLD, severe AS s/p TAVR (@ [facility] w/ Dr. Padron in 2016), hospitalized in [place] Spring 2019 for pericardial effusion (aspirated 900 cc blood), MI, CAD s/p SADE to D1 on 5/25/2021 (on ASA, no longer takes Plavix), chronic systolic CHF with EF 34%, s/p dual chamber BSC ICD implant 5/2/2012, upgraded to CRT-D (BS) 12/14/2016 due to LBBB (EF improved to 40-45% with CRT-D and AVR), VT s/p ICD shocks in 5/2021, s/p VT ablation 7/2022 (done in [place]) and paroxysmal Afib (very brief, not on AC) who transferred to [facility] CCU from OSH for slow monomorphic VT at 150's s/p amiodarone 150mg bolus and external shock x 2 for hypotension with SBP in the 60's.     1. MMVT   -He was on amiodarone for few months prior to VT ablation and was stopped shortly after ablation in 7/2022 due to GI complaint.  -CRT-D interrogation showed patient is pacer dependent, had sustained monomorphic VT with average  bpm on 7/7 treated and terminated with ATP x 1. Also revealed he was in sustained slow monomorphic VT with average -165 bpm for about 1 hour and 36 minutes on 7/9/23 for which patient did not receive therapy due to below treatment zone.   -Started on sotalol 80mg Q12hr, obtain EKG 2 hours after each doses to monitor for QTc prolongation, paced QTc this morning is 499ms.  -Added mexiletine 150mg Q8hr to help shorten QTc  -Aware that pt is on FLUoxetine which can prolong QTc, will monitor closely   -D/C  lidocaine at 10 am today  -ICD with additional VT zone added to allow for ATP therapy at 150 bpm and LDL increased from 60bpm to 70bpm today.   -Patient will need NIP testing in EP lab prior to discharge  -Will check if patient has MRI compatible ICD, he would need cardiac MRI to assess substrate.   -Continue tele monitor  -Supplement to maintain K>4.0, Mg>2.0    MARGARITA Anderson, NP-C  [id number]   79y/o man with Hx of Anxiety/depression, Hodgkins lymphoma, HTN, DM, HLD, severe AS s/p TAVR (@ [facility] w/ Dr. Padron in 2016), hospitalized in [place] Spring 2019 for pericardial effusion (aspirated 900 cc blood), MI, CAD s/p SADE to D1 on 5/25/2021 (on ASA, no longer takes Plavix), chronic systolic CHF with EF 34%, s/p dual chamber BSC ICD implant 5/2/2012, upgraded to CRT-D (BS) 12/14/2016 due to LBBB (EF improved to 40-45% with CRT-D and AVR), VT s/p ICD shocks in 5/2021, s/p VT ablation 7/2022 (done in [place]) and paroxysmal Afib (very brief, not on AC) who transferred to [facility] CCU from OSH for slow monomorphic VT at 150's s/p amiodarone 150mg bolus and external shock x 2 for hypotension with SBP in the 60's.     1. MMVT   -He was on amiodarone for few months prior to VT ablation and was stopped shortly after ablation in 7/2022 due to GI complaint.  -CRT-D interrogation showed patient is pacer dependent, had sustained monomorphic VT with average  bpm on 7/7 treated and terminated with ATP x 1. Also revealed he was in sustained slow monomorphic VT with average -165 bpm for about 1 hour and 36 minutes on 7/9/23 for which patient did not receive therapy due to below treatment zone.   -Started on sotalol 80mg Q12hr, obtain EKG 2 hours after each doses to monitor for QTc prolongation, paced QTc this morning is 499ms.  -Added mexiletine 150mg Q8hr to help shorten QTc  -Aware that pt is on FLUoxetine which can prolong QTc, will monitor closely   -D/C  lidocaine at 10 am today  -ICD with additional VT zone added to allow for ATP therapy at 150 bpm and LDL increased from 60bpm to 70bpm today.   -Patient will need NIPS testing in EP lab prior to discharge  -Will check if patient has MRI compatible ICD, he would need cardiac MRI to assess substrate.   -Continue tele monitor  -Supplement to maintain K>4.0, Mg>2.0    MARGARITA Anderson, NP-C  [id number]   79y/o man with Hx of Anxiety/depression, Hodgkins lymphoma, HTN, DM, HLD, severe AS s/p TAVR (@ [facility] w/ Dr. Padron in 2016), hospitalized in [place] Spring 2019 for pericardial effusion (aspirated 900 cc blood), MI, CAD s/p SADE to D1 on 5/25/2021 (on ASA, no longer takes Plavix), chronic systolic CHF with EF 34%, s/p dual chamber BSC ICD implant 5/2/2012, upgraded to CRT-D (BS) 12/14/2016 due to LBBB (EF improved to 40-45% with CRT-D and AVR), VT s/p ICD shocks in 5/2021, s/p VT ablation 7/2022 (done in [place]) and paroxysmal Afib (very brief, not on AC) who transferred to [facility] CCU from OSH for slow monomorphic VT at 150's s/p amiodarone 150mg bolus and external shock x 2 for hypotension with SBP in the 60's.     1. MMVT   -He was on amiodarone for few months prior to VT ablation and was stopped shortly after ablation in 7/2022 due to GI complaint.  -CRT-D interrogation showed patient is pacer dependent, had sustained monomorphic VT with average  bpm on 7/7 treated and terminated with ATP x 1. Also revealed he was in sustained slow monomorphic VT with average -165 bpm for about 1 hour and 36 minutes on 7/9/23 for which patient did not receive therapy due to below treatment zone.   -Started on sotalol 80mg Q12hr, obtain EKG 2 hours after each doses to monitor for QTc prolongation, paced QTc this morning is 499ms.  -Added mexiletine 150mg Q8hr to help shorten QTc  -Aware that pt is on FLUoxetine which can prolong QTc, will monitor closely   -D/C  lidocaine at 10 am today  -ICD with additional VT zone added to allow for ATP therapy at 150 bpm and LDL increased from 60bpm to 70bpm today.   -Patient will need NIPS testing in EP lab prior to discharge  -Need cardiac MRI to assess substrate (his ICD is MRI compatible)   -Continue tele monitor  -Supplement to maintain K>4.0, Mg>2.0    MARGARITA Anderson, NP-C  [id number]

## 2023-07-11 NOTE — PROVIDER CONTACT NOTE (OTHER) - ACTION/TREATMENT ORDERED:
NP Delfino Singh aware. As per NP allow refusal of bed alarm at this time. Pt notified of importance of bed alarm for fall prevention/safety. Call bell left within reach, bed in lowest position.

## 2023-07-11 NOTE — PROGRESS NOTE ADULT - SUBJECTIVE AND OBJECTIVE BOX
ACCEPTANCE NOTE  78M w/ PMH HTN, DM, Anxiety/depression, Hodgkin's Lymphoma, TAVR (@ [facility] w/ Dr. Padron in 2016), CAD s/p PCI (on ASA, no longer takes Plavix), and VT s/p ablation (2021 in [place]) and CRT-D presented to OSH complaining of generalized weakness and stating that he was not feeling well . He was in the bathroom having a BM when this began, his wife states he was diaphoretic and feeling weak and called 911. EMS arrived and found the patient in VT with pulses/awake & alert. He was given 150 amio without improvement. BP at the time was in the low 60s systolic. Given 5mg versed and shocked x2 with successful conversion to paced rhythm. Patient transferred to [facility] CICU for further management of VT.    CCU COURSE  Started on lidocaine gtt @ 1, TTE revealed EF 45%, mild-moderate mitral valve stenosis. EP consulted, recommended for pt to be weaned off lidocaine and started on sotalol. Pt deemed hemodynamically stable and transferred to telemetry floor.        PAST MEDICAL & SURGICAL HISTORY:  HTN (hypertension)      HLD (hyperlipidemia)      Acquired hypothyroidism      Hodgkin lymphoma  on remission, chemo and rad [id number]      Type 2 diabetes mellitus without complication      Myocardial infarction  Cardiac Arrest- 1994- no stents      AS (aortic stenosis)  s/p TAVR      Systolic CHF, chronic  s/p ICD, denies any recent exacerbation or intubation hx      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's      Pericardial effusion  drained in [place] in 4/19      Presence of biventricular AICD      Cholelithiases      H/O carotid endarterectomy  bilateral      H/O bilateral inguinal hernia repair      History of umbilical hernia repair      S/P TAVR (transcatheter aortic valve replacement)  10/16 in [place]      S/P ICD (internal cardiac defibrillator) procedure  [employer] [id number], last interrogation 11/2019      S/P cataract surgery  b/l          Review of Systems:   CONSTITUTIONAL: No fever, weight loss, or fatigue  RESPIRATORY: No cough, wheezing, chills or hemoptysis; No shortness of breath  CARDIOVASCULAR: No chest pain, palpitations, dizziness, or leg swelling  GASTROINTESTINAL: No abdominal or epigastric pain. No nausea, vomiting, or hematemesis; No diarrhea or constipation.  GENITOURINARY: No dysuria, frequency, hematuria, or incontinence  NEUROLOGICAL: No headaches, memory loss, loss of strength, numbness, or tremors  SKIN: No itching, burning, rashes, or lesions   MUSCULOSKELETAL: No joint pain or swelling; No muscle, back, or extremity pain  PSYCHIATRIC: No depression, anxiety, mood swings, or difficulty sleeping  HEME/LYMPH: No easy bruising, or bleeding gums      Allergies    No Known Allergies    Intolerances        Social History:     Lives with spouse  Denies tobacco/Etoh/Illicit drug use    FAMILY HISTORY:  No pertinent family history (Father, Mother)        MEDICATIONS  (STANDING):  aspirin enteric coated 81 milliGRAM(s) Oral daily  atorvastatin 80 milliGRAM(s) Oral at bedtime  chlorhexidine 2% Cloths 1 Application(s) Topical at bedtime  clonazePAM  Tablet 1 milliGRAM(s) Oral daily  dextrose 50% Injectable 50 milliLiter(s) IV Push every 15 minutes  FLUoxetine 20 milliGRAM(s) Oral daily  heparin   Injectable 5000 Unit(s) SubCutaneous every 8 hours  insulin glargine Injectable (LANTUS) 25 Unit(s) SubCutaneous every morning  insulin lispro (ADMELOG) corrective regimen sliding scale   SubCutaneous three times a day before meals  insulin lispro (ADMELOG) corrective regimen sliding scale   SubCutaneous at bedtime  levothyroxine 125 MICROGram(s) Oral daily  mexiletine 150 milliGRAM(s) Oral every 8 hours  sotalol. 80 milliGRAM(s) Oral every 12 hours    MEDICATIONS  (PRN):      Vital Signs Last 24 Hrs  T(C): 36.8 (11 Jul 2023 12:00), Max: 36.9 (11 Jul 2023 07:00)  T(F): 98.2 (11 Jul 2023 12:00), Max: 98.4 (11 Jul 2023 07:00)  HR: 70 (11 Jul 2023 12:00) (61 - 75)  BP: 136/66 (11 Jul 2023 12:00) (121/56 - 136/66)  BP(mean): 91 (11 Jul 2023 11:00) (80 - 91)  RR: 18 (11 Jul 2023 12:00) (15 - 37)  SpO2: 97% (11 Jul 2023 12:00) (93% - 99%)    Parameters below as of 11 Jul 2023 12:00  Patient On (Oxygen Delivery Method): room air      CAPILLARY BLOOD GLUCOSE      POCT Blood Glucose.: 167 mg/dL (11 Jul 2023 11:16)  POCT Blood Glucose.: 143 mg/dL (11 Jul 2023 07:42)  POCT Blood Glucose.: 183 mg/dL (10 Jul 2023 22:09)  POCT Blood Glucose.: 207 mg/dL (10 Jul 2023 17:10)    I&O's Summary    10 Jul 2023 07:01  -  11 Jul 2023 07:00  --------------------------------------------------------  IN: 946 mL / OUT: 1375 mL / NET: -429 mL    11 Jul 2023 07:01  -  11 Jul 2023 16:18  --------------------------------------------------------  IN: 281.4 mL / OUT: 500 mL / NET: -218.6 mL    Telemetry : AV paced ; 70 bpm    PHYSICAL EXAM:  GENERAL: NAD, well-groomed  HEAD:  Atraumatic, Normocephalic  EYES  conjunctiva and sclera clear  NECK: Supple, No JVD  CHEST/LUNG: Clear to auscultation bilaterally; No wheeze  HEART: Regular rate and rhythm; No murmurs, rubs, or gallops  ABDOMEN: Soft, Nontender, Nondistended; Bowel sounds present  EXTREMITIES:  2+ Peripheral Pulses, No clubbing, cyanosis, or edema  PSYCH: AAOx3  NEUROLOGY: non-focal  SKIN: No rashes or lesions    LABS:                        11.1   6.95  )-----------( 154      ( 11 Jul 2023 00:42 )             33.0     07-11    139  |  105  |  23  ----------------------------<  172<H>  3.8   |  25  |  1.34<H>    Ca    8.4      11 Jul 2023 00:42  Phos  2.9     07-11  Mg     2.2     07-11    TPro  5.2<L>  /  Alb  3.2<L>  /  TBili  0.3  /  DBili  x   /  AST  26  /  ALT  18  /  AlkPhos  45  07-11    PT/INR - ( 09 Jul 2023 17:27 )   PT: 11.6 sec;   INR: 0.98 ratio         PTT - ( 09 Jul 2023 17:27 )  PTT:24.0 sec      Urinalysis Basic - ( 11 Jul 2023 00:42 )    Color: x / Appearance: x / SG: x / pH: x  Gluc: 172 mg/dL / Ketone: x  / Bili: x / Urobili: x   Blood: x / Protein: x / Nitrite: x   Leuk Esterase: x / RBC: x / WBC x   Sq Epi: x / Non Sq Epi: x / Bacteria: x        RADIOLOGY & ADDITIONAL TESTS:    Imaging Personally Reviewed:  TTE 7/10  1. Technically difficult image quality.   2. Moderately dilated left ventricular cavity size. The left ventricular systolic function is mildly decreased with an ejection fraction visually estimated at 45 %. There are regional wall motion abnormalities.   3. Multiple segmental abnormalities exist. See findings.   4. Mildly enlarged right ventricular cavity size and mildly reduced right ventricular systolic function.   5. There is mild to moderate mitral valve stenosis, secondary to dystrophic mitral annular calcification.   6. TAVR valve with normal function in the aortic position. Well seated aortic valve prosthesis with normal function. No intravalvular regurgitation No paravalvular regurgitation.   7. Mild to moderate pulmonary hypertension.      Consultant(s) Notes Reviewed:  DAVID

## 2023-07-11 NOTE — PROGRESS NOTE ADULT - SUBJECTIVE AND OBJECTIVE BOX
C A R D I O L O G Y  **********************************     DATE OF SERVICE: 07-11-23    Patient denies chest pain, palpitations, or shortness of breath.   Review of systems otherwise negative.  	  MEDICATIONS:  MEDICATIONS  (STANDING):  aspirin enteric coated 81 milliGRAM(s) Oral daily  atorvastatin 80 milliGRAM(s) Oral at bedtime  chlorhexidine 2% Cloths 1 Application(s) Topical at bedtime  clonazePAM  Tablet 1 milliGRAM(s) Oral daily  dextrose 50% Injectable 50 milliLiter(s) IV Push every 15 minutes  FLUoxetine 20 milliGRAM(s) Oral daily  heparin   Injectable 5000 Unit(s) SubCutaneous every 8 hours  insulin glargine Injectable (LANTUS) 25 Unit(s) SubCutaneous every morning  insulin lispro (ADMELOG) corrective regimen sliding scale   SubCutaneous three times a day before meals  insulin lispro (ADMELOG) corrective regimen sliding scale   SubCutaneous at bedtime  levothyroxine 125 MICROGram(s) Oral daily  mexiletine 150 milliGRAM(s) Oral every 8 hours  sotalol. 80 milliGRAM(s) Oral every 12 hours      LABS:	 	    CARDIAC MARKERS:                                11.1   6.95  )-----------( 154      ( 11 Jul 2023 00:42 )             33.0     Hemoglobin: 11.1 g/dL (07-11 @ 00:42)  Hemoglobin: 11.9 g/dL (07-10 @ 07:28)  Hemoglobin: 12.1 g/dL (07-09 @ 21:35)  Hemoglobin: 10.9 g/dL (07-09 @ 17:27)      07-11    139  |  105  |  23  ----------------------------<  172<H>  3.8   |  25  |  1.34<H>    Ca    8.4      11 Jul 2023 00:42  Phos  2.9     07-11  Mg     2.2     07-11    TPro  5.2<L>  /  Alb  3.2<L>  /  TBili  0.3  /  DBili  x   /  AST  26  /  ALT  18  /  AlkPhos  45  07-11    Creatinine Trend: 1.34<--, 1.31<--, 1.57<--, 2.16<--    COAGS:       proBNP:   Lipid Profile:   HgA1c:   TSH:       PHYSICAL EXAM:  T(C): 36.8 (07-11-23 @ 12:00), Max: 36.9 (07-11-23 @ 07:00)  HR: 70 (07-11-23 @ 12:00) (61 - 75)  BP: 136/66 (07-11-23 @ 12:00) (121/56 - 136/66)  RR: 18 (07-11-23 @ 12:00) (15 - 37)  SpO2: 97% (07-11-23 @ 12:00) (93% - 99%)  Wt(kg): --  I&O's Summary    10 Jul 2023 07:01  -  11 Jul 2023 07:00  --------------------------------------------------------  IN: 946 mL / OUT: 1375 mL / NET: -429 mL    11 Jul 2023 07:01  -  11 Jul 2023 16:22  --------------------------------------------------------  IN: 281.4 mL / OUT: 500 mL / NET: -218.6 mL        Weight (kg): 74.7 (07-11 @ 00:00)    Gen: NAD  HEENT:  (-)icterus (-)pallor  CV: N S1 S2 1/6 ANGELLA (+)2 Pulses B/l  Resp:  Clear to auscultation B/L, normal effort  GI: (+) BS Soft, NT, ND  Lymph:  (-)Edema, (-)obvious lymphadenopathy  Skin: Warm to touch, Normal turgor  Psych: Appropriate mood and affect      TELEMETRY: SR,  60s	      < from: TTE W or WO Ultrasound Enhancing Agent (07.10.23 @ 06:27) >  CONCLUSIONS:      1. Technically difficult image quality.   2. Moderately dilated left ventricular cavity size. The left ventricular systolic function is mildly decreased with an ejection fraction visually estimated at 45 %. There are regional wall motion abnormalities.   3. Multiple segmental abnormalities exist. See findings.   4. Mildly enlarged right ventricular cavity size and mildly reduced right ventricular systolic function.   5. There is mild to moderate mitral valve stenosis, secondary to dystrophic mitral annular calcification.   6. 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 No intravalvular regurgitation No paravalvular regurgitation.   7. Mild to moderate pulmonary hypertension.    < end of copied text >      ASSESSMENT/PLAN: Patient is a 77 y/o Male with PMH of HTN, DM, Anxiety/depression, Hodgkin's Lymphoma, TAVR (@ [facility] w/ Dr. Padron in 2016), CAD s/p PCI, ICM/HFrEF s/p BiV-ICD, and VT s/p ablation (in [place]) who presented to [facility] with generalized weakness admitted with VT requiring shock x2 by EMS now transferred to [facility] CICU for further management.    #VT  - ICD interrogation noted - patient did not receive therapy for VT due to below treatment zone  - EP consult appreciated - started on sotalol and mexiletine, additional VT zone added  - Weaned off lido  - TTE noted above, EF 45%  - Cardiac MRI pending  - Supportive care per CCU appreciated    #HFrEF  - HF meds held given RIANNA/hypotension requiring pressors now weaned off    #CAD s/p PCI  - Continue ASA and Lipitor    - Patient to f/u with Dr. Durham after discharge    José Miguel Angulo PA-C  Pager: [phone]

## 2023-07-12 DIAGNOSIS — I50.20 UNSPECIFIED SYSTOLIC (CONGESTIVE) HEART FAILURE: ICD-10-CM

## 2023-07-12 LAB
ALBUMIN SERPL ELPH-MCNC: 3.7 G/DL — SIGNIFICANT CHANGE UP (ref 3.3–5)
ALP SERPL-CCNC: 55 U/L — SIGNIFICANT CHANGE UP (ref 40–120)
ALT FLD-CCNC: 18 U/L — SIGNIFICANT CHANGE UP (ref 10–45)
ANION GAP SERPL CALC-SCNC: 12 MMOL/L — SIGNIFICANT CHANGE UP (ref 5–17)
AST SERPL-CCNC: 20 U/L — SIGNIFICANT CHANGE UP (ref 10–40)
BILIRUB SERPL-MCNC: 0.5 MG/DL — SIGNIFICANT CHANGE UP (ref 0.2–1.2)
BUN SERPL-MCNC: 17 MG/DL — SIGNIFICANT CHANGE UP (ref 7–23)
CALCIUM SERPL-MCNC: 8.9 MG/DL — SIGNIFICANT CHANGE UP (ref 8.4–10.5)
CHLORIDE SERPL-SCNC: 104 MMOL/L — SIGNIFICANT CHANGE UP (ref 96–108)
CO2 SERPL-SCNC: 24 MMOL/L — SIGNIFICANT CHANGE UP (ref 22–31)
CREAT SERPL-MCNC: 1.22 MG/DL — SIGNIFICANT CHANGE UP (ref 0.5–1.3)
EGFR: 60 ML/MIN/1.73M2 — SIGNIFICANT CHANGE UP
GLUCOSE BLDC GLUCOMTR-MCNC: 150 MG/DL — HIGH (ref 70–99)
GLUCOSE BLDC GLUCOMTR-MCNC: 152 MG/DL — HIGH (ref 70–99)
GLUCOSE BLDC GLUCOMTR-MCNC: 169 MG/DL — HIGH (ref 70–99)
GLUCOSE BLDC GLUCOMTR-MCNC: 171 MG/DL — HIGH (ref 70–99)
GLUCOSE SERPL-MCNC: 140 MG/DL — HIGH (ref 70–99)
HCT VFR BLD CALC: 36.5 % — LOW (ref 39–50)
HGB BLD-MCNC: 12.3 G/DL — LOW (ref 13–17)
MAGNESIUM SERPL-MCNC: 2.1 MG/DL — SIGNIFICANT CHANGE UP (ref 1.6–2.6)
MCHC RBC-ENTMCNC: 32.6 PG — SIGNIFICANT CHANGE UP (ref 27–34)
MCHC RBC-ENTMCNC: 33.7 GM/DL — SIGNIFICANT CHANGE UP (ref 32–36)
MCV RBC AUTO: 96.8 FL — SIGNIFICANT CHANGE UP (ref 80–100)
NRBC # BLD: 0 /100 WBCS — SIGNIFICANT CHANGE UP (ref 0–0)
PHOSPHATE SERPL-MCNC: 2.5 MG/DL — SIGNIFICANT CHANGE UP (ref 2.5–4.5)
PLATELET # BLD AUTO: 157 K/UL — SIGNIFICANT CHANGE UP (ref 150–400)
POTASSIUM SERPL-MCNC: 3.8 MMOL/L — SIGNIFICANT CHANGE UP (ref 3.5–5.3)
POTASSIUM SERPL-SCNC: 3.8 MMOL/L — SIGNIFICANT CHANGE UP (ref 3.5–5.3)
PROT SERPL-MCNC: 6 G/DL — SIGNIFICANT CHANGE UP (ref 6–8.3)
RBC # BLD: 3.77 M/UL — LOW (ref 4.2–5.8)
RBC # FLD: 14.2 % — SIGNIFICANT CHANGE UP (ref 10.3–14.5)
SODIUM SERPL-SCNC: 140 MMOL/L — SIGNIFICANT CHANGE UP (ref 135–145)
WBC # BLD: 6.86 K/UL — SIGNIFICANT CHANGE UP (ref 3.8–10.5)
WBC # FLD AUTO: 6.86 K/UL — SIGNIFICANT CHANGE UP (ref 3.8–10.5)

## 2023-07-12 PROCEDURE — 99223 1ST HOSP IP/OBS HIGH 75: CPT

## 2023-07-12 PROCEDURE — 99233 SBSQ HOSP IP/OBS HIGH 50: CPT

## 2023-07-12 PROCEDURE — 93010 ELECTROCARDIOGRAM REPORT: CPT | Mod: 77

## 2023-07-12 PROCEDURE — 93010 ELECTROCARDIOGRAM REPORT: CPT

## 2023-07-12 PROCEDURE — 75561 CARDIAC MRI FOR MORPH W/DYE: CPT | Mod: 26

## 2023-07-12 RX ORDER — KETOCONAZOLE 20 MG/G
0 AEROSOL, FOAM TOPICAL
Qty: 0 | Refills: 0 | DISCHARGE

## 2023-07-12 RX ORDER — INSULIN GLARGINE 100 [IU]/ML
27 INJECTION, SOLUTION SUBCUTANEOUS EVERY MORNING
Refills: 0 | Status: DISCONTINUED | OUTPATIENT
Start: 2023-07-13 | End: 2023-07-13

## 2023-07-12 RX ORDER — LEVOTHYROXINE SODIUM 125 MCG
1 TABLET ORAL
Qty: 0 | Refills: 0 | DISCHARGE

## 2023-07-12 RX ORDER — LOSARTAN POTASSIUM 100 MG/1
25 TABLET, FILM COATED ORAL DAILY
Refills: 0 | Status: DISCONTINUED | OUTPATIENT
Start: 2023-07-12 | End: 2023-07-13

## 2023-07-12 RX ORDER — LOSARTAN POTASSIUM 100 MG/1
1 TABLET, FILM COATED ORAL
Qty: 0 | Refills: 0 | DISCHARGE

## 2023-07-12 RX ORDER — INSULIN LISPRO 100/ML
2 VIAL (ML) SUBCUTANEOUS
Refills: 0 | Status: DISCONTINUED | OUTPATIENT
Start: 2023-07-12 | End: 2023-07-13

## 2023-07-12 RX ORDER — ATORVASTATIN CALCIUM 80 MG/1
1 TABLET, FILM COATED ORAL
Qty: 0 | Refills: 0 | DISCHARGE

## 2023-07-12 RX ORDER — FUROSEMIDE 40 MG
1 TABLET ORAL
Refills: 0 | DISCHARGE

## 2023-07-12 RX ORDER — CLONAZEPAM 1 MG
0 TABLET ORAL
Qty: 0 | Refills: 0 | DISCHARGE

## 2023-07-12 RX ORDER — FLUOXETINE HCL 10 MG
0 CAPSULE ORAL
Qty: 0 | Refills: 0 | DISCHARGE

## 2023-07-12 RX ORDER — FUROSEMIDE 40 MG
0 TABLET ORAL
Qty: 0 | Refills: 0 | DISCHARGE

## 2023-07-12 RX ORDER — FLUOXETINE HCL 10 MG
1 CAPSULE ORAL
Refills: 0 | DISCHARGE

## 2023-07-12 RX ADMIN — MEXILETINE HYDROCHLORIDE 150 MILLIGRAM(S): 150 CAPSULE ORAL at 14:22

## 2023-07-12 RX ADMIN — HEPARIN SODIUM 5000 UNIT(S): 5000 INJECTION INTRAVENOUS; SUBCUTANEOUS at 05:50

## 2023-07-12 RX ADMIN — Medication 2: at 14:04

## 2023-07-12 RX ADMIN — Medication 80 MILLIGRAM(S): at 14:22

## 2023-07-12 RX ADMIN — CHLORHEXIDINE GLUCONATE 1 APPLICATION(S): 213 SOLUTION TOPICAL at 21:45

## 2023-07-12 RX ADMIN — Medication 125 MICROGRAM(S): at 05:49

## 2023-07-12 RX ADMIN — MEXILETINE HYDROCHLORIDE 150 MILLIGRAM(S): 150 CAPSULE ORAL at 21:24

## 2023-07-12 RX ADMIN — Medication 2 UNIT(S): at 16:42

## 2023-07-12 RX ADMIN — Medication 1 MILLIGRAM(S): at 14:22

## 2023-07-12 RX ADMIN — LOSARTAN POTASSIUM 25 MILLIGRAM(S): 100 TABLET, FILM COATED ORAL at 10:29

## 2023-07-12 RX ADMIN — ATORVASTATIN CALCIUM 80 MILLIGRAM(S): 80 TABLET, FILM COATED ORAL at 21:24

## 2023-07-12 RX ADMIN — HEPARIN SODIUM 5000 UNIT(S): 5000 INJECTION INTRAVENOUS; SUBCUTANEOUS at 14:23

## 2023-07-12 RX ADMIN — Medication 81 MILLIGRAM(S): at 14:22

## 2023-07-12 RX ADMIN — Medication 20 MILLIGRAM(S): at 14:22

## 2023-07-12 RX ADMIN — Medication 2: at 08:15

## 2023-07-12 RX ADMIN — Medication 80 MILLIGRAM(S): at 01:39

## 2023-07-12 RX ADMIN — INSULIN GLARGINE 25 UNIT(S): 100 INJECTION, SOLUTION SUBCUTANEOUS at 08:14

## 2023-07-12 RX ADMIN — MEXILETINE HYDROCHLORIDE 150 MILLIGRAM(S): 150 CAPSULE ORAL at 05:49

## 2023-07-12 RX ADMIN — HEPARIN SODIUM 5000 UNIT(S): 5000 INJECTION INTRAVENOUS; SUBCUTANEOUS at 21:24

## 2023-07-12 NOTE — PROCEDURE NOTE - NSINTMANUFACTURE_CARD_ALL_CORE
[facility]

## 2023-07-12 NOTE — PROVIDER CONTACT NOTE (OTHER) - RECOMMENDATIONS
Notify NP and nurse manager. Re-educate pt & emphasize use of call bell before ambulating.
Notify provider. BP meds?

## 2023-07-12 NOTE — PROGRESS NOTE ADULT - SUBJECTIVE AND OBJECTIVE BOX
[facility] Division of Hospital Medicine  Luana Saldaña MD M-F, 8A-5P: MS Teams, Pager: [phone]  Other Times: Ext: [phone], Pager: [phone]      Patient is a 78y old  Male who presents with a chief complaint of VT (2023 11:52)      SUBJECTIVE / OVERNIGHT EVENTS:  Patient was examined    ADDITIONAL REVIEW OF SYSTEMS:    MEDICATIONS  (STANDING):  aspirin enteric coated 81 milliGRAM(s) Oral daily  atorvastatin 80 milliGRAM(s) Oral at bedtime  chlorhexidine 2% Cloths 1 Application(s) Topical at bedtime  clonazePAM  Tablet 1 milliGRAM(s) Oral daily  dextrose 50% Injectable 50 milliLiter(s) IV Push every 15 minutes  FLUoxetine 20 milliGRAM(s) Oral daily  heparin   Injectable 5000 Unit(s) SubCutaneous every 8 hours  insulin glargine Injectable (LANTUS) 25 Unit(s) SubCutaneous every morning  insulin lispro (ADMELOG) corrective regimen sliding scale   SubCutaneous three times a day before meals  insulin lispro (ADMELOG) corrective regimen sliding scale   SubCutaneous at bedtime  levothyroxine 125 MICROGram(s) Oral daily  losartan 25 milliGRAM(s) Oral daily  mexiletine 150 milliGRAM(s) Oral every 8 hours  sotalol. 80 milliGRAM(s) Oral every 12 hours    MEDICATIONS  (PRN):      CAPILLARY BLOOD GLUCOSE      POCT Blood Glucose.: 171 mg/dL (2023 08:10)  POCT Blood Glucose.: 176 mg/dL (2023 21:55)  POCT Blood Glucose.: 211 mg/dL (2023 16:48)      I&O's Summary    2023 07:01  -  2023 07:00  --------------------------------------------------------  IN: 581.4 mL / OUT: 500 mL / NET: 81.4 mL        Daily     Daily Weight in k.8 (2023 08:28)    PHYSICAL EXAM:  Vital Signs Last 24 Hrs  T(C): 36.7 (2023 10:48), Max: 36.8 (2023 21:21)  T(F): 98 (2023 10:48), Max: 98.2 (2023 21:21)  HR: 72 (2023 10:48) (70 - 82)  BP: 117/65 (2023 10:48) (106/59 - 170/76)  BP(mean): --  RR: 18 (2023 10:48) (16 - 18)  SpO2: 97% (2023 10:48) (96% - 97%)    Parameters below as of 2023 10:48  Patient On (Oxygen Delivery Method): room air      CONSTITUTIONAL: NAD, well-developed, well-groomed  EYES: PERRLA; conjunctiva and sclera clear  ENMT: Moist oral mucosa, no pharyngeal injection or exudates; normal dentition  NECK: Supple, no palpable masses; no thyromegaly  RESPIRATORY: Normal respiratory effort; lungs are clear to auscultation bilaterally  CARDIOVASCULAR: Regular rate and rhythm, normal S1 and S2, no murmur/rub/gallop; No lower extremity edema; Peripheral pulses are 2+ bilaterally  ABDOMEN: Nontender to palpation, normoactive bowel sounds, no rebound/guarding; No hepatosplenomegaly  MUSCULOSKELETAL:  no clubbing or cyanosis of digits; no joint swelling or tenderness to palpation, moving all extremities   PSYCH: A+O to person, place, and time; affect appropriate  NEUROLOGY: CN 2-12 are intact and symmetric; no gross sensory/motor deficits   SKIN: No rashes; no palpable lesions    LABS:                        12.3   6.86  )-----------( 157      ( 2023 07:28 )             36.5     07-12    140  |  104  |  17  ----------------------------<  140<H>  3.8   |  24  |  1.22    Ca    8.9      2023 07:28  Phos  2.5     07-12  Mg     2.1     07-12    TPro  6.0  /  Alb  3.7  /  TBili  0.5  /  DBili  x   /  AST  20  /  ALT  18  /  AlkPhos  55  07-12    LIVER FUNCTIONS - ( 2023 07:28 )  Alb: 3.7 g/dL / Pro: 6.0 g/dL / ALK PHOS: 55 U/L / ALT: 18 U/L / AST: 20 U/L / GGT: x                 Urinalysis Basic - ( 2023 07:28 )    Color: x / Appearance: x / SG: x / pH: x  Gluc: 140 mg/dL / Ketone: x  / Bili: x / Urobili: x   Blood: x / Protein: x / Nitrite: x   Leuk Esterase: x / RBC: x / WBC x   Sq Epi: x / Non Sq Epi: x / Bacteria: x        Culture - Blood (collected 2023 17:35)  Source: .Blood Blood-Peripheral  Preliminary Report (2023 23:02):    No growth at 48 Hours    Culture - Blood (collected 2023 17:30)  Source: .Blood Blood-Peripheral  Preliminary Report (2023 23:02):    No growth at 48 Hours      SARS-CoV-2: NotDetec (2023 17:27)      RADIOLOGY & ADDITIONAL TESTS:  Results Reviewed:   Imaging Personally Reviewed:  Electrocardiogram Personally Reviewed:    COORDINATION OF CARE:  Care Discussed with Consultants/Other Providers [Y/N]:  Prior or Outpatient Records Reviewed [Y/N]:

## 2023-07-12 NOTE — PROGRESS NOTE ADULT - ASSESSMENT
Agree with above assessment and plan as outlined above.    - EP f/u  - on sotalol now  - f/u cardiac MRI    Lobo Hinton MD, [facility]  BEEPER [phone]

## 2023-07-12 NOTE — PROGRESS NOTE ADULT - SUBJECTIVE AND OBJECTIVE BOX
Subjective    Patient seen and examined at bedside.  No chest pain, shortness of breath, or palpitations            Telemetry review: A-V paced at 70    Current Meds:  aspirin enteric coated 81 milliGRAM(s) Oral daily  atorvastatin 80 milliGRAM(s) Oral at bedtime  chlorhexidine 2% Cloths 1 Application(s) Topical at bedtime  clonazePAM  Tablet 1 milliGRAM(s) Oral daily  dextrose 50% Injectable 50 milliLiter(s) IV Push every 15 minutes  FLUoxetine 20 milliGRAM(s) Oral daily  heparin   Injectable 5000 Unit(s) SubCutaneous every 8 hours  insulin glargine Injectable (LANTUS) 25 Unit(s) SubCutaneous every morning  insulin lispro (ADMELOG) corrective regimen sliding scale   SubCutaneous three times a day before meals  insulin lispro (ADMELOG) corrective regimen sliding scale   SubCutaneous at bedtime  levothyroxine 125 MICROGram(s) Oral daily  mexiletine 150 milliGRAM(s) Oral every 8 hours  sotalol. 80 milliGRAM(s) Oral every 12 hours      Vitals:  T(F): 97.9 (07-12), Max: 98.2 (07-11)  HR: 80 (07-12) (69 - 82)  BP: 170/76 (07-12) (106/59 - 170/76)  RR: 18 (07-12)  SpO2: 96% (07-12)  I&O's Summary    11 Jul 2023 07:01  -  12 Jul 2023 07:00  --------------------------------------------------------  IN: 581.4 mL / OUT: 500 mL / NET: 81.4 mL    Physical Exam:  General: No acute distress; well appearing  Eyes: PERRL, EOMI, pink conjunctiva  HEENT: Normal oral mucosa  Cardiovascular: RRR, S1, S2, no murmurs, rubs, or gallops; no edema; no JVD  Respiratory: Clear to auscultation bilaterally, speaking full sentences  Gastrointestinal: soft, non-tender, non-distended with normal bowel sounds  Extremities: 2+ pulses, no clubbing; no joint deformity, or edema  Neurologic: AAOx3,  Non-focal  Skin: No rashes, ecchymoses, or cyanosis                          12.3   6.86  )-----------( 157      ( 12 Jul 2023 07:28 )             36.5     07-12    140  |  104  |  17  ----------------------------<  140<H>  3.8   |  24  |  1.22    Ca    8.9      12 Jul 2023 07:28  Phos  2.5     07-12  Mg     2.1     07-12    TPro  6.0  /  Alb  3.7  /  TBili  0.5  /  DBili  x   /  AST  20  /  ALT  18  /  AlkPhos  55  07-12

## 2023-07-12 NOTE — PROCEDURE NOTE - ADDITIONAL PROCEDURE DETAILS
1) Indication for interrogation: p/w VT, need to increase LDL to 70 bpm and add addition VT zone  2) Presenting rhythm: SR with V pacing in the 60's   3) Pt is pacemaker dependent  4) Changes made: 1) LDL increased from 60 bpm to 70 bpm. 2) Additional VT zone added to have Burst x 2 and Ramp x 2 at  bpm.   5) Patient's CRT-D is MRI compatible per [employer] Scientific Representative.     MARGARITA Anderson, NP-C  [id number]
Indication: pre MRI   Changes made: patient is dependent (Underlying SR w/ CHB) changed to DOO 80bpm
1) Indication for interrogation: VT  2) Presenting rhythm: SR with BiV pacing at 60-70's, occasional VPCs  3) Measured data WNL, normal BiV ICD function, Pt is pacemaker dependent  4) Since 6/30/2023: A pace 2%, BiV pace 96%, AT/AF burden 0%  5) Since last interrogation from 6/30/23, stored data revealed patient had sustained monomorphic VT with average  bpm on 7/7 treated and terminated with ATP x 1. Also revealed he was in sustained slow monomorphic VT with average -165 bpm for about 1 hour and 36 minutes on 7/9/23 for which patient did not receive therapy due to below treatment zone.   6) EP consult note to follow.    MICKIE Khan  Can reach me via Teams
Indication: post MRI   Changes made: after MRI, device mode changed from DOO 80bpm to DDDR70/120 bpm (pre MRI settings)

## 2023-07-12 NOTE — PHARMACOTHERAPY INTERVENTION NOTE - COMMENTS
Performed medication reconciliation and home medication list updated in prescription writer/ outpatient medication review. Medications verified with patient at bedside and Surescripts.     Home Medications:  aspirin 81 mg oral delayed release tablet: 1 tab(s) orally once a day   atorvastatin 80 mg oral tablet: 1 tab(s) orally once a day   farxiga 10mg once daily  FLUoxetine 20 mg oral capsule: 1 cap(s) orally once a day   furosemide 40 mg oral tablet: 1 tab(s) orally once a day  KlonoPIN 1 mg oral tablet:  orally once a day  levothyroxine 125 mcg (0.125 mg) oral capsule: 1 cap(s) orally once a day  losartan 25 mg oral tablet: 1 tab(s) orally once a day   metoprolol succinate 200mg once daily    Deleted:   clopidogrel 75 once daily- pt states they are no longer taking this medication  ketoconazole 2% cream - 7 day supply from 5/8/23     All medications are appropiately reconciled     Alina Pearce  [facility]  Pharm Candidate c/o 2024    Performed medication reconciliation and home medication list updated in prescription writer/ outpatient medication review. Medications verified with patient at bedside and Surescripts.     Home Medications:  aspirin 81 mg oral delayed release tablet: 1 tab(s) orally once a day   atorvastatin 80 mg oral tablet: 1 tab(s) orally once a day   farxiga 10mg once daily  FLUoxetine 20 mg oral capsule: 1 cap(s) orally once a day   furosemide 40 mg oral tablet: 1 tab(s) orally once a day  KlonoPIN 1 mg oral tablet:  orally once a day  levothyroxine 125 mcg (0.125 mg) oral capsule: 1 cap(s) orally once a day  losartan 25 mg oral tablet: 1 tab(s) orally once a day   metoprolol succinate 200mg once daily    Deleted:   clopidogrel 75 once daily- pt states they are no longer taking this medication  ketoconazole 2% cream - 7 day supply from 5/8/23     All medications are appropriately reconciled     Alina Pearce  [facility]  Pharm Candidate c/o 2024

## 2023-07-12 NOTE — PROCEDURE NOTE - NSPROCNAME_GEN_A_CORE
CRT-D (Cardiac Resynchronization Therapy with Defibrillation Capabilities) Interrogation Note
Arterial Puncture/Cannulation

## 2023-07-12 NOTE — PROGRESS NOTE ADULT - ASSESSMENT
79y/o man with Hx of Anxiety/depression, Hodgkins lymphoma, HTN, DM, HLD, severe AS s/p TAVR (@ [facility] w/ Dr. Padron in 2016), hospitalized in [place] Spring 2019 for pericardial effusion (aspirated 900 cc blood), MI, CAD s/p SADE to D1 on 5/25/2021 (on ASA, no longer takes Plavix), chronic systolic CHF with EF 34%, s/p dual chamber BSC ICD implant 5/2/2012, upgraded to CRT-D (BS) 12/14/2016 due to LBBB (EF improved to 40-45% with CRT-D and AVR), VT s/p ICD shocks in 5/2021, s/p VT ablation 7/2022 (done in [place]) and paroxysmal Afib (very brief, not on AC) who transferred to [facility] CCU from OSH for slow monomorphic VT at 150's s/p amiodarone 150mg bolus and external shock x 2 for hypotension with SBP in the 60's.     1. MMVT   -He was on amiodarone for few months prior to VT ablation and was stopped shortly after ablation in 7/2022 due to GI complaint.  -CRT-D interrogation showed patient is pacer dependent, had sustained monomorphic VT with average  bpm on 7/7 treated and terminated with ATP x 1. Also revealed he was in sustained slow monomorphic VT with average -165 bpm for about 1 hour and 36 minutes on 7/9/23 for which patient did not receive therapy due to below treatment zone.   -Continue sotalol 80mg Q12hr, obtain EKG 2 hours after each doses to monitor for QTc prolongation, will get 4th dose later today  -Mexiletine 150mg Q8hr to help shorten QTc  -Aware that pt is on FLUoxetine which can prolong QTc, will monitor closely   -ICD with additional VT zone added to allow for ATP therapy at 150 bpm and LDL increased from 60bpm to 70bpm today.   -LV offset set to -60 to improve synchronization, please obtain repeat ECG  -Please restart patient's home GDMT for afterload reduction: losartan 25mg daily  -Patient will need NIPS testing in EP lab prior to discharge  -Pending cardiac MRI to assess substrate (his ICD is MRI compatible)   -Continue tele monitor  -Supplement to maintain K>4.0, Mg>2.0      Lambert Rowell MD  Cardiology Fellow

## 2023-07-12 NOTE — PROVIDER CONTACT NOTE (OTHER) - DATE AND TIME:
12-Jul-2023 08:33 Ivermectin Counseling:  Patient instructed to take medication on an empty stomach with a full glass of water.  Patient informed of potential adverse effects including but not limited to nausea, diarrhea, dizziness, itching, and swelling of the extremities or lymph nodes.  The patient verbalized understanding of the proper use and possible adverse effects of ivermectin.  All of the patient's questions and concerns were addressed.

## 2023-07-12 NOTE — PROVIDER CONTACT NOTE (OTHER) - BACKGROUND
Pt admitted for VTach. PMH HTN, DM, anxiety/depression, Hodgkins Lymphoma, TAVR, CAD.
Pt admitted for VTach. Pt transferred from CICU today to [facility]. PMH HTN, CAD, Hodgkins Lymphoma, VT s/p ablation, anxiety/depression. Pt had presented to hospital for general weakness w/fall.

## 2023-07-12 NOTE — PROGRESS NOTE ADULT - SUBJECTIVE AND OBJECTIVE BOX
C A R D I O L O G Y  **********************************     DATE OF SERVICE: 07-12-23    Patient hypertensive this morning otherwise denies chest pain, palpitations, dizziness, or shortness of breath. Awaiting cardiac MRI.  Review of symptoms otherwise negative.    MEDICATIONS:  aspirin enteric coated 81 milliGRAM(s) Oral daily  atorvastatin 80 milliGRAM(s) Oral at bedtime  chlorhexidine 2% Cloths 1 Application(s) Topical at bedtime  clonazePAM  Tablet 1 milliGRAM(s) Oral daily  dextrose 50% Injectable 50 milliLiter(s) IV Push every 15 minutes  FLUoxetine 20 milliGRAM(s) Oral daily  heparin   Injectable 5000 Unit(s) SubCutaneous every 8 hours  insulin glargine Injectable (LANTUS) 25 Unit(s) SubCutaneous every morning  insulin lispro (ADMELOG) corrective regimen sliding scale   SubCutaneous three times a day before meals  insulin lispro (ADMELOG) corrective regimen sliding scale   SubCutaneous at bedtime  levothyroxine 125 MICROGram(s) Oral daily  losartan 25 milliGRAM(s) Oral daily  mexiletine 150 milliGRAM(s) Oral every 8 hours  sotalol. 80 milliGRAM(s) Oral every 12 hours      LABS:                        12.3   6.86  )-----------( 157      ( 12 Jul 2023 07:28 )             36.5       Hemoglobin: 12.3 g/dL (07-12 @ 07:28)  Hemoglobin: 11.1 g/dL (07-11 @ 00:42)  Hemoglobin: 11.9 g/dL (07-10 @ 07:28)  Hemoglobin: 12.1 g/dL (07-09 @ 21:35)  Hemoglobin: 10.9 g/dL (07-09 @ 17:27)      07-12    140  |  104  |  17  ----------------------------<  140<H>  3.8   |  24  |  1.22    Ca    8.9      12 Jul 2023 07:28  Phos  2.5     07-12  Mg     2.1     07-12    TPro  6.0  /  Alb  3.7  /  TBili  0.5  /  DBili  x   /  AST  20  /  ALT  18  /  AlkPhos  55  07-12    Creatinine Trend: 1.22<--, 1.34<--, 1.31<--, 1.57<--, 2.16<--    COAGS:           PHYSICAL EXAM:  T(C): 36.7 (07-12-23 @ 10:48), Max: 36.8 (07-11-23 @ 12:00)  HR: 72 (07-12-23 @ 10:48) (70 - 82)  BP: 117/65 (07-12-23 @ 10:48) (106/59 - 170/76)  RR: 18 (07-12-23 @ 10:48) (16 - 18)  SpO2: 97% (07-12-23 @ 10:48) (96% - 97%)  Wt(kg): --    I&O's Summary    11 Jul 2023 07:01  -  12 Jul 2023 07:00  --------------------------------------------------------  IN: 581.4 mL / OUT: 500 mL / NET: 81.4 mL        Gen: NAD  HEENT:  (-)icterus (-)pallor  CV: N S1 S2 1/6 ANGELLA (+)2 Pulses B/l  Resp:  Clear to auscultation B/L, normal effort  GI: (+) BS Soft, NT, ND  Lymph:  (-)Edema, (-)obvious lymphadenopathy  Skin: Warm to touch, Normal turgor  Psych: Appropriate mood and affect      TELEMETRY:  70s     < from: TTE W or WO Ultrasound Enhancing Agent (07.10.23 @ 06:27) >  CONCLUSIONS:      1. Technically difficult image quality.   2. Moderately dilated left ventricular cavity size. The left ventricular systolic function is mildly decreased with an ejection fraction visually estimated at 45 %. There are regional wall motion abnormalities.   3. Multiple segmental abnormalities exist. See findings.   4. Mildly enlarged right ventricular cavity size and mildly reduced right ventricular systolic function.   5. There is mild to moderate mitral valve stenosis, secondary to dystrophic mitral annular calcification.   6. 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 No intravalvular regurgitation No paravalvular regurgitation.   7. Mild to moderate pulmonary hypertension.    < end of copied text >      ASSESSMENT/PLAN: Patient is a 79 y/o Male with PMH of HTN, DM, Anxiety/depression, Hodgkin's Lymphoma, TAVR (@ [facility] w/ Dr. Padron in 2016), CAD s/p PCI, ICM/HFrEF s/p BiV-ICD, and VT s/p ablation (in [place]) who presented to [facility] with generalized weakness admitted with VT requiring shock x2 by EMS now transferred to [facility] CICU for further management.    #Monomorphic VT  - ICD interrogation noted - patient did not receive therapy for VT due to below treatment zone  - EP consult appreciated - started on sotalol and mexiletine, additional VT zone added  - Weaned off lido  - TTE noted above, EF 45%  - Cardiac MRI pending    #HFrEF  - HF meds held on admission given RIANNA/hypotension requiring pressors now weaned off  - Restart Losartan 25mg daily, will hold off on PO lasix 40mg daily for now given recent RIANNA and not in clinical HF    #CAD s/p PCI  - Continue ASA and Lipitor    - Patient to f/u with Dr. Durham after discharge    José Miguel Angulo PA-C  Pager: [phone]

## 2023-07-12 NOTE — PROCEDURE NOTE - INTERROGATION NOTE: COMMENTS
Battery longevity: 4.5 years
Battery longevity: 5 years
Battery longevity: 4.5 years
Battery longevity: 4.5 years

## 2023-07-12 NOTE — CONSULT NOTE ADULT - SUBJECTIVE AND OBJECTIVE BOX
Reason For Consult:     HPI:  78M w/ PMH HTN, DM, Anxiety/depression, Hodgkin's Lymphoma, TAVR (@ [facility] w/ Dr. Padron in 2016), CAD s/p PCI (on ASA, no longer takes Plavix), and VT s/p ablation (2021 in [place]) and CRT-D presented to OSH complaining of generalized weakness and stating that he was not feeling well today. He was in the bathroom having a BM when this began, his wife states he was diaphoretic and feeling weak and called 911. EMS arrived and found the patient in VT with pulses/awake & alert. He was given 150 amio without improvement. BP at the time was in the low 60s systolic. Given 5mg versed and shocked x2 with successful conversion to paced rhythm. Patient transferred to [facility] CICU for further management.     8M  with  HTN, DMT2, Anxiety/depression, Hodgkin's Lymphoma, TAVR (2016), CAD s/p PCI, ICM/HFrEF s/p BiV-ICD, and VT s/p ablation (in [place]) who presented to OSH with generalized weakness admitted with VT requiring shock x2 by EMS now transferred to [facility] for further management. Pt is s/p lidocaine gtt and started on sotalol. Now deemed hemodynamically stable and transferred to telemetry floor.      Patient with history of DMT2, Hodgkin’s lymphoma, TAVR, admitted due to VT hypotension, CICU transfer to medicine. For DM at home on Farxiga.    Here with FS initially in 400s, s/p insulin gtt, A1C 11.8.    Reason for consult: for diabetic regimen optimization prior to discharge and for home.         Patient states he saw Dr. Godinez for EP appt ~1 week ago and had his device interrogated. S/p TTE with no acute changes.   (09 Jul 2023 20:22)      PAST MEDICAL & SURGICAL HISTORY:  HTN (hypertension)      HLD (hyperlipidemia)      Acquired hypothyroidism      Hodgkin lymphoma  on remission, chemo and rad [id number]      Type 2 diabetes mellitus without complication      Myocardial infarction  Cardiac Arrest- 1994- no stents      AS (aortic stenosis)  s/p TAVR      Systolic CHF, chronic  s/p ICD, denies any recent exacerbation or intubation hx      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's      Pericardial effusion  drained in [place] in 4/19      Presence of biventricular AICD      Cholelithiases      H/O carotid endarterectomy  bilateral      H/O bilateral inguinal hernia repair      History of umbilical hernia repair      S/P TAVR (transcatheter aortic valve replacement)  10/16 in [place]      S/P ICD (internal cardiac defibrillator) procedure  [employer] [id number], last interrogation 11/2019      S/P cataract surgery  b/l          FAMILY HISTORY:  No pertinent family history (Father, Mother)        Social History:    Outpatient Medications:    MEDICATIONS  (STANDING):  aspirin enteric coated 81 milliGRAM(s) Oral daily  atorvastatin 80 milliGRAM(s) Oral at bedtime  chlorhexidine 2% Cloths 1 Application(s) Topical at bedtime  clonazePAM  Tablet 1 milliGRAM(s) Oral daily  dextrose 50% Injectable 50 milliLiter(s) IV Push every 15 minutes  FLUoxetine 20 milliGRAM(s) Oral daily  heparin   Injectable 5000 Unit(s) SubCutaneous every 8 hours  insulin glargine Injectable (LANTUS) 25 Unit(s) SubCutaneous every morning  insulin lispro (ADMELOG) corrective regimen sliding scale   SubCutaneous three times a day before meals  insulin lispro (ADMELOG) corrective regimen sliding scale   SubCutaneous at bedtime  levothyroxine 125 MICROGram(s) Oral daily  losartan 25 milliGRAM(s) Oral daily  mexiletine 150 milliGRAM(s) Oral every 8 hours  sotalol. 80 milliGRAM(s) Oral every 12 hours    MEDICATIONS  (PRN):      Allergies    No Known Allergies    Intolerances      Review of Systems:  Constitutional: No fever  Eyes: No blurry vision  Neuro: No tremors  HEENT: No pain  Cardiovascular: No chest pain, palpitations  Respiratory: No SOB, no cough  GI: No nausea, vomiting, abdominal pain  : No dysuria  Skin: no rash  Psych: no depression  Endocrine: no polyuria, polydipsia  Hem/lymph: no swelling  Osteoporosis: no fractures    ALL OTHER SYSTEMS REVIEWED AND NEGATIVE    UNABLE TO OBTAIN    PHYSICAL EXAM:  VITALS: T(C): 36.7 (07-12-23 @ 10:48)  T(F): 98 (07-12-23 @ 10:48), Max: 98.2 (07-11-23 @ 21:21)  HR: 72 (07-12-23 @ 10:48) (70 - 82)  BP: 117/65 (07-12-23 @ 10:48) (106/59 - 170/76)  RR:  (16 - 18)  SpO2:  (96% - 97%)  Wt(kg): --  GENERAL: NAD, well-groomed, well-developed  EYES: No proptosis, no lid lag, anicteric  HEENT:  Atraumatic, Normocephalic, moist mucous membranes  THYROID: Normal size, no palpable nodules  RESPIRATORY: Clear to auscultation bilaterally; No rales, rhonchi, wheezing, or rubs  CARDIOVASCULAR: Regular rate and rhythm; No murmurs; no peripheral edema  GI: Soft, nontender, non distended, normal bowel sounds  SKIN: Dry, intact, No rashes or lesions  MUSCULOSKELETAL: Full range of motion, normal strength  NEURO: sensation intact, extraocular movements intact, no tremor, normal reflexes  PSYCH: Alert and oriented x 3, normal affect, normal mood  CUSHING'S SIGNS: no striae    POCT Blood Glucose.: 169 mg/dL (07-12-23 @ 13:58)  POCT Blood Glucose.: 171 mg/dL (07-12-23 @ 08:10)  POCT Blood Glucose.: 176 mg/dL (07-11-23 @ 21:55)  POCT Blood Glucose.: 211 mg/dL (07-11-23 @ 16:48)  POCT Blood Glucose.: 167 mg/dL (07-11-23 @ 11:16)  POCT Blood Glucose.: 143 mg/dL (07-11-23 @ 07:42)  POCT Blood Glucose.: 183 mg/dL (07-10-23 @ 22:09)  POCT Blood Glucose.: 207 mg/dL (07-10-23 @ 17:10)  POCT Blood Glucose.: 200 mg/dL (07-10-23 @ 11:30)  POCT Blood Glucose.: 139 mg/dL (07-10-23 @ 10:05)  POCT Blood Glucose.: 134 mg/dL (07-10-23 @ 09:03)  POCT Blood Glucose.: 99 mg/dL (07-10-23 @ 08:07)  POCT Blood Glucose.: 113 mg/dL (07-10-23 @ 07:14)  POCT Blood Glucose.: 112 mg/dL (07-10-23 @ 06:19)  POCT Blood Glucose.: 116 mg/dL (07-10-23 @ 05:07)  POCT Blood Glucose.: 123 mg/dL (07-10-23 @ 04:11)  POCT Blood Glucose.: 137 mg/dL (07-10-23 @ 03:07)  POCT Blood Glucose.: 146 mg/dL (07-10-23 @ 02:17)  POCT Blood Glucose.: 187 mg/dL (07-10-23 @ 01:20)  POCT Blood Glucose.: 231 mg/dL (07-10-23 @ 00:18)  POCT Blood Glucose.: 318 mg/dL (07-09-23 @ 23:03)  POCT Blood Glucose.: 367 mg/dL (07-09-23 @ 22:26)  POCT Blood Glucose.: 441 mg/dL (07-09-23 @ 21:06)  POCT Blood Glucose.: 496 mg/dL (07-09-23 @ 18:37)  POCT Blood Glucose.: 561 mg/dL (07-09-23 @ 16:41)  POCT Blood Glucose.: 578 mg/dL (07-09-23 @ 16:40)                            12.3   6.86  )-----------( 157      ( 12 Jul 2023 07:28 )             36.5       07-12    140  |  104  |  17  ----------------------------<  140<H>  3.8   |  24  |  1.22    eGFR: 60    Ca    8.9      07-12  Mg     2.1     07-12  Phos  2.5     07-12    TPro  6.0  /  Alb  3.7  /  TBili  0.5  /  DBili  x   /  AST  20  /  ALT  18  /  AlkPhos  55  07-12      Thyroid Function Tests:  07-09 @ 21:34 TSH 2.46 FreeT4 -- T3 -- Anti TPO -- Anti Thyroglobulin Ab -- TSI --          07-09 Chol 199 Direct LDL -- LDL calculated 94 HDL 36<L> Trig 348<H>    Radiology:              Reason For Consult: DM     HPI:  78M w/ PMH HTN, DM, Anxiety/depression, Hodgkin's Lymphoma, TAVR (@ [facility] w/ Dr. Padron in 2016), CAD s/p PCI (on ASA, no longer takes Plavix), and VT s/p ablation (2021 in [place]) who presented to OSH with generalized weakness admitted with VT requiring shock x2 by EMS, managed in CCU s/p lidocaine gtt and started on sotalol. Now hemodynamically stable and transferred to telemetry floor.  Endocrine team consulted for managed of DM.     He reports dx of pre-DM but hadn't seen any physician in last 15 months while he was in [place].  Reports A1c always less than 7%.  Most recent A1c 11.8%   Family h/o: unknown   home meds: Farxiga 10 mg daily (unsure how long he has been on it)   SMBG: does not check  Diet: admits to a "terrible" diet, loves his sweets and chocolates, has regular soda and juice occ   weight: stable   exercise: none     Here, he is tolerating diet.  Denies n/v/d/ap     PAST MEDICAL & SURGICAL HISTORY:  HTN (hypertension)      HLD (hyperlipidemia)      Acquired hypothyroidism      Hodgkin lymphoma  on remission, chemo and rad [id number]      Type 2 diabetes mellitus without complication      Myocardial infarction  Cardiac Arrest- 1994- no stents      AS (aortic stenosis)  s/p TAVR      Systolic CHF, chronic  s/p ICD, denies any recent exacerbation or intubation hx      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's      Pericardial effusion  drained in [place] in 4/19      Presence of biventricular AICD      Cholelithiases      H/O carotid endarterectomy  bilateral      H/O bilateral inguinal hernia repair      History of umbilical hernia repair      S/P TAVR (transcatheter aortic valve replacement)  10/16 in [place]      S/P ICD (internal cardiac defibrillator) procedure  [employer] [id number], last interrogation 11/2019      S/P cataract surgery  b/l          FAMILY HISTORY:  No pertinent family history (Father, Mother)        Social History: lives with wife     Outpatient Medications:    MEDICATIONS  (STANDING):  aspirin enteric coated 81 milliGRAM(s) Oral daily  atorvastatin 80 milliGRAM(s) Oral at bedtime  chlorhexidine 2% Cloths 1 Application(s) Topical at bedtime  clonazePAM  Tablet 1 milliGRAM(s) Oral daily  dextrose 50% Injectable 50 milliLiter(s) IV Push every 15 minutes  FLUoxetine 20 milliGRAM(s) Oral daily  heparin   Injectable 5000 Unit(s) SubCutaneous every 8 hours  insulin glargine Injectable (LANTUS) 25 Unit(s) SubCutaneous every morning  insulin lispro (ADMELOG) corrective regimen sliding scale   SubCutaneous three times a day before meals  insulin lispro (ADMELOG) corrective regimen sliding scale   SubCutaneous at bedtime  levothyroxine 125 MICROGram(s) Oral daily  losartan 25 milliGRAM(s) Oral daily  mexiletine 150 milliGRAM(s) Oral every 8 hours  sotalol. 80 milliGRAM(s) Oral every 12 hours    MEDICATIONS  (PRN):      Allergies    No Known Allergies    Intolerances      Review of Systems:  Constitutional: No fever  Eyes: No blurry vision  Neuro: No tremors  HEENT: No pain  Cardiovascular: No chest pain, palpitations  Respiratory: No SOB, no cough  GI: No nausea, vomiting, abdominal pain  : No dysuria  Skin: no rash  Psych: no depression  Endocrine: no polyuria, polydipsia  Hem/lymph: no swelling  Osteoporosis: no fractures    ALL OTHER SYSTEMS REVIEWED AND NEGATIVE    PHYSICAL EXAM:  VITALS: T(C): 36.7 (07-12-23 @ 10:48)  T(F): 98 (07-12-23 @ 10:48), Max: 98.2 (07-11-23 @ 21:21)  HR: 72 (07-12-23 @ 10:48) (70 - 82)  BP: 117/65 (07-12-23 @ 10:48) (106/59 - 170/76)  RR:  (16 - 18)  SpO2:  (96% - 97%)  Wt(kg): --  GENERAL: NAD, well-groomed, well-developed  EYES: No proptosis, no lid lag, anicteric  HEENT:  Atraumatic, Normocephalic, moist mucous membranes  THYROID: Normal size, no palpable nodules  RESPIRATORY: Clear to auscultation bilaterally; No rales, rhonchi, wheezing, or rubs  CARDIOVASCULAR: Regular rate and rhythm; No murmurs; no peripheral edema  GI: Soft, nontender, non distended, normal bowel sounds  PSYCH: Alert and oriented x 3, normal affect, normal mood  CUSHING'S SIGNS: no striae    POCT Blood Glucose.: 169 mg/dL (07-12-23 @ 13:58)  POCT Blood Glucose.: 171 mg/dL (07-12-23 @ 08:10)  POCT Blood Glucose.: 176 mg/dL (07-11-23 @ 21:55)  POCT Blood Glucose.: 211 mg/dL (07-11-23 @ 16:48)  POCT Blood Glucose.: 167 mg/dL (07-11-23 @ 11:16)  POCT Blood Glucose.: 143 mg/dL (07-11-23 @ 07:42)  POCT Blood Glucose.: 183 mg/dL (07-10-23 @ 22:09)  POCT Blood Glucose.: 207 mg/dL (07-10-23 @ 17:10)  POCT Blood Glucose.: 200 mg/dL (07-10-23 @ 11:30)  POCT Blood Glucose.: 139 mg/dL (07-10-23 @ 10:05)  POCT Blood Glucose.: 134 mg/dL (07-10-23 @ 09:03)  POCT Blood Glucose.: 99 mg/dL (07-10-23 @ 08:07)  POCT Blood Glucose.: 113 mg/dL (07-10-23 @ 07:14)  POCT Blood Glucose.: 112 mg/dL (07-10-23 @ 06:19)  POCT Blood Glucose.: 116 mg/dL (07-10-23 @ 05:07)  POCT Blood Glucose.: 123 mg/dL (07-10-23 @ 04:11)  POCT Blood Glucose.: 137 mg/dL (07-10-23 @ 03:07)  POCT Blood Glucose.: 146 mg/dL (07-10-23 @ 02:17)  POCT Blood Glucose.: 187 mg/dL (07-10-23 @ 01:20)  POCT Blood Glucose.: 231 mg/dL (07-10-23 @ 00:18)  POCT Blood Glucose.: 318 mg/dL (07-09-23 @ 23:03)  POCT Blood Glucose.: 367 mg/dL (07-09-23 @ 22:26)  POCT Blood Glucose.: 441 mg/dL (07-09-23 @ 21:06)  POCT Blood Glucose.: 496 mg/dL (07-09-23 @ 18:37)  POCT Blood Glucose.: 561 mg/dL (07-09-23 @ 16:41)  POCT Blood Glucose.: 578 mg/dL (07-09-23 @ 16:40)                            12.3   6.86  )-----------( 157      ( 12 Jul 2023 07:28 )             36.5       07-12    140  |  104  |  17  ----------------------------<  140<H>  3.8   |  24  |  1.22    eGFR: 60    Ca    8.9      07-12  Mg     2.1     07-12  Phos  2.5     07-12    TPro  6.0  /  Alb  3.7  /  TBili  0.5  /  DBili  x   /  AST  20  /  ALT  18  /  AlkPhos  55  07-12      Thyroid Function Tests:  07-09 @ 21:34 TSH 2.46 FreeT4 -- T3 -- Anti TPO -- Anti Thyroglobulin Ab -- TSI --          07-09 Chol 199 Direct LDL -- LDL calculated 94 HDL 36<L> Trig 348<H>

## 2023-07-12 NOTE — PROCEDURE NOTE - INTERROGATION NOTE: MODEL
[facility] G184
[facility]

## 2023-07-12 NOTE — CONSULT NOTE ADULT - ASSESSMENT
78M w/ PMH HTN, DM, Anxiety/depression, Hodgkin's Lymphoma, TAVR (@ [facility] w/ Dr. Padron in 2016), CAD s/p PCI (on ASA, no longer takes Plavix), and VT s/p ablation (2021 in [place]) who presented to OSH with generalized weakness admitted with VT requiring shock x2 by EMS, managed in CCU s/p lidocaine gtt and started on sotalol. Now hemodynamically stable and transferred to telemetry floor.  Endocrine team consulted for managed of DM.     # Stress Hyperglycemia  # Chronic Kidney disease - cautious with insulin titration; high risk for insulin stacking and hypoglycemia  # Non-compliance with diet   # Diabetes Mellitus type II   A1c: 11.8%   Home meds: Farxiga 10 mg daily    GFR/Cr: 60/1.22    - Blood glucose target while hospitalized 140-180 mg/dL. Overall controlled   - would recommend Lantus 27 U AM   - start Lispro 2 U TID with meals   - mod scale with meals and low dose at night   - do not give scheduled prandial insulin if patient is NPO  - please monitor fingerstick blood glucose at least 4 times a day to avoid insulin toxicity, hypoglycemia  - Carbohydrate controlled diet, no concentrated sweets  - Counseled on need for medication and diet adherence to avoid new complications.   - RD consult   - RN to teach glucometer   DISCHARGE: does not have an endocrinologist.  d/w d/c on one time insulin+GLP1, however, pt refuses injections of any type.  d/c on PO meds: metformin 500 mg 2 tab BID, Farxiga 10 mg daily, Januvia 100 mg daily.      # Dyslipidemia: c/w lipito 80 mg HS   # Hypertension: BP goal < 130/80, will defer to primary team   # Hypothyroidism post radiation for Hodgkin's in 1987: c/w Lt4 125 mcg daily. TSH is at goal.     Luh Lozoya MD  Pager: 9AM - 5PM (Mon-Fri): [phone]  After 5PM and on Weekends: [phone]     For nonurgent matters, please email [email or web address] for assistance.

## 2023-07-12 NOTE — PROGRESS NOTE ADULT - ASSESSMENT
78M  with  HTN, DMT2, Anxiety/depression, Hodgkin's Lymphoma, TAVR (2016), CAD s/p PCI, ICM/HFrEF s/p BiV-ICD, and VT s/p ablation (in [place]) who presented to OSH with generalized weakness admitted with VT requiring shock x2 by EMS now transferred to [facility] CICU for further management. Pt is s/p lidocaine gtt and started on sotalol. Now deemed hemodynamically stable and transferred to telemetry floor.

## 2023-07-12 NOTE — PROVIDER CONTACT NOTE (OTHER) - SITUATION
Pt refused bed alarm. Pt educated on importance of using bed alarm for safety due to s/s of admit diagnoses & medication side effects; pt continued refusal.
/76

## 2023-07-13 ENCOUNTER — TRANSCRIPTION ENCOUNTER (OUTPATIENT)
Age: 78
End: 2023-07-13

## 2023-07-13 VITALS — WEIGHT: 164.69 LBS

## 2023-07-13 DIAGNOSIS — E11.65 TYPE 2 DIABETES MELLITUS WITH HYPERGLYCEMIA: ICD-10-CM

## 2023-07-13 DIAGNOSIS — E78.5 HYPERLIPIDEMIA, UNSPECIFIED: ICD-10-CM

## 2023-07-13 DIAGNOSIS — I10 ESSENTIAL (PRIMARY) HYPERTENSION: ICD-10-CM

## 2023-07-13 LAB
ANION GAP SERPL CALC-SCNC: 10 MMOL/L — SIGNIFICANT CHANGE UP (ref 5–17)
BUN SERPL-MCNC: 14 MG/DL — SIGNIFICANT CHANGE UP (ref 7–23)
CALCIUM SERPL-MCNC: 8.8 MG/DL — SIGNIFICANT CHANGE UP (ref 8.4–10.5)
CHLORIDE SERPL-SCNC: 105 MMOL/L — SIGNIFICANT CHANGE UP (ref 96–108)
CO2 SERPL-SCNC: 23 MMOL/L — SIGNIFICANT CHANGE UP (ref 22–31)
CREAT SERPL-MCNC: 1.2 MG/DL — SIGNIFICANT CHANGE UP (ref 0.5–1.3)
EGFR: 62 ML/MIN/1.73M2 — SIGNIFICANT CHANGE UP
GLUCOSE BLDC GLUCOMTR-MCNC: 140 MG/DL — HIGH (ref 70–99)
GLUCOSE BLDC GLUCOMTR-MCNC: 141 MG/DL — HIGH (ref 70–99)
GLUCOSE BLDC GLUCOMTR-MCNC: 177 MG/DL — HIGH (ref 70–99)
GLUCOSE SERPL-MCNC: 139 MG/DL — HIGH (ref 70–99)
HCT VFR BLD CALC: 35.2 % — LOW (ref 39–50)
HGB BLD-MCNC: 11.6 G/DL — LOW (ref 13–17)
MCHC RBC-ENTMCNC: 32.3 PG — SIGNIFICANT CHANGE UP (ref 27–34)
MCHC RBC-ENTMCNC: 33 GM/DL — SIGNIFICANT CHANGE UP (ref 32–36)
MCV RBC AUTO: 98.1 FL — SIGNIFICANT CHANGE UP (ref 80–100)
NRBC # BLD: 0 /100 WBCS — SIGNIFICANT CHANGE UP (ref 0–0)
PLATELET # BLD AUTO: 141 K/UL — LOW (ref 150–400)
POTASSIUM SERPL-MCNC: 3.9 MMOL/L — SIGNIFICANT CHANGE UP (ref 3.5–5.3)
POTASSIUM SERPL-SCNC: 3.9 MMOL/L — SIGNIFICANT CHANGE UP (ref 3.5–5.3)
RBC # BLD: 3.59 M/UL — LOW (ref 4.2–5.8)
RBC # FLD: 14.4 % — SIGNIFICANT CHANGE UP (ref 10.3–14.5)
SODIUM SERPL-SCNC: 138 MMOL/L — SIGNIFICANT CHANGE UP (ref 135–145)
WBC # BLD: 8.08 K/UL — SIGNIFICANT CHANGE UP (ref 3.8–10.5)
WBC # FLD AUTO: 8.08 K/UL — SIGNIFICANT CHANGE UP (ref 3.8–10.5)

## 2023-07-13 PROCEDURE — 93010 ELECTROCARDIOGRAM REPORT: CPT

## 2023-07-13 PROCEDURE — 84295 ASSAY OF SERUM SODIUM: CPT

## 2023-07-13 PROCEDURE — 82962 GLUCOSE BLOOD TEST: CPT

## 2023-07-13 PROCEDURE — 82947 ASSAY GLUCOSE BLOOD QUANT: CPT

## 2023-07-13 PROCEDURE — 97161 PT EVAL LOW COMPLEX 20 MIN: CPT

## 2023-07-13 PROCEDURE — 93005 ELECTROCARDIOGRAM TRACING: CPT

## 2023-07-13 PROCEDURE — 82803 BLOOD GASES ANY COMBINATION: CPT

## 2023-07-13 PROCEDURE — 71045 X-RAY EXAM CHEST 1 VIEW: CPT

## 2023-07-13 PROCEDURE — 75561 CARDIAC MRI FOR MORPH W/DYE: CPT

## 2023-07-13 PROCEDURE — 82435 ASSAY OF BLOOD CHLORIDE: CPT

## 2023-07-13 PROCEDURE — 84100 ASSAY OF PHOSPHORUS: CPT

## 2023-07-13 PROCEDURE — 85014 HEMATOCRIT: CPT

## 2023-07-13 PROCEDURE — 86850 RBC ANTIBODY SCREEN: CPT

## 2023-07-13 PROCEDURE — 36415 COLL VENOUS BLD VENIPUNCTURE: CPT

## 2023-07-13 PROCEDURE — 86901 BLOOD TYPING SEROLOGIC RH(D): CPT

## 2023-07-13 PROCEDURE — A9585: CPT

## 2023-07-13 PROCEDURE — 85027 COMPLETE CBC AUTOMATED: CPT

## 2023-07-13 PROCEDURE — 84443 ASSAY THYROID STIM HORMONE: CPT

## 2023-07-13 PROCEDURE — 80048 BASIC METABOLIC PNL TOTAL CA: CPT

## 2023-07-13 PROCEDURE — 82330 ASSAY OF CALCIUM: CPT

## 2023-07-13 PROCEDURE — 80061 LIPID PANEL: CPT

## 2023-07-13 PROCEDURE — 87641 MR-STAPH DNA AMP PROBE: CPT

## 2023-07-13 PROCEDURE — 84132 ASSAY OF SERUM POTASSIUM: CPT

## 2023-07-13 PROCEDURE — 85018 HEMOGLOBIN: CPT

## 2023-07-13 PROCEDURE — 87640 STAPH A DNA AMP PROBE: CPT

## 2023-07-13 PROCEDURE — 99232 SBSQ HOSP IP/OBS MODERATE 35: CPT

## 2023-07-13 PROCEDURE — 83735 ASSAY OF MAGNESIUM: CPT

## 2023-07-13 PROCEDURE — 93306 TTE W/DOPPLER COMPLETE: CPT

## 2023-07-13 PROCEDURE — 82565 ASSAY OF CREATININE: CPT

## 2023-07-13 PROCEDURE — 80053 COMPREHEN METABOLIC PANEL: CPT

## 2023-07-13 PROCEDURE — 86900 BLOOD TYPING SEROLOGIC ABO: CPT

## 2023-07-13 PROCEDURE — 99239 HOSP IP/OBS DSCHRG MGMT >30: CPT

## 2023-07-13 PROCEDURE — 83036 HEMOGLOBIN GLYCOSYLATED A1C: CPT

## 2023-07-13 PROCEDURE — 83605 ASSAY OF LACTIC ACID: CPT

## 2023-07-13 RX ORDER — METOPROLOL TARTRATE 50 MG
0 TABLET ORAL
Qty: 0 | Refills: 0 | DISCHARGE

## 2023-07-13 RX ORDER — METFORMIN HYDROCHLORIDE 850 MG/1
2 TABLET ORAL
Qty: 120 | Refills: 0
Start: 2023-07-13 | End: 2023-08-11

## 2023-07-13 RX ORDER — SITAGLIPTIN 50 MG/1
1 TABLET, FILM COATED ORAL
Qty: 30 | Refills: 0
Start: 2023-07-13 | End: 2023-08-11

## 2023-07-13 RX ORDER — SOTALOL HCL 120 MG
1 TABLET ORAL
Qty: 60 | Refills: 0
Start: 2023-07-13 | End: 2023-08-11

## 2023-07-13 RX ORDER — MEXILETINE HYDROCHLORIDE 150 MG/1
1 CAPSULE ORAL
Qty: 90 | Refills: 0
Start: 2023-07-13 | End: 2023-08-11

## 2023-07-13 RX ADMIN — Medication 20 MILLIGRAM(S): at 11:32

## 2023-07-13 RX ADMIN — HEPARIN SODIUM 5000 UNIT(S): 5000 INJECTION INTRAVENOUS; SUBCUTANEOUS at 13:17

## 2023-07-13 RX ADMIN — Medication 1 MILLIGRAM(S): at 11:32

## 2023-07-13 RX ADMIN — Medication 2 UNIT(S): at 12:16

## 2023-07-13 RX ADMIN — Medication 2: at 08:09

## 2023-07-13 RX ADMIN — Medication 81 MILLIGRAM(S): at 11:32

## 2023-07-13 RX ADMIN — MEXILETINE HYDROCHLORIDE 150 MILLIGRAM(S): 150 CAPSULE ORAL at 13:17

## 2023-07-13 RX ADMIN — Medication 80 MILLIGRAM(S): at 01:43

## 2023-07-13 RX ADMIN — Medication 2 UNIT(S): at 08:09

## 2023-07-13 RX ADMIN — MEXILETINE HYDROCHLORIDE 150 MILLIGRAM(S): 150 CAPSULE ORAL at 06:05

## 2023-07-13 RX ADMIN — Medication 80 MILLIGRAM(S): at 13:17

## 2023-07-13 RX ADMIN — INSULIN GLARGINE 27 UNIT(S): 100 INJECTION, SOLUTION SUBCUTANEOUS at 08:10

## 2023-07-13 RX ADMIN — HEPARIN SODIUM 5000 UNIT(S): 5000 INJECTION INTRAVENOUS; SUBCUTANEOUS at 06:05

## 2023-07-13 RX ADMIN — LOSARTAN POTASSIUM 25 MILLIGRAM(S): 100 TABLET, FILM COATED ORAL at 06:04

## 2023-07-13 RX ADMIN — Medication 125 MICROGRAM(S): at 06:05

## 2023-07-13 RX ADMIN — Medication 2 UNIT(S): at 16:53

## 2023-07-13 NOTE — PROGRESS NOTE ADULT - SUBJECTIVE AND OBJECTIVE BOX
Seen earlier today     Chief Complaint: Diabetes Mellitus follow up    INTERVAL Hx: tolerating POs. BGs in 100s, mostly at goal on current insulin regimen. Discharge planning once cleared by EP according to chart review.     Review of Systems:  General: As above  GI: No nausea, vomiting  Endocrine: no  S&Sx of hypoglycemia    Allergies    No Known Allergies    Intolerances      MEDICATIONS  (STANDING):  atorvastatin 80 milliGRAM(s) Oral at bedtime  dextrose 50% Injectable 50 milliLiter(s) IV Push every 15 minutes  insulin glargine Injectable (LANTUS) 27 Unit(s) SubCutaneous every morning  insulin lispro (ADMELOG) corrective regimen sliding scale   SubCutaneous three times a day before meals  insulin lispro (ADMELOG) corrective regimen sliding scale   SubCutaneous at bedtime  insulin lispro Injectable (ADMELOG) 2 Unit(s) SubCutaneous three times a day before meals  levothyroxine 125 MICROGram(s) Oral daily  losartan 25 milliGRAM(s) Oral daily  mexiletine 150 milliGRAM(s) Oral every 8 hours  sotalol. 80 milliGRAM(s) Oral every 12 hours      atorvastatin   80 milliGRAM(s) Oral (07-12-23 @ 21:24)    insulin glargine Injectable (LANTUS)   27 Unit(s) SubCutaneous (07-13-23 @ 08:10)    insulin lispro (ADMELOG) corrective regimen sliding scale   2 Unit(s) SubCutaneous (07-13-23 @ 08:09)    insulin lispro Injectable (ADMELOG)   2 Unit(s) SubCutaneous (07-13-23 @ 16:53)   2 Unit(s) SubCutaneous (07-13-23 @ 12:16)   2 Unit(s) SubCutaneous (07-13-23 @ 08:09)    levothyroxine   125 MICROGram(s) Oral (07-13-23 @ 06:05)        PHYSICAL EXAM:  VITALS: T(C): 36.8 (07-13-23 @ 11:03)  T(F): 98.3 (07-13-23 @ 11:03), Max: 98.3 (07-13-23 @ 11:03)  HR: 74 (07-13-23 @ 11:03) (70 - 94)  BP: 116/64 (07-13-23 @ 11:03) (96/53 - 148/70)  RR:  (18 - 18)  SpO2:  (96% - 97%)  Wt(kg): --  GENERAL: male laying in bed, in NAD  Respiratory: Respirations unlabored   Extremities: Warm, no edema  NEURO: Alert , appropriate     LABS:  POCT Blood Glucose.: 141 mg/dL (07-13-23 @ 16:38)  POCT Blood Glucose.: 140 mg/dL (07-13-23 @ 12:03)  POCT Blood Glucose.: 177 mg/dL (07-13-23 @ 07:49)  POCT Blood Glucose.: 152 mg/dL (07-12-23 @ 21:21)  POCT Blood Glucose.: 150 mg/dL (07-12-23 @ 16:35)  POCT Blood Glucose.: 169 mg/dL (07-12-23 @ 13:58)  POCT Blood Glucose.: 171 mg/dL (07-12-23 @ 08:10)  POCT Blood Glucose.: 176 mg/dL (07-11-23 @ 21:55)  POCT Blood Glucose.: 211 mg/dL (07-11-23 @ 16:48)  POCT Blood Glucose.: 167 mg/dL (07-11-23 @ 11:16)  POCT Blood Glucose.: 143 mg/dL (07-11-23 @ 07:42)  POCT Blood Glucose.: 183 mg/dL (07-10-23 @ 22:09)                          11.6   8.08  )-----------( 141      ( 13 Jul 2023 06:20 )             35.2     07-13    138  |  105  |  14  ----------------------------<  139<H>  3.9   |  23  |  1.20    Ca    8.8      13 Jul 2023 06:20  Phos  2.5     07-12  Mg     2.1     07-12    TPro  6.0  /  Alb  3.7  /  TBili  0.5  /  DBili  x   /  AST  20  /  ALT  18  /  AlkPhos  55  07-12    eGFR: 62 mL/min/1.73m2 (13 Jul 2023 06:20)    07-09 Chol 199 Direct LDL -- LDL calculated 94 HDL 36<L> Trig 348<H>  Thyroid Function Tests:  07-09 @ 21:34 TSH 2.46 FreeT4 -- T3 -- Anti TPO -- Anti Thyroglobulin Ab -- TSI --      A1C with Estimated Average Glucose Result: 11.8 % (07-09-23 @ 21:34)    Estimated Average Glucose: 292 mg/dL (07-09-23 @ 21:34)        Diet, DASH/TLC:   Sodium & Cholesterol Restricted (07-10-23 @ 05:56) [Active]

## 2023-07-13 NOTE — DISCHARGE NOTE PROVIDER - PROVIDER TOKENS
PROVIDER:[TOKEN:[[id number]],ESTABLISHEDPATIENT:[T]] PROVIDER:[TOKEN:[[id number]],FOLLOWUP:[1 week]],PROVIDER:[TOKEN:[[id number]],FOLLOWUP:[1 week]],PROVIDER:[TOKEN:[[id number]],FOLLOWUP:[1 week]]

## 2023-07-13 NOTE — PHYSICAL THERAPY INITIAL EVALUATION ADULT - PLANNED THERAPY INTERVENTIONS, PT EVAL
GOAL: Patient will negotiate 10 stairs with unilateral hand rail with close supervision./balance training/gait training/strengthening/transfer training

## 2023-07-13 NOTE — PROGRESS NOTE ADULT - SUBJECTIVE AND OBJECTIVE BOX
C A R D I O L O G Y  **********************************     DATE OF SERVICE: 07-13-23    Patient denies chest pain, palpitations, dizziness, or shortness of breath.   Review of symptoms otherwise negative.    MEDICATIONS:  aspirin enteric coated 81 milliGRAM(s) Oral daily  atorvastatin 80 milliGRAM(s) Oral at bedtime  chlorhexidine 2% Cloths 1 Application(s) Topical at bedtime  clonazePAM  Tablet 1 milliGRAM(s) Oral daily  dextrose 50% Injectable 50 milliLiter(s) IV Push every 15 minutes  FLUoxetine 20 milliGRAM(s) Oral daily  heparin   Injectable 5000 Unit(s) SubCutaneous every 8 hours  insulin glargine Injectable (LANTUS) 27 Unit(s) SubCutaneous every morning  insulin lispro (ADMELOG) corrective regimen sliding scale   SubCutaneous three times a day before meals  insulin lispro (ADMELOG) corrective regimen sliding scale   SubCutaneous at bedtime  insulin lispro Injectable (ADMELOG) 2 Unit(s) SubCutaneous three times a day before meals  levothyroxine 125 MICROGram(s) Oral daily  losartan 25 milliGRAM(s) Oral daily  mexiletine 150 milliGRAM(s) Oral every 8 hours  sotalol. 80 milliGRAM(s) Oral every 12 hours      LABS:                        11.6   8.08  )-----------( 141      ( 13 Jul 2023 06:20 )             35.2       Hemoglobin: 11.6 g/dL (07-13 @ 06:20)  Hemoglobin: 12.3 g/dL (07-12 @ 07:28)  Hemoglobin: 11.1 g/dL (07-11 @ 00:42)  Hemoglobin: 11.9 g/dL (07-10 @ 07:28)  Hemoglobin: 12.1 g/dL (07-09 @ 21:35)      07-13    138  |  105  |  14  ----------------------------<  139<H>  3.9   |  23  |  1.20    Ca    8.8      13 Jul 2023 06:20  Phos  2.5     07-12  Mg     2.1     07-12    TPro  6.0  /  Alb  3.7  /  TBili  0.5  /  DBili  x   /  AST  20  /  ALT  18  /  AlkPhos  55  07-12    Creatinine Trend: 1.20<--, 1.22<--, 1.34<--, 1.31<--, 1.57<--, 2.16<--    COAGS:           PHYSICAL EXAM:  T(C): 36.8 (07-13-23 @ 11:03), Max: 36.8 (07-12-23 @ 18:14)  HR: 74 (07-13-23 @ 11:03) (70 - 94)  BP: 116/64 (07-13-23 @ 11:03) (96/53 - 148/70)  RR: 18 (07-13-23 @ 11:03) (18 - 18)  SpO2: 96% (07-13-23 @ 11:03) (96% - 97%)  Wt(kg): --    I&O's Summary    12 Jul 2023 07:01  -  13 Jul 2023 07:00  --------------------------------------------------------  IN: 240 mL / OUT: 0 mL / NET: 240 mL    13 Jul 2023 07:01  -  13 Jul 2023 15:10  --------------------------------------------------------  IN: 240 mL / OUT: 0 mL / NET: 240 mL          Gen: NAD  HEENT:  (-)icterus (-)pallor  CV: N S1 S2 1/6 ANGELLA (+)2 Pulses B/l  Resp:  Clear to auscultation B/L, normal effort  GI: (+) BS Soft, NT, ND  Lymph:  (-)Edema, (-)obvious lymphadenopathy  Skin: Warm to touch, Normal turgor  Psych: Appropriate mood and affect      TELEMETRY:  70-80    < from: TTE W or WO Ultrasound Enhancing Agent (07.10.23 @ 06:27) >  CONCLUSIONS:      1. Technically difficult image quality.   2. Moderately dilated left ventricular cavity size. The left ventricular systolic function is mildly decreased with an ejection fraction visually estimated at 45 %. There are regional wall motion abnormalities.   3. Multiple segmental abnormalities exist. See findings.   4. Mildly enlarged right ventricular cavity size and mildly reduced right ventricular systolic function.   5. There is mild to moderate mitral valve stenosis, secondary to dystrophic mitral annular calcification.   6. 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 No intravalvular regurgitation No paravalvular regurgitation.   7. Mild to moderate pulmonary hypertension.    < end of copied text >      ASSESSMENT/PLAN: Patient is a 79 y/o Male with PMH of HTN, DM, Anxiety/depression, Hodgkin's Lymphoma, TAVR (@ [facility] w/ Dr. Padron in 2016), CAD s/p PCI, ICM/HFrEF s/p BiV-ICD, and VT s/p ablation (in [place]) who presented to [facility] with generalized weakness admitted with VT requiring shock x2 by EMS now transferred to [facility] CICU for further management.    #Monomorphic VT  - ICD interrogation noted - patient did not receive therapy for VT due to below treatment zone  - Continue sotalol and mexiletine, additional VT zone added per EP  - Weaned off lido  - TTE noted above, EF 45%  - Cardiac MRI noted with scarring involving the basal to mid inferior lateral walls  - Follow up EP recs    #HFrEF  - HF meds held on admission given RIANNA/hypotension requiring pressors now weaned off  - Restarted Losartan 25mg daily, can restart PO lasix 40mg daily upon discharge  - No further Toprol XL per EP    #CAD s/p PCI  - Continue ASA and Lipitor    - D/C planning when cleared by EP  - Patient to f/u with Dr. Durham after discharge    José Miguel Angulo PA-C  Pager: [phone]

## 2023-07-13 NOTE — DISCHARGE NOTE PROVIDER - NSDCMRMEDTOKEN_GEN_ALL_CORE_FT
aspirin 81 mg oral delayed release tablet: 1 tab(s) orally once a day  atorvastatin 80 mg oral tablet: 1 tab(s) orally once a day  Farxiga 10 mg oral tablet: 1 tab(s) orally once a day   FLUoxetine 20 mg oral capsule: 1 cap(s) orally once a day  furosemide 40 mg oral tablet: 1 tab(s) orally once a day  Januvia 100 mg oral tablet: 1 tab(s) orally once a day  KlonoPIN 1 mg oral tablet:  orally once a day  levothyroxine 125 mcg (0.125 mg) oral capsule: 1 cap(s) orally once a day    losartan 25 mg oral tablet: 1 tab(s) orally once a day  metFORMIN 500 mg oral tablet: 2 tab(s) orally 2 times a day  mexiletine 150 mg oral capsule: 1 cap(s) orally every 8 hours  sotalol 80 mg oral tablet: 1 tab(s) orally every 12 hours   aspirin 81 mg oral delayed release tablet: 1 tab(s) orally once a day  atorvastatin 80 mg oral tablet: 1 tab(s) orally once a day  Farxiga 10 mg oral tablet: 1 tab(s) orally once a day   FLUoxetine 20 mg oral capsule: 1 cap(s) orally once a day  furosemide 40 mg oral tablet: 1 tab(s) orally once a day  Home Physical Therapy: for strenghtening  Januvia 100 mg oral tablet: 1 tab(s) orally once a day  KlonoPIN 1 mg oral tablet:  orally once a day  levothyroxine 125 mcg (0.125 mg) oral capsule: 1 cap(s) orally once a day    losartan 25 mg oral tablet: 1 tab(s) orally once a day  metFORMIN 500 mg oral tablet: 2 tab(s) orally 2 times a day  mexiletine 150 mg oral capsule: 1 cap(s) orally every 8 hours  sotalol 80 mg oral tablet: 1 tab(s) orally every 12 hours

## 2023-07-13 NOTE — PHYSICAL THERAPY INITIAL EVALUATION ADULT - PERTINENT HX OF CURRENT PROBLEM, REHAB EVAL
78M w/ PMH HTN, DM, Anxiety/depression, Hodgkin's Lymphoma, TAVR (@ [facility] w/ Dr. Padron in 2016), CAD s/p PCI (on ASA, no longer takes Plavix), and VT s/p ablation (2021 in [place]) and CRT-D presented to OSH complaining of generalized weakness and stating that he was not feeling well today. He was in the bathroom having a BM when this began, his wife states he was diaphoretic and feeling weak and called 911. EMS arrived and found the patient in VT with pulses/awake & alert. He was given 150 amio without improvement. BP at the time was in the low 60s systolic. Given 5mg versed and shocked x2 with successful conversion to paced rhythm. Patient transferred to [facility] CICU for further management. Hospital course: chest x-ray 7/9- no acute findings at this time; chest x-ray 7/11- Defibrillator in the left chest wall. Defibrillator pad overlies the chest wall. The lungs are clear. Trace right-sided pleural effusion without pneumothorax. Cardiomediastinal silhouette size is within normal limits. No acute osseous abnormality; MR cardiac 7/12- Image quality markedly degraded due to metallic artifact from patient's ICD. Late gadolinium enhancement sequences are severely degraded, but there is suggestion of scarring involving the basal to mid inferior lateral walls and these myocardial wall segments do appear somewhat thin and   hypokinetic. 78M w/ PMH HTN, DM, Anxiety/depression, Hodgkin's Lymphoma, TAVR (@ [facility] w/ Dr. Padron in 2016), CAD s/p PCI (on ASA, no longer takes Plavix), and VT s/p ablation (2021 in [place]) and CRT-D presented to OSH complaining of generalized weakness and stating that he was not feeling well today. He was in the bathroom having a BM when this began, his wife states he was diaphoretic and feeling weak and called 911. EMS arrived and found the patient in VT with pulses/awake & alert. He was given 150 amio without improvement. BP at the time was in the low 60s systolic. Given 5mg versed and shocked x2 with successful conversion to paced rhythm. Patient transferred to [facility] CICU for further management. Hospital course: chest x-ray 7/9- no acute findings at this time; chest x-ray 7/11- Defibrillator in the left chest wall. Defibrillator pad overlies the chest wall. The lungs are clear. Trace right-sided pleural effusion without pneumothorax. Cardiomediastinal silhouette size is within normal limits. No acute osseous abnormality; MR cardiac 7/12- Image quality markedly degraded due to metallic artifact from patient's ICD. Late gadolinium enhancement sequences are severely degraded, but there is suggestion of scarring involving the basal to mid inferior lateral walls and these myocardial wall segments do appear somewhat thin and   hypokinetic.  Pt +VT required CICU management, s/p lidocaine gtt, started on sotalol; TTE revealed EF 45%, mild-moderate mitral valve stenosis. EP consulted, recommended. Pt weaned off lidocaine and started on sotalol. Pt deemed hemodynamically stable and ready to transfer. +CTR interrogations during hospital couse; 7/11: downgrade from CICU, QTC post sotalol is 507, 7/12 s/p cardiac MRI, 4th dose of sotalol given, +VTE SQ heparin

## 2023-07-13 NOTE — PROGRESS NOTE ADULT - PROVIDER SPECIALTY LIST ADULT
Cardiology
Electrophysiology
DIEGO
Electrophysiology
DIEGO
Electrophysiology
Internal Medicine
Endocrinology
Internal Medicine
Hospitalist

## 2023-07-13 NOTE — DIETITIAN INITIAL EVALUATION ADULT - PERTINENT LABORATORY DATA
07-13    138  |  105  |  14  ----------------------------<  139<H>  3.9   |  23  |  1.20    Ca    8.8      13 Jul 2023 06:20  Phos  2.5     07-12  Mg     2.1     07-12    TPro  6.0  /  Alb  3.7  /  TBili  0.5  /  DBili  x   /  AST  20  /  ALT  18  /  AlkPhos  55  07-12  POCT Blood Glucose.: 140 mg/dL (07-13-23 @ 12:03)  A1C with Estimated Average Glucose Result: 11.8 % (07-09-23 @ 21:34)

## 2023-07-13 NOTE — DIETITIAN INITIAL EVALUATION ADULT - PERTINENT MEDS FT
MEDICATIONS  (STANDING):  aspirin enteric coated 81 milliGRAM(s) Oral daily  atorvastatin 80 milliGRAM(s) Oral at bedtime  chlorhexidine 2% Cloths 1 Application(s) Topical at bedtime  clonazePAM  Tablet 1 milliGRAM(s) Oral daily  dextrose 50% Injectable 50 milliLiter(s) IV Push every 15 minutes  FLUoxetine 20 milliGRAM(s) Oral daily  heparin   Injectable 5000 Unit(s) SubCutaneous every 8 hours  insulin glargine Injectable (LANTUS) 27 Unit(s) SubCutaneous every morning  insulin lispro (ADMELOG) corrective regimen sliding scale   SubCutaneous three times a day before meals  insulin lispro (ADMELOG) corrective regimen sliding scale   SubCutaneous at bedtime  insulin lispro Injectable (ADMELOG) 2 Unit(s) SubCutaneous three times a day before meals  levothyroxine 125 MICROGram(s) Oral daily  losartan 25 milliGRAM(s) Oral daily  mexiletine 150 milliGRAM(s) Oral every 8 hours  sotalol. 80 milliGRAM(s) Oral every 12 hours    MEDICATIONS  (PRN):

## 2023-07-13 NOTE — DIETITIAN INITIAL EVALUATION ADULT - ORAL INTAKE PTA/DIET HISTORY
Pt reports having a good appetite and PO intake PTA; consuming >75% of most meals. Denies following any therapeutic diet; endorses his typical diet PTA was high in concentrated sugar and carbohydrates. Pt denies any known food allergies or intolerances. Pt reports taking vitamin D supplements at home. Denies any difficulty chewing/swallowing at this time.

## 2023-07-13 NOTE — DISCHARGE NOTE NURSING/CASE MANAGEMENT/SOCIAL WORK - PATIENT PORTAL LINK FT
You can access the FollowMyHealth Patient Portal offered by [facility] by registering at the following website: [email or web address]. By joining [facility]’s FollowMyHealth portal, you will also be able to view your health information using other applications (apps) compatible with our system.

## 2023-07-13 NOTE — DISCHARGE NOTE PROVIDER - NS AS DC PROVIDER CONTACT Y/N MULTI
Increase your night time insulin dose to 38 units, keep your morning the same at 54 units.      Call to set up your repeat ultrasound for 6 weeks from now.    
Yes

## 2023-07-13 NOTE — PROGRESS NOTE ADULT - PROBLEM SELECTOR PROBLEM 1
Uncontrolled type 2 diabetes mellitus with hyperglycemia
Monomorphic ventricular tachycardia

## 2023-07-13 NOTE — PROGRESS NOTE ADULT - PROBLEM SELECTOR PLAN 3
CAD with SADE   - c/w ASA, lipitor   - no longer taking Plavix  - Hx TAVR in 2016
Currently on Atorvastatin 80 mg HS   Goal LDL < 55
CAD with SADE   - c/w ASA, lipitor   - no longer taking Plavix  - Hx TAVR in 2016
- holding home Farxiga  - HbA1c 11.8%  - FS initially in 400s, s/p insulin gtt at 5u/hr, s/p insulin IVP 5  - anion gap WNL  - c/w BRODERICK  - c/w Lantus 25u qbedtime  - FS QAC QHS  - Carb- consistent diet  - given A1C > 9 would obtain Endocrine consult for glycemic control

## 2023-07-13 NOTE — PROGRESS NOTE ADULT - PROBLEM SELECTOR PLAN 1
- Test BGs ACTID and at HS  - Continue Lantus 27 U AM   - Continue Lispro 2 U TID with meals, hold if NPO  - Adjust to low Admelog correction scale with meals and low dose at night   - Carbohydrate controlled diet, no concentrated sweets  - RD consult   - RN to teach glucometer     DISCHARGE: does not have an endocrinologist.  d/w d/c on one time insulin+GLP1, however, pt refuses injections of any type.  d/c on PO meds: metformin 500 mg 2 tab BID, Farxiga 10 mg daily, Januvia 100 mg daily.  Can follow up with [facility] - [address] [place] [address], [address]. [place] [address]  Tel) [phone]. email sent to office to assist scheduling followup appointment
- s/p lidocaine gtt in CICU  - per EP reccs ; started on sotalol 80mg Q12hr ;  CHECK EKG 2 hours after each doses to monitor for QTc prolongation  - c/w Mexiletine 150mg Q8hr to help shorten QTc  - Pt is on Fluoxetine which can prolong QTc, will monitor closely   - cardiac MRI done to assess substrate (his ICD is MRI compatible)  > results pending  >    > Reached out to Dr. Mcbride, awaiting final recs for discharge plan.   - Continue tele monitor  - Monitor BMP, Supplement to maintain K>4.0, Mg>2.0
-s/p lidocaine gtt in CICU  - per EP reccs ; started on sotalol 80mg Q12hr ;  obtain EKG 2 hours after each doses to monitor for QTc prolongation  -c/w Mexiletine 150mg Q8hr to help shorten QTc  - Pt is on Fluoxetine which can prolong QTc, will monitor closely   -Patient will need NIPS testing in EP lab prior to discharge  - cardiac MRI ordered to assess substrate (his ICD is MRI compatible)   -Continue tele monitor  -Supplement to maintain K>4.0, Mg>2.0
- s/p lidocaine gtt in CICU  - per EP reccs ; started on sotalol 80mg Q12hr ;  CHECK EKG 2 hours after each doses to monitor for QTc prolongation  - c/w Mexiletine 150mg Q8hr to help shorten QTc  - Pt is on Fluoxetine which can prolong QTc, will monitor closely   - Patient will need NIPS testing in EP lab prior to discharge  - cardiac MRI ordered to assess substrate (his ICD is MRI compatible)   - Continue tele monitor  - Monitor BMP, Supplement to maintain K>4.0, Mg>2.0

## 2023-07-13 NOTE — DIETITIAN INITIAL EVALUATION ADULT - OTHER INFO
- UBW: 161 pounds per pt  - Dosing wt: 164.6 pounds (7/11)  - Wt hx per chart in pounds: 164.6 (7/09), 164.9 (7/12), 161.3 (7/13). Per Previous RD notes: 189.6 pounds (5/26/21).   - Pt reports stable weight, denies any recent changes.   - RD to continue to monitor weight trends as able.   - Nutritionally Pertinent Meds in-house: Lantus, Admelog, SSI, atorvastatin, synthroid.   - Nutritionally Pertinent Labs: high POCT; high BG.   - Endo:       - Per Endo note, pt with pre-DM dx in the past.       - Regimen PTA: Farxiga (per pt).       - A1c 11.8% (7/09) - indicates DM range + very poor glycemic control.       - In-house regimen: Lantus, Admelog, SSI.   - Cardio:       - HFrEF.

## 2023-07-13 NOTE — DISCHARGE NOTE NURSING/CASE MANAGEMENT/SOCIAL WORK - NSDCFUADDAPPT_GEN_ALL_CORE_FT
APPTS ARE READY TO BE MADE: [x ] YES    Best Family or Patient Contact (if needed):    Additional Information about above appointments (if needed):    1: pcp  2: EP , cardiology  3: Endo clinic    Other comments or requests:

## 2023-07-13 NOTE — DIETITIAN INITIAL EVALUATION ADULT - REASON FOR ADMISSION
Ventricular tachycardia    Per chart: 78M w/ PMH HTN, DM, Anxiety/depression, Hodgkin's Lymphoma, TAVR (@ [facility] w/ Dr. Padron in 2016), CAD s/p PCI (on ASA, no longer takes Plavix), and VT s/p ablation (2021 in [place]) and CRT-D presented to OSH complaining of generalized weakness and stating that he was not feeling well today. He was in the bathroom having a BM when this began, his wife states he was diaphoretic and feeling weak and called 911. EMS arrived and found the patient in VT with pulses/awake & alert.

## 2023-07-13 NOTE — PROGRESS NOTE ADULT - PROBLEM SELECTOR PLAN 7
DVT ppx : Heparin SC      Discharge planning 50mins  Pending EP clearance.  Will need close outpatient follow up with cards, EP and Endo clinic.
DVT ppx : Heparin SC

## 2023-07-13 NOTE — PHARMACOTHERAPY INTERVENTION NOTE - COMMENTS
Counseled patient and wife Eden at bedside on the following inpatient/discharge medications names (brand/generic), indication, and possible side effects:  mexiletine 150mg Q8H  sotalol 80mg Q12H    New diabetic meds:  Januvia 100mg daily  metformin 500mg, 2 tabs twice a day  Continue Farxiga 10mg daily    Patient was provided with a medication card for their new medication. Patient questions and concerns were answered and addressed. Patient demonstrated understanding.    RXs sent to [facility] pharmacy. All meds covered under insurance: sotalol ($1.76), mexiletine ($15.78), metformin ($2.62) and Januvia (#131.92). Farxiga is a home med. Discussed with pt and wife, they agree to pay for all medications and states it is not a barrier.   Mexiletine is out of stock at the pharmacy -will order for tm. Will supply the pt with 2 doses of mexiletine (one for tonight and one for tmr morning) until able to  from pharmacy.       Angelina Manning, PharmD, [facility]  Clinical Pharmacy Specialist  Teams (preferred) or [phone]

## 2023-07-13 NOTE — DIETITIAN INITIAL EVALUATION ADULT - ENERGY INTAKE
Fair (50-75%) In house, pt reports fair appetite and PO intake secondary to dislike of institutional foods.

## 2023-07-13 NOTE — DISCHARGE NOTE PROVIDER - NSFOLLOWUPCLINICS_GEN_ALL_ED_FT
[facility]  Endocrinology  [address] [place] [address]  Phone: [phone]  Fax:

## 2023-07-13 NOTE — PROGRESS NOTE ADULT - SUBJECTIVE AND OBJECTIVE BOX
[facility] Division of Hospital Medicine  Luana Saldaña MD M-F, 8A-5P: MS Teams, Pager: [phone]  Other Times: Ext: [phone], Pager: [phone]      Patient is a 78y old  Male who presents with a chief complaint of VT (2023 09:10)      SUBJECTIVE / OVERNIGHT EVENTS:  Patient was examined this morning. He is eager to go home, discussed that EP final plan is pending, also discussed Endo plan. Patient denies headache, dizziness, fever, chest pain, palpitations, nausea. He states he is ambulating.       ADDITIONAL REVIEW OF SYSTEMS:    MEDICATIONS  (STANDING):  aspirin enteric coated 81 milliGRAM(s) Oral daily  atorvastatin 80 milliGRAM(s) Oral at bedtime  chlorhexidine 2% Cloths 1 Application(s) Topical at bedtime  clonazePAM  Tablet 1 milliGRAM(s) Oral daily  dextrose 50% Injectable 50 milliLiter(s) IV Push every 15 minutes  FLUoxetine 20 milliGRAM(s) Oral daily  heparin   Injectable 5000 Unit(s) SubCutaneous every 8 hours  insulin glargine Injectable (LANTUS) 27 Unit(s) SubCutaneous every morning  insulin lispro (ADMELOG) corrective regimen sliding scale   SubCutaneous three times a day before meals  insulin lispro (ADMELOG) corrective regimen sliding scale   SubCutaneous at bedtime  insulin lispro Injectable (ADMELOG) 2 Unit(s) SubCutaneous three times a day before meals  levothyroxine 125 MICROGram(s) Oral daily  losartan 25 milliGRAM(s) Oral daily  mexiletine 150 milliGRAM(s) Oral every 8 hours  sotalol. 80 milliGRAM(s) Oral every 12 hours    MEDICATIONS  (PRN):      CAPILLARY BLOOD GLUCOSE      POCT Blood Glucose.: 177 mg/dL (2023 07:49)  POCT Blood Glucose.: 152 mg/dL (2023 21:21)  POCT Blood Glucose.: 150 mg/dL (2023 16:35)  POCT Blood Glucose.: 169 mg/dL (2023 13:58)      I&O's Summary    2023 07:01  -  2023 07:00  --------------------------------------------------------  IN: 240 mL / OUT: 0 mL / NET: 240 mL        Daily     Daily Weight in k.2 (2023 08:08)    PHYSICAL EXAM:  Vital Signs Last 24 Hrs  T(C): 36.8 (2023 11:03), Max: 36.8 (2023 18:14)  T(F): 98.3 (2023 11:03), Max: 98.3 (2023 11:03)  HR: 74 (2023 11:03) (70 - 94)  BP: 116/64 (2023 11:03) (96/53 - 148/70)  BP(mean): --  RR: 18 (2023 11:03) (18 - 18)  SpO2: 96% (2023 11:03) (96% - 97%)    Parameters below as of 2023 11:03  Patient On (Oxygen Delivery Method): room air      PHYSICAL EXAM:  GENERAL: NAD, well-groomed  HEAD:  Atraumatic, Normocephalic  EYES  conjunctiva and sclera clear  NECK: Supple, No JVD  CHEST/LUNG: Clear to auscultation bilaterally; No wheeze  HEART: Regular rate and rhythm; No murmurs, rubs, or gallops  ABDOMEN: Soft, Nontender, Nondistended; Bowel sounds present  EXTREMITIES:  2+ Peripheral Pulses, No clubbing, cyanosis, or edema  PSYCH: AAOx3  NEUROLOGY: non-focal  SKIN: No rashes or lesions      LABS:                        11.6   8.08  )-----------( 141      ( 2023 06:20 )             35.2     07-13    138  |  105  |  14  ----------------------------<  139<H>  3.9   |  23  |  1.20    Ca    8.8      2023 06:20  Phos  2.5     07-12  Mg     2.1     07-12    TPro  6.0  /  Alb  3.7  /  TBili  0.5  /  DBili  x   /  AST  20  /  ALT  18  /  AlkPhos  55  07-12    LIVER FUNCTIONS - ( 2023 07:28 )  Alb: 3.7 g/dL / Pro: 6.0 g/dL / ALK PHOS: 55 U/L / ALT: 18 U/L / AST: 20 U/L / GGT: x                 Urinalysis Basic - ( 2023 06:20 )    Color: x / Appearance: x / SG: x / pH: x  Gluc: 139 mg/dL / Ketone: x  / Bili: x / Urobili: x   Blood: x / Protein: x / Nitrite: x   Leuk Esterase: x / RBC: x / WBC x   Sq Epi: x / Non Sq Epi: x / Bacteria: x        SARS-CoV-2: NotDetec (2023 17:27)      RADIOLOGY & ADDITIONAL TESTS:  Results Reviewed:   Imaging Personally Reviewed:  Electrocardiogram Personally Reviewed:    COORDINATION OF CARE:  Care Discussed with Consultants/Other Providers [Y/N]:  Prior or Outpatient Records Reviewed [Y/N]:

## 2023-07-13 NOTE — PROGRESS NOTE ADULT - PROBLEM SELECTOR PROBLEM 3
CAD S/P percutaneous coronary angioplasty
CAD S/P percutaneous coronary angioplasty
HLD (hyperlipidemia)
Type 2 diabetes mellitus

## 2023-07-13 NOTE — PROGRESS NOTE ADULT - NSPROGADDITIONALINFOA_GEN_ALL_CORE
time spent reviewing prior charts, meds, discussing plan with patient and wife = 75 minutes    d/w ACP Delfino
Case and plan discussed with ACP: OLIVIER Tuttle, CM
Case and plan discussed with ACP: Delfino Adaems, RN, CM

## 2023-07-13 NOTE — DISCHARGE NOTE NURSING/CASE MANAGEMENT/SOCIAL WORK - NSDCPEFALRISK_GEN_ALL_CORE
For information on Fall & Injury Prevention, visit: [email or web address] OR  [email or web address] OR  https://www.cdc.gov/steadi/patient.html

## 2023-07-13 NOTE — DIETITIAN INITIAL EVALUATION ADULT - LITERATURE/VIDEOS GIVEN
Carbohydrate Counting for People with Diabetes  Diabetes Label Reading Tips handouts  Plate Method for Diabetes

## 2023-07-13 NOTE — DISCHARGE NOTE PROVIDER - NSDCFUSCHEDAPPT_GEN_ALL_CORE_FT
Sotero Godinez  [facility]  ELECTROPH [id number]  Scheduled Appointment: 09/01/2023     Sotero Godinez  [facility] [phone]  Scheduled Appointment: 07/28/2023    Sotero Godinez  [facility] [phone]  Scheduled Appointment: 09/01/2023    Luh Lozoya  [facility] [address] [place] [address]  Scheduled Appointment: 10/06/2023

## 2023-07-13 NOTE — PROGRESS NOTE ADULT - PROBLEM SELECTOR PLAN 5
- TSH 2.46  - c/w levothyroxine
- c/w Fluoxetine 20mg po qd  - c/w Clonazepam 1mg po qd
- TSH 2.46  - continue levothyroxine

## 2023-07-13 NOTE — DIETITIAN INITIAL EVALUATION ADULT - PERSON TAUGHT/METHOD
Provided brief education on Nutrition Therapy for Type 2 Diabetes. Emphasis on what foods contain carbohydrates, choosing whole grains vs refined carbohydrates; limiting refined sugars, portion sizes. Pt made aware RD to remain available for any questions./verbal instruction/written material/patient instructed

## 2023-07-13 NOTE — DIETITIAN INITIAL EVALUATION ADULT - ADD RECOMMEND
1) Recommend changing to the following diet as tolerated: Consistent Carbohydrate, Low Sodium diet.           - Defer texture/consistency to SLP/team.   2) Recommend providing Glucerna 1x/day to optimize protein-energy intake.   3) Recommend Multivitamin daily pending no medical contraindications.  4) Continue to monitor PO intake, weight, labs, skin, GI status, and diet.  5) Honor food preferences as able.   6) RD to follow up per protocol / remains available for diet education/reinforcement PRN.

## 2023-07-13 NOTE — PROGRESS NOTE ADULT - ASSESSMENT
79y/o man with Hx of Anxiety/depression, Hodgkins lymphoma, HTN, DM, HLD, severe AS s/p TAVR (@ [facility] w/ Dr. Padron in 2016), hospitalized in [place] Spring 2019 for pericardial effusion (aspirated 900 cc blood), MI, CAD s/p SADE to D1 on 5/25/2021 (on ASA, no longer takes Plavix), chronic systolic CHF with EF 34%, s/p dual chamber BSC ICD implant 5/2/2012, upgraded to CRT-D (BS) 12/14/2016 due to LBBB (EF improved to 40-45% with CRT-D and AVR), VT s/p ICD shocks in 5/2021, s/p VT ablation 7/2022 (done in [place]) and paroxysmal Afib (very brief, not on AC) who transferred to [facility] CCU from OSH for slow monomorphic VT at 150's s/p amiodarone 150mg bolus and external shock x 2 for hypotension with SBP in the 60's, patient has been medically managed and started on sotalol and mexiletine     1. MMVT   -He was on amiodarone for few months prior to VT ablation and was stopped shortly after ablation in 7/2022 due to GI complaint.  -CRT-D interrogation showed patient is pacer dependent, had sustained monomorphic VT with average  bpm on 7/7 treated and terminated with ATP x 1. Also revealed he was in sustained slow monomorphic VT with average -165 bpm for about 1 hour and 36 minutes on 7/9/23 for which patient did not receive therapy due to below treatment zone.   -Continue sotalol 80mg Q12hr, Corrected QTc today 473  -Mexiletine 150mg Q8hr to help shorten QTc  -ICD with additional VT zone added to allow for ATP therapy at 150 bpm and LDL increased from 60bpm to 70bpm    - Losartan 25mg daily  -Patient will need NIPS testing in EP lab prior to discharge  -Continue tele monitor  -Supplement to maintain K>4.0, Mg>2.0      Lambert Rowell MD  Cardiology Fellow

## 2023-07-13 NOTE — CHART NOTE - NSCHARTNOTEFT_GEN_A_CORE
Patient's wife declined assistance with all appointments apart from Endocrinology. Caregiver requested we outreach in two weeks to assist them with establishing care. Patient's wife advised the patient does not have emotional capacity to care about follow up prior to being discharged.

## 2023-07-13 NOTE — PROGRESS NOTE ADULT - PROBLEM SELECTOR PLAN 6
- c/w Fluoxetine 20mg po qd  - c/w Clonazepam 1mg po qd
DVT ppx : Heparin SC
- continue Fluoxetine 20mg po qd  - continue Clonazepam 1mg po qd

## 2023-07-13 NOTE — PROGRESS NOTE ADULT - ASSESSMENT
78M w/ PMH HTN, DM, Anxiety/depression, Hodgkin's Lymphoma, TAVR (@ [facility] w/ Dr. Padron in 2016), CAD s/p PCI (on ASA, no longer takes Plavix), and VT s/p ablation (2021 in [place]) who presented to OSH with generalized weakness admitted with VT requiring shock x2 by EMS, managed in CCU s/p lidocaine gtt and started on sotalol. Now hemodynamically stable and transferred to telemetry floor.  Endocrine team consulted for managed of DM. BG Goal 100-180mg/dl. Tolerating POs with fair intake. BGs mostly at goal with , received increased dose of Lantus 27 untits.   Attempted to discuss about DM management, however he declined to further discuss with me. " I don't want to hear same thing over and over". He confirmed that he does not want any injectable medication.      A1c: 11.8%   Home meds: Farxiga 10 mg daily    GFR/Cr: 60/1.22    - Blood glucose target while hospitalized 140-180 mg/dL. Overall controlled   - would recommend Lantus 27 U AM   - start Lispro 2 U TID with meals   - mod scale with meals and low dose at night   - do not give scheduled prandial insulin if patient is NPO  - please monitor fingerstick blood glucose at least 4 times a day to avoid insulin toxicity, hypoglycemia  - Carbohydrate controlled diet, no concentrated sweets  - Counseled on need for medication and diet adherence to avoid new complications.   - RD consult   - RN to teach glucometer   DISCHARGE: does not have an endocrinologist.  d/w d/c on one time insulin+GLP1, however, pt refuses injections of any type.  d/c on PO meds: metformin 500 mg 2 tab BID, Farxiga 10 mg daily, Januvia 100 mg daily.      # Dyslipidemia: c/w lipito 80 mg HS   # Hypertension: BP goal < 130/80, will defer to primary team   # Hypothyroidism post radiation for Hodgkin's in 1987: c/w Lt4 125 mcg daily. TSH is at goal.     Luh Lozoya MD  Pager: 9AM - 5PM (Mon-Fri): [phone]  After 5PM and on Weekends: [phone]     For nonurgent matters, please email [email or web address] for assistance.

## 2023-07-13 NOTE — DISCHARGE NOTE PROVIDER - HOSPITAL COURSE
78 year old male with  HTN, DMT2, Anxiety/depression, Hodgkin's Lymphoma, TAVR (2016), CAD s/p PCI, ICM/HFrEF s/p BiV-ICD, and VT s/p ablation (in [place]) who presented to OSH with generalized weakness admitted with VT requiring shock x2 by EMS now transferred to [facility] CICU for further management. Pt is s/p lidocaine gtt and started on sotalol. Now deemed hemodynamically stable and transferred to telemetry floor.    # Monomorphic ventricular tachycardia.   - He was on amiodarone for few months prior to VT ablation and was stopped shortly after ablation in 7/2022 due to GI complaint.  - s/p lidocaine gtt in CICU  - CRT-D interrogation showed patient is pacer dependent, had sustained monomorphic VT with average  bpm on 7/7 treated and terminated with ATP x 1. Also revealed he was in sustained slow monomorphic VT with average -165 bpm for about 1 hour and 36 minutes on 7/9/23 for which patient did not receive therapy due to below treatment zone.   - per EP reccs ; started on sotalol 80mg Q12hr ;  Post sotalol EKG 2 hours after each doses showed no QTc prolongation  - Started Mexiletine 150mg Q8hr to help shorten QTc  - Pt is on Fluoxetine which can prolong QTc,  monitored QTc - QTc stable  - ICD settings adjusted -  with additional VT zone added to allow for ATP therapy at 150 bpm and LDL increased from 60bpm to 70bpm    - cardiac MRI --------------    # HFrEF (heart failure with reduced ejection fraction).   - Restart Losartan 25mg daily.    # CAD S/P percutaneous coronary angioplasty.   - Continue ASA, lipitor   - no longer taking Plavix  - Hx TAVR in 2016.      # Type 2 diabetes mellitus.   - holding home Farxiga  - HbA1c 11.8%  - FS initially in 400s, s/p insulin gtt at 5u/hr, s/p insulin IVP 5. anion gap WNL  - Lantus 25u qbedtime adn admelog sliding scale wile in hospital  - A1C 11.8  - Endocrinology consulted - rec Insulin for home but pt refusing insulin on discharge  Home meds will be: Metformin 500 2 tabs BID, Farxiga 10mg, Januvia 100mg  Follow up with outpatient Endocrinologist    # Hypothyroidism.   - TSH 2.46  - Continue Levothyroxine.    # Anxiety and depression.   - Continue Fluoxetine 20mg po qd  - Continue Clonazepam 1mg po qd.    # CAD  - Lipitor 80 mg HS  - Aspirin 81 mg       Discharge pending EP clearance. 78 year old male with  HTN, DMT2, Anxiety/depression, Hodgkin's Lymphoma, TAVR (2016), CAD s/p PCI, ICM/HFrEF s/p BiV-ICD, and VT s/p ablation (in [place]) who presented to OSH with generalized weakness admitted with VT requiring shock x2 by EMS now transferred to [facility] CICU for further management. Pt is s/p lidocaine gtt and started on sotalol. Now deemed hemodynamically stable and transferred to telemetry floor.    # Monomorphic ventricular tachycardia.   - He was on amiodarone for few months prior to VT ablation and was stopped shortly after ablation in 7/2022 due to GI complaint.  - s/p lidocaine gtt in CICU  - CRT-D interrogation showed patient is pacer dependent, had sustained monomorphic VT with average  bpm on 7/7 treated and terminated with ATP x 1. Also revealed he was in sustained slow monomorphic VT with average -165 bpm for about 1 hour and 36 minutes on 7/9/23 for which patient did not receive therapy due to below treatment zone.   - per EP reccs ; started on sotalol 80mg Q12hr ;  Post sotalol EKG 2 hours after each doses showed no QTc prolongation  - Started Mexiletine 150mg Q8hr to help shorten QTc  - Pt is on Fluoxetine which can prolong QTc,  monitored QTc - QTc stable  - ICD settings adjusted -  with additional VT zone added to allow for ATP therapy at 150 bpm and LDL increased from 60bpm to 70bpm    - cardiac MRI --------------    # HFrEF (heart failure with reduced ejection fraction).   - HF meds held on admission given RIANNA/hypotension requiring pressors now weaned off  - Restart Losartan 25mg daily, PO lasix 40mg resumed on discharge  -  TTE noted above, EF 45%    # CAD S/P percutaneous coronary angioplasty.   - Continue ASA, lipitor   - no longer taking Plavix  - Hx TAVR in 2016.      # Type 2 diabetes mellitus.   - holding home Farxiga  - HbA1c 11.8%  - FS initially in 400s, s/p insulin gtt at 5u/hr, s/p insulin IVP 5. anion gap WNL  - Lantus 25u at bedtime admelog sliding scale while in hospital  - A1C 11.8  - Endocrinology consulted - rec Insulin for home but pt refusing insulin on discharge  Home meds will be: Metformin 500 2 tabs BID, Farxiga 10mg, Januvia 100mg  Follow up with outpatient Endocrinologist    # Hypothyroidism.   - TSH 2.46  - Continue Levothyroxine.    # Anxiety and depression.   - Continue Fluoxetine 20mg po qd  - Continue Clonazepam 1mg po qd.    # CAD  - Lipitor 80 mg HS  - Aspirin 81 mg       Discharge pending EP clearance. 78 year old male with  HTN, DMT2, Anxiety/depression, Hodgkin's Lymphoma, TAVR (2016), CAD s/p PCI, ICM/HFrEF s/p BiV-ICD, and VT s/p ablation (in [place]) who presented to OSH with generalized weakness admitted with VT requiring shock x2 by EMS now transferred to [facility] CICU for further management. Pt is s/p lidocaine gtt and started on sotalol. Now deemed hemodynamically stable and transferred to telemetry floor.    # Monomorphic ventricular tachycardia.   - He was on amiodarone for few months prior to VT ablation and was stopped shortly after ablation in 7/2022 due to GI complaint.  - s/p lidocaine gtt in CICU  - CRT-D interrogation showed patient is pacer dependent, had sustained monomorphic VT with average  bpm on 7/7 treated and terminated with ATP x 1. Also revealed he was in sustained slow monomorphic VT with average -165 bpm for about 1 hour and 36 minutes on 7/9/23 for which patient did not receive therapy due to below treatment zone.   - per EP reccs ; started on sotalol 80mg Q12hr ;  Post sotalol EKG 2 hours after each doses showed no QTc prolongation  - Started Mexiletine 150mg Q8hr to help shorten QTc  - Pt is on Fluoxetine which can prolong QTc,  monitored QTc - QTc stable  - ICD settings adjusted -  with additional VT zone added to allow for ATP therapy at 150 bpm and LDL increased from 60bpm to 70bpm    - Cardiac MRI noted with scarring involving the basal to mid inferior lateral walls    # HFrEF (heart failure with reduced ejection fraction).   - HF meds held on admission given RIANNA/hypotension requiring pressors now weaned off  - Restart Losartan 25mg daily, PO lasix 40mg resumed on discharge  -  TTE noted above, EF 45%    # CAD S/P percutaneous coronary angioplasty.   - Continue ASA, lipitor   - no longer taking Plavix  - Hx TAVR in 2016.      # Type 2 diabetes mellitus.   - holding home Farxiga  - HbA1c 11.8%  - FS initially in 400s, s/p insulin gtt at 5u/hr, s/p insulin IVP 5. anion gap WNL  - Lantus 25u at bedtime admelog sliding scale while in hospital  - A1C 11.8  - Endocrinology consulted - rec Insulin for home but pt refusing insulin on discharge  Home meds will be: Metformin 500 2 tabs BID, Farxiga 10mg, Januvia 100mg  Follow up with outpatient Endocrinologist    # Hypothyroidism.   - TSH 2.46  - Continue Levothyroxine.    # Anxiety and depression.   - Continue Fluoxetine 20mg po qd  - Continue Clonazepam 1mg po qd.    # CAD  - Lipitor 80 mg HS  - Aspirin 81 mg       Discharge pending EP clearance.

## 2023-07-13 NOTE — PHYSICAL THERAPY INITIAL EVALUATION ADULT - ADDITIONAL COMMENTS
Per patient, he lives in an apartment with his spouse. There is elevator access and patient has 10 stairs to get to his garage. Patient reports he has a first floor set up once in his apartment. Patient expresses prior to admission, he was driving and was independent in ambulation (occasional use of cane for community distances) as well as ADL's. Patient reports he owns a cane and rolling walker.

## 2023-07-13 NOTE — DISCHARGE NOTE PROVIDER - NSDCFUADDAPPT_GEN_ALL_CORE_FT
APPTS ARE READY TO BE MADE: [ ] YES    Best Family or Patient Contact (if needed):    Additional Information about above appointments (if needed):    1: pcp  2: EP , cardiology  3: Endo clinic    Other comments or requests:    APPTS ARE READY TO BE MADE: [x ] YES    Best Family or Patient Contact (if needed):    Additional Information about above appointments (if needed):    1: pcp  2: EP , cardiology  3: Endo clinic    Other comments or requests:    APPTS ARE READY TO BE MADE: [x ] YES    Best Family or Patient Contact (if needed):    Additional Information about above appointments (if needed):    1: pcp   2: EP Dr. Mcbride , cardiology  3: Endo clinic Dr. Lozoya    Other comments or requests:    APPTS ARE READY TO BE MADE: [x ] YES    Best Family or Patient Contact (if needed):    Additional Information about above appointments (if needed):    1: pcp   2: EP Dr. Mcbride , cardiology  3: Endo clinic Dr. Lozoya    Other comments or requests:     Patient was previously scheduled to see Dr. Goidnez at 10:30AM on 9/1 at [address] [place] [address]     Patient/Caregiver declined scheduling assistance as they prefer to coordinate their own care.  APPTS ARE READY TO BE MADE: [x ] YES    Best Family or Patient Contact (if needed):    Additional Information about above appointments (if needed):    1: pcp   2: EP Dr. Mcbride , cardiology  3: Endo clinic Dr. Lozoya    Other comments or requests:     Patient was previously scheduled to see Dr. Godinez at 10:30AM on 9/1 at [address] [place] [address]     Patient/Caregiver declined scheduling assistance as they prefer to coordinate their own care.     Patient was previously scheduled with Dr. Lozoya 10/6/23 1:40pm [address] [place] [address]

## 2023-07-13 NOTE — PROGRESS NOTE ADULT - PROBLEM SELECTOR PROBLEM 2
HFrEF (heart failure with reduced ejection fraction)
HFrEF (heart failure with reduced ejection fraction)
CAD S/P percutaneous coronary angioplasty
Hypothyroidism

## 2023-07-13 NOTE — PROGRESS NOTE ADULT - ASSESSMENT
Patient seen and examined, agree with above assessment and plan as transcribed above.    - D/C planned for today if ok with Dr. Rae Hinton MD, [facility]  BEEPER [phone]

## 2023-07-13 NOTE — PROGRESS NOTE ADULT - ASSESSMENT
78M  with  HTN, DMT2, Anxiety/depression, Hodgkin's Lymphoma, TAVR (2016), CAD s/p PCI, ICM/HFrEF s/p BiV-ICD, and VT s/p ablation (in [place]) who presented to OSH with generalized weakness admitted with VT requiring shock x2 by EMS now transferred to [facility] CICU for further management. Pt is s/p lidocaine gtt and started on sotalol. Now deemed hemodynamically stable and transferred to telemetry floor.

## 2023-07-13 NOTE — DISCHARGE NOTE PROVIDER - CARE PROVIDER_API CALL
Lobo Hinton  Cardiovascular Disease  [address]  [place] [address]  Phone: [phone]  Fax: [phone]  Established Patient  Follow Up Time:    Mikhail Mcbride  Cardiac Electrophysiology  [address], [address] [place] [address]  Phone: [phone]  Fax: [phone]  Follow Up Time: 1 week    Kerry Durham  Cardiovascular Disease  [address] [facility] [address], [address]  [place] [address]  Phone: [phone]  Fax: [phone]  Follow Up Time: 1 week    Luh Lozoya  Internal Medicine  [address] [facility] [facility] [address]  [place] [address]  Phone: [phone]  Fax: [phone]  Follow Up Time: 1 week

## 2023-07-13 NOTE — PROGRESS NOTE ADULT - PROBLEM SELECTOR PLAN 2
#HFrEF:  Restarted Losartan 25mg daily
CAD with SADE   - c/w ASA, lipitor   - no longer taking Plavix  - Hx TAVR in 2016
Hypothyroidism post radiation for Hodgkin's in 1987  Continue with Lt4 125 mcg daily. TSH is at goal
#HFrEF:  Restart Losartan 25mg daily

## 2023-07-13 NOTE — PROGRESS NOTE ADULT - SUBJECTIVE AND OBJECTIVE BOX
Subjective    Patient seen and examined at while OOB in a chair. Reports feeling well and was able to ambulate without symptoms.     Telemetry review: V-paced rhythm at 75bpm    Current Meds:  aspirin enteric coated 81 milliGRAM(s) Oral daily  atorvastatin 80 milliGRAM(s) Oral at bedtime  chlorhexidine 2% Cloths 1 Application(s) Topical at bedtime  clonazePAM  Tablet 1 milliGRAM(s) Oral daily  dextrose 50% Injectable 50 milliLiter(s) IV Push every 15 minutes  FLUoxetine 20 milliGRAM(s) Oral daily  heparin   Injectable 5000 Unit(s) SubCutaneous every 8 hours  insulin glargine Injectable (LANTUS) 27 Unit(s) SubCutaneous every morning  insulin lispro (ADMELOG) corrective regimen sliding scale   SubCutaneous three times a day before meals  insulin lispro (ADMELOG) corrective regimen sliding scale   SubCutaneous at bedtime  insulin lispro Injectable (ADMELOG) 2 Unit(s) SubCutaneous three times a day before meals  levothyroxine 125 MICROGram(s) Oral daily  losartan 25 milliGRAM(s) Oral daily  mexiletine 150 milliGRAM(s) Oral every 8 hours  sotalol. 80 milliGRAM(s) Oral every 12 hours      Vitals:  T(F): 97.6 (07-13), Max: 98.2 (07-12)  HR: 94 (07-13) (70 - 94)  BP: 117/53 (07-13) (96/53 - 148/70)  RR: 18 (07-13)  SpO2: 97% (07-13)  I&O's Summary    12 Jul 2023 07:01  -  13 Jul 2023 07:00  --------------------------------------------------------  IN: 240 mL / OUT: 0 mL / NET: 240 mL      Physical Exam:  General: No acute distress; well appearing  Eyes: PERRL, EOMI, pink conjunctiva  HEENT: Normal oral mucosa  Cardiovascular: RRR, S1, S2, no murmurs, rubs, or gallops; no edema; no JVD  Respiratory: Clear to auscultation bilaterally, speaking full sentences  Gastrointestinal: soft, non-tender, non-distended with normal bowel sounds  Extremities: 2+ pulses, no clubbing; no joint deformity, or edema  Neurologic: AAOx3,  Non-focal  Skin: No rashes, ecchymoses, or cyanosis                          11.6   8.08  )-----------( 141      ( 13 Jul 2023 06:20 )             35.2     07-13    138  |  105  |  14  ----------------------------<  139<H>  3.9   |  23  |  1.20    Ca    8.8      13 Jul 2023 06:20  Phos  2.5     07-12  Mg     2.1     07-12    TPro  6.0  /  Alb  3.7  /  TBili  0.5  /  DBili  x   /  AST  20  /  ALT  18  /  AlkPhos  55  07-12

## 2023-07-13 NOTE — DIETITIAN INITIAL EVALUATION ADULT - REASON INDICATOR FOR ASSESSMENT
Nutrition Consult for DM.   Source: Previous RD notes, Pt, Electronic Medical Record.   Chart reviewed, events noted.

## 2023-07-13 NOTE — DISCHARGE NOTE PROVIDER - NSDCCPCAREPLAN_GEN_ALL_CORE_FT
PRINCIPAL DISCHARGE DIAGNOSIS  Diagnosis: Monomorphic ventricular tachycardia  Assessment and Plan of Treatment: You were started on sotalol 80mg every 12 hours.  Your were started on Mexiletine 150mg every 8 hours to help with heart rhythym. Your ICD settings were adjusted   Seek medical help if you witness any chest pain, Shortness of breath, or palpitations         SECONDARY DISCHARGE DIAGNOSES  Diagnosis: CAD S/P percutaneous coronary angioplasty  Assessment and Plan of Treatment: Continue Aspirin and Lipitor  Coronary artery disease is a condition where the arteries the supply the heart muscle get clogged with fatty deposits & puts you at risk for a heart attack.  Call your doctor if you have any new pain, pressure, or discomfort in the center of your chest, pain, tingling or discomfort in arms, back, neck, jaw, or stomach, shortness of breath, nausea, vomiting, burping or heartburn, sweating, cold and clammy skin, racing or abnormal heartbeat for more than 10 minutes or if they keep coming & going.  Call 911 and do not try to get to hospital by car.  You can help yourself with lifestyle changes (quitting smoking if you If you are on medication to help you quit smoking, be sure to take it as prescribed. Find healthy ways to deal with stress, such as exercise (check with your healthcare provider first), deep breathing, meditation, or enjoyable healthy hobbies.  Avoid situations that may cause you to smoke a cigarette.  Look for help with quitting; you are not alone. A resource to help you stop smoking is the [facility] – phone number 160-690-7262.), eat lots of fruits & vegetables & low fat dairy products, not a lot of meat & fatty foods, walk or some form of physical activity most days of the week, lose weight if you are overweight.  Take your cardiac medication as prescribed to lower cholesterol, to lower blood pressure, and control your blood sugar.      Diagnosis: HFrEF (heart failure with reduced ejection fraction)  Assessment and Plan of Treatment: You were restarted on Losartan 25mg daily.  Seek medical help if you witness any chest pain, Shortness of breath, or palpitations    Diagnosis: Hypothyroidism  Assessment and Plan of Treatment: Continue Levothyroxine.      Diagnosis: Type 2 diabetes mellitus  Assessment and Plan of Treatment: Endocrinology was consulted for managing your diabetes.  Home meds will be: Metformin 500 2 tabs BID, Farxiga 10mg, Januvia 100mg  Follow up with outpatient Endocrinologist  # Hypothyroidism.   - TSH 2.46  - Continue Levothyroxine.  # Anxiety and depression.   - Continue Fluoxetine 20mg po qd  - Continue Clonazepam 1mg po qd.     PRINCIPAL DISCHARGE DIAGNOSIS  Diagnosis: Monomorphic ventricular tachycardia  Assessment and Plan of Treatment: You were started on sotalol 80mg every 12 hours.  Your were started on Mexiletine 150mg every 8 hours to help with heart rhythym. Your ICD settings were adjusted   Seek medical help if you witness any chest pain, Shortness of breath, nausea/vomiting, dizziness, or palpitations         SECONDARY DISCHARGE DIAGNOSES  Diagnosis: CAD S/P percutaneous coronary angioplasty  Assessment and Plan of Treatment: Continue Aspirin and Lipitor  Coronary artery disease is a condition where the arteries the supply the heart muscle get clogged with fatty deposits & puts you at risk for a heart attack.  Call your doctor if you have any new pain, pressure, or discomfort in the center of your chest, pain, tingling or discomfort in arms, back, neck, jaw, or stomach, shortness of breath, nausea, vomiting, burping or heartburn, sweating, cold and clammy skin, racing or abnormal heartbeat for more than 10 minutes or if they keep coming & going.  Call 911 and do not try to get to hospital by car.  You can help yourself with lifestyle changes (quitting smoking if you If you are on medication to help you quit smoking, be sure to take it as prescribed. Find healthy ways to deal with stress, such as exercise (check with your healthcare provider first), deep breathing, meditation, or enjoyable healthy hobbies.  Avoid situations that may cause you to smoke a cigarette.  Look for help with quitting; you are not alone. A resource to help you stop smoking is the [facility] – phone number 290-907-1155.), eat lots of fruits & vegetables & low fat dairy products, not a lot of meat & fatty foods, walk or some form of physical activity most days of the week, lose weight if you are overweight.  Take your cardiac medication as prescribed to lower cholesterol, to lower blood pressure, and control your blood sugar.      Diagnosis: HFrEF (heart failure with reduced ejection fraction)  Assessment and Plan of Treatment: Your meds were  held on admission given your reduced kidney function and low blood pressure. Now improving  - Restarted Losartan 25mg daily, PO lasix 40mg resumed on discharge  -  Your echo noted above, EF 45%   Weigh yourself daily.  If you gain 3lbs in 3 days, or 5lbs in a week call your Health Care Provider.  Eat a low sodium diet.  If you have pulmonary hypertension and you are a female of child bearing age, talk to your caregiver about using birth control pills or getting pregnant.  Call your Health Care Provider if you have any swelling or increased swelling in your feet, ankles, and/or stomach.  If you experience dizziness, chest pain, or shortness of breath, seek immediate medical attention.  Seek medical help if you witness any chest pain, Shortness of breath, or palpitations    Diagnosis: Hypothyroidism  Assessment and Plan of Treatment: Continue Levothyroxine.  Follow up with primary care doctor  you do not make enough thyroid hormone  signs & symptoms of low levels of thyroid hormone - tired, getting cold easily, coarse or thin hair, constipation, shortness of breath, swelling, irregular periods  your doctor will do thyroid hormone blood tests at least once a year to monitor if medication dose is adequate  take your thyroid medicine as directed by your doctor & on empty stomach      Diagnosis: Anxiety and depression  Assessment and Plan of Treatment: - Continue Fluoxetine 20mg every day  - Continue Clonazepam 1mg every day.  Follow up with primary care doctor    Diagnosis: Type 2 diabetes mellitus  Assessment and Plan of Treatment: Endocrinology was consulted for managing your diabetes.  Home meds will be: Metformin 500 2 tabs BID, Farxiga 10mg, Januvia 100mg  Follow up with outpatient Endocrinologist. You need close follow up with endocronologist because your blood sugars are uncontrolled.  HgA1c this admission was 11.7  Make sure you get your HgA1c checked every three months.  If you take oral diabetes medications, check your blood glucose at least two times a day.  If you take short-acting insulin, check your blood glucose before meals and at bedtime.  It's important not to skip any meals.  Keep a log of your blood glucose results and always take it with you to your doctor appointments.  Keep a list of your current medications including over the counter medications and bring this medication list with you to all your doctor appointments.  If you have not seen your ophthalmologist this year, call for appointment.  Check your feet daily for redness, sores, or openings.  Do not self treat.  If there is no improvement in two days, call your primary care physician for an appointment.

## 2023-07-14 LAB
CULTURE RESULTS: SIGNIFICANT CHANGE UP
CULTURE RESULTS: SIGNIFICANT CHANGE UP
SPECIMEN SOURCE: SIGNIFICANT CHANGE UP
SPECIMEN SOURCE: SIGNIFICANT CHANGE UP

## 2023-07-21 RX ORDER — CHOLECALCIFEROL (VITAMIN D3) 10(400)/ML
DROPS ORAL
Qty: 1 | Refills: 3 | Status: ACTIVE | COMMUNITY
Start: 2023-07-21 | End: 1900-01-01

## 2023-07-21 RX ORDER — LANCING DEVICE
EACH MISCELLANEOUS
Qty: 1 | Refills: 4 | Status: ACTIVE | COMMUNITY
Start: 2023-07-21 | End: 1900-01-01

## 2023-07-26 NOTE — HISTORY OF PRESENT ILLNESS
[FreeTextEntry1] : Al Hutton is a 79y/o man with Hx of Hodgkins lymphoma, HTN, DM, HLD, anxiety/ depression, Hodgkin's lymphoma, severe AS s/p TAVR (2016), chronic systolic CHF with EF 34%, LBBB (EF improved to 40-45% with CRT-D and AVR), and MI s/p BiV ICD and paroxysmal afib (very brief, not on AC), s/p VT ablation ( in [place]),recently hosp with VT requiring shocks x2 who presents today for routine device check and follow up.He was on amiodarone for few months prior to VT ablation and was stopped shortly after ablation in 7/2022 due to GI complaint. Device interrogation showed sustained .....\par \par ..... Had an episode of VT requiring ICD shock on 5/13/2021. Recalls being in bathroom when he felt the shock. Denies any symptoms at time. No missed medication dosing. Believes he had ECHO with LVEF 40% and stress test. Was hospitalized in [place] in the spring of 2019 for pericardial effusion and 900cc of blood was aspirated. Now off Plavix and only on ASA. Was also admitted to [facility] in late May 2019 for acute on chronic systolic CHF. He does note dyspnea at times when on incline, but not on flat surface. Had VT ablation for recurrent VT.

## 2023-07-26 NOTE — DISCUSSION/SUMMARY
[EKG obtained to assist in diagnosis and management of assessed problem(s)] : EKG obtained to assist in diagnosis and management of assessed problem(s) [FreeTextEntry1] : \par 1. VT. Patient underwent VT ablation for recurrent VT.  Today's ECG performed to check for VT showed sinus rhythm with atrial sensing and ventricular pacing. \par \par 2. Chronic systolic CHF:  continue optimal medical therapy and patient has CRT device. On metoprolol and Losartan. Also taking furosemide. \par \par 3. DM: on Actos\par \par 4. HTN: continue metoprolol and losartan. \par \par 5. Hx of severe AS/TAVR:

## 2023-07-26 NOTE — HISTORY OF PRESENT ILLNESS
[FreeTextEntry1] : Al Hutton is a 79y/o man with Hx of Hodgkins lymphoma, HTN, DM, HLD, anxiety/ depression, Hodgkin's lymphoma, severe AS s/p TAVR (2016), chronic systolic CHF with EF 34%, LBBB (EF improved to 40-45% with CRT-D and AVR), and MI s/p BiV ICD and paroxysmal afib (very brief, not on AC), s/p VT ablation ( in [place]),recently hosp with VT requiring shocks x2 who presents today for routine device check and follow up.He was on amiodarone for few months prior to VT ablation and was stopped shortly after ablation in 7/2022 due to GI complaint. Device interrogation showed sustained .....\par \par ..... Had an episode of VT requiring ICD shock on 5/13/2021. Recalls being in bathroom when he felt the shock. Denies any symptoms at time. No missed medication dosing. Believes he had ECHO with LVEF 40% and stress test. Was hospitalized in [place] in the spring of 2019 for pericardial effusion and 900cc of blood was aspirated. Now off Plavix and only on ASA. Was also admitted to [facility] in late May 2019 for acute on chronic systolic CHF. He does note dyspnea at times when on incline, but not on flat surface. Had VT ablation for recurrent VT.

## 2023-07-26 NOTE — HISTORY OF PRESENT ILLNESS
[FreeTextEntry1] : Al Hutton is a 79y/o man with Hx of Hodgkins lymphoma, HTN, DM, HLD, anxiety/ depression, Hodgkin's lymphoma, severe AS s/p TAVR (2016), chronic systolic CHF with EF 34%, LBBB (EF improved to 40-45% with CRT-D and AVR), and MI s/p BiV ICD and paroxysmal afib (very brief, not on AC), s/p VT ablation ( in [place]),recently hosp with VT requiring shocks x2 who presents today for routine device check and follow up.He was on amiodarone for few months prior to VT ablation and was stopped shortly after ablation in 7/2022 due to GI complaint. Device interrogation showed sustained .....\par \par ..... Had an episode of VT requiring ICD shock on 5/13/2021. Recalls being in bathroom when he felt the shock. Denies any symptoms at time. No missed medication dosing. Believes he had ECHO with LVEF 40% and stress test. Was hospitalized in [place] in the spring of 2019 for pericardial effusion and 900cc of blood was aspirated. Now off Plavix and only on ASA. Was also admitted to [facility] in late May 2019 for acute on chronic systolic CHF. He does note dyspnea at times when on incline, but not on flat surface. Had VT ablation for recurrent VT.

## 2023-07-28 ENCOUNTER — APPOINTMENT (OUTPATIENT)
Dept: ELECTROPHYSIOLOGY | Facility: CLINIC | Age: 78
End: 2023-07-28

## 2023-08-08 RX ORDER — PANTOPRAZOLE SODIUM 40 MG/1
40 TABLET, DELAYED RELEASE ORAL
Refills: 0 | Status: DISCONTINUED | COMMUNITY
Start: 2023-06-30 | End: 2023-08-08

## 2023-08-08 RX ORDER — SITAGLIPTIN 100 MG/1
100 TABLET, FILM COATED ORAL
Refills: 0 | Status: ACTIVE | COMMUNITY
Start: 2023-08-08

## 2023-08-08 RX ORDER — METOPROLOL SUCCINATE 200 MG/1
200 TABLET, EXTENDED RELEASE ORAL
Qty: 90 | Refills: 3 | Status: DISCONTINUED | COMMUNITY
Start: 2021-02-17 | End: 2023-08-08

## 2023-08-08 RX ORDER — METFORMIN HYDROCHLORIDE 500 MG/1
500 TABLET, COATED ORAL
Refills: 0 | Status: ACTIVE | COMMUNITY
Start: 2023-08-08

## 2023-08-11 ENCOUNTER — APPOINTMENT (OUTPATIENT)
Dept: ELECTROPHYSIOLOGY | Facility: CLINIC | Age: 78
End: 2023-08-11
Payer: MEDICARE

## 2023-08-11 ENCOUNTER — NON-APPOINTMENT (OUTPATIENT)
Age: 78
End: 2023-08-11

## 2023-08-11 ENCOUNTER — APPOINTMENT (OUTPATIENT)
Dept: ELECTROPHYSIOLOGY | Facility: CLINIC | Age: 78
End: 2023-08-11

## 2023-08-11 VITALS
OXYGEN SATURATION: 97 % | DIASTOLIC BLOOD PRESSURE: 55 MMHG | TEMPERATURE: 97.4 F | RESPIRATION RATE: 16 BRPM | HEART RATE: 79 BPM | SYSTOLIC BLOOD PRESSURE: 126 MMHG

## 2023-08-11 DIAGNOSIS — I50.22 CHRONIC SYSTOLIC (CONGESTIVE) HEART FAILURE: ICD-10-CM

## 2023-08-11 DIAGNOSIS — Z95.810 PRESENCE OF AUTOMATIC (IMPLANTABLE) CARDIAC DEFIBRILLATOR: ICD-10-CM

## 2023-08-11 PROCEDURE — 99213 OFFICE O/P EST LOW 20 MIN: CPT

## 2023-08-11 PROCEDURE — 93000 ELECTROCARDIOGRAM COMPLETE: CPT

## 2023-08-11 RX ORDER — MEXILETINE HYDROCHLORIDE 150 MG/1
150 CAPSULE ORAL 3 TIMES DAILY
Qty: 270 | Refills: 1 | Status: ACTIVE | COMMUNITY
Start: 2023-08-08 | End: 1900-01-01

## 2023-08-11 NOTE — PHYSICAL EXAM
[Well Developed] : well developed [Well Nourished] : well nourished [No Acute Distress] : no acute distress [Normal Conjunctiva] : normal conjunctiva [Normal Venous Pressure] : normal venous pressure [No Carotid Bruit] : no carotid bruit [Normal S1, S2] : normal S1, S2 [No Rub] : no rub [No Murmur] : no murmur [No Gallop] : no gallop [Clear Lung Fields] : clear lung fields [Good Air Entry] : good air entry [No Respiratory Distress] : no respiratory distress  [Soft] : abdomen soft [Non Tender] : non-tender [Normal Bowel Sounds] : normal bowel sounds [No Masses/organomegaly] : no masses/organomegaly [Normal Gait] : normal gait [No Edema] : no edema [No Cyanosis] : no cyanosis [No Clubbing] : no clubbing [No Varicosities] : no varicosities [No Rash] : no rash [No Skin Lesions] : no skin lesions [Moves all extremities] : moves all extremities [No Focal Deficits] : no focal deficits [Normal Speech] : normal speech [Alert and Oriented] : alert and oriented [Normal memory] : normal memory [Normal] : normal S1, S2, no murmur, no rub, no gallop

## 2023-08-11 NOTE — PHYSICAL EXAM
[Well Developed] : well developed [Well Nourished] : well nourished [No Acute Distress] : no acute distress [Normal Conjunctiva] : normal conjunctiva [Normal Venous Pressure] : normal venous pressure [No Carotid Bruit] : no carotid bruit [Normal S1, S2] : normal S1, S2 [No Rub] : no rub [No Murmur] : no murmur [No Gallop] : no gallop [Clear Lung Fields] : clear lung fields [Good Air Entry] : good air entry [No Respiratory Distress] : no respiratory distress  [Soft] : abdomen soft [Non Tender] : non-tender [No Masses/organomegaly] : no masses/organomegaly [Normal Bowel Sounds] : normal bowel sounds [Normal Gait] : normal gait [No Edema] : no edema [No Cyanosis] : no cyanosis [No Clubbing] : no clubbing [No Varicosities] : no varicosities [No Rash] : no rash [No Skin Lesions] : no skin lesions [Moves all extremities] : moves all extremities [No Focal Deficits] : no focal deficits [Normal Speech] : normal speech [Alert and Oriented] : alert and oriented [Normal memory] : normal memory [Normal] : normal S1, S2, no murmur, no rub, no gallop

## 2023-08-11 NOTE — HISTORY OF PRESENT ILLNESS
[FreeTextEntry1] : Al Hutton is a 77y/o man with Hx of Hodgkins lymphoma, HTN, DM, HLD, severe AS s/p TAVR (2016), chronic systolic CHF with EF 34%, LBBB (EF improved to 40-45% with CRT-D and AVR), and MI s/p BiV ICD and paroxysmal afib (very brief, not on AC) who presents today for because of recent VT. He s/p discharge from [facility], initiated on Sotalol, Mexiletine TID, as well as Januvia and metformin (also still on Farxiga). having upper back pain as well as nausea/loose stool, overall, not feeling well. Unsure if secondary to medication. given started multiple medications at once, not sure which could be causing symptoms. He feels SOB/TALBERT. His upper midback pain begins after eating, possibly due to GI issue.   Device is interrogated today. Feeling well now. Denies chest pain, palpitations syncope.

## 2023-08-11 NOTE — DISCUSSION/SUMMARY
[FreeTextEntry1] : 1. VT. Patient underwent VT ablation for recurrent VT while in [place] and developed recurrent VT.  Then had recurrence of VT recently necessitating addition of sotalol and mexiletine. Today's ECG performed to check for VT showed sinus rhythm with atrial sensing and ventricular pacing. QTc long, but mostly due to conduction defect due to biV pacing. Would continue sotalol and mexiletine for now.   2. Chronic systolic CHF:  continue optimal medical therapy and patient has CRT device. On sotalol and Losartan. Also taking furosemide. Referring back to Dr. Durham for further management of heart failure.   3. DM: on Actos  4. HTN: continue metoprolol and losartan.   5. Hx of severe AS/TAVR:   6. Back pain: discussed case with Dr. Durham who will perform a stress test.  [EKG obtained to assist in diagnosis and management of assessed problem(s)] : EKG obtained to assist in diagnosis and management of assessed problem(s)

## 2023-08-11 NOTE — HISTORY OF PRESENT ILLNESS
[FreeTextEntry1] : Al Hutton is a 79y/o man with Hx of Hodgkins lymphoma, HTN, DM, HLD, severe AS s/p TAVR (2016), chronic systolic CHF with EF 34%, LBBB (EF improved to 40-45% with CRT-D and AVR), and MI s/p BiV ICD and paroxysmal afib (very brief, not on AC) who presents today for because of recent VT. He s/p discharge from [facility], initiated on Sotalol, Mexiletine TID, as well as Januvia and metformin (also still on Farxiga). having upper back pain as well as nausea/loose stool, overall, not feeling well. Unsure if secondary to medication. given started multiple medications at once, not sure which could be causing symptoms. He feels SOB/TALBERT. His upper midback pain begins after eating, possibly due to GI issue.   Device is interrogated today. Feeling well now. Denies chest pain, palpitations syncope.

## 2023-08-14 RX ORDER — SOTALOL HYDROCHLORIDE 80 MG/1
80 TABLET ORAL
Qty: 180 | Refills: 1 | Status: ACTIVE | COMMUNITY
Start: 2023-08-08 | End: 1900-01-01

## 2023-08-17 ENCOUNTER — APPOINTMENT (OUTPATIENT)
Dept: INTERNAL MEDICINE | Facility: CLINIC | Age: 78
End: 2023-08-17

## 2023-09-01 ENCOUNTER — APPOINTMENT (OUTPATIENT)
Dept: ELECTROPHYSIOLOGY | Facility: CLINIC | Age: 78
End: 2023-09-01

## 2023-09-07 ENCOUNTER — APPOINTMENT (OUTPATIENT)
Dept: ENDOCRINOLOGY | Facility: CLINIC | Age: 78
End: 2023-09-07

## 2023-09-11 ENCOUNTER — APPOINTMENT (OUTPATIENT)
Dept: ENDOCRINOLOGY | Facility: CLINIC | Age: 78
End: 2023-09-11
Payer: MEDICARE

## 2023-09-11 VITALS
WEIGHT: 160 LBS | DIASTOLIC BLOOD PRESSURE: 60 MMHG | SYSTOLIC BLOOD PRESSURE: 120 MMHG | OXYGEN SATURATION: 98 % | BODY MASS INDEX: 24.25 KG/M2 | HEIGHT: 68 IN | HEART RATE: 71 BPM

## 2023-09-11 DIAGNOSIS — E11.9 TYPE 2 DIABETES MELLITUS W/OUT COMPLICATIONS: ICD-10-CM

## 2023-09-11 DIAGNOSIS — E03.9 HYPOTHYROIDISM, UNSPECIFIED: ICD-10-CM

## 2023-09-11 DIAGNOSIS — E78.5 HYPERLIPIDEMIA, UNSPECIFIED: ICD-10-CM

## 2023-09-11 DIAGNOSIS — I10 ESSENTIAL (PRIMARY) HYPERTENSION: ICD-10-CM

## 2023-09-11 PROCEDURE — 83036 HEMOGLOBIN GLYCOSYLATED A1C: CPT | Mod: QW

## 2023-09-11 PROCEDURE — 99215 OFFICE O/P EST HI 40 MIN: CPT

## 2023-09-11 PROCEDURE — 82962 GLUCOSE BLOOD TEST: CPT

## 2023-09-11 RX ORDER — METFORMIN HYDROCHLORIDE 500 MG/1
500 TABLET, COATED ORAL
Qty: 360 | Refills: 2 | Status: ACTIVE | COMMUNITY
Start: 2023-09-11 | End: 1900-01-01

## 2023-09-11 RX ORDER — LANCING DEVICE
EACH MISCELLANEOUS
Qty: 2 | Refills: 4 | Status: ACTIVE | COMMUNITY
Start: 2023-09-11 | End: 1900-01-01

## 2023-09-11 RX ORDER — SITAGLIPTIN 100 MG/1
100 TABLET, FILM COATED ORAL
Qty: 90 | Refills: 3 | Status: ACTIVE | COMMUNITY
Start: 2023-09-11 | End: 1900-01-01

## 2023-09-12 LAB
ANION GAP SERPL CALC-SCNC: 18 MMOL/L
BUN SERPL-MCNC: 22 MG/DL
CALCIUM SERPL-MCNC: 8.5 MG/DL
CHLORIDE SERPL-SCNC: 97 MMOL/L
CO2 SERPL-SCNC: 24 MMOL/L
CREAT SERPL-MCNC: 1.45 MG/DL
CREAT SPEC-SCNC: 55 MG/DL
EGFR: 49 ML/MIN/1.73M2
GLUCOSE BLDC GLUCOMTR-MCNC: 160
GLUCOSE SERPL-MCNC: 144 MG/DL
HBA1C MFR BLD HPLC: 6.5
MICROALBUMIN 24H UR DL<=1MG/L-MCNC: <1.2 MG/DL
MICROALBUMIN/CREAT 24H UR-RTO: NORMAL MG/G
POTASSIUM SERPL-SCNC: 4.1 MMOL/L
SODIUM SERPL-SCNC: 139 MMOL/L

## 2023-10-09 RX ORDER — BLOOD SUGAR DIAGNOSTIC
STRIP MISCELLANEOUS
Qty: 4 | Refills: 4 | Status: ACTIVE | COMMUNITY
Start: 2023-10-09 | End: 1900-01-01

## 2023-10-31 RX ORDER — LANCETS
EACH MISCELLANEOUS
Qty: 2 | Refills: 2 | Status: ACTIVE | COMMUNITY
Start: 2023-10-09 | End: 1900-01-01

## 2024-01-12 ENCOUNTER — APPOINTMENT (OUTPATIENT)
Dept: ENDOCRINOLOGY | Facility: CLINIC | Age: 79
End: 2024-01-12

## 2024-02-13 NOTE — ASU PREOP CHECKLIST - PATIENT'S PERSONAL PROPERTY GIVEN TO
"Endoscopy Scheduling Screen    Have you had a positive Covid test in the last 14 days?  No    Are you active on MyChart?   Yes    What insurance is in the chart?  Other:  [employer]    Ordering/Referring Provider: Lexi Forrester MD   (If ordering provider performs procedure, schedule with ordering provider unless otherwise instructed. )    BMI: Estimated body mass index is 26.65 kg/m  as calculated from the following:    Height as of 1/24/24: 1.708 m (5' 7.25\").    Weight as of 1/24/24: 77.7 kg (171 lb 6.4 oz).     Sedation Ordered  moderate sedation.   If patient BMI > 50 do not schedule in [facility].    If patient BMI > 45 do not schedule at [facility].    Are you taking methadone or Suboxone?  No    Have you had difficulties, pain, or discomfort during past endoscopy procedures?  No    Are you taking any prescription medications for pain 3 or more times per week?   NO - No RN review required.    Do you have a history of malignant hyperthermia or adverse reaction to anesthesia?  No    (Females) Are you currently pregnant?   No     Have you been diagnosed or told you have pulmonary hypertension?   No    Do you have an LVAD?  No    Have you been told you have moderate to severe sleep apnea?  No    Have you been told you have COPD, asthma, or any other lung disease?  No    Do you have any heart conditions?  No     Have you ever had an organ transplant?   No    Have you ever had or are you awaiting a heart or lung transplant?   No    Have you had a stroke or transient ischemic attack (TIA aka \"mini stroke\" in the last 6 months?   No    Have you been diagnosed with or been told you have cirrhosis of the liver?   No    Are you currently on dialysis?   No    Do you need assistance transferring?   No    BMI: Estimated body mass index is 26.65 kg/m  as calculated from the following:    Height as of 1/24/24: 1.708 m (5' 7.25\").    Weight as of 1/24/24: 77.7 kg (171 lb 6.4 oz).     Is patients BMI > 40 and scheduling " location [facility]?  No    Do you take an injectable medication for weight loss or diabetes (excluding insulin)?  No    Do you take the medication Naltrexone?  No    Do you take blood thinners?  No       Prep   Are you currently on dialysis or do you have chronic kidney disease?  No    Do you have a diagnosis of diabetes?  No    Do you have a diagnosis of cystic fibrosis (CF)?  No    On a regular basis do you go 3 -5 days between bowel movements?  No    BMI > 40?  No    Preferred Pharmacy:        [facility]/pharmacy #[id number] [facility] [address] [place] [address]  Phone: [phone] Fax: [phone]      Final Scheduling Details   Colonoscopy prep sent?  N/A    Procedure scheduled  Colonoscopy    Surgeon:  Gavin    Date of procedure:  05/09/2024     Pre-OP / PAC:   No - Not required for this site.    Location  [facility] - Patient preference.    Sedation   Moderate Sedation - Per order.      Patient Reminders:   You will receive a call from a Nurse to review instructions and health history.  This assessment must be completed prior to your procedure.  Failure to complete the Nurse assessment may result in the procedure being cancelled.      On the day of your procedure, please designate an adult(s) who can drive you home stay with you for the next 24 hours. The medicines used in the exam will make you sleepy. You will not be able to drive.      You cannot take public transportation, ride share services, or non-medical taxi service without a responsible caregiver.  Medical transport services are allowed with the requirement that a responsible caregiver will receive you at your destination.  We require that drivers and caregivers are confirmed prior to your procedure.   family member

## 2024-03-27 NOTE — PROVIDER CONTACT NOTE (CRITICAL VALUE NOTIFICATION) - NS PROVIDER READ BACK TO LAB
Patient has Abnormal Magnesium: hypomagnesemia. Will continue to monitor electrolytes closely. Will replace the affected electrolytes and repeat labs to be done after interventions completed. The patient's magnesium results have been reviewed and are listed below.  Recent Labs   Lab 03/27/24  0342   MG 1.5*      IV mag given, went down again on 3rd day to 1.5 mg due to continued vomiting. Gave IV mag again today 3/27.   yes

## 2024-04-21 NOTE — DIETITIAN INITIAL EVALUATION ADULT. - PERTINENT MEDS FT
88
MEDICATIONS  (STANDING):  aspirin  chewable 81 milliGRAM(s) Oral daily  atorvastatin 80 milliGRAM(s) Oral at bedtime  cefTRIAXone   IVPB 1000 milliGRAM(s) IV Intermittent every 24 hours  clonazePAM  Tablet 1 milliGRAM(s) Oral daily  dextrose 50% Injectable 12.5 Gram(s) IV Push once  dextrose 50% Injectable 25 Gram(s) IV Push once  dextrose 50% Injectable 25 Gram(s) IV Push once  FLUoxetine 20 milliGRAM(s) Oral daily  heparin  Injectable 5000 Unit(s) SubCutaneous every 8 hours  insulin lispro (HumaLOG) corrective regimen sliding scale   SubCutaneous three times a day before meals  insulin lispro (HumaLOG) corrective regimen sliding scale   SubCutaneous at bedtime  levothyroxine 125 MICROGram(s) Oral daily  metoprolol tartrate 25 milliGRAM(s) Oral two times a day  metroNIDAZOLE  IVPB 500 milliGRAM(s) IV Intermittent every 8 hours  potassium phosphate IVPB 15 milliMole(s) IV Intermittent once  senna 2 Tablet(s) Oral daily

## 2024-06-12 NOTE — CONSULT NOTE ADULT - CONSULT REQUESTED DATE/TIME
Forms/Letter Request    Type of form/letter: Nursing Home/Assisted Living Orders      Do we have the form/letter: Yes    Who is the form from? Home care    Where did/will the form come from? form was faxed in    When is form/letter needed by: at your earliest time     How would you like the form/letter returned: Fax : [phone]    Patient Notified form requests are processed in 5-7 business days:No    Could we send this information to you in [employer] or would you prefer to receive a phone call?:   Patient would like to be contacted via [employer]   
29-May-2019 13:38
28-May-2019 12:00

## 2024-08-14 NOTE — ED PROVIDER NOTE - HIV OFFER
Width (cm) 1.7 cm 08/14/24 1407   Post-Procedure Depth (cm) 0.2 cm 08/14/24 1407   Post-Procedure Surface Area (cm^2) 3.57 cm^2 08/14/24 1407   Post-Procedure Volume (cm^3) 0.714 cm^3 08/14/24 1407   Wound Assessment Granulation tissue;Hyper granulation tissue;Slough 08/14/24 1349   Drainage Amount Small (< 25%) 08/14/24 1349   Drainage Description Serosanguinous;Thick 08/14/24 1349   Odor None 08/14/24 1349   Sameera-wound Assessment Dry/flaky;Fragile 08/14/24 1349   Margins Attached edges 08/14/24 1349   Wound Thickness Description not for Pressure Injury Full thickness 08/14/24 1349   Number of days: 20          Total Surface Area Debrided:  3.57 sq cm     Estimated Blood Loss:  Minimal    Hemostasis Achieved:  by pressure    Procedural Pain:  0  / 10     Post Procedural Pain:  0 / 10     Response to treatment:  Well tolerated by patient.       Plan:   - Discussed my physical findings with the patient. Informed patient he is at risk of wound complication including poor/delay wound healing due to the extent of the wound and his lower extremity edema. Informed patient that controlling the swelling to his legs is essential to help promote wound healing. Patient reported he has compression stockings at home, but they too tight and hard for him to put on. Provided patient with SpandiGrip stockings to help control edema.     Treatment Note please see attached Discharge Instructions    Written patient dismissal instructions given to patient and signed by patient or POA.           Patient Instructions   [facility] Wound Care Physician Orders and Discharge Instructions  [facility]  [address], [address]  [place] [address]  Telephone: [phone]      FAX [phone]  MONDAY - THURSDAY 8:00 am - 4:30 pm and Friday 8:00 am - 12:00 pm.        NAME:  Jarred Suarez  YOB: 1936  MEDICAL RECORD NUMBER:  [id number]  DATE:  8/14/2024      Return Appointment:  [x] Return  Opt out

## 2024-10-21 NOTE — CONSULT NOTE ADULT - ASSESSMENT
75 M hx of  PMH of MI s/p PPM, AS s/p TAVR 2016, CHF w/ BiV AICD, cholelithiasis, anxiety, hypothyroidism, DM, Hodgkin lymphoma s/p radiation and chemo now in remission with left lung base radiation fibrosis, b/l carotid stenosis s/p carotid endarterectomy presenting due to AICD firing since 4d ago  RIANNA following the cath and elevated wedge     1 Renal-Agree with IV lasix       75 M hx of  PMH of MI s/p PPM, AS s/p TAVR 2016, CHF w/ BiV AICD, cholelithiasis, anxiety, hypothyroidism, DM, Hodgkin lymphoma s/p radiation and chemo now in remission with left lung base radiation fibrosis, b/l carotid stenosis s/p carotid endarterectomy presenting due to AICD firing since 4d ago  RIANNA following the cath and elevated wedge     1 Renal-Agree with IV lasix but will increase to bid for increase diuresis.  He is in RIANNA but absolutely needs aggressive diuresis for optimization for cardiac cath  2 CVS-For now tentative date is slated for Monday.  At this point will get more dye then yesterday   3 EPS-On Amiodarone for AICD shock    before dc will increase the Cozaar.  No reason to stop at present     [facility]   [phone]  21-Oct-2024 06:32

## 2024-11-21 NOTE — ED ADULT NURSE NOTE - ALCOHOL PRE SCREEN (AUDIT - C)
Airway       Patient location during procedure: OR       Procedure Start/Stop Times: 11/21/2024 9:03 AM  Staff -        CRNA: Ron Anthony APRN CRNA       Performed By: CRNA  Consent for Airway        Urgency: elective  Indications and Patient Condition       Indications for airway management: herb-procedural       Induction type:intravenous       Mask difficulty assessment: 1 - vent by mask    Final Airway Details       Final airway type: endotracheal airway       Successful airway: ETT - single and Oral  Endotracheal Airway Details        ETT size (mm): 7.5       Cuffed: yes       Successful intubation technique: direct laryngoscopy       DL Blade Type: Aragon 2       Adjucts: stylet       Position: Right       Measured from: gums/teeth       Secured at (cm): 24       Bite block used: Oral Airway (At end of case)    Post intubation assessment        Placement verified by: capnometry, equal breath sounds and chest rise        Number of attempts at approach: 1       Number of other approaches attempted: 0       Secured with: tape       Ease of procedure: easy       Dentition: Intact and Unchanged    Medication(s) Administered   Medication Administration Time: 11/21/2024 9:03 AM         Statement Selected

## 2025-01-08 NOTE — PATIENT PROFILE ADULT - NSPROMUTINFOINDIVIDFT_GEN_A_NUR
Patient is a 79yo male who presented with worsening right foot wound with ischemic changes and findings concerning for wet gangrene. For his left foot wound, He is now s/p LLE agram, shockwave CFA, SFA, TPT, PT via R groin puncture. He is hemodynamically stable.     -Plan for OR today for RLE AKA. Unlikely to heal BKA given occluded SFA stents.   -NPO for OR  -Trend WBC and fever curve  -Continue Zosyn  -pain and nausea control as needed  -rest of care per primary team  -Continue ASA, Plavix, statin  -Continue to hold losartan, Jardiance, lasix for OR  -APS consult for pain control post-op   NA

## 2025-05-08 NOTE — PATIENT PROFILE ADULT - OVER THE PAST TWO WEEKS, HAVE YOU FELT LITTLE INTEREST OR PLEASURE IN DOING THINGS?
----- Message from Yinka Smart MD sent at 5/8/2025 10:06 AM CDT -----  Thanks dude!Team, let's hold off on IR biopsy.  ----- Message -----  From: Gregg Huff MD  Sent: 5/8/2025   9:38 AM CDT  To: Yinka Smart MD; VERA Wallace, Can you schedule with me or raine. Next available. D   no

## 2025-05-09 NOTE — DISCHARGE NOTE NURSING/CASE MANAGEMENT/SOCIAL WORK - NSDCFUADDAPPT_GEN_ALL_CORE_FT
Follow up with EP Dr. Godinez 7/13/21 at 8:00AM.   Follow up with cardiologist Dr. Durham 6/2/21 at 9:00AM. 
Negative

## 2025-06-12 NOTE — DIETITIAN INITIAL EVALUATION ADULT. - PERTINENT LABORATORY DATA
Medication passed protocol.     Medication:     Disp Refills Start End     DULoxetine (CYMBALTA) 60 MG capsule 90 capsule 1 12/5/2024 --    Sig - Route: Take 1 capsule by mouth once daily     passed protocol.   Last office visit date: 4/16/25  Next appointment scheduled? Yes    Recommended Follow Up: 6 months  No Show/Cancel:   Next visit: 10/15/2025       05-26 Na139 mmol/L Glu 130 mg/dL<H> K+ 4.4 mmol/L Cr  1.53 mg/dL<H> BUN 32 mg/dL<H> 05-26 Phos 3.1 mg/dL

## 2025-06-13 NOTE — ED PROVIDER NOTE - IV ALTEPLASE DOOR HIDDEN
Caller: Derek Funez    Relationship to patient: Self    Best call back number: [phone]      New or established patient?  [] New  [x] Established    Date of discharge: 06/06/2025    Facility discharged from: [facility] COULD NOT WARM TRANSFER TO OFFICE. PLEASE CONTACT PATIENT BACK ASAP TO SCHEDULE APPOINTMENT WITH NEW PROVIDER FOR HOSPITAL FOLLOW UP.    show